# Patient Record
Sex: MALE | Race: WHITE | NOT HISPANIC OR LATINO | Employment: OTHER | URBAN - NONMETROPOLITAN AREA
[De-identification: names, ages, dates, MRNs, and addresses within clinical notes are randomized per-mention and may not be internally consistent; named-entity substitution may affect disease eponyms.]

---

## 2017-03-07 ENCOUNTER — OFFICE VISIT (OUTPATIENT)
Dept: INTERNAL MEDICINE | Facility: CLINIC | Age: 57
End: 2017-03-07

## 2017-03-07 VITALS
HEART RATE: 72 BPM | TEMPERATURE: 98.5 F | BODY MASS INDEX: 36.26 KG/M2 | SYSTOLIC BLOOD PRESSURE: 126 MMHG | OXYGEN SATURATION: 98 % | WEIGHT: 231 LBS | DIASTOLIC BLOOD PRESSURE: 80 MMHG | HEIGHT: 67 IN

## 2017-03-07 DIAGNOSIS — N40.1 BENIGN NON-NODULAR PROSTATIC HYPERPLASIA WITH LOWER URINARY TRACT SYMPTOMS: ICD-10-CM

## 2017-03-07 DIAGNOSIS — I10 ESSENTIAL HYPERTENSION: Primary | ICD-10-CM

## 2017-03-07 DIAGNOSIS — K29.50 CHRONIC GASTRITIS WITHOUT BLEEDING, UNSPECIFIED GASTRITIS TYPE: ICD-10-CM

## 2017-03-07 PROCEDURE — 99214 OFFICE O/P EST MOD 30 MIN: CPT | Performed by: PHYSICIAN ASSISTANT

## 2017-03-07 RX ORDER — AMLODIPINE BESYLATE 2.5 MG/1
2.5 TABLET ORAL DAILY
Qty: 90 TABLET | Refills: 3 | Status: SHIPPED | OUTPATIENT
Start: 2017-03-07 | End: 2017-12-11 | Stop reason: SDUPTHER

## 2017-03-07 RX ORDER — PANTOPRAZOLE SODIUM 40 MG/1
40 TABLET, DELAYED RELEASE ORAL DAILY
Qty: 90 TABLET | Refills: 3 | Status: SHIPPED | OUTPATIENT
Start: 2017-03-07 | End: 2017-12-11 | Stop reason: SDUPTHER

## 2017-03-07 RX ORDER — PRAVASTATIN SODIUM 40 MG
40 TABLET ORAL DAILY
Qty: 90 TABLET | Refills: 3 | Status: SHIPPED | OUTPATIENT
Start: 2017-03-07 | End: 2017-12-11 | Stop reason: SDUPTHER

## 2017-03-07 RX ORDER — CARVEDILOL 12.5 MG/1
12.5 TABLET ORAL 2 TIMES DAILY WITH MEALS
Qty: 180 TABLET | Refills: 3 | Status: SHIPPED | OUTPATIENT
Start: 2017-03-07 | End: 2017-12-11 | Stop reason: SDUPTHER

## 2017-03-07 RX ORDER — HYDROCHLOROTHIAZIDE 12.5 MG/1
12.5 CAPSULE, GELATIN COATED ORAL DAILY
Qty: 90 CAPSULE | Refills: 3 | Status: SHIPPED | OUTPATIENT
Start: 2017-03-07 | End: 2017-12-18 | Stop reason: SDUPTHER

## 2017-03-07 RX ORDER — TAMSULOSIN HYDROCHLORIDE 0.4 MG/1
1 CAPSULE ORAL NIGHTLY
Qty: 90 CAPSULE | Refills: 3 | Status: SHIPPED | OUTPATIENT
Start: 2017-03-07 | End: 2017-12-11 | Stop reason: SDUPTHER

## 2017-03-07 NOTE — PROGRESS NOTES
Chris Mejia is a 57 y.o. male.     Subjective   History of Present Illness   Here today for follow up of hypertension. BP well controlled with amlodipine, coreg and HCTZ.  Denies CP, SOA, edema or palpitations.     Chronic gastritis: well controlled with Protonix daily. Denies epigastric pain.         The following portions of the patient's history were reviewed and updated as appropriate: allergies, current medications, past family history, past medical history, past social history, past surgical history and problem list.    Review of Systems    Constitutional: Negative for appetite change, chills, fatigue, fever and unexpected weight change.   HENT: Negative for congestion, ear pain, hearing loss, nosebleeds, postnasal drip, rhinorrhea, sore throat, tinnitus and trouble swallowing.    Eyes: Negative for photophobia, discharge and visual disturbance.   Respiratory: Negative for cough, chest tightness, shortness of breath and wheezing.    Cardiovascular: Negative for chest pain, palpitations and leg swelling.   Gastrointestinal: Negative for abdominal distention, abdominal pain, blood in stool, constipation, diarrhea, nausea and vomiting.   Endocrine: Negative for cold intolerance, heat intolerance, polydipsia, polyphagia and polyuria.   Musculoskeletal: Negative for arthralgias, back pain, joint swelling, myalgias, neck pain and neck stiffness.   Skin: Negative for color change, pallor, rash and wound.   Allergic/Immunologic: Negative for environmental allergies, food allergies and immunocompromised state.   Neurological: Negative for dizziness, tremors, seizures, weakness, numbness and headaches.   Hematological: Negative for adenopathy. Does not bruise/bleed easily.   Psychiatric/Behavioral: Negative for sleep disturbances, agitation, behavioral problems, confusion, hallucinations, self-injury and suicidal ideas. The patient is not nervous/anxious.      Objective    Physical Exam  Constitutional: Oriented to  "person, place, and time. Appears well-developed and well-nourished.   HENT:   Head: Normocephalic and atraumatic.   Eyes: EOM are normal. Pupils are equal, round, and reactive to light.   Neck: Normal range of motion. Neck supple.   Cardiovascular: Normal rate, regular rhythm and normal heart sounds.    Pulmonary/Chest: Effort normal and breath sounds normal. No respiratory distress.  Has no wheezes or rales. Exhibits no chest wall tenderness.   Abdominal: Soft. Bowel sounds are normal. Exhibits no distension and no mass. There is no tenderness.   Musculoskeletal: Normal range of motion. Exhibits no tenderness.   Neurological: Alert and oriented to person, place, and time.   Skin: Skin is warm and dry.   Psychiatric: Has a normal mood and affect. Behavior is normal. Judgment and thought content normal.       Visit Vitals   • /80   • Pulse 72   • Temp 98.5 °F (36.9 °C)   • Ht 67\" (170.2 cm)   • Wt 231 lb (105 kg)   • SpO2 98%   • BMI 36.18 kg/m2       Nursing note and vitals reviewed.        Assessment/Plan   Chris was seen today for follow-up and hypertension.    Diagnoses and all orders for this visit:    Essential hypertension    Benign non-nodular prostatic hyperplasia with lower urinary tract symptoms  -     tamsulosin (FLOMAX) 0.4 MG capsule 24 hr capsule; Take 1 capsule by mouth Every Night.    Chronic gastritis without bleeding, unspecified gastritis type    Other orders  -     pantoprazole (PROTONIX) 40 MG EC tablet; Take 1 tablet by mouth Daily.  -     amLODIPine (NORVASC) 2.5 MG tablet; Take 1 tablet by mouth Daily.  -     carvedilol (COREG) 12.5 MG tablet; Take 1 tablet by mouth 2 (Two) Times a Day With Meals.  -     hydrochlorothiazide (MICROZIDE) 12.5 MG capsule; Take 1 capsule by mouth Daily.  -     pravastatin (PRAVACHOL) 40 MG tablet; Take 1 tablet by mouth Daily.      HTN stable with current medications: continue.     Gastritis: stable with Protonix. Continue. F/u with Dr. Lopez in 1 year as " scheduled.

## 2017-04-10 ENCOUNTER — OFFICE VISIT (OUTPATIENT)
Dept: INTERNAL MEDICINE | Facility: CLINIC | Age: 57
End: 2017-04-10

## 2017-04-10 VITALS
HEART RATE: 68 BPM | WEIGHT: 230 LBS | SYSTOLIC BLOOD PRESSURE: 126 MMHG | TEMPERATURE: 98 F | HEIGHT: 67 IN | DIASTOLIC BLOOD PRESSURE: 86 MMHG | BODY MASS INDEX: 36.1 KG/M2 | OXYGEN SATURATION: 98 %

## 2017-04-10 DIAGNOSIS — G11.9: ICD-10-CM

## 2017-04-10 DIAGNOSIS — F51.01 PRIMARY INSOMNIA: Primary | ICD-10-CM

## 2017-04-10 DIAGNOSIS — Z00.00 WELCOME TO MEDICARE PREVENTIVE VISIT: ICD-10-CM

## 2017-04-10 DIAGNOSIS — G11.9 CEREBRAL ATAXIA (HCC): ICD-10-CM

## 2017-04-10 PROCEDURE — G0402 INITIAL PREVENTIVE EXAM: HCPCS | Performed by: PHYSICIAN ASSISTANT

## 2017-04-10 PROCEDURE — G0403 EKG FOR INITIAL PREVENT EXAM: HCPCS | Performed by: PHYSICIAN ASSISTANT

## 2017-04-10 PROCEDURE — 99396 PREV VISIT EST AGE 40-64: CPT | Performed by: PHYSICIAN ASSISTANT

## 2017-04-10 PROCEDURE — 96160 PT-FOCUSED HLTH RISK ASSMT: CPT | Performed by: PHYSICIAN ASSISTANT

## 2017-04-10 RX ORDER — AMITRIPTYLINE HYDROCHLORIDE 50 MG/1
TABLET, FILM COATED ORAL
Qty: 90 TABLET | Refills: 3 | Status: SHIPPED | OUTPATIENT
Start: 2017-04-10 | End: 2018-03-02

## 2017-04-10 RX ORDER — CARBAMAZEPINE 200 MG/1
200 TABLET ORAL NIGHTLY PRN
Qty: 90 TABLET | Refills: 3 | Status: SHIPPED | OUTPATIENT
Start: 2017-04-10 | End: 2018-06-04 | Stop reason: SDUPTHER

## 2017-04-10 NOTE — PROGRESS NOTES
QUICK REFERENCE INFORMATION:  The ABCs of the Annual Wellness Visit    [facility]    HEALTH RISK ASSESSMENT    1960    Recent Hospitalizations:  No recent hospitalization(s)..      Current Medical Providers:  Patient Care Team:  Dallas Deleon DO as PCP - General (Family Medicine)      Smoking Status:  History   Smoking Status   • Never Smoker   Smokeless Tobacco   • Never Used       Alcohol Consumption:  History   Alcohol Use No       Depression Screen:   PHQ-9 Depression Screening 4/10/2017   Little interest or pleasure in doing things 1   Feeling down, depressed, or hopeless 0   Trouble falling or staying asleep, or sleeping too much 3   Feeling tired or having little energy 1   Poor appetite or overeating 1   Feeling bad about yourself - or that you are a failure or have let yourself or your family down 1   Trouble concentrating on things, such as reading the newspaper or watching television 0   Moving or speaking so slowly that other people could have noticed. Or the opposite - being so fidgety or restless that you have been moving around a lot more than usual 1   Thoughts that you would be better off dead, or of hurting yourself in some way 0   PHQ-9 Total Score 8   If you checked off any problems, how difficult have these problems made it for you to do your work, take care of things at home, or get along with other people? Not difficult at all       Health Habits and Functional and Cognitive Screening:  Functional & Cognitive Status 4/10/2017   Do you have difficulty preparing food and eating? No   Do you have difficulty bathing yourself? No   Do you have difficulty getting dressed? No   Do you have difficulty using the toilet? No   Do you have difficulty moving around from place to place? No   In the past year have you fallen or experienced a near fall? No   Do you need help using the phone?  No   Are you deaf or do you have serious difficulty hearing?  Yes   Do you need help with  transportation? No   Do you need help shopping? No   Do you need help preparing meals?  No   Do you need help with housework?  No   Do you need help with laundry? No   Do you need help taking your medications? No   Do you need help managing money? No   Do you have difficulty concentrating, remembering or making decisions? Yes       Health Habits  Current Diet: Limited Junk Food  Dental Exam: Not up to date  Eye Exam: Not up to date  Exercise (times per week): 7 times per week for knees, once weekly whole body  Current Exercise Activities Include:  (leg exercise, sit ups, working outside)        Does the patient have evidence of cognitive impairment? 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     Asprin use counseling?yes      Recent Lab Results:  CMP:  Lab Results   Component Value Date    GLU 95 06/27/2016    BUN 18 06/27/2016    CREATININE 0.90 06/27/2016    EGFRIFNONA 87 06/27/2016    BCR 20.0 06/27/2016     06/27/2016    K 4.2 06/27/2016    CO2 31.0 06/27/2016    CALCIUM 9.6 06/27/2016    PROTENTOTREF 7.8 06/27/2016    ALBUMIN 4.20 06/27/2016    LABIL2 1.2 06/27/2016    BILITOT 0.8 06/27/2016    ALKPHOS 50 06/27/2016    AST 32 06/27/2016    ALT 39 06/27/2016     Lipid Panel:  Lab Results   Component Value Date    CHLPL 147 06/27/2016    TRIG 103 06/27/2016    HDL 49 06/27/2016    VLDL 20.6 06/27/2016     LDL:  Lab Results   Component Value Date    LDLCHOLDIREC 75 06/27/2016     HbA1c:     Urine Microalbumin:     Visual Acuity:  No exam data present    Age-appropriate Screening Schedule:  Refer to the list below for future screening recommendations based on patient's age, sex and/or medical conditions. Orders for these recommended tests are listed in the plan section. The patient has been provided with a written plan.    Health Maintenance   Topic Date Due   • INFLUENZA VACCINE  10/02/2017 (Originally 8/1/2016)   • LIPID PANEL  06/27/2017   • COLONOSCOPY  03/12/2018   • TDAP/TD VACCINES (2 -  Td) 01/01/2020        Subjective   History of Present Illness    Chris Mejia is a 57 y.o. male established patient presenting for a Welcome to Medicare Visit.     The following portions of the patient's history were reviewed and updated as appropriate: allergies, current medications, past family history, past medical history, past social history, past surgical history and problem list.    Outpatient Medications Prior to Visit   Medication Sig Dispense Refill   • amLODIPine (NORVASC) 2.5 MG tablet Take 1 tablet by mouth Daily. 90 tablet 3   • aspirin  MG tablet Take  by mouth.     • carvedilol (COREG) 12.5 MG tablet Take 1 tablet by mouth 2 (Two) Times a Day With Meals. 180 tablet 3   • hydrochlorothiazide (MICROZIDE) 12.5 MG capsule Take 1 capsule by mouth Daily. 90 capsule 3   • loratadine (CLARITIN) 10 MG tablet      • pantoprazole (PROTONIX) 40 MG EC tablet Take 1 tablet by mouth Daily. 90 tablet 3   • pravastatin (PRAVACHOL) 40 MG tablet Take 1 tablet by mouth Daily. 90 tablet 3   • tamsulosin (FLOMAX) 0.4 MG capsule 24 hr capsule Take 1 capsule by mouth Every Night. 90 capsule 3   • amitriptyline (ELAVIL) 50 MG tablet Take 1 pill at supper time daily 30 tablet 0   • carBAMazepine (TEGretol) 200 MG tablet Take  by mouth 2 (two) times a day.       No facility-administered medications prior to visit.        Patient Active Problem List   Diagnosis   • Tubular adenoma of colon   • Trigeminal neuralgia   • Spinocerebellar disorder   • RA (rheumatoid arthritis)   • Insomnia   • Hypertension   • Hypercholesteremia   • Acid reflux   • Anxiety   • Vitamin B12 deficiency   • Vitamin D deficiency   • DENZEL (obstructive sleep apnea)   • Right upper quadrant pain   • Abdominal pain   • Acute sinusitis   • Atopic rhinitis   • Ataxia   • Ataxic gait   • Cellulitis   • Chest pain   • Dizziness   • Edema   • Fall   • Gastritis   • Headache   • Closed fracture of left hip   • Acute lymphadenitis   • Shoulder pain   •  Tick bite   • Encounter for screening colonoscopy       Advanced Care Planning:  has NO advanced directive - information provided to the patient today    Identification of Risk Factors:  Risk factors include: cardiovascular risk, cognitive impairment and hearing limitations.    Review of Systems   Constitutional: Negative for appetite change, chills, fatigue, fever and unexpected weight change.   HENT: Negative for congestion, ear pain, hearing loss, nosebleeds, postnasal drip, rhinorrhea, sore throat, tinnitus and trouble swallowing.    Eyes: Negative for photophobia, discharge and visual disturbance.   Respiratory: Negative for cough, chest tightness, shortness of breath and wheezing.    Cardiovascular: Negative for chest pain, palpitations and leg swelling.   Gastrointestinal: Negative for abdominal distention, abdominal pain, blood in stool, constipation, diarrhea, nausea and vomiting.   Endocrine: Negative for cold intolerance, heat intolerance, polydipsia, polyphagia and polyuria.   Musculoskeletal: Positive for arthralgias (knee pain). Negative for back pain, joint swelling, myalgias, neck pain and neck stiffness.   Skin: Negative for color change, pallor, rash and wound.   Allergic/Immunologic: Negative for environmental allergies, food allergies and immunocompromised state.   Neurological: Negative for dizziness, tremors, seizures, weakness, numbness and headaches.   Hematological: Negative for adenopathy. Does not bruise/bleed easily.   Psychiatric/Behavioral: Negative for agitation, behavioral problems, confusion, hallucinations, self-injury and suicidal ideas. The patient is not nervous/anxious.        Compared to one year ago, the patient feels his physical health is the same.  Compared to one year ago, the patient feels his mental health is better.    Objective    Physical Exam   Constitutional: He is oriented to person, place, and time. He appears well-developed and well-nourished. No distress.  "  HENT:   Head: Normocephalic and atraumatic.   Eyes: EOM are normal. Pupils are equal, round, and reactive to light.   Neck: Normal range of motion. Neck supple. No thyromegaly present.   Cardiovascular: Normal rate, regular rhythm and normal heart sounds.  Exam reveals no gallop and no friction rub.    No murmur heard.  Pulmonary/Chest: Effort normal and breath sounds normal. No respiratory distress. He has no wheezes. He has no rales. He exhibits no tenderness.   Abdominal: Soft. Bowel sounds are normal. He exhibits no distension. There is no tenderness.   Musculoskeletal: Normal range of motion.   Lymphadenopathy:     He has no cervical adenopathy.   Neurological: He is alert and oriented to person, place, and time. Coordination abnormal.   Ataxic gait   Skin: Skin is warm and dry. He is not diaphoretic.   Psychiatric: He has a normal mood and affect. His behavior is normal. Judgment and thought content normal.   Nursing note and vitals reviewed.        Vitals:    04/10/17 1028   BP: 126/86   Pulse: 68   Temp: 98 °F (36.7 °C)   SpO2: 98%   Weight: 230 lb (104 kg)   Height: 67\" (170.2 cm)       Body mass index is 36.02 kg/(m^2).  Discussed the patient's BMI with him. The BMI is above average; BMI management plan is completed.    Procedure     ECG 12 Lead  Date/Time: 4/10/2017 11:11 AM  Performed by: BRIANA NGUYEN  Authorized by: BRIANA NGUYEN   Comparison: not compared with previous ECG   Previous ECG: no previous ECG available  Rhythm: sinus bradycardia  Rate: bradycardic  BPM: 58  Conduction comments: Possible 1st degree block  ST Segments: ST segments normal  T Waves: T waves normal  QRS axis: normal  Other: no other findings  Clinical impression: normal ECG               Assessment/Plan   Patient Self-Management and Personalized Health Advice  The patient has been provided with information about: fall prevention and designing advance directives and preventive services including:   · Advanced " directives: has NO advanced directive - information provided to the patient today.    Visit Diagnoses:    ICD-10-CM ICD-9-CM   1. Primary insomnia F51.01 307.42   2. 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 Spinocerebellar disorder G11.8 334.9   4. Welcome to Medicare preventive visit Z00.00 V70.0       Orders Placed This Encounter   Procedures   • ECG 12 Lead     This order was created via procedure documentation       Outpatient Encounter Prescriptions as of 4/10/2017   Medication Sig Dispense Refill   • amitriptyline (ELAVIL) 50 MG tablet Take 1 pill at bedtime daily. 90 tablet 3   • amLODIPine (NORVASC) 2.5 MG tablet Take 1 tablet by mouth Daily. 90 tablet 3   • aspirin  MG tablet Take  by mouth.     • carBAMazepine (TEGretol) 200 MG tablet Take 1 tablet by mouth At Night As Needed (headache). 90 tablet 3   • carvedilol (COREG) 12.5 MG tablet Take 1 tablet by mouth 2 (Two) Times a Day With Meals. 180 tablet 3   • hydrochlorothiazide (MICROZIDE) 12.5 MG capsule Take 1 capsule by mouth Daily. 90 capsule 3   • loratadine (CLARITIN) 10 MG tablet      • pantoprazole (PROTONIX) 40 MG EC tablet Take 1 tablet by mouth Daily. 90 tablet 3   • pravastatin (PRAVACHOL) 40 MG tablet Take 1 tablet by mouth Daily. 90 tablet 3   • tamsulosin (FLOMAX) 0.4 MG capsule 24 hr capsule Take 1 capsule by mouth Every Night. 90 capsule 3   • [DISCONTINUED] amitriptyline (ELAVIL) 50 MG tablet Take 1 pill at supper time daily 30 tablet 0   • [DISCONTINUED] carBAMazepine (TEGretol) 200 MG tablet Take  by mouth 2 (two) times a day.       No facility-administered encounter medications on file as of 4/10/2017.        Reviewed use of high risk medication in the elderly: yes  Reviewed for potential of harmful drug interactions in the elderly: yes    Follow Up:  No Follow-up on file.     An After Visit Summary and PPPS with all of these plans were given to the patient.

## 2017-04-10 NOTE — PATIENT INSTRUCTIONS
Medicare Wellness  Personal Prevention Plan of Service     Date of Office Visit:  04/10/2017  Encounter Provider:  CRISTIAN Ascencio  Place of Service:  [facility]  Patient Name: Chris Mejia  :  1960    As part of the Medicare Wellness portion of your visit today, we are providing you with this personalized preventive plan of services (PPPS). This plan is based upon recommendations of the [place] Preventive Services Task Force (USPSTF) and the Advisory Committee on Immunization Practices (ACIP).    This lists the preventive care services that should be considered, and provides dates of when you are due. Items listed as completed are up-to-date and do not require any further intervention.    Health Maintenance   Topic Date Due   • HEPATITIS C SCREENING  10/02/2017 (Originally 2016)   • INFLUENZA VACCINE  10/02/2017 (Originally 2016)   • LIPID PANEL  2017   • COLONOSCOPY  2018   • MEDICARE ANNUAL WELLNESS  04/10/2018   • TDAP/TD VACCINES (2 - Td) 2020       Orders Placed This Encounter   Procedures   • ECG 12 Lead     This order was created via procedure documentation       No Follow-up on file.

## 2017-04-18 ENCOUNTER — TELEPHONE (OUTPATIENT)
Dept: INTERNAL MEDICINE | Facility: CLINIC | Age: 57
End: 2017-04-18

## 2017-04-18 DIAGNOSIS — R51.9 FREQUENT HEADACHES: ICD-10-CM

## 2017-04-18 DIAGNOSIS — R27.0 ATAXIA: Primary | ICD-10-CM

## 2017-04-18 NOTE — TELEPHONE ENCOUNTER
----- Message from Oksana Sahu sent at 4/18/2017  3:14 PM EDT -----  Contact: Diana Patients Wife  Diana called the office stating that [facility] pharmacy has been trying to get in touch with our office regarding 2 medications that Susana had sent in. They state that there is 2 alternatives that they are wanting to change them to due to the payment for the patient. The patients wife would like an update once you get a chance to contact [facility] mail order.

## 2017-04-18 NOTE — TELEPHONE ENCOUNTER
KE WANTS AMITRIPTYLINE CHANGED TO TRAZODONE, AND CARBAMAZIPINE TO CARBAMAZEPINE CHW? IT WILL BE CHEAPER ON PT, CAN WE CHANGE, LET PT WIFE KNOW WHAT WE DO

## 2017-04-21 NOTE — TELEPHONE ENCOUNTER
Discussed with his wife. After further review of the last neurology note it does not appear that he is supposed to be using Tegretol so I advised her not to have it filled. Request for Trazodone not advised due to potential worsening of headaches. Recommended using Elavil until follow up with neurology when med changes may be made. She agreed to this.

## 2017-09-15 ENCOUNTER — OFFICE VISIT (OUTPATIENT)
Dept: INTERNAL MEDICINE | Facility: CLINIC | Age: 57
End: 2017-09-15

## 2017-09-15 VITALS
OXYGEN SATURATION: 96 % | HEIGHT: 67 IN | BODY MASS INDEX: 36.1 KG/M2 | HEART RATE: 70 BPM | SYSTOLIC BLOOD PRESSURE: 130 MMHG | TEMPERATURE: 98.1 F | WEIGHT: 230 LBS | DIASTOLIC BLOOD PRESSURE: 86 MMHG

## 2017-09-15 DIAGNOSIS — M19.012 BILATERAL SHOULDER REGION ARTHRITIS: Primary | ICD-10-CM

## 2017-09-15 DIAGNOSIS — F41.9 ANXIETY: ICD-10-CM

## 2017-09-15 DIAGNOSIS — Z00.00 PREVENTATIVE HEALTH CARE: ICD-10-CM

## 2017-09-15 DIAGNOSIS — M19.011 BILATERAL SHOULDER REGION ARTHRITIS: Primary | ICD-10-CM

## 2017-09-15 DIAGNOSIS — R97.20 ELEVATED PSA: ICD-10-CM

## 2017-09-15 LAB
ALBUMIN SERPL-MCNC: 4.4 G/DL (ref 3.5–5)
ALBUMIN/GLOB SERPL: 1.4 G/DL (ref 1–2)
ALP SERPL-CCNC: 51 U/L (ref 38–126)
ALT SERPL-CCNC: 36 U/L (ref 13–69)
AST SERPL-CCNC: 30 U/L (ref 15–46)
BILIRUB SERPL-MCNC: 0.7 MG/DL (ref 0.2–1.3)
BUN SERPL-MCNC: 20 MG/DL (ref 7–20)
BUN/CREAT SERPL: 22.2 (ref 6.3–21.9)
CALCIUM SERPL-MCNC: 9.9 MG/DL (ref 8.4–10.2)
CHLORIDE SERPL-SCNC: 104 MMOL/L (ref 98–107)
CHOLEST SERPL-MCNC: 124 MG/DL (ref 0–199)
CO2 SERPL-SCNC: 29 MMOL/L (ref 26–30)
CREAT SERPL-MCNC: 0.9 MG/DL (ref 0.6–1.3)
GLOBULIN SER CALC-MCNC: 3.2 GM/DL
GLUCOSE SERPL-MCNC: 103 MG/DL (ref 74–98)
HDLC SERPL-MCNC: 43 MG/DL (ref 40–60)
LDLC SERPL CALC-MCNC: 66 MG/DL (ref 0–99)
POTASSIUM SERPL-SCNC: 4.8 MMOL/L (ref 3.5–5.1)
PROT SERPL-MCNC: 7.6 G/DL (ref 6.3–8.2)
PSA SERPL-MCNC: 2.34 NG/ML (ref 0.06–4)
SODIUM SERPL-SCNC: 142 MMOL/L (ref 137–145)
TRIGL SERPL-MCNC: 75 MG/DL
TSH SERPL DL<=0.005 MIU/L-ACNC: 2.25 MIU/ML (ref 0.47–4.68)
VLDLC SERPL CALC-MCNC: 15 MG/DL

## 2017-09-15 PROCEDURE — 99214 OFFICE O/P EST MOD 30 MIN: CPT | Performed by: PHYSICIAN ASSISTANT

## 2017-09-15 RX ORDER — CETIRIZINE HYDROCHLORIDE 10 MG/1
10 TABLET ORAL DAILY
COMMUNITY
Start: 2017-08-07 | End: 2021-02-10

## 2017-09-15 RX ORDER — BUSPIRONE HYDROCHLORIDE 10 MG/1
10 TABLET ORAL 2 TIMES DAILY PRN
Qty: 180 TABLET | Refills: 1 | Status: SHIPPED | OUTPATIENT
Start: 2017-09-15 | End: 2018-03-02 | Stop reason: SDUPTHER

## 2017-09-15 NOTE — PROGRESS NOTES
Chris Mejia is a 57 y.o. male.     Subjective   History of Present Illness   Here today with concern of increased anxiety which he feels is due to babysitting grand kids who do not mind him.   Also concerned with bilateral shoulder pain which has been bothersome more than usual. The right shoulder is worse than the left. Previously saw Dr. Gaytan who was giving him steroid injections which were helpful but he has not had one in the last few years.         The following portions of the patient's history were reviewed and updated as appropriate: allergies, current medications, past family history, past medical history, past social history, past surgical history and problem list.    Review of Systems    Constitutional: Negative for appetite change, chills, fatigue, fever and unexpected weight change.   HENT: Negative for congestion, ear pain, hearing loss, nosebleeds, postnasal drip, rhinorrhea, sore throat, tinnitus and trouble swallowing.    Eyes: Negative for photophobia, discharge and visual disturbance.   Respiratory: Negative for cough, chest tightness, shortness of breath and wheezing.    Cardiovascular: Negative for chest pain, palpitations and leg swelling.   Gastrointestinal: Negative for abdominal distention, abdominal pain, blood in stool, constipation, diarrhea, nausea and vomiting.   Endocrine: Negative for cold intolerance, heat intolerance, polydipsia, polyphagia and polyuria.   Musculoskeletal: bilateral shoulder pain. Negative for back pain, joint swelling, myalgias, neck pain and neck stiffness.   Skin: Negative for color change, pallor, rash and wound.   Allergic/Immunologic: Negative for environmental allergies, food allergies and immunocompromised state.   Neurological: Negative for dizziness, tremors, seizures, weakness, numbness and headaches.   Hematological: Negative for adenopathy. Does not bruise/bleed easily.   Psychiatric/Behavioral: anxious. Negative for sleep disturbances, agitation,  "behavioral problems, confusion, hallucinations, self-injury and suicidal ideas.     Objective    Physical Exam  Constitutional: Oriented to person, place, and time. Appears well-developed and well-nourished.   HENT:   Head: Normocephalic and atraumatic.   Eyes: EOM are normal. Pupils are equal, round, and reactive to light.   Neck: Normal range of motion. Neck supple.   Cardiovascular: Normal rate, regular rhythm and normal heart sounds.    Pulmonary/Chest: Effort normal and breath sounds normal. No respiratory distress.  Has no wheezes or rales. Exhibits no chest wall tenderness.   Abdominal: Soft. Bowel sounds are normal. Exhibits no distension and no mass. There is no tenderness.   Musculoskeletal: tenderness bilateral shoulder regions, pain with ROM. Normal range of motion.   Neurological: Alert and oriented to person, place, and time.   Skin: Skin is warm and dry.   Psychiatric: Has a normal mood and affect. Behavior is normal. Judgment and thought content normal.       /86  Pulse 70  Temp 98.1 °F (36.7 °C)  Ht 67\" (170.2 cm)  Wt 230 lb (104 kg)  SpO2 96%  BMI 36.02 kg/m2    Nursing note and vitals reviewed.        Assessment/Plan   Chris was seen today for follow-up, anxiety and shoulder pain.    Diagnoses and all orders for this visit:    Bilateral shoulder region arthritis  -     Ambulatory Referral to Orthopedic Surgery    Anxiety  -     busPIRone (BUSPAR) 10 MG tablet; Take 1 tablet by mouth 2 (Two) Times a Day As Needed (anxiety).  -     TSH    Preventative health care  -     Lipid Panel  -     Comprehensive Metabolic Panel    Elevated PSA  -     PSA               "

## 2017-11-20 ENCOUNTER — OFFICE VISIT (OUTPATIENT)
Dept: INTERNAL MEDICINE | Facility: CLINIC | Age: 57
End: 2017-11-20

## 2017-11-20 VITALS
BODY MASS INDEX: 36.47 KG/M2 | DIASTOLIC BLOOD PRESSURE: 84 MMHG | HEIGHT: 67 IN | SYSTOLIC BLOOD PRESSURE: 124 MMHG | RESPIRATION RATE: 16 BRPM | HEART RATE: 88 BPM | WEIGHT: 232.38 LBS | TEMPERATURE: 98.6 F | OXYGEN SATURATION: 95 %

## 2017-11-20 DIAGNOSIS — J10.1 INFLUENZA A VIRUS PRESENT: Primary | ICD-10-CM

## 2017-11-20 LAB
EXPIRATION DATE: ABNORMAL
FLUAV AG NPH QL: ABNORMAL
FLUBV AG NPH QL: ABNORMAL
INTERNAL CONTROL: ABNORMAL
Lab: ABNORMAL

## 2017-11-20 PROCEDURE — 99213 OFFICE O/P EST LOW 20 MIN: CPT | Performed by: NURSE PRACTITIONER

## 2017-11-20 PROCEDURE — 87804 INFLUENZA ASSAY W/OPTIC: CPT | Performed by: NURSE PRACTITIONER

## 2017-11-20 RX ORDER — OSELTAMIVIR PHOSPHATE 75 MG/1
75 CAPSULE ORAL 2 TIMES DAILY
Qty: 10 CAPSULE | Refills: 0 | Status: SHIPPED | OUTPATIENT
Start: 2017-11-20 | End: 2017-11-25

## 2017-11-20 RX ORDER — DEXTROMETHORPHAN HYDROBROMIDE AND PROMETHAZINE HYDROCHLORIDE 15; 6.25 MG/5ML; MG/5ML
5 SYRUP ORAL 4 TIMES DAILY PRN
Qty: 240 ML | Refills: 0 | Status: SHIPPED | OUTPATIENT
Start: 2017-11-20 | End: 2017-12-07 | Stop reason: SDUPTHER

## 2017-11-20 NOTE — PATIENT INSTRUCTIONS
Viral Respiratory Infection  A respiratory infection is an illness that affects part of the respiratory system, such as the lungs, nose, or throat. Most respiratory infections are caused by either viruses or bacteria. A respiratory infection that is caused by a virus is called a viral respiratory infection.  Common types of viral respiratory infections include:  · A cold.  · The flu (influenza).  · A respiratory syncytial virus (RSV) infection.  HOW DO I KNOW IF I HAVE A VIRAL RESPIRATORY INFECTION?  Most viral respiratory infections cause:  · A stuffy or runny nose.  · Yellow or green nasal discharge.  · A cough.  · Sneezing.  · Fatigue.  · Achy muscles.  · A sore throat.  · Sweating or chills.  · A fever.  · A headache.  HOW ARE VIRAL RESPIRATORY INFECTIONS TREATED?  If influenza is diagnosed early, it may be treated with an antiviral medicine that shortens the length of time a person has symptoms. Symptoms of viral respiratory infections may be treated with over-the-counter and prescription medicines, such as:  · Expectorants. These make it easier to cough up mucus.  · Decongestant nasal sprays.  Health care providers do not prescribe antibiotic medicines for viral infections. This is because antibiotics are designed to kill bacteria. They have no effect on viruses.  HOW DO I KNOW IF I SHOULD STAY HOME FROM WORK OR SCHOOL?  To avoid exposing others to your respiratory infection, stay home if you have:  · A fever.  · A persistent cough.  · A sore throat.  · A runny nose.  · Sneezing.  · Muscles aches.  · Headaches.  · Fatigue.  · Weakness.  · Chills.  · Sweating.  · Nausea.  HOME CARE INSTRUCTIONS  · Rest as much as possible.  · Take over-the-counter and prescription medicines only as told by your health care provider.  · Drink enough fluid to keep your urine clear or pale yellow. This helps prevent dehydration and helps loosen up mucus.  · Gargle with a salt-water mixture 3-4 times per day or as needed. To make a  salt-water mixture, completely dissolve ½-1 tsp of salt in 1 cup of warm water.  · Use nose drops made from salt water to ease congestion and soften raw skin around your nose.  · Do not drink alcohol.  · Do not use tobacco products, including cigarettes, chewing tobacco, and e-cigarettes. If you need help quitting, ask your health care provider.  SEEK MEDICAL CARE IF:  · Your symptoms last for 10 days or longer.  · Your symptoms get worse over time.  · You have a fever.  · You have severe sinus pain in your face or forehead.  · The glands in your jaw or neck become very swollen.  SEEK IMMEDIATE MEDICAL CARE IF:  · You feel pain or pressure in your chest.  · You have shortness of breath.  · You faint or feel like you will faint.  · You have severe and persistent vomiting.  · You feel confused or disoriented.     This information is not intended to replace advice given to you by your health care provider. Make sure you discuss any questions you have with your health care provider.     Document Released: 09/27/2006 Document Revised: 04/10/2017 Document Reviewed: 05/25/2016  [employer] Patient Education ©2017 [employer]

## 2017-11-20 NOTE — PROGRESS NOTES
Chief Complaint / Reason:      Chief Complaint   Patient presents with   • Cough     onset Thursday night, cough is sometimes productive, chest sore from coughing   • Headache     using a nasal spray, unsure of name       Subjective     HPI  She presents today with complaints of cough, headache, and chest tenderness.  He reports that he is congested but denies fever.  He indicates onset was Thursday night and is accompanied by his wife who has similar symptoms.  He has tried using the nasal spray and taking allergy medication with out any relief.  Denies chest pain, shortness of breath or heart palpitations.  Denies any sick contacts.  History taken from: patient    PMH/FH/Social History were reviewed and updated appropriately in the electronic medical record.     Review of Systems:   Review of Systems   Constitutional: Positive for fatigue. Negative for fever.   HENT: Positive for congestion, sinus pain, sinus pressure and sore throat.    Respiratory: Positive for cough.    Cardiovascular: Negative.    Gastrointestinal: Negative.    Skin: Negative.    Neurological: Positive for headaches.   Hematological: Negative for adenopathy.     All other systems were reviewed and are negative.  Exceptions are noted in the subjective or above.      Objective     Vital Signs  Vitals:    11/20/17 1705   BP: 124/84   Pulse: 88   Resp: 16   Temp: 98.6 °F (37 °C)   SpO2: 95%       Body mass index is 36.4 kg/(m^2).    Physical Exam   Constitutional: He is oriented to person, place, and time. He appears well-developed and well-nourished.   HENT:   Head: Normocephalic.   Right Ear: External ear normal.   Left Ear: External ear normal.   Mouth/Throat: Mucous membranes are dry. Posterior oropharyngeal erythema present.   Cardiovascular: Normal rate, regular rhythm, normal heart sounds and intact distal pulses.    Pulmonary/Chest: Effort normal and breath sounds normal.   Abdominal: Soft. Bowel sounds are normal.   Lymphadenopathy:      He has no cervical adenopathy.   Neurological: He is alert and oriented to person, place, and time.   Skin: Skin is warm and dry.   Psychiatric: He has a normal mood and affect. His behavior is normal. Judgment and thought content normal.   Nursing note and vitals reviewed.       Results Review:    I reviewed the patient's new clinical results.   Influenza A positive  Medication Review:     Current Outpatient Prescriptions:   •  Acetaminophen 500 MG coapsule, , Disp: , Rfl:   •  amitriptyline (ELAVIL) 50 MG tablet, Take 1 pill at bedtime daily., Disp: 90 tablet, Rfl: 3  •  amLODIPine (NORVASC) 2.5 MG tablet, Take 1 tablet by mouth Daily., Disp: 90 tablet, Rfl: 3  •  aspirin  MG tablet, Take  by mouth., Disp: , Rfl:   •  busPIRone (BUSPAR) 10 MG tablet, Take 1 tablet by mouth 2 (Two) Times a Day As Needed (anxiety)., Disp: 180 tablet, Rfl: 1  •  carBAMazepine (TEGretol) 200 MG tablet, Take 1 tablet by mouth At Night As Needed (headache)., Disp: 90 tablet, Rfl: 3  •  carvedilol (COREG) 12.5 MG tablet, Take 1 tablet by mouth 2 (Two) Times a Day With Meals., Disp: 180 tablet, Rfl: 3  •  cetirizine (zyrTEC) 10 MG tablet, Daily., Disp: , Rfl:   •  hydrochlorothiazide (MICROZIDE) 12.5 MG capsule, Take 1 capsule by mouth Daily., Disp: 90 capsule, Rfl: 3  •  pantoprazole (PROTONIX) 40 MG EC tablet, Take 1 tablet by mouth Daily., Disp: 90 tablet, Rfl: 3  •  pravastatin (PRAVACHOL) 40 MG tablet, Take 1 tablet by mouth Daily., Disp: 90 tablet, Rfl: 3  •  tamsulosin (FLOMAX) 0.4 MG capsule 24 hr capsule, Take 1 capsule by mouth Every Night., Disp: 90 capsule, Rfl: 3  •  oseltamivir (TAMIFLU) 75 MG capsule, Take 1 capsule by mouth 2 (Two) Times a Day for 5 days., Disp: 10 capsule, Rfl: 0  •  promethazine-dextromethorphan (PROMETHAZINE-DM) 6.25-15 MG/5ML syrup, Take 5 mL by mouth 4 (Four) Times a Day As Needed for Cough., Disp: 240 mL, Rfl: 0    Assessment/Plan   Chris was seen today for cough and headache.    Diagnoses  and all orders for this visit:    Influenza A virus present  -     promethazine-dextromethorphan (PROMETHAZINE-DM) 6.25-15 MG/5ML syrup; Take 5 mL by mouth 4 (Four) Times a Day As Needed for Cough.  -     oseltamivir (TAMIFLU) 75 MG capsule; Take 1 capsule by mouth 2 (Two) Times a Day for 5 days.  Recommend resting at home, increased fluid intake, analgesics and antipyretics.  Good handwashing and oral hygiene. Avoid spreading the flu by covering your mouth.  Recommend getting the flu vaccine every year.    Return if symptoms worsen or fail to improve.    Silvia Resendiz, APRN  11/20/2017

## 2017-12-07 DIAGNOSIS — J10.1 INFLUENZA A VIRUS PRESENT: ICD-10-CM

## 2017-12-07 DIAGNOSIS — N40.1 BENIGN NON-NODULAR PROSTATIC HYPERPLASIA WITH LOWER URINARY TRACT SYMPTOMS: ICD-10-CM

## 2017-12-07 RX ORDER — CARVEDILOL 12.5 MG/1
12.5 TABLET ORAL 2 TIMES DAILY WITH MEALS
Qty: 180 TABLET | Refills: 3 | Status: CANCELLED | OUTPATIENT
Start: 2017-12-07

## 2017-12-07 RX ORDER — PANTOPRAZOLE SODIUM 40 MG/1
40 TABLET, DELAYED RELEASE ORAL DAILY
Qty: 90 TABLET | Refills: 3 | Status: CANCELLED | OUTPATIENT
Start: 2017-12-07

## 2017-12-07 RX ORDER — TAMSULOSIN HYDROCHLORIDE 0.4 MG/1
1 CAPSULE ORAL NIGHTLY
Qty: 90 CAPSULE | Refills: 3 | Status: CANCELLED | OUTPATIENT
Start: 2017-12-07

## 2017-12-07 RX ORDER — AMLODIPINE BESYLATE 2.5 MG/1
2.5 TABLET ORAL DAILY
Qty: 90 TABLET | Refills: 3 | Status: CANCELLED | OUTPATIENT
Start: 2017-12-07

## 2017-12-07 RX ORDER — PRAVASTATIN SODIUM 40 MG
40 TABLET ORAL DAILY
Qty: 90 TABLET | Refills: 3 | Status: CANCELLED | OUTPATIENT
Start: 2017-12-07

## 2017-12-07 NOTE — TELEPHONE ENCOUNTER
Promehazine-dextromethophran needs to go to [facility] pharmacy in [place].    Other meds need to go to [facility] mail delivery      Please let know when complete.

## 2017-12-11 ENCOUNTER — FLU SHOT (OUTPATIENT)
Dept: INTERNAL MEDICINE | Facility: CLINIC | Age: 57
End: 2017-12-11

## 2017-12-11 DIAGNOSIS — N40.1 BENIGN NON-NODULAR PROSTATIC HYPERPLASIA WITH LOWER URINARY TRACT SYMPTOMS: ICD-10-CM

## 2017-12-11 DIAGNOSIS — Z23 NEED FOR INFLUENZA VACCINE: ICD-10-CM

## 2017-12-11 PROCEDURE — G0008 ADMIN INFLUENZA VIRUS VAC: HCPCS | Performed by: PHYSICIAN ASSISTANT

## 2017-12-11 PROCEDURE — 90686 IIV4 VACC NO PRSV 0.5 ML IM: CPT | Performed by: PHYSICIAN ASSISTANT

## 2017-12-11 RX ORDER — PRAVASTATIN SODIUM 40 MG
40 TABLET ORAL DAILY
Qty: 90 TABLET | Refills: 0 | Status: SHIPPED | OUTPATIENT
Start: 2017-12-11 | End: 2018-02-23 | Stop reason: SDUPTHER

## 2017-12-11 RX ORDER — TAMSULOSIN HYDROCHLORIDE 0.4 MG/1
1 CAPSULE ORAL NIGHTLY
Qty: 90 CAPSULE | Refills: 0 | Status: SHIPPED | OUTPATIENT
Start: 2017-12-11 | End: 2018-02-23 | Stop reason: SDUPTHER

## 2017-12-11 RX ORDER — DEXTROMETHORPHAN HYDROBROMIDE AND PROMETHAZINE HYDROCHLORIDE 15; 6.25 MG/5ML; MG/5ML
5 SYRUP ORAL 4 TIMES DAILY PRN
Qty: 240 ML | Refills: 0 | Status: SHIPPED | OUTPATIENT
Start: 2017-12-11 | End: 2017-12-12 | Stop reason: SDUPTHER

## 2017-12-11 RX ORDER — AMLODIPINE BESYLATE 2.5 MG/1
2.5 TABLET ORAL DAILY
Qty: 90 TABLET | Refills: 0 | Status: SHIPPED | OUTPATIENT
Start: 2017-12-11 | End: 2018-02-23 | Stop reason: SDUPTHER

## 2017-12-11 RX ORDER — CARVEDILOL 12.5 MG/1
12.5 TABLET ORAL 2 TIMES DAILY WITH MEALS
Qty: 180 TABLET | Refills: 0 | Status: SHIPPED | OUTPATIENT
Start: 2017-12-11 | End: 2018-02-23 | Stop reason: SDUPTHER

## 2017-12-11 RX ORDER — PANTOPRAZOLE SODIUM 40 MG/1
40 TABLET, DELAYED RELEASE ORAL DAILY
Qty: 90 TABLET | Refills: 0 | Status: SHIPPED | OUTPATIENT
Start: 2017-12-11 | End: 2018-02-23 | Stop reason: SDUPTHER

## 2017-12-12 DIAGNOSIS — J10.1 INFLUENZA A VIRUS PRESENT: ICD-10-CM

## 2017-12-12 RX ORDER — DEXTROMETHORPHAN HYDROBROMIDE AND PROMETHAZINE HYDROCHLORIDE 15; 6.25 MG/5ML; MG/5ML
5 SYRUP ORAL 4 TIMES DAILY PRN
Qty: 240 ML | Refills: 0 | Status: SHIPPED | OUTPATIENT
Start: 2017-12-12 | End: 2018-03-02 | Stop reason: SDUPTHER

## 2017-12-19 RX ORDER — HYDROCHLOROTHIAZIDE 12.5 MG/1
CAPSULE, GELATIN COATED ORAL
Qty: 90 CAPSULE | Refills: 3 | Status: SHIPPED | OUTPATIENT
Start: 2017-12-19 | End: 2018-03-02 | Stop reason: SDUPTHER

## 2018-02-23 DIAGNOSIS — N40.1 BENIGN NON-NODULAR PROSTATIC HYPERPLASIA WITH LOWER URINARY TRACT SYMPTOMS: ICD-10-CM

## 2018-02-23 RX ORDER — AMLODIPINE BESYLATE 2.5 MG/1
TABLET ORAL
Qty: 90 TABLET | Refills: 1 | Status: SHIPPED | OUTPATIENT
Start: 2018-02-23 | End: 2018-03-02 | Stop reason: SDUPTHER

## 2018-02-23 RX ORDER — PRAVASTATIN SODIUM 40 MG
TABLET ORAL
Qty: 90 TABLET | Refills: 1 | Status: SHIPPED | OUTPATIENT
Start: 2018-02-23 | End: 2018-03-02 | Stop reason: SDUPTHER

## 2018-02-23 RX ORDER — PANTOPRAZOLE SODIUM 40 MG/1
TABLET, DELAYED RELEASE ORAL
Qty: 90 TABLET | Refills: 1 | Status: SHIPPED | OUTPATIENT
Start: 2018-02-23 | End: 2018-03-02 | Stop reason: SDUPTHER

## 2018-02-23 RX ORDER — CARVEDILOL 12.5 MG/1
TABLET ORAL
Qty: 180 TABLET | Refills: 1 | Status: SHIPPED | OUTPATIENT
Start: 2018-02-23 | End: 2018-03-02 | Stop reason: SDUPTHER

## 2018-02-23 RX ORDER — TAMSULOSIN HYDROCHLORIDE 0.4 MG/1
CAPSULE ORAL
Qty: 90 CAPSULE | Refills: 1 | Status: SHIPPED | OUTPATIENT
Start: 2018-02-23 | End: 2018-03-02 | Stop reason: SDUPTHER

## 2018-03-02 ENCOUNTER — OFFICE VISIT (OUTPATIENT)
Dept: INTERNAL MEDICINE | Facility: CLINIC | Age: 58
End: 2018-03-02

## 2018-03-02 VITALS
SYSTOLIC BLOOD PRESSURE: 120 MMHG | HEIGHT: 67 IN | TEMPERATURE: 98.9 F | WEIGHT: 235 LBS | HEART RATE: 84 BPM | RESPIRATION RATE: 12 BRPM | OXYGEN SATURATION: 97 % | DIASTOLIC BLOOD PRESSURE: 80 MMHG | BODY MASS INDEX: 36.88 KG/M2

## 2018-03-02 DIAGNOSIS — E78.00 HYPERCHOLESTEREMIA: ICD-10-CM

## 2018-03-02 DIAGNOSIS — K21.9 GASTROESOPHAGEAL REFLUX DISEASE WITHOUT ESOPHAGITIS: ICD-10-CM

## 2018-03-02 DIAGNOSIS — R07.2 PRECORDIAL PAIN: Primary | ICD-10-CM

## 2018-03-02 DIAGNOSIS — R05.9 COUGH: ICD-10-CM

## 2018-03-02 DIAGNOSIS — I10 ESSENTIAL HYPERTENSION: Chronic | ICD-10-CM

## 2018-03-02 DIAGNOSIS — Z23 NEED FOR ZOSTER VACCINE: ICD-10-CM

## 2018-03-02 DIAGNOSIS — N40.1 BENIGN NON-NODULAR PROSTATIC HYPERPLASIA WITH LOWER URINARY TRACT SYMPTOMS: ICD-10-CM

## 2018-03-02 DIAGNOSIS — G11.8 SPINOCEREBELLAR ATAXIA (HCC): Chronic | ICD-10-CM

## 2018-03-02 DIAGNOSIS — F41.9 ANXIETY: ICD-10-CM

## 2018-03-02 PROBLEM — H49.9 OPHTHALMOPLEGIA: Chronic | Status: ACTIVE | Noted: 2018-03-02

## 2018-03-02 PROCEDURE — 99214 OFFICE O/P EST MOD 30 MIN: CPT | Performed by: FAMILY MEDICINE

## 2018-03-02 RX ORDER — HYDROCHLOROTHIAZIDE 12.5 MG/1
12.5 CAPSULE, GELATIN COATED ORAL EVERY MORNING
Qty: 90 CAPSULE | Refills: 3 | Status: SHIPPED | OUTPATIENT
Start: 2018-03-02 | End: 2018-06-04 | Stop reason: SDUPTHER

## 2018-03-02 RX ORDER — ERGOCALCIFEROL 1.25 MG/1
5000 CAPSULE ORAL WEEKLY
COMMUNITY
End: 2018-03-02

## 2018-03-02 RX ORDER — CARVEDILOL 12.5 MG/1
12.5 TABLET ORAL 2 TIMES DAILY WITH MEALS
Qty: 180 TABLET | Refills: 3 | Status: SHIPPED | OUTPATIENT
Start: 2018-03-02 | End: 2018-06-04 | Stop reason: SDUPTHER

## 2018-03-02 RX ORDER — TAMSULOSIN HYDROCHLORIDE 0.4 MG/1
1 CAPSULE ORAL DAILY
Qty: 90 CAPSULE | Refills: 3 | Status: SHIPPED | OUTPATIENT
Start: 2018-03-02 | End: 2018-06-04 | Stop reason: SDUPTHER

## 2018-03-02 RX ORDER — DEXTROMETHORPHAN HYDROBROMIDE AND PROMETHAZINE HYDROCHLORIDE 15; 6.25 MG/5ML; MG/5ML
5 SYRUP ORAL 4 TIMES DAILY PRN
Qty: 240 ML | Refills: 0 | Status: SHIPPED | OUTPATIENT
Start: 2018-03-02 | End: 2018-06-04

## 2018-03-02 RX ORDER — AMLODIPINE BESYLATE 2.5 MG/1
2.5 TABLET ORAL DAILY
Qty: 90 TABLET | Refills: 3 | Status: SHIPPED | OUTPATIENT
Start: 2018-03-02 | End: 2018-04-18 | Stop reason: DRUGHIGH

## 2018-03-02 RX ORDER — PANTOPRAZOLE SODIUM 40 MG/1
40 TABLET, DELAYED RELEASE ORAL DAILY
Qty: 90 TABLET | Refills: 3 | Status: SHIPPED | OUTPATIENT
Start: 2018-03-02 | End: 2018-06-04 | Stop reason: SDUPTHER

## 2018-03-02 RX ORDER — PRAVASTATIN SODIUM 40 MG
40 TABLET ORAL NIGHTLY
Qty: 90 TABLET | Refills: 3 | Status: SHIPPED | OUTPATIENT
Start: 2018-03-02 | End: 2018-06-04

## 2018-03-02 RX ORDER — BUSPIRONE HYDROCHLORIDE 15 MG/1
15 TABLET ORAL 2 TIMES DAILY PRN
Qty: 180 TABLET | Refills: 3 | Status: SHIPPED | OUTPATIENT
Start: 2018-03-02 | End: 2018-06-04 | Stop reason: DRUGHIGH

## 2018-03-02 NOTE — PROGRESS NOTES
Subjective    Chris Mejia is a 58 y.o. male here for:  Chief Complaint   Patient presents with   • Establish Care     Former patient of Dr. Deleon   • Hypertension   • Anxiety     Discuss change in medication     History of Present Illness     Patient says he's having more gas pain, usually in center of chest but now more on left. He's able to drink mylanta and it sometimes gets better. He's had this almost every night recently. If he exerts himself he gets short of breath. Not able to do as much as before. No arm or neck pain. Symptoms last minute or so. Symptoms happen sitting up. Occurs at rest. Does not seem to be correlated with particular foods, though he says peanut butter recently ay set it off. Does not smoke but there's a family history of heart disease. He has hypertension and hyperlipidemia. He had a stress test 4-5 years ago. Has issues with cerebellum so treadmill test is not an option. Dr. Barlow prescribes tegretol.    Hypertension is well controlled on current medicines. He takes amlodipine, coreg and HCTZ.    Anxiety, though, is not well controlled. Currently using Buspar 10 mg twice a day.    He'd like a refill on the cough syrup he used when he had the flu. He still has some but he found it especially helpful with cough at night.    The following portions of the patient's history were reviewed and updated as appropriate: allergies, current medications, past family history, past medical history, past social history, past surgical history and problem list.    Review of Systems   HENT: Positive for trouble swallowing.    Cardiovascular:        Chest discomfort   Gastrointestinal: Negative for blood in stool.   Musculoskeletal: Positive for arthralgias.   Neurological: Positive for dizziness.   Psychiatric/Behavioral: The patient is nervous/anxious.        Vitals:    03/02/18 0921   BP: 120/80   Pulse: 84   Resp: 12   Temp: 98.9 °F (37.2 °C)   SpO2: 97%         Objective   Physical Exam    Constitutional: He is oriented to person, place, and time. Vital signs are normal. He appears well-developed and well-nourished. He is active. He does not have a sickly appearance. He does not appear ill.   Appears stated age. Well groomed. Obese    HENT:   Head: Normocephalic and atraumatic. 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   Right Ear: Hearing normal.   Left Ear: Hearing normal.   Nose: Nose normal.   Mouth/Throat: Mucous membranes are not dry.   No suspicious skin lesions to scalp   Eyes: EOM and lids are normal. Pupils are equal, round, and reactive to light. No scleral icterus.   Cardiovascular: Normal rate, regular rhythm and normal heart sounds.  Exam reveals no gallop and no friction rub.    No murmur heard.  Pulmonary/Chest: Effort normal and breath sounds normal.   Musculoskeletal: He exhibits no deformity.   Neurological: He is alert and oriented to person, place, and time. No cranial nerve deficit. Coordination and gait abnormal.   Skin: Skin is warm. He is not diaphoretic. No cyanosis. Nails show no clubbing.   Psychiatric: He has a normal mood and affect. His speech is normal and behavior is normal. Judgment and thought content normal. Cognition and memory are normal.   Nursing note and vitals reviewed.          Assessment/Plan       Chris was seen today for establish care, hypertension and anxiety.    Diagnoses and all orders for this visit:    Precordial pain  -     Stress Test With Myocardial Perfusion - One Day    Anxiety  -     busPIRone (BUSPAR) 15 MG tablet; Take 1 tablet by mouth 2 (Two) Times a Day As Needed (anxiety).    Essential hypertension  Comments:  Controlled. Continue amlodipine, coreg, HCTZ.  Orders:  -     amLODIPine (NORVASC) 2.5 MG tablet; Take 1 tablet by mouth Daily.  -     carvedilol (COREG) 12.5 MG tablet; Take 1 tablet by mouth 2 (Two) Times a Day With Meals.  -     hydrochlorothiazide (MICROZIDE) 12.5 MG capsule; Take 1 capsule by mouth Every  Morning.    Cough  Comments:  use medicine sparingly. If use becomes regular needs further evaluation  Orders:  -     promethazine-dextromethorphan (PROMETHAZINE-DM) 6.25-15 MG/5ML syrup; Take 5 mL by mouth 4 (Four) Times a Day As Needed for Cough.    Benign non-nodular prostatic hyperplasia with lower urinary tract symptoms  -     tamsulosin (FLOMAX) 0.4 MG capsule 24 hr capsule; Take 1 capsule by mouth Daily.    Spinocerebellar ataxia  Comments:  Follow up with neurology.    Need for zoster vaccine  -     Zoster Vac Recomb Adjuvanted 50 MCG reconstituted suspension; Inject 1 each into the shoulder, thigh, or buttocks 1 (One) Time for 1 dose.    Hypercholesteremia  -     pravastatin (PRAVACHOL) 40 MG tablet; Take 1 tablet by mouth Every Night.    Gastroesophageal reflux disease without esophagitis  -     pantoprazole (PROTONIX) 40 MG EC tablet; Take 1 tablet by mouth Daily.        · Prescription sent for Shingrix. Vaccination discussed in detail including efficacy compared to Zostavax and need for two injections two months apart. Discussed likely myalgias after vaccination as greater than 40% of patients complained of this in clinical trials. If not covered by insurance, recommend waiting until it is covered (if not affordable). Even if Zostavax was previously received, Shingrix is recommended.  ·     Return in about 3 months (around 5/18/2018) for Medicare Wellness.    Naty Lebron MD    Please note that portions of this note may have been completed with a voice recognition program. Efforts were made to edit dictation, but occasionally words are mistranscribed.

## 2018-03-04 ENCOUNTER — APPOINTMENT (OUTPATIENT)
Dept: GENERAL RADIOLOGY | Facility: HOSPITAL | Age: 58
End: 2018-03-04

## 2018-03-04 ENCOUNTER — HOSPITAL ENCOUNTER (EMERGENCY)
Facility: HOSPITAL | Age: 58
Discharge: HOME | End: 2018-03-04
Attending: EMERGENCY MEDICINE | Admitting: EMERGENCY MEDICINE

## 2018-03-04 VITALS
HEIGHT: 67 IN | DIASTOLIC BLOOD PRESSURE: 84 MMHG | BODY MASS INDEX: 36.88 KG/M2 | OXYGEN SATURATION: 92 % | SYSTOLIC BLOOD PRESSURE: 125 MMHG | RESPIRATION RATE: 18 BRPM | TEMPERATURE: 98.3 F | HEART RATE: 61 BPM | WEIGHT: 235 LBS

## 2018-03-04 DIAGNOSIS — R07.9 CHEST PAIN, UNSPECIFIED TYPE: Primary | ICD-10-CM

## 2018-03-04 LAB
ALBUMIN SERPL-MCNC: 4.4 G/DL (ref 3.5–5)
ALBUMIN/GLOB SERPL: 1.3 G/DL (ref 1–2)
ALP SERPL-CCNC: 45 U/L (ref 38–126)
ALT SERPL W P-5'-P-CCNC: 46 U/L (ref 13–69)
ANION GAP SERPL CALCULATED.3IONS-SCNC: 18 MMOL/L
AST SERPL-CCNC: 30 U/L (ref 15–46)
BASOPHILS # BLD AUTO: 0.06 10*3/MM3 (ref 0–0.2)
BASOPHILS NFR BLD AUTO: 0.7 % (ref 0–2.5)
BILIRUB SERPL-MCNC: 0.4 MG/DL (ref 0.2–1.3)
BUN BLD-MCNC: 14 MG/DL (ref 7–20)
BUN/CREAT SERPL: 14 (ref 6.3–21.9)
CALCIUM SPEC-SCNC: 9.7 MG/DL (ref 8.4–10.2)
CARBAMAZEPINE SERPL-MCNC: <3 MCG/ML (ref 4–12)
CHLORIDE SERPL-SCNC: 100 MMOL/L (ref 98–107)
CO2 SERPL-SCNC: 30 MMOL/L (ref 26–30)
CREAT BLD-MCNC: 1 MG/DL (ref 0.6–1.3)
DEPRECATED RDW RBC AUTO: 41.5 FL (ref 37–54)
EOSINOPHIL # BLD AUTO: 0.58 10*3/MM3 (ref 0–0.7)
EOSINOPHIL NFR BLD AUTO: 7 % (ref 0–7)
ERYTHROCYTE [DISTWIDTH] IN BLOOD BY AUTOMATED COUNT: 11.9 % (ref 11.5–14.5)
GFR SERPL CREATININE-BSD FRML MDRD: 76 ML/MIN/1.73
GLOBULIN UR ELPH-MCNC: 3.4 GM/DL
GLUCOSE BLD-MCNC: 87 MG/DL (ref 74–98)
HCT VFR BLD AUTO: 48.8 % (ref 42–52)
HGB BLD-MCNC: 17.4 G/DL (ref 14–18)
HOLD SPECIMEN: NORMAL
HOLD SPECIMEN: NORMAL
IMM GRANULOCYTES # BLD: 0.02 10*3/MM3 (ref 0–0.06)
IMM GRANULOCYTES NFR BLD: 0.2 % (ref 0–0.6)
LYMPHOCYTES # BLD AUTO: 2.7 10*3/MM3 (ref 0.6–3.4)
LYMPHOCYTES NFR BLD AUTO: 32.8 % (ref 10–50)
MCH RBC QN AUTO: 33.5 PG (ref 27–31)
MCHC RBC AUTO-ENTMCNC: 35.7 G/DL (ref 30–37)
MCV RBC AUTO: 94 FL (ref 80–94)
MONOCYTES # BLD AUTO: 1.2 10*3/MM3 (ref 0–0.9)
MONOCYTES NFR BLD AUTO: 14.6 % (ref 0–12)
NEUTROPHILS # BLD AUTO: 3.68 10*3/MM3 (ref 2–6.9)
NEUTROPHILS NFR BLD AUTO: 44.7 % (ref 37–80)
NRBC BLD MANUAL-RTO: 0 /100 WBC (ref 0–0)
PLATELET # BLD AUTO: 167 10*3/MM3 (ref 130–400)
PMV BLD AUTO: 9.5 FL (ref 6–12)
POTASSIUM BLD-SCNC: 4 MMOL/L (ref 3.5–5.1)
PROT SERPL-MCNC: 7.8 G/DL (ref 6.3–8.2)
RBC # BLD AUTO: 5.19 10*6/MM3 (ref 4.7–6.1)
SODIUM BLD-SCNC: 144 MMOL/L (ref 137–145)
TROPONIN I SERPL-MCNC: 0.01 NG/ML (ref 0–0.05)
TROPONIN I SERPL-MCNC: <0.012 NG/ML (ref 0–0.03)
TROPONIN I SERPL-MCNC: <0.012 NG/ML (ref 0–0.03)
WBC NRBC COR # BLD: 8.24 10*3/MM3 (ref 4.8–10.8)
WHOLE BLOOD HOLD SPECIMEN: NORMAL
WHOLE BLOOD HOLD SPECIMEN: NORMAL

## 2018-03-04 PROCEDURE — 93005 ELECTROCARDIOGRAM TRACING: CPT | Performed by: EMERGENCY MEDICINE

## 2018-03-04 PROCEDURE — 84484 ASSAY OF TROPONIN QUANT: CPT | Performed by: NURSE PRACTITIONER

## 2018-03-04 PROCEDURE — 85025 COMPLETE CBC W/AUTO DIFF WBC: CPT | Performed by: EMERGENCY MEDICINE

## 2018-03-04 PROCEDURE — 99284 EMERGENCY DEPT VISIT MOD MDM: CPT

## 2018-03-04 PROCEDURE — 84484 ASSAY OF TROPONIN QUANT: CPT | Performed by: EMERGENCY MEDICINE

## 2018-03-04 PROCEDURE — 80156 ASSAY CARBAMAZEPINE TOTAL: CPT | Performed by: NURSE PRACTITIONER

## 2018-03-04 PROCEDURE — 71045 X-RAY EXAM CHEST 1 VIEW: CPT

## 2018-03-04 PROCEDURE — 80053 COMPREHEN METABOLIC PANEL: CPT | Performed by: EMERGENCY MEDICINE

## 2018-03-04 RX ORDER — ASPIRIN 325 MG
325 TABLET ORAL ONCE
Status: DISCONTINUED | OUTPATIENT
Start: 2018-03-04 | End: 2018-03-04

## 2018-03-04 RX ORDER — SODIUM CHLORIDE 0.9 % (FLUSH) 0.9 %
10 SYRINGE (ML) INJECTION AS NEEDED
Status: DISCONTINUED | OUTPATIENT
Start: 2018-03-04 | End: 2018-03-05 | Stop reason: HOSPADM

## 2018-03-04 NOTE — ED PROVIDER NOTES
Subjective   History of Present Illness  This is a 58 year old male who comes in today complain of chest pain x 1 week. He reports that the pain usually starts when he sits in his chair watching television and relieved when he lays down. He denies any nausea, vomiting, diarrhea or fever. He went to his primary care provider last Monday and she referred him to his Dr. Beverly next week for evaluation. He reports he did have a heart cath about 8 years ago and was normal.   Review of Systems   Constitutional: Negative.    HENT: Negative.    Eyes: Negative.    Respiratory: Positive for shortness of breath.    Cardiovascular: Positive for chest pain.   Gastrointestinal: Negative.    Genitourinary: Negative.    Skin: Negative.    Neurological: Negative.    Psychiatric/Behavioral: Negative.    All other systems reviewed and are negative.      Past Medical History:   Diagnosis Date   • Allergic rhinitis    • Anxiety    • Arthritis    • Dysphagia    • GERD (gastroesophageal reflux disease)    • Gout    • Hip fracture, left    • Hyperlipidemia    • Hypertension    • Lymphadenitis    • Myocardial infarction 2000   • Obesity    • Peptic ulcer    • Peptic ulceration    • Rheumatoid arthritis    • Right shoulder pain    • Spinocerebellar ataxia    • Tension headache    • Vitamin B 12 deficiency    • Vitamin D deficiency        Allergies   Allergen Reactions   • Oxycodone Hallucinations       Past Surgical History:   Procedure Laterality Date   • ELBOW ARTHROPLASTY     • HERNIA REPAIR      x3   • NEUROPLASTY      decompression median nerve at carpal tunnel   • TOTAL KNEE ARTHROPLASTY      2005, 2012       Family History   Problem Relation Age of Onset   • Deep vein thrombosis Mother    • Diabetes Mother    • Hyperlipidemia Mother    • Hypertension Mother    • Heart attack Mother    • Cancer Father    • Kidney disease Father    • Deep vein thrombosis Daughter    • Diabetes Other    • Hyperlipidemia Other    • Hypertension Other    •  Heart attack Other    • Cancer Other    • Kidney disease Other    • Osteoporosis Other    • Thyroid disease Other    • Stroke Other    • Arthritis Sister    • Diabetes Sister    • Hyperlipidemia Sister    • Hypertension Sister    • Osteoporosis Sister    • Thyroid disease Sister    • Arthritis Brother    • Diabetes Brother    • Hyperlipidemia Brother    • Hypertension Brother    • Stroke Brother        Social History     Social History   • Marital status:      Social History Main Topics   • Smoking status: Never Smoker   • Smokeless tobacco: Never Used   • Alcohol use No   • Drug use: No           Objective   Physical Exam   Constitutional: He appears well-developed and well-nourished.   Nursing note and vitals reviewed.  GEN: No acute distress  Head: Normocephalic, atraumatic  Eyes: Pupils equal round reactive to light  ENT: Posterior pharynx normal in appearance, oral mucosa is moist  Chest: tender left chest wall at midline at 5th intercostal space. No bruising or edema noted.  Cardiovascular: Regular rate  Lungs: Clear to auscultation bilaterally  Abdomen: Soft, nontender, nondistended, no peritoneal signs  Extremities: No edema, normal appearance  Neuro: GCS 15  Psych: Mood and affect are appropriate      ECG 12 Lead    Date/Time: 3/4/2018 6:58 PM  Performed by: KHADIJAH JOHNSTON  Authorized by: IVY EDWARDS   Interpreted by physician  Comparison: compared with previous ECG   Similar to previous ECG  Rhythm: sinus rhythm  Clinical impression: normal ECG      ECG 12 Lead    Date/Time: 3/4/2018 9:18 PM  Performed by: KHADIJAH JOHNSTON  Authorized by: EMI AGUDELO   Interpreted by physician  Comparison: compared with previous ECG   Similar to previous ECG  Rhythm: sinus rhythm  Clinical impression: normal ECG               ED Course  ED Course   Comment By Time   Tegretol level and low because patient reports he only takes his medicine as needed whenever he has a headache. Khadijah MCCOLLUM  MARJORIE Awad 03/04 1954                HEART Score (for prediction of 6-week risk of major adverse cardiac event) reviewed and/or performed as part of the patient evaluation and treatment planning process.  The result associated with this review/performance is: 2       MDM  Number of Diagnoses or Management Options     Amount and/or Complexity of Data Reviewed  Clinical lab tests: ordered and reviewed  Tests in the radiology section of CPT®: ordered and reviewed  Review and summarize past medical records: yes  Discuss the patient with other providers: yes  Independent visualization of images, tracings, or specimens: yes    Risk of Complications, Morbidity, and/or Mortality  Presenting problems: moderate  Diagnostic procedures: moderate  Management options: moderate        Final diagnoses:   Chest pain, unspecified type            MARJORIE Correa  03/04/18 [id number]

## 2018-03-05 ENCOUNTER — TELEPHONE (OUTPATIENT)
Dept: INTERNAL MEDICINE | Facility: CLINIC | Age: 58
End: 2018-03-05

## 2018-03-05 NOTE — TELEPHONE ENCOUNTER
Patient wife called and states patient went to the emergency room last night for chest pain but states they told him it was due to hypertension. She states she is wanting to get him set up with dr barrientos for acid reflux. She states this could be part of the problem but not sure.

## 2018-03-05 NOTE — TELEPHONE ENCOUNTER
We discussed symptoms like this at his visit. Needs the stress test first, and then can refer to Dr. Lopez. If he's seen her in last year they can call office to follow up.

## 2018-03-05 NOTE — DISCHARGE INSTRUCTIONS
Chest Wall Pain  Chest wall pain is pain in or around the bones and muscles of your chest. Sometimes, an injury causes this pain. Sometimes, the cause may not be known. This pain may take several weeks or longer to get better.  Follow these instructions at home:  Pay attention to any changes in your symptoms. Take these actions to help with your pain:  · Rest as told by your doctor.  · Avoid activities that cause pain. Try not to use your chest, belly (abdominal), or side muscles to lift heavy things.  · If directed, apply ice to the painful area:  ¨ Put ice in a plastic bag.  ¨ Place a towel between your skin and the bag.  ¨ Leave the ice on for 20 minutes, 2-3 times per day.  · Take over-the-counter and prescription medicines only as told by your doctor.  · Do not use tobacco products, including cigarettes, chewing tobacco, and e-cigarettes. If you need help quitting, ask your doctor.  · Keep all follow-up visits as told by your doctor. This is important.  Contact a doctor if:  · You have a fever.  · Your chest pain gets worse.  · You have new symptoms.  Get help right away if:  · You feel sick to your stomach (nauseous) or you throw up (vomit).  · You feel sweaty or light-headed.  · You have a cough with phlegm (sputum) or you cough up blood.  · You are short of breath.  This information is not intended to replace advice given to you by your health care provider. Make sure you discuss any questions you have with your health care provider.  Document Released: 06/05/2009 Document Revised: 05/25/2017 Document Reviewed: 03/14/2016  [employer] Interactive Patient Education © 2017 [employer]      Hypertension  Hypertension, commonly called high blood pressure, is when the force of blood pumping through the arteries is too strong. The arteries are the blood vessels that carry blood from the heart throughout the body. Hypertension forces the heart to work harder to pump blood and may cause arteries to become narrow  "or stiff. Having untreated or uncontrolled hypertension can cause heart attacks, strokes, kidney disease, and other problems.  A blood pressure reading consists of a higher number over a lower number. Ideally, your blood pressure should be below 120/80. The first (\"top\") number is called the systolic pressure. It is a measure of the pressure in your arteries as your heart beats. The second (\"bottom\") number is called the diastolic pressure. It is a measure of the pressure in your arteries as the heart relaxes.  What are the causes?  The cause of this condition is not known.  What increases the risk?  Some risk factors for high blood pressure are under your control. Others are not.  Factors you can change   · Smoking.  · Having type 2 diabetes mellitus, high cholesterol, or both.  · Not getting enough exercise or physical activity.  · Being overweight.  · Having too much fat, sugar, calories, or salt (sodium) in your diet.  · Drinking too much alcohol.  Factors that are difficult or impossible to change   · Having chronic kidney disease.  · Having a family history of high blood pressure.  · Age. Risk increases with age.  · Race. You may be at higher risk if you are -American.  · Gender. Men are at higher risk than women before age 45. After age 65, women are at higher risk than men.  · Having obstructive sleep apnea.  · Stress.  What are the signs or symptoms?  Extremely high blood pressure (hypertensive crisis) may cause:  · Headache.  · Anxiety.  · Shortness of breath.  · Nosebleed.  · Nausea and vomiting.  · Severe chest pain.  · Jerky movements you cannot control (seizures).  How is this diagnosed?  This condition is diagnosed by measuring your blood pressure while you are seated, with your arm resting on a surface. The cuff of the blood pressure monitor will be placed directly against the skin of your upper arm at the level of your heart. It should be measured at least twice using the same arm. Certain " conditions can cause a difference in blood pressure between your right and left arms.  Certain factors can cause blood pressure readings to be lower or higher than normal (elevated) for a short period of time:  · When your blood pressure is higher when you are in a health care provider's office than when you are at home, this is called white coat hypertension. Most people with this condition do not need medicines.  · When your blood pressure is higher at home than when you are in a health care provider's office, this is called masked hypertension. Most people with this condition may need medicines to control blood pressure.  If you have a high blood pressure reading during one visit or you have normal blood pressure with other risk factors:  · You may be asked to return on a different day to have your blood pressure checked again.  · You may be asked to monitor your blood pressure at home for 1 week or longer.  If you are diagnosed with hypertension, you may have other blood or imaging tests to help your health care provider understand your overall risk for other conditions.  How is this treated?  This condition is treated by making healthy lifestyle changes, such as eating healthy foods, exercising more, and reducing your alcohol intake. Your health care provider may prescribe medicine if lifestyle changes are not enough to get your blood pressure under control, and if:  · Your systolic blood pressure is above 130.  · Your diastolic blood pressure is above 80.  Your personal target blood pressure may vary depending on your medical conditions, your age, and other factors.  Follow these instructions at home:  Eating and drinking   · Eat a diet that is high in fiber and potassium, and low in sodium, added sugar, and fat. An example eating plan is called the DASH (Dietary Approaches to Stop Hypertension) diet. To eat this way:  ¨ Eat plenty of fresh fruits and vegetables. Try to fill half of your plate at each meal  with fruits and vegetables.  ¨ Eat whole grains, such as whole wheat pasta, brown rice, or whole grain bread. Fill about one quarter of your plate with whole grains.  ¨ Eat or drink low-fat dairy products, such as skim milk or low-fat yogurt.  ¨ Avoid fatty cuts of meat, processed or cured meats, and poultry with skin. Fill about one quarter of your plate with lean proteins, such as fish, chicken without skin, beans, eggs, and tofu.  ¨ Avoid premade and processed foods. These tend to be higher in sodium, added sugar, and fat.  · Reduce your daily sodium intake. Most people with hypertension should eat less than 1,500 mg of sodium a day.  · Limit alcohol intake to no more than 1 drink a day for nonpregnant women and 2 drinks a day for men. One drink equals 12 oz of beer, 5 oz of wine, or 1½ oz of hard liquor.  Lifestyle   · Work with your health care provider to maintain a healthy body weight or to lose weight. Ask what an ideal weight is for you.  · Get at least 30 minutes of exercise that causes your heart to beat faster (aerobic exercise) most days of the week. Activities may include walking, swimming, or biking.  · Include exercise to strengthen your muscles (resistance exercise), such as pilates or lifting weights, as part of your weekly exercise routine. Try to do these types of exercises for 30 minutes at least 3 days a week.  · Do not use any products that contain nicotine or tobacco, such as cigarettes and e-cigarettes. If you need help quitting, ask your health care provider.  · Monitor your blood pressure at home as told by your health care provider.  · Keep all follow-up visits as told by your health care provider. This is important.  Medicines   · Take over-the-counter and prescription medicines only as told by your health care provider. Follow directions carefully. Blood pressure medicines must be taken as prescribed.  · Do not skip doses of blood pressure medicine. Doing this puts you at risk for  problems and can make the medicine less effective.  · Ask your health care provider about side effects or reactions to medicines that you should watch for.  Contact a health care provider if:  · You think you are having a reaction to a medicine you are taking.  · You have headaches that keep coming back (recurring).  · You feel dizzy.  · You have swelling in your ankles.  · You have trouble with your vision.  Get help right away if:  · You develop a severe headache or confusion.  · You have unusual weakness or numbness.  · You feel faint.  · You have severe pain in your chest or abdomen.  · You vomit repeatedly.  · You have trouble breathing.  Summary  · Hypertension is when the force of blood pumping through your arteries is too strong. If this condition is not controlled, it may put you at risk for serious complications.  · Your personal target blood pressure may vary depending on your medical conditions, your age, and other factors. For most people, a normal blood pressure is less than 120/80.  · Hypertension is treated with lifestyle changes, medicines, or a combination of both. Lifestyle changes include weight loss, eating a healthy, low-sodium diet, exercising more, and limiting alcohol.  This information is not intended to replace advice given to you by your health care provider. Make sure you discuss any questions you have with your health care provider.  Document Released: 12/18/2006 Document Revised: 11/15/2017 Document Reviewed: 11/15/2017  [employer] Interactive Patient Education © 2017 [employer]

## 2018-03-05 NOTE — TELEPHONE ENCOUNTER
Spoke with wife, he is agreeable to Stress test, states he is still having the same pain and antacids are not helping.

## 2018-03-09 ENCOUNTER — HOSPITAL ENCOUNTER (OUTPATIENT)
Dept: CARDIOLOGY | Facility: HOSPITAL | Age: 58
Discharge: HOME | End: 2018-03-09
Attending: FAMILY MEDICINE

## 2018-03-09 ENCOUNTER — TELEPHONE (OUTPATIENT)
Dept: INTERNAL MEDICINE | Facility: CLINIC | Age: 58
End: 2018-03-09

## 2018-03-09 VITALS
HEART RATE: 69 BPM | HEIGHT: 67 IN | BODY MASS INDEX: 36.88 KG/M2 | WEIGHT: 235 LBS | SYSTOLIC BLOOD PRESSURE: 132 MMHG | DIASTOLIC BLOOD PRESSURE: 100 MMHG

## 2018-03-09 DIAGNOSIS — R07.2 PRECORDIAL PAIN: Primary | ICD-10-CM

## 2018-03-09 DIAGNOSIS — R94.39 ABNORMAL CARDIOVASCULAR STRESS TEST: ICD-10-CM

## 2018-03-09 LAB
BH CV STRESS BP STAGE 2: NORMAL
BH CV STRESS BP STAGE 3: NORMAL
BH CV STRESS COMMENTS STAGE 1: NORMAL
BH CV STRESS DOSE REGADENOSON STAGE 1: 0.4
BH CV STRESS DURATION MIN STAGE 1: 1
BH CV STRESS DURATION MIN STAGE 2: 1
BH CV STRESS DURATION MIN STAGE 3: 1
BH CV STRESS DURATION MIN STAGE 4: 1
BH CV STRESS DURATION SEC STAGE 2: 0
BH CV STRESS HR STAGE 1: 73
BH CV STRESS HR STAGE 2: 97
BH CV STRESS HR STAGE 3: 91
BH CV STRESS HR STAGE 4: 87
BH CV STRESS PROTOCOL 1: NORMAL
BH CV STRESS RECOVERY BP: NORMAL MMHG
BH CV STRESS RECOVERY HR: 86 BPM
BH CV STRESS STAGE 1: 1
BH CV STRESS STAGE 2: 2
BH CV STRESS STAGE 3: 3
BH CV STRESS STAGE 4: 4
LV EF NUC BP: 53 %
MAXIMAL PREDICTED HEART RATE: 160 BPM
PERCENT MAX PREDICTED HR: 63.13 %
STRESS BASELINE BP: NORMAL MMHG
STRESS BASELINE HR: 64 BPM
STRESS PERCENT HR: 74 %
STRESS POST PEAK BP: NORMAL MMHG
STRESS POST PEAK HR: 101 BPM
STRESS TARGET HR: 136 BPM

## 2018-03-09 PROCEDURE — 78452 HT MUSCLE IMAGE SPECT MULT: CPT | Performed by: INTERNAL MEDICINE

## 2018-03-09 PROCEDURE — 25010000002 REGADENOSON 0.4 MG/5ML SOLUTION: Performed by: FAMILY MEDICINE

## 2018-03-09 PROCEDURE — A9500 TC99M SESTAMIBI: HCPCS | Performed by: FAMILY MEDICINE

## 2018-03-09 PROCEDURE — 93018 CV STRESS TEST I&R ONLY: CPT | Performed by: INTERNAL MEDICINE

## 2018-03-09 PROCEDURE — 93017 CV STRESS TEST TRACING ONLY: CPT

## 2018-03-09 PROCEDURE — 78452 HT MUSCLE IMAGE SPECT MULT: CPT

## 2018-03-09 PROCEDURE — 0 TECHNETIUM SESTAMIBI: Performed by: FAMILY MEDICINE

## 2018-03-09 RX ORDER — ASPIRIN 81 MG/1
81 TABLET ORAL DAILY
Qty: 30 TABLET | Refills: 1 | Status: SHIPPED | OUTPATIENT
Start: 2018-03-09 | End: 2018-04-18 | Stop reason: CLARIF

## 2018-03-09 RX ORDER — NITROGLYCERIN 0.4 MG/1
0.4 TABLET SUBLINGUAL
Qty: 30 TABLET | Refills: 1 | Status: SHIPPED | OUTPATIENT
Start: 2018-03-09 | End: 2019-06-24 | Stop reason: SDUPTHER

## 2018-03-09 RX ADMIN — TECHNETIUM TC 99M SESTAMIBI 1 DOSE: 1 INJECTION INTRAVENOUS at 08:00

## 2018-03-09 RX ADMIN — TECHNETIUM TC 99M SESTAMIBI 1 DOSE: 1 INJECTION INTRAVENOUS at 09:50

## 2018-03-09 RX ADMIN — REGADENOSON 0.4 MG: 0.08 INJECTION, SOLUTION INTRAVENOUS at 09:51

## 2018-03-09 NOTE — TELEPHONE ENCOUNTER
Pt wife informed, explained the nitro, told them if he has chest pain for long period of time go to ER

## 2018-03-09 NOTE — TELEPHONE ENCOUNTER
I received a message from Dr. Perkins, cardiology, who suggested getting this patient set up for a heart cath based on the stress test findings. I'm going to order that, but he wanted the patient to start aspirin 81 mg daily (if he's not already taking it) and to have nitroglycerin on hand in case he has chest pain. Will you let him know I sent that to his pharmacy? He'll get a call about further steps on his heart.

## 2018-03-13 ENCOUNTER — PREP FOR SURGERY (OUTPATIENT)
Dept: OTHER | Facility: HOSPITAL | Age: 58
End: 2018-03-13

## 2018-03-13 DIAGNOSIS — E78.2 MIXED HYPERLIPIDEMIA: ICD-10-CM

## 2018-03-13 DIAGNOSIS — E11.9 TYPE 2 DIABETES MELLITUS WITHOUT COMPLICATION, UNSPECIFIED LONG TERM INSULIN USE STATUS: ICD-10-CM

## 2018-03-13 DIAGNOSIS — R94.39 ABNORMAL MYOCARDIAL PERFUSION STUDY: Primary | ICD-10-CM

## 2018-03-13 PROBLEM — R07.2 PRECORDIAL PAIN: Status: ACTIVE | Noted: 2018-03-13

## 2018-03-13 RX ORDER — SODIUM CHLORIDE 0.9 % (FLUSH) 0.9 %
1-10 SYRINGE (ML) INJECTION AS NEEDED
Status: CANCELLED | OUTPATIENT
Start: 2018-03-13

## 2018-03-14 ENCOUNTER — HOSPITAL ENCOUNTER (OUTPATIENT)
Facility: HOSPITAL | Age: 58
Discharge: HOME | End: 2018-03-14
Attending: INTERNAL MEDICINE | Admitting: INTERNAL MEDICINE

## 2018-03-14 VITALS
DIASTOLIC BLOOD PRESSURE: 83 MMHG | OXYGEN SATURATION: 92 % | BODY MASS INDEX: 36.89 KG/M2 | RESPIRATION RATE: 18 BRPM | SYSTOLIC BLOOD PRESSURE: 128 MMHG | WEIGHT: 235.01 LBS | TEMPERATURE: 98 F | HEIGHT: 67 IN | HEART RATE: 69 BPM

## 2018-03-14 DIAGNOSIS — R07.2 PRECORDIAL PAIN: ICD-10-CM

## 2018-03-14 DIAGNOSIS — G47.33 OSA (OBSTRUCTIVE SLEEP APNEA): ICD-10-CM

## 2018-03-14 DIAGNOSIS — E78.2 MIXED HYPERLIPIDEMIA: ICD-10-CM

## 2018-03-14 DIAGNOSIS — R94.39 ABNORMAL CARDIOVASCULAR STRESS TEST: ICD-10-CM

## 2018-03-14 DIAGNOSIS — R06.09 DYSPNEA ON EXERTION: Primary | ICD-10-CM

## 2018-03-14 DIAGNOSIS — R94.39 ABNORMAL MYOCARDIAL PERFUSION STUDY: ICD-10-CM

## 2018-03-14 DIAGNOSIS — E11.9 TYPE 2 DIABETES MELLITUS WITHOUT COMPLICATION, UNSPECIFIED LONG TERM INSULIN USE STATUS: ICD-10-CM

## 2018-03-14 LAB
ALBUMIN SERPL-MCNC: 4.1 G/DL (ref 3.2–4.8)
ALBUMIN/GLOB SERPL: 1.2 G/DL (ref 1.5–2.5)
ALP SERPL-CCNC: 45 U/L (ref 25–100)
ALT SERPL W P-5'-P-CCNC: 25 U/L (ref 7–40)
ANION GAP SERPL CALCULATED.3IONS-SCNC: 9 MMOL/L (ref 3–11)
ARTICHOKE IGE QN: 65 MG/DL (ref 0–130)
AST SERPL-CCNC: 23 U/L (ref 0–33)
BILIRUB SERPL-MCNC: 0.6 MG/DL (ref 0.3–1.2)
BUN BLD-MCNC: 17 MG/DL (ref 9–23)
BUN/CREAT SERPL: 17 (ref 7–25)
CALCIUM SPEC-SCNC: 9.2 MG/DL (ref 8.7–10.4)
CHLORIDE SERPL-SCNC: 103 MMOL/L (ref 99–109)
CHOLEST SERPL-MCNC: 114 MG/DL (ref 0–200)
CO2 SERPL-SCNC: 27 MMOL/L (ref 20–31)
CREAT BLD-MCNC: 1 MG/DL (ref 0.6–1.3)
DEPRECATED RDW RBC AUTO: 43.5 FL (ref 37–54)
ERYTHROCYTE [DISTWIDTH] IN BLOOD BY AUTOMATED COUNT: 12.5 % (ref 11.3–14.5)
GFR SERPL CREATININE-BSD FRML MDRD: 76 ML/MIN/1.73
GLOBULIN UR ELPH-MCNC: 3.3 GM/DL
GLUCOSE BLD-MCNC: 106 MG/DL (ref 70–100)
HBA1C MFR BLD: 5.5 % (ref 4.8–5.6)
HCT VFR BLD AUTO: 48.8 % (ref 38.9–50.9)
HDLC SERPL-MCNC: 41 MG/DL (ref 40–60)
HGB BLD-MCNC: 17.1 G/DL (ref 13.1–17.5)
MCH RBC QN AUTO: 33.3 PG (ref 27–31)
MCHC RBC AUTO-ENTMCNC: 35 G/DL (ref 32–36)
MCV RBC AUTO: 94.9 FL (ref 80–99)
PLATELET # BLD AUTO: 158 10*3/MM3 (ref 150–450)
PMV BLD AUTO: 10.1 FL (ref 6–12)
POTASSIUM BLD-SCNC: 3.5 MMOL/L (ref 3.5–5.5)
PROT SERPL-MCNC: 7.4 G/DL (ref 5.7–8.2)
RBC # BLD AUTO: 5.14 10*6/MM3 (ref 4.2–5.76)
SODIUM BLD-SCNC: 139 MMOL/L (ref 132–146)
TRIGL SERPL-MCNC: 90 MG/DL (ref 0–150)
WBC NRBC COR # BLD: 8.13 10*3/MM3 (ref 3.5–10.8)

## 2018-03-14 PROCEDURE — 25010000002 MIDAZOLAM PER 1 MG: Performed by: INTERNAL MEDICINE

## 2018-03-14 PROCEDURE — C1894 INTRO/SHEATH, NON-LASER: HCPCS | Performed by: INTERNAL MEDICINE

## 2018-03-14 PROCEDURE — 83036 HEMOGLOBIN GLYCOSYLATED A1C: CPT | Performed by: PHYSICIAN ASSISTANT

## 2018-03-14 PROCEDURE — C1769 GUIDE WIRE: HCPCS | Performed by: INTERNAL MEDICINE

## 2018-03-14 PROCEDURE — 93571 IV DOP VEL&/PRESS C FLO 1ST: CPT | Performed by: INTERNAL MEDICINE

## 2018-03-14 PROCEDURE — 93005 ELECTROCARDIOGRAM TRACING: CPT | Performed by: PHYSICIAN ASSISTANT

## 2018-03-14 PROCEDURE — 25010000002 FENTANYL CITRATE (PF) 100 MCG/2ML SOLUTION: Performed by: INTERNAL MEDICINE

## 2018-03-14 PROCEDURE — 93458 L HRT ARTERY/VENTRICLE ANGIO: CPT | Performed by: INTERNAL MEDICINE

## 2018-03-14 PROCEDURE — 80061 LIPID PANEL: CPT | Performed by: PHYSICIAN ASSISTANT

## 2018-03-14 PROCEDURE — 80053 COMPREHEN METABOLIC PANEL: CPT | Performed by: PHYSICIAN ASSISTANT

## 2018-03-14 PROCEDURE — 63710000001 ASPIRIN EC 325 MG TABLET DELAYED-RELEASE: Performed by: NURSE PRACTITIONER

## 2018-03-14 PROCEDURE — A9270 NON-COVERED ITEM OR SERVICE: HCPCS | Performed by: NURSE PRACTITIONER

## 2018-03-14 PROCEDURE — 0 IOPAMIDOL PER 1 ML: Performed by: INTERNAL MEDICINE

## 2018-03-14 PROCEDURE — 25010000002 HEPARIN (PORCINE) PER 1000 UNITS: Performed by: INTERNAL MEDICINE

## 2018-03-14 PROCEDURE — 85027 COMPLETE CBC AUTOMATED: CPT | Performed by: PHYSICIAN ASSISTANT

## 2018-03-14 PROCEDURE — 36415 COLL VENOUS BLD VENIPUNCTURE: CPT

## 2018-03-14 RX ORDER — ASPIRIN 325 MG
325 TABLET, DELAYED RELEASE (ENTERIC COATED) ORAL ONCE
Status: COMPLETED | OUTPATIENT
Start: 2018-03-14 | End: 2018-03-14

## 2018-03-14 RX ORDER — LIDOCAINE HYDROCHLORIDE 10 MG/ML
INJECTION, SOLUTION EPIDURAL; INFILTRATION; INTRACAUDAL; PERINEURAL AS NEEDED
Status: DISCONTINUED | OUTPATIENT
Start: 2018-03-14 | End: 2018-03-14 | Stop reason: HOSPADM

## 2018-03-14 RX ORDER — FENTANYL CITRATE 50 UG/ML
INJECTION, SOLUTION INTRAMUSCULAR; INTRAVENOUS AS NEEDED
Status: DISCONTINUED | OUTPATIENT
Start: 2018-03-14 | End: 2018-03-14 | Stop reason: HOSPADM

## 2018-03-14 RX ORDER — HEPARIN SODIUM 1000 [USP'U]/ML
INJECTION, SOLUTION INTRAVENOUS; SUBCUTANEOUS AS NEEDED
Status: DISCONTINUED | OUTPATIENT
Start: 2018-03-14 | End: 2018-03-14 | Stop reason: HOSPADM

## 2018-03-14 RX ORDER — MIDAZOLAM HYDROCHLORIDE 1 MG/ML
INJECTION INTRAMUSCULAR; INTRAVENOUS AS NEEDED
Status: DISCONTINUED | OUTPATIENT
Start: 2018-03-14 | End: 2018-03-14 | Stop reason: HOSPADM

## 2018-03-14 RX ORDER — SODIUM CHLORIDE 0.9 % (FLUSH) 0.9 %
1-10 SYRINGE (ML) INJECTION AS NEEDED
Status: DISCONTINUED | OUTPATIENT
Start: 2018-03-14 | End: 2018-03-14 | Stop reason: HOSPADM

## 2018-03-14 RX ADMIN — ASPIRIN 325 MG: 325 TABLET, DELAYED RELEASE ORAL at 10:56

## 2018-03-14 NOTE — CONSULTS
[facility]   [address], [address]  [place], [place], [id number] [phone]  [facility]           HISTORY AND PHYSICAL    Referring Physician: MD Adam Hudson Md  [address] [place] [address] [address]  [place] [address]    Patient Care Team:  Patient Care Team:  Naty Lebron MD as PCP - General (Family Medicine)  Kirill Barlow MD, FAAN as Consulting Physician (Sleep Medicine)       HPI:    Chris Mejia is a 58 y.o. male.  History of Present Illness    The patient presents today with a history of chest pain times 2 weeks.  This pain occurs more often in the evenings and does not require exertion.  The chest pain has been resolved with SL NTG tablets. He went to the ED at [facility] last week for the same chest pain where he had negative troponins and normal EKG's.  His family physician ordered a stress test which showed inferior and lateral wall ischemia.  He has had multiple previous left heart catheterizations, but denies any previous interventions.  He states that 10-15 years ago he was hospitalized at [facility] for elevated troponins, but was never told he had an MI. He does complain of dyspnea with exertion at distances even as short as walking to the bathroom in his home.  He also has intermittent lower extremity edema and dizziness with quick movements, such as sitting to standing and lying to sitting.  He denies and palpitations or syncopal episodes.  He has a strong family history of heart disease with many members of his family being affected, including two brothers who had MI's.    PFSH:  Patient Active Problem List   Diagnosis   • Tubular adenoma of colon   • Trigeminal neuralgia   • Spinocerebellar ataxia   • RA (rheumatoid arthritis)   • Insomnia   • Essential hypertension   • Hypercholesteremia   • Acid reflux   • Anxiety   • Vitamin B12 deficiency   • Vitamin D deficiency   • DENZEL (obstructive sleep  apnea)   • Atopic rhinitis   • Ataxia   • Ataxic gait   • Chest pain   • Dizziness   • Headache   • Shoulder pain   • Ophthalmoplegia   • Precordial pain   • Abnormal cardiovascular stress test       No current facility-administered medications on file prior to encounter.      Current Outpatient Prescriptions on File Prior to Encounter   Medication Sig Dispense Refill   • Acetaminophen 500 MG coapsule Take 500 mg by mouth Daily.     • amLODIPine (NORVASC) 2.5 MG tablet Take 1 tablet by mouth Daily. 90 tablet 3   • aspirin 81 MG EC tablet Take 1 tablet by mouth Daily. 30 tablet 1   • busPIRone (BUSPAR) 15 MG tablet Take 1 tablet by mouth 2 (Two) Times a Day As Needed (anxiety). 180 tablet 3   • carBAMazepine (TEGretol) 200 MG tablet Take 1 tablet by mouth At Night As Needed (headache). 90 tablet 3   • carvedilol (COREG) 12.5 MG tablet Take 1 tablet by mouth 2 (Two) Times a Day With Meals. 180 tablet 3   • cetirizine (zyrTEC) 10 MG tablet Take 10 mg by mouth Daily.     • hydrochlorothiazide (MICROZIDE) 12.5 MG capsule Take 1 capsule by mouth Every Morning. 90 capsule 3   • pravastatin (PRAVACHOL) 40 MG tablet Take 1 tablet by mouth Every Night. 90 tablet 3   • promethazine-dextromethorphan (PROMETHAZINE-DM) 6.25-15 MG/5ML syrup Take 5 mL by mouth 4 (Four) Times a Day As Needed for Cough. 240 mL 0   • tamsulosin (FLOMAX) 0.4 MG capsule 24 hr capsule Take 1 capsule by mouth Daily. 90 capsule 3   • nitroglycerin (NITROSTAT) 0.4 MG SL tablet Place 1 tablet under the tongue Every 5 (Five) Minutes As Needed for Chest Pain. Take no more than 3 doses in 15 minutes. 30 tablet 1   • pantoprazole (PROTONIX) 40 MG EC tablet Take 1 tablet by mouth Daily. 90 tablet 3     Allergies   Allergen Reactions   • Oxycodone Hallucinations       Social History     Social History   • Marital status:      Social History Main Topics   • Smoking status: Never Smoker   • Smokeless tobacco: Never Used   • Alcohol use No   • Drug use: No      Other Topics Concern   • Not on file     Family History   Problem Relation Age of Onset   • Deep vein thrombosis Mother    • Diabetes Mother    • Hyperlipidemia Mother    • Hypertension Mother    • Heart attack Mother    • Cancer Father    • Kidney disease Father    • Deep vein thrombosis Daughter    • Diabetes Other    • Hyperlipidemia Other    • Hypertension Other    • Heart attack Other    • Cancer Other    • Kidney disease Other    • Osteoporosis Other    • Thyroid disease Other    • Stroke Other    • Arthritis Sister    • Diabetes Sister    • Hyperlipidemia Sister    • Hypertension Sister    • Osteoporosis Sister    • Thyroid disease Sister    • Arthritis Brother    • Diabetes Brother    • Hyperlipidemia Brother    • Hypertension Brother    • Stroke Brother        Review of Systems:  Positive for chest pain, dyspnea with exertion, lower extremity edema, dizziness.  Negative for palpitations, syncopal episodes.  All other systems reviewed are negative.         Objective:     Vital Sign Min/Max for last 24 hours  Temp  Min: 98 °F (36.7 °C)  Max: 98 °F (36.7 °C)   BP  Min: 137/97  Max: 137/97   Pulse  Min: 73  Max: 73   Resp  Min: 18  Max: 18   SpO2  Min: 93 %  Max: 93 %   No Data Recorded    No intake or output data in the 24 hours ending 03/14/18 1021        Vitals:    03/14/18 0930   BP: 137/97   Pulse: 73   Resp: 18   Temp: 98 °F (36.7 °C)   SpO2: 93%       CONSTITUTIONAL: Well-nourished. In no acute distress.   SKIN: Warm and dry. No rashes noted  HEENT: Head is normocephalic and atraumatic. Mucous membranes are pink and moist.   NECK: Supple without masses or thyromegaly.  LUNGS: Normal effort. Clear to auscultation bilaterally without wheezing, rhonchi, or rales noted.   CARDIOVASCULAR: The heart has a regular rate with a normal S1 and S2. There is no murmur, gallop, rub, or click appreciated.   PERIPHERAL VASCULAR: Carotid upstroke is 2+ bilaterally and without bruits. Radial pulses are 2+ bilaterally.  Posterior tibial pulses are 2+ and symmetrical. There is no lower extremity edema. Right radial sophia's acceptable  ABDOMEN: Soft with no tenderness with palpitation. No hepatosplenomegaly.  MUSCULOSKELETAL:  No digital cyanosis  NEUROLOGICAL: Nonfocal.  PSYCHIATRIC: Alert, orientated x 3, appropriate affect     Labs:  Basic Metabolic Panel    Sodium Sodium   Date Value Ref Range Status   03/14/2018 139 132 - 146 mmol/L Final      Potassium Potassium   Date Value Ref Range Status   03/14/2018 3.5 3.5 - 5.5 mmol/L Final      Chloride Chloride   Date Value Ref Range Status   03/14/2018 103 99 - 109 mmol/L Final      Bicarbonate No results found for: PLASMABICARB   BUN BUN   Date Value Ref Range Status   03/14/2018 17 9 - 23 mg/dL Final      Creatinine Creatinine   Date Value Ref Range Status   03/14/2018 1.00 0.60 - 1.30 mg/dL Final      Calcium Calcium   Date Value Ref Range Status   03/14/2018 9.2 8.7 - 10.4 mg/dL Final      Glucose      No components found for: GLUCOSE.*     Lab Results   Component Value Date    CHOL 114 03/14/2018    TRIG 90 03/14/2018    HDL 41 03/14/2018     WBC   Date Value Ref Range Status   03/14/2018 8.13 3.50 - 10.80 10*3/mm3 Final     RBC   Date Value Ref Range Status   03/14/2018 5.14 4.20 - 5.76 10*6/mm3 Final     Hemoglobin   Date Value Ref Range Status   03/14/2018 17.1 13.1 - 17.5 g/dL Final     Hematocrit   Date Value Ref Range Status   03/14/2018 48.8 38.9 - 50.9 % Final     MCV   Date Value Ref Range Status   03/14/2018 94.9 80.0 - 99.0 fL Final     MCH   Date Value Ref Range Status   03/14/2018 33.3 (H) 27.0 - 31.0 pg Final     MCHC   Date Value Ref Range Status   03/14/2018 35.0 32.0 - 36.0 g/dL Final     RDW   Date Value Ref Range Status   03/14/2018 12.5 11.3 - 14.5 % Final     RDW-SD   Date Value Ref Range Status   03/14/2018 43.5 37.0 - 54.0 fl Final     MPV   Date Value Ref Range Status   03/14/2018 10.1 6.0 - 12.0 fL Final     Platelets   Date Value Ref Range Status    03/14/2018 158 150 - 450 10*3/mm3 Final     Diagnostic Data:    EKG:  NSR, Sinus Arrythmia         Assessment and Plan:   Assessment  -Chest Pain, positive stress test with inferior and lateral ischemia, atypical in nature, relived by SL NTG tablets.  -HTN, stable today on current home medications.  -Hyperlipidemia, controlled on current home medications.    Plan  -LHC +/- CBI today as scheduled, procedure discussed with patient and family at bedside, including risks and benefits, verbalized understanding and wishes to proceed.      Nell Sams, APRN

## 2018-03-14 NOTE — PLAN OF CARE
Problem: Patient Care Overview  Goal: Plan of Care Review  Outcome: Outcome(s) achieved Date Met: 03/14/18 03/14/18 0160   Coping/Psychosocial   Plan of Care Reviewed With patient   Plan of Care Review   Progress no change   OTHER   Outcome Summary Patient's site stable at time of discharge. The patient is being discharged home with family.     Goal: Individualization and Mutuality  Outcome: Outcome(s) achieved Date Met: 03/14/18    Goal: Discharge Needs Assessment  Outcome: Outcome(s) achieved Date Met: 03/14/18    Goal: Interprofessional Rounds/Family Conf  Outcome: Outcome(s) achieved Date Met: 03/14/18

## 2018-04-05 ENCOUNTER — HOSPITAL ENCOUNTER (OUTPATIENT)
Dept: CARDIOLOGY | Facility: HOSPITAL | Age: 58
Discharge: HOME | End: 2018-04-05
Admitting: NURSE PRACTITIONER

## 2018-04-05 ENCOUNTER — HOSPITAL ENCOUNTER (OUTPATIENT)
Dept: PULMONOLOGY | Facility: HOSPITAL | Age: 58
Discharge: HOME | End: 2018-04-05

## 2018-04-05 VITALS — BODY MASS INDEX: 36.88 KG/M2 | WEIGHT: 235 LBS | HEIGHT: 67 IN

## 2018-04-05 DIAGNOSIS — G47.33 OSA (OBSTRUCTIVE SLEEP APNEA): ICD-10-CM

## 2018-04-05 DIAGNOSIS — R06.09 DYSPNEA ON EXERTION: ICD-10-CM

## 2018-04-05 LAB
ARTERIAL PATENCY WRIST A: ABNORMAL
ATMOSPHERIC PRESS: ABNORMAL MMHG
BASE EXCESS BLDA CALC-SCNC: 3.2 MMOL/L (ref 0–2)
BDY SITE: ABNORMAL
CO2 BLDA-SCNC: 27.4 MMOL/L (ref 22–33)
COHGB MFR BLD: 0.1 % (ref 0–2)
HCO3 BLDA-SCNC: 26.3 MMOL/L (ref 20–26)
HCT VFR BLD CALC: 53.9 %
HGB BLDA-MCNC: 17.6 G/DL (ref 13.5–17.5)
HOROWITZ INDEX BLD+IHG-RTO: 21 %
METHGB BLD QL: 0.6 % (ref 0–1.5)
MODALITY: ABNORMAL
OXYHGB MFR BLDV: 93 % (ref 94–99)
PCO2 BLDA: 35.4 MM HG (ref 35–48)
PH BLDA: 7.48 PH UNITS (ref 7.35–7.45)
PO2 BLDA: 72.2 MM HG (ref 83–108)

## 2018-04-05 PROCEDURE — 94727 GAS DIL/WSHOT DETER LNG VOL: CPT | Performed by: INTERNAL MEDICINE

## 2018-04-05 PROCEDURE — 94727 GAS DIL/WSHOT DETER LNG VOL: CPT

## 2018-04-05 PROCEDURE — 94060 EVALUATION OF WHEEZING: CPT

## 2018-04-05 PROCEDURE — 36600 WITHDRAWAL OF ARTERIAL BLOOD: CPT | Performed by: INTERNAL MEDICINE

## 2018-04-05 PROCEDURE — 94729 DIFFUSING CAPACITY: CPT

## 2018-04-05 PROCEDURE — 93306 TTE W/DOPPLER COMPLETE: CPT | Performed by: INTERNAL MEDICINE

## 2018-04-05 PROCEDURE — 82805 BLOOD GASES W/O2 SATURATION: CPT | Performed by: INTERNAL MEDICINE

## 2018-04-05 PROCEDURE — 94060 EVALUATION OF WHEEZING: CPT | Performed by: INTERNAL MEDICINE

## 2018-04-05 PROCEDURE — 94729 DIFFUSING CAPACITY: CPT | Performed by: INTERNAL MEDICINE

## 2018-04-05 PROCEDURE — 93306 TTE W/DOPPLER COMPLETE: CPT

## 2018-04-06 LAB
BH CV ECHO MEAS - AI DEC SLOPE: 264.4 CM/SEC^2
BH CV ECHO MEAS - AI MAX PG: 88.6 MMHG
BH CV ECHO MEAS - AI MAX VEL: 470.6 CM/SEC
BH CV ECHO MEAS - AI P1/2T: 521.2 MSEC
BH CV ECHO MEAS - AO MAX PG (FULL): 1.9 MMHG
BH CV ECHO MEAS - AO MAX PG: 5.1 MMHG
BH CV ECHO MEAS - AO ROOT AREA (BSA CORRECTED): 1.6
BH CV ECHO MEAS - AO ROOT AREA: 8.9 CM^2
BH CV ECHO MEAS - AO ROOT DIAM: 3.4 CM
BH CV ECHO MEAS - AO V2 MAX: 112.5 CM/SEC
BH CV ECHO MEAS - AVA(V,A): 2.6 CM^2
BH CV ECHO MEAS - AVA(V,D): 2.6 CM^2
BH CV ECHO MEAS - BSA(HAYCOCK): 2.3 M^2
BH CV ECHO MEAS - BSA: 2.2 M^2
BH CV ECHO MEAS - BZI_BMI: 36.8 KILOGRAMS/M^2
BH CV ECHO MEAS - BZI_METRIC_HEIGHT: 170.2 CM
BH CV ECHO MEAS - BZI_METRIC_WEIGHT: 106.6 KG
BH CV ECHO MEAS - CONTRAST EF (2CH): 53.8 ML/M^2
BH CV ECHO MEAS - CONTRAST EF 4CH: 56.3 ML/M^2
BH CV ECHO MEAS - EDV(CUBED): 165.1 ML
BH CV ECHO MEAS - EDV(MOD-SP2): 65 ML
BH CV ECHO MEAS - EDV(MOD-SP4): 96 ML
BH CV ECHO MEAS - EDV(TEICH): 146.5 ML
BH CV ECHO MEAS - EF(CUBED): 68.1 %
BH CV ECHO MEAS - EF(MOD-SP2): 53.8 %
BH CV ECHO MEAS - EF(MOD-SP4): 54 %
BH CV ECHO MEAS - EF(TEICH): 59.1 %
BH CV ECHO MEAS - ESV(CUBED): 52.7 ML
BH CV ECHO MEAS - ESV(MOD-SP2): 30 ML
BH CV ECHO MEAS - ESV(MOD-SP4): 42 ML
BH CV ECHO MEAS - ESV(TEICH): 60 ML
BH CV ECHO MEAS - FS: 31.7 %
BH CV ECHO MEAS - IVS/LVPW: 0.84
BH CV ECHO MEAS - IVSD: 1 CM
BH CV ECHO MEAS - LA DIMENSION: 3.4 CM
BH CV ECHO MEAS - LA/AO: 1
BH CV ECHO MEAS - LAT PEAK E' VEL: 8.6 CM/SEC
BH CV ECHO MEAS - LV DIASTOLIC VOL/BSA (35-75): 44.3 ML/M^2
BH CV ECHO MEAS - LV MASS(C)D: 252.3 GRAMS
BH CV ECHO MEAS - LV MASS(C)DI: 116.5 GRAMS/M^2
BH CV ECHO MEAS - LV MAX PG: 3.1 MMHG
BH CV ECHO MEAS - LV MEAN PG: 1.5 MMHG
BH CV ECHO MEAS - LV SYSTOLIC VOL/BSA (12-30): 19.4 ML/M^2
BH CV ECHO MEAS - LV V1 MAX: 88.7 CM/SEC
BH CV ECHO MEAS - LV V1 MEAN: 57.2 CM/SEC
BH CV ECHO MEAS - LV V1 VTI: 16.7 CM
BH CV ECHO MEAS - LVIDD: 5.5 CM
BH CV ECHO MEAS - LVIDS: 3.7 CM
BH CV ECHO MEAS - LVLD AP2: 7.5 CM
BH CV ECHO MEAS - LVLD AP4: 7.6 CM
BH CV ECHO MEAS - LVLS AP2: 7.1 CM
BH CV ECHO MEAS - LVLS AP4: 6.6 CM
BH CV ECHO MEAS - LVOT AREA (M): 3.1 CM^2
BH CV ECHO MEAS - LVOT AREA: 3.3 CM^2
BH CV ECHO MEAS - LVOT DIAM: 2 CM
BH CV ECHO MEAS - LVPWD: 1.2 CM
BH CV ECHO MEAS - MED PEAK E' VEL: 6.4 CM/SEC
BH CV ECHO MEAS - MV A MAX VEL: 52.4 CM/SEC
BH CV ECHO MEAS - MV DEC TIME: 0.18 SEC
BH CV ECHO MEAS - MV E MAX VEL: 87.1 CM/SEC
BH CV ECHO MEAS - MV E/A: 1.7
BH CV ECHO MEAS - PA ACC SLOPE: 405.3 CM/SEC^2
BH CV ECHO MEAS - PA ACC TIME: 0.15 SEC
BH CV ECHO MEAS - PA PR(ACCEL): 10.9 MMHG
BH CV ECHO MEAS - SI(CUBED): 51.9 ML/M^2
BH CV ECHO MEAS - SI(LVOT): 25.1 ML/M^2
BH CV ECHO MEAS - SI(MOD-SP2): 16.2 ML/M^2
BH CV ECHO MEAS - SI(MOD-SP4): 24.9 ML/M^2
BH CV ECHO MEAS - SI(TEICH): 40 ML/M^2
BH CV ECHO MEAS - SV(CUBED): 112.4 ML
BH CV ECHO MEAS - SV(LVOT): 54.4 ML
BH CV ECHO MEAS - SV(MOD-SP2): 35 ML
BH CV ECHO MEAS - SV(MOD-SP4): 54 ML
BH CV ECHO MEAS - SV(TEICH): 86.6 ML
BH CV ECHO MEAS - TAPSE (>1.6): 2.7 CM2
BH CV VAS BP LEFT ARM: NORMAL MMHG
BH CV XLRA - RV BASE: 3.5 CM
BH CV XLRA - RV LENGTH: 8.2 CM
BH CV XLRA - RV MID: 2.9 CM
BH CV XLRA - TDI S': 12.6 CM/SEC
E/E' RATIO: 11.9
LEFT ATRIUM VOLUME INDEX: 21 ML/M2
LEFT ATRIUM VOLUME: 46 CM3
LV EF 2D ECHO EST: 55 %
MAXIMAL PREDICTED HEART RATE: 160 BPM
STRESS TARGET HR: 136 BPM

## 2018-04-11 ENCOUNTER — HOSPITAL ENCOUNTER (OUTPATIENT)
Dept: GENERAL RADIOLOGY | Facility: HOSPITAL | Age: 58
Discharge: HOME | End: 2018-04-11
Admitting: PHYSICIAN ASSISTANT

## 2018-04-11 ENCOUNTER — OFFICE VISIT (OUTPATIENT)
Dept: INTERNAL MEDICINE | Facility: CLINIC | Age: 58
End: 2018-04-11

## 2018-04-11 VITALS
BODY MASS INDEX: 37.98 KG/M2 | WEIGHT: 242 LBS | OXYGEN SATURATION: 96 % | DIASTOLIC BLOOD PRESSURE: 90 MMHG | HEART RATE: 70 BPM | HEIGHT: 67 IN | SYSTOLIC BLOOD PRESSURE: 132 MMHG | TEMPERATURE: 97.7 F
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
## 2018-04-11 DIAGNOSIS — M25.552 POSTERIOR PAIN OF HIP, LEFT: ICD-10-CM

## 2018-04-11 PROCEDURE — 73502 X-RAY EXAM HIP UNI 2-3 VIEWS: CPT

## 2018-04-11 PROCEDURE — 99213 OFFICE O/P EST LOW 20 MIN: CPT | Performed by: PHYSICIAN ASSISTANT

## 2018-04-11 NOTE — PROGRESS NOTES
"Subjective     Chief Complaint: hip pain from fall    History of Present Illness     Chris Mejia is a 58 y.o. male presenting following a fall onto hip 2 days ago. Patient states he was trying to pull on his deep freeze, handle came off, and he fell backwards on to left hip. Denies hitting his head. Patient reports significant bruising and swelling of posterior hip. Had surgery and hardware put in this hip 2014 by Dr. Gaytan, has had no issues until now. Wife is concerned for fracture or hardware shifting. Would like imaging. Weightbearing is okay, no falls since then. Wife says patient is always somewhat unstable but due cerebellar ataxia, not fall.    The following portions of the patient's history were reviewed and updated as appropriate: current medications, allergies, PMH.    Review of Systems   Musculoskeletal: Positive for arthralgias and joint swelling. Negative for back pain, neck pain and neck stiffness.     Objective     Vitals:    04/11/18 0834   BP: 132/90   Pulse: 70   Temp: 97.7 °F (36.5 °C)   SpO2: 96%   Weight: 110 kg (242 lb)   Height: 170 cm (66.93\")     Physical Exam   Constitutional: He appears well-developed and well-nourished. No distress.   Cardiovascular: Normal rate and regular rhythm.    Pulmonary/Chest: Effort normal and breath sounds normal.   Musculoskeletal:        Left hip: He exhibits tenderness, swelling and deformity.        Lumbar back: He exhibits no tenderness and no swelling.        Legs:      Assessment/Plan     Diagnoses and all orders for this visit:    Fall, initial encounter  -     XR Hip With or Without Pelvis 2 - 3 View Left    Posterior pain of hip, left  -     XR Hip With or Without Pelvis 2 - 3 View Left    Imaging today, patient may use ice, has not needed anything for pain but states he will take tylenol if necessary. Encouraged rest, non weightbearing while waiting for imaging result. Prior x-rays were completed by Dr. Gaytan following surgery- will try to get " copies of these for comparison if necessary.    Mya Ortiz PA-C  04/11/2018         Please note that portions of this note were completed with a voice recognition program. Efforts were made to edit dictation, but occasionally words are mistranscribed.

## 2018-04-12 NOTE — PROGRESS NOTES
[facility] AT [address], [address]  [place], [place], [address] [phone]  [email or web address]           OUTPATIENT CLINIC FOLLOW UP NOTE    Patient Care Team:  Patient Care Team:  Naty Lebron MD as PCP - General (Family Medicine)  Kirill Barlow MD, FAAN as Consulting Physician (Sleep Medicine)    Subjective:      Chief Complaint   Patient presents with   • Shortness of Breath   • Precordial pain       HPI:    Chris Mejia is a 58 y.o. male.  History of Present Illness    History of mild to moderate CAD, hypertension, hyperlipidemia, DENZEL, RA, and trigeminal neuralgia. Presents today for follow-up.    The patient has had persistent dyspnea with exertion that is mild to moderate in severity, worse with exertion, relieves with rest, not associated with chest pain.  Occasionally has mild lower extremity swelling.  Overall his chest syndrome has improved since his heart catheterization note is unclear as to why    Review of Systems:  Positive for dyspnea, fatigue, lower extremity edema  Negative for exertional chest pain, orthopnea, PND, palpitations, lightheadedness, syncope.    PFSH:  Patient Active Problem List   Diagnosis   • Tubular adenoma of colon   • Trigeminal neuralgia   • Spinocerebellar ataxia   • RA (rheumatoid arthritis)   • Insomnia   • Essential hypertension   • Hypercholesteremia   • Acid reflux   • Anxiety   • Vitamin B12 deficiency   • Vitamin D deficiency   • DENZEL (obstructive sleep apnea)   • Atopic rhinitis   • Ataxia   • Ataxic gait   • Chest pain   • Dizziness   • Headache   • Shoulder pain   • Ophthalmoplegia   • Precordial pain   • Abnormal cardiovascular stress test         Current Outpatient Prescriptions:   •  Acetaminophen 500 MG coapsule, Take 500 mg by mouth Daily., Disp: , Rfl:   •  amLODIPine (NORVASC) 2.5 MG tablet, Take 1 tablet by mouth Daily., Disp: 90 tablet, Rfl: 3  •  aspirin 81 MG EC tablet, Take 1 tablet by mouth  Daily., Disp: 30 tablet, Rfl: 1  •  busPIRone (BUSPAR) 15 MG tablet, Take 1 tablet by mouth 2 (Two) Times a Day As Needed (anxiety)., Disp: 180 tablet, Rfl: 3  •  carBAMazepine (TEGretol) 200 MG tablet, Take 1 tablet by mouth At Night As Needed (headache)., Disp: 90 tablet, Rfl: 3  •  carvedilol (COREG) 12.5 MG tablet, Take 1 tablet by mouth 2 (Two) Times a Day With Meals., Disp: 180 tablet, Rfl: 3  •  cetirizine (zyrTEC) 10 MG tablet, Take 10 mg by mouth Daily., Disp: , Rfl:   •  hydrochlorothiazide (MICROZIDE) 12.5 MG capsule, Take 1 capsule by mouth Every Morning., Disp: 90 capsule, Rfl: 3  •  nitroglycerin (NITROSTAT) 0.4 MG SL tablet, Place 1 tablet under the tongue Every 5 (Five) Minutes As Needed for Chest Pain. Take no more than 3 doses in 15 minutes., Disp: 30 tablet, Rfl: 1  •  pantoprazole (PROTONIX) 40 MG EC tablet, Take 1 tablet by mouth Daily., Disp: 90 tablet, Rfl: 3  •  pravastatin (PRAVACHOL) 40 MG tablet, Take 1 tablet by mouth Every Night., Disp: 90 tablet, Rfl: 3  •  promethazine-dextromethorphan (PROMETHAZINE-DM) 6.25-15 MG/5ML syrup, Take 5 mL by mouth 4 (Four) Times a Day As Needed for Cough., Disp: 240 mL, Rfl: 0  •  tamsulosin (FLOMAX) 0.4 MG capsule 24 hr capsule, Take 1 capsule by mouth Daily., Disp: 90 capsule, Rfl: 3    Allergies   Allergen Reactions   • Oxycodone Hallucinations        reports that he has never smoked. He has never used smokeless tobacco.    Family History   Problem Relation Age of Onset   • Deep vein thrombosis Mother    • Diabetes Mother    • Hyperlipidemia Mother    • Hypertension Mother    • Heart attack Mother    • Cancer Father    • Kidney disease Father    • Deep vein thrombosis Daughter    • Diabetes Other    • Hyperlipidemia Other    • Hypertension Other    • Heart attack Other    • Cancer Other    • Kidney disease Other    • Osteoporosis Other    • Thyroid disease Other    • Stroke Other    • Arthritis Sister    • Diabetes Sister    • Hyperlipidemia Sister    •  "Hypertension Sister    • Osteoporosis Sister    • Thyroid disease Sister    • Arthritis Brother    • Diabetes Brother    • Hyperlipidemia Brother    • Hypertension Brother    • Stroke Brother          Objective:   Blood pressure 118/88, pulse 74, height 171.5 cm (67.5\"), weight 109 kg (240 lb 9.6 oz), SpO2 92 %.  CONSTITUTIONAL: No acute distress, normal affect  RESPIRATORY: Normal effort. Clear to auscultation bilaterally without wheezing or rales  CARDIOVASCULAR: Carotids with normal upstrokes without bruits.  Regular rate and rhythm with normal S1 and S2. Without murmur, gallop or rub.  PERIPHERAL VASCULAR: Normal radial pulses bilaterally. There is no lower extremity edema bilaterally.    Labs:  Lab Results   Component Value Date    ALT 25 03/14/2018    AST 23 03/14/2018     Lab Results   Component Value Date    CHOL 114 03/14/2018    TRIG 90 03/14/2018    HDL 41 03/14/2018    CREATININE 1.00 03/14/2018       No components found for: LDLDIRECTC  Lab Results   Component Value Date    LDL 65 03/14/2018    LDL 66 09/15/2017    LDL 75 06/27/2016     Lab Results   Component Value Date    LDLHDL 1.58 06/27/2016       Diagnostic Data:    Procedures    PFT 4/2018 - results reviewed; borderline FEV1/FVC    TTE 4/2018  · Left ventricular systolic function is normal. Estimated EF = 55%.  · Left ventricular diastolic dysfunction (grade II) consistent with pseudonormalization.    [facility] 3/2018  · There was mild to moderate CAD.  · There is a 40% eccentric, discrete stenosis in the second obtuse marginal branch that was not found to be functionally significant by iFR with a ratio of 1.0.  · There is mild coronary artery disease involving the proximal LAD.  · Normal left ventricular ejection fraction 65% with no regional wall motion abnormalities    Assessment and Plan:   Chris was seen today for shortness of breath and precordial pain.    Diagnoses and all orders for this visit:    Coronary artery disease involving native " coronary artery of native heart without angina pectoris, Hypercholesteremia  -CCS 1, Without flow limiting stenoses on heart catheterization.  Of note the patient does have borderline PFTs, history of hiatal hernia, prior ulcerative disease on EGD  -Continue current related medications other than decreasing aspirin to 81 mg daily    Dyspnea  Borderline PFTs?   Smoke exposure  Sleep disturbance, poor sleep, snoring  - Referral to primary clinic for a sleep study as well as to evaluate borderline PFTs and prior smoke exposure    Essential hypertension  -At goal, continue current medications    - Dietary and exercise counseling was provided during this visit  - Return in about 1 year (around 4/18/2019).     Adam Perkins MD, MSc, [facility]  Interventional Cardiology  [facility]

## 2018-04-18 ENCOUNTER — OFFICE VISIT (OUTPATIENT)
Dept: CARDIOLOGY | Facility: CLINIC | Age: 58
End: 2018-04-18

## 2018-04-18 VITALS
OXYGEN SATURATION: 92 % | WEIGHT: 240.6 LBS | HEART RATE: 74 BPM | HEIGHT: 68 IN | DIASTOLIC BLOOD PRESSURE: 88 MMHG | BODY MASS INDEX: 36.46 KG/M2 | SYSTOLIC BLOOD PRESSURE: 118 MMHG

## 2018-04-18 DIAGNOSIS — E78.00 HYPERCHOLESTEREMIA: ICD-10-CM

## 2018-04-18 DIAGNOSIS — G47.9 SLEEP DISTURBANCE: Primary | ICD-10-CM

## 2018-04-18 DIAGNOSIS — I25.10 CORONARY ARTERY DISEASE INVOLVING NATIVE CORONARY ARTERY OF NATIVE HEART WITHOUT ANGINA PECTORIS: ICD-10-CM

## 2018-04-18 DIAGNOSIS — I10 ESSENTIAL HYPERTENSION: Chronic | ICD-10-CM

## 2018-04-18 PROBLEM — R94.39 ABNORMAL CARDIOVASCULAR STRESS TEST: Status: RESOLVED | Noted: 2018-03-13 | Resolved: 2018-04-18

## 2018-04-18 PROCEDURE — 99213 OFFICE O/P EST LOW 20 MIN: CPT | Performed by: INTERNAL MEDICINE

## 2018-04-18 RX ORDER — MELATONIN
5000 WEEKLY
COMMUNITY
End: 2019-06-24

## 2018-04-18 RX ORDER — ASPIRIN 325 MG
325 TABLET ORAL DAILY
COMMUNITY
End: 2018-04-18 | Stop reason: DRUGHIGH

## 2018-06-04 ENCOUNTER — OFFICE VISIT (OUTPATIENT)
Dept: INTERNAL MEDICINE | Facility: CLINIC | Age: 58
End: 2018-06-04

## 2018-06-04 VITALS
SYSTOLIC BLOOD PRESSURE: 112 MMHG | OXYGEN SATURATION: 95 % | HEIGHT: 68 IN | WEIGHT: 231 LBS | TEMPERATURE: 98.3 F | DIASTOLIC BLOOD PRESSURE: 70 MMHG | RESPIRATION RATE: 14 BRPM | BODY MASS INDEX: 35.01 KG/M2 | HEART RATE: 66 BPM

## 2018-06-04 DIAGNOSIS — G50.0 TRIGEMINAL NEURALGIA: ICD-10-CM

## 2018-06-04 DIAGNOSIS — Z51.81 ENCOUNTER FOR MONITORING LONG-TERM PROTON PUMP INHIBITOR THERAPY: ICD-10-CM

## 2018-06-04 DIAGNOSIS — H49.9 OPHTHALMOPLEGIA: Chronic | ICD-10-CM

## 2018-06-04 DIAGNOSIS — F41.9 ANXIETY: ICD-10-CM

## 2018-06-04 DIAGNOSIS — R26.0 ATAXIC GAIT: ICD-10-CM

## 2018-06-04 DIAGNOSIS — Z00.00 MEDICARE ANNUAL WELLNESS VISIT, SUBSEQUENT: Primary | ICD-10-CM

## 2018-06-04 DIAGNOSIS — E53.8 VITAMIN B12 DEFICIENCY: ICD-10-CM

## 2018-06-04 DIAGNOSIS — Z79.899 ENCOUNTER FOR MONITORING LONG-TERM PROTON PUMP INHIBITOR THERAPY: ICD-10-CM

## 2018-06-04 DIAGNOSIS — I25.10 CORONARY ARTERY DISEASE INVOLVING NATIVE CORONARY ARTERY OF NATIVE HEART WITHOUT ANGINA PECTORIS: ICD-10-CM

## 2018-06-04 DIAGNOSIS — E66.09 CLASS 2 OBESITY DUE TO EXCESS CALORIES WITHOUT SERIOUS COMORBIDITY WITH BODY MASS INDEX (BMI) OF 35.0 TO 35.9 IN ADULT: ICD-10-CM

## 2018-06-04 DIAGNOSIS — Z11.59 NEED FOR HEPATITIS C SCREENING TEST: ICD-10-CM

## 2018-06-04 DIAGNOSIS — I10 ESSENTIAL HYPERTENSION: Chronic | ICD-10-CM

## 2018-06-04 DIAGNOSIS — E78.00 HYPERCHOLESTEREMIA: ICD-10-CM

## 2018-06-04 DIAGNOSIS — M06.9 RHEUMATOID ARTHRITIS INVOLVING MULTIPLE SITES, UNSPECIFIED RHEUMATOID FACTOR PRESENCE: ICD-10-CM

## 2018-06-04 DIAGNOSIS — G11.8 SPINOCEREBELLAR ATAXIA (HCC): Chronic | ICD-10-CM

## 2018-06-04 DIAGNOSIS — R05.9 COUGH: ICD-10-CM

## 2018-06-04 DIAGNOSIS — N40.1 BENIGN NON-NODULAR PROSTATIC HYPERPLASIA WITH LOWER URINARY TRACT SYMPTOMS: ICD-10-CM

## 2018-06-04 DIAGNOSIS — Z23 NEED FOR PNEUMOCOCCAL VACCINE: ICD-10-CM

## 2018-06-04 DIAGNOSIS — R06.02 SOB (SHORTNESS OF BREATH) ON EXERTION: ICD-10-CM

## 2018-06-04 DIAGNOSIS — K21.9 GASTROESOPHAGEAL REFLUX DISEASE WITHOUT ESOPHAGITIS: ICD-10-CM

## 2018-06-04 DIAGNOSIS — F51.01 PRIMARY INSOMNIA: ICD-10-CM

## 2018-06-04 DIAGNOSIS — G47.33 OSA (OBSTRUCTIVE SLEEP APNEA): ICD-10-CM

## 2018-06-04 PROBLEM — R07.2 PRECORDIAL PAIN: Status: RESOLVED | Noted: 2018-03-13 | Resolved: 2018-06-04

## 2018-06-04 PROCEDURE — G0009 ADMIN PNEUMOCOCCAL VACCINE: HCPCS | Performed by: FAMILY MEDICINE

## 2018-06-04 PROCEDURE — 96160 PT-FOCUSED HLTH RISK ASSMT: CPT | Performed by: FAMILY MEDICINE

## 2018-06-04 PROCEDURE — G0439 PPPS, SUBSEQ VISIT: HCPCS | Performed by: FAMILY MEDICINE

## 2018-06-04 PROCEDURE — 90732 PPSV23 VACC 2 YRS+ SUBQ/IM: CPT | Performed by: FAMILY MEDICINE

## 2018-06-04 PROCEDURE — 99396 PREV VISIT EST AGE 40-64: CPT | Performed by: FAMILY MEDICINE

## 2018-06-04 RX ORDER — AMLODIPINE BESYLATE 2.5 MG/1
2.5 TABLET ORAL DAILY
Qty: 90 TABLET | Refills: 3 | Status: SHIPPED | OUTPATIENT
Start: 2018-06-04 | End: 2019-05-28 | Stop reason: SDUPTHER

## 2018-06-04 RX ORDER — HYDROCHLOROTHIAZIDE 12.5 MG/1
12.5 CAPSULE, GELATIN COATED ORAL EVERY MORNING
Qty: 90 CAPSULE | Refills: 3 | Status: SHIPPED | OUTPATIENT
Start: 2018-06-04 | End: 2019-05-28 | Stop reason: SDUPTHER

## 2018-06-04 RX ORDER — DEXTROMETHORPHAN HYDROBROMIDE AND PROMETHAZINE HYDROCHLORIDE 15; 6.25 MG/5ML; MG/5ML
5 SYRUP ORAL 4 TIMES DAILY PRN
Qty: 240 ML | Refills: 0
Start: 2018-06-04 | End: 2019-06-24

## 2018-06-04 RX ORDER — CARVEDILOL 12.5 MG/1
12.5 TABLET ORAL 2 TIMES DAILY WITH MEALS
Qty: 180 TABLET | Refills: 3 | Status: SHIPPED | OUTPATIENT
Start: 2018-06-04 | End: 2019-05-28 | Stop reason: SDUPTHER

## 2018-06-04 RX ORDER — PRAVASTATIN SODIUM 40 MG
40 TABLET ORAL DAILY
Qty: 90 TABLET | Refills: 3 | Status: SHIPPED | OUTPATIENT
Start: 2018-06-04 | End: 2019-05-28 | Stop reason: SDUPTHER

## 2018-06-04 RX ORDER — CARBAMAZEPINE 200 MG/1
200 TABLET ORAL NIGHTLY PRN
Qty: 90 TABLET | Refills: 3 | Status: SHIPPED | OUTPATIENT
Start: 2018-06-04 | End: 2019-06-24 | Stop reason: SDUPTHER

## 2018-06-04 RX ORDER — TAMSULOSIN HYDROCHLORIDE 0.4 MG/1
1 CAPSULE ORAL DAILY
Qty: 90 CAPSULE | Refills: 3 | Status: SHIPPED | OUTPATIENT
Start: 2018-06-04 | End: 2019-05-28 | Stop reason: SDUPTHER

## 2018-06-04 RX ORDER — AMLODIPINE BESYLATE 2.5 MG/1
TABLET ORAL
COMMUNITY
Start: 2018-04-24 | End: 2018-06-04 | Stop reason: SDUPTHER

## 2018-06-04 RX ORDER — PANTOPRAZOLE SODIUM 40 MG/1
40 TABLET, DELAYED RELEASE ORAL DAILY
Qty: 90 TABLET | Refills: 3 | Status: SHIPPED | OUTPATIENT
Start: 2018-06-04 | End: 2019-05-28 | Stop reason: SDUPTHER

## 2018-06-04 RX ORDER — BUSPIRONE HYDROCHLORIDE 10 MG/1
TABLET ORAL
Qty: 180 TABLET | Refills: 3 | Status: SHIPPED | OUTPATIENT
Start: 2018-06-04 | End: 2019-05-28

## 2018-06-04 NOTE — PATIENT INSTRUCTIONS
MyPlate from [employer]  The general, healthful diet is based on the 2010 Dietary Guidelines for Americans. The amount of food you need to eat from each food group depends on your age, sex, and level of physical activity and can be individualized by a dietitian. Go to ChooseMyPlate.gov for more information.  What do I need to know about the MyPlate plan?  · Enjoy your food, but eat less.  · Avoid oversized portions.  ¨ ½ of your plate should include fruits and vegetables.  ¨ ¼ of your plate should be grains.  ¨ ¼ of your plate should be protein.  Grains   · Make at least half of your grains whole grains.  · For a 2,000 calorie daily food plan, eat 6 oz every day.  · 1 oz is about 1 slice bread, 1 cup cereal, or ½ cup cooked rice, cereal, or pasta.  Vegetables   · Make half your plate fruits and vegetables.  · For a 2,000 calorie daily food plan, eat 2½ cups every day.  · 1 cup is about 1 cup raw or cooked vegetables or vegetable juice or 2 cups raw leafy greens.  Fruits   · Make half your plate fruits and vegetables.  · For a 2,000 calorie daily food plan, eat 2 cups every day.  · 1 cup is about 1 cup fruit or 100% fruit juice or ½ cup dried fruit.  Protein   · For a 2,000 calorie daily food plan, eat 5½ oz every day.  · 1 oz is about 1 oz meat, poultry, or fish, ¼ cup cooked beans, 1 egg, 1 Tbsp peanut butter, or ½ oz nuts or seeds.  Dairy   · Switch to fat-free or low-fat (1%) milk.  · For a 2,000 calorie daily food plan, eat 3 cups every day.  · 1 cup is about 1 cup milk or yogurt or soy milk (soy beverage), 1½ oz natural cheese, or 2 oz processed cheese.  Fats, Oils, and Empty Calories   · Only small amounts of oils are recommended.  · Empty calories are calories from solid fats or added sugars.  · Compare sodium in foods like soup, bread, and frozen meals. Choose the foods with lower numbers.  · Drink water instead of sugary drinks.  What foods can I eat?  Grains   Whole grains such as whole wheat, quinoa,  millet, and bulgur. Bread, rolls, and pasta made from whole grains. Brown or wild rice. Hot or cold cereals made from whole grains and without added sugar.  Vegetables   All fresh vegetables, especially fresh red, dark green, or orange vegetables. Peas and beans. Low-sodium frozen or canned vegetables prepared without added salt. Low-sodium vegetable juices.  Fruits   All fresh, frozen, and dried fruits. Canned fruit packed in water or fruit juice without added sugar. Fruit juices without added sugar.  Meats and Other Protein Sources   Boiled, baked, or grilled lean meat trimmed of fat. Skinless poultry. Fresh seafood and shellfish. Canned seafood packed in water. Unsalted nuts and unsalted nut butters. Tofu. Dried beans and pea. Eggs.  Dairy   Low-fat or fat-free milk, yogurt, and cheeses.  Sweets and Desserts   Frozen desserts made from low-fat milk.  Fats and Oils   Olive, peanut, and canola oils and margarine. Salad dressing and mayonnaise made from these oils.  Other   Soups and casseroles made from allowed ingredients and without added fat or salt.  The items listed above may not be a complete list of recommended foods or beverages. Contact your dietitian for more options.   What foods are not recommended?  Grains   Sweetened, low-fiber cereals. Packaged baked goods. Snack crackers and chips. Cheese crackers, butter crackers, and biscuits. Frozen waffles, sweet breads, doughnuts, pastries, packaged baking mixes, pancakes, cakes, and cookies.  Vegetables   Regular canned or frozen vegetables or vegetables prepared with salt. Canned tomatoes. Canned tomato sauce. Fried vegetables. Vegetables in cream sauce or cheese sauce.  Fruits   Fruits packed in syrup or made with added sugar.  Meats and Other Protein Sources   Marbled or fatty meats such as ribs. Poultry with skin. Fried meats, poultry, eggs, or fish. Sausages, hot dogs, and deli meats such as pastrami, bologna, or salami.  Dairy   Whole milk, cream,  cheeses made from whole milk, sour cream. Ice cream or yogurt made from whole milk or with added sugar.  Beverages   For adults, no more than one alcoholic drink per day. Regular soft drinks or other sugary beverages. Juice drinks.  Sweets and Desserts   Sugary or fatty desserts, candy, and other sweets.  Fats and Oils   Solid shortening or partially hydrogenated oils. Solid margarine. Margarine that contains trans fats. Butter.  The items listed above may not be a complete list of foods and beverages to avoid. Contact your dietitian for more information.   This information is not intended to replace advice given to you by your health care provider. Make sure you discuss any questions you have with your health care provider.  Document Released: 01/06/2009 Document Revised: 05/25/2017 Document Reviewed: 11/26/2014  [employer] Interactive Patient Education © 2017 [employer]    Serving Sizes  A serving size is a measured amount of food or drink, such as one slice of bread, that has an associated nutrient content. Knowing the serving size of a food or drink can help you determine how much of that food you should consume.  What is the size of one serving?  The size of one healthy serving depends on the food or drink. To determine a serving size, read the food label. If the food or drink does not have a food label, try to find serving size information online. Or, use the following to estimate the size of one adult serving:  Grain   1 slice bread. ½ bagel. ½ cup pasta.  Vegetable   ½ cup cooked or canned vegetables. 1 cup raw, leafy greens.  Fruit   ½ cup canned fruit. 1 medium fruit. ¼ cup dried fruit.  Meat and Other Protein Sources   1 oz meat, poultry, or fish. ¼ cup cooked beans. 1 egg. ¼ cup nuts or seeds. 1 Tbsp nut butter. ¼ cup tofu or tempeh. 2 Tbsp hummus.  Dairy   An individual container of yogurt (6-8 oz). 1 piece of cheese the size of your thumb (1 oz). 1 cup (8 oz) milk or milk alternative.  Fat   A piece  the size of one dice. 1 tsp soft margarine. 1 Tbsp mayonnaise. 1 tsp vegetable oil. 1 Tbsp regular salad dressing. 2 Tbsp low-fat salad dressing.  How many servings should I eat from each food group each day?  The following are the suggested number of servings to try and have every day from each food group. You can also look at your eating throughout the week and aim for meeting these requirements on most days for overall healthy eating.  Grain   6-8 servings. Try to have half of your grains from whole grains, such as whole wheat bread, corn tortillas, oatmeal, brown rice, whole wheat pasta, and bulgur.  Vegetable   At least 2½-3 servings.  Fruit   2 servings.  Meat and Other Protein Foods   5-6 servings. Aim to have lean proteins, such as chicken, turkey, fish, beans, or tofu.  Dairy   3 servings. Choose low-fat or nonfat if you are trying to control your weight.  Fat   2-3 servings.  Is a serving the same thing as a portion?  No. A portion is the actual amount you eat, which may be more than one serving. Knowing the specific serving size of a food and the nutritional information that goes with it can help you make a healthy decision on what size portion to eat.  What are some tips to help me learn healthy serving sizes?  · Check food labels for serving sizes. Many foods that come as a single portion actually contain multiple servings.  · Determine the serving size of foods you commonly eat and figure out how large a portion you usually eat.  · Measure the number of servings that can be held by the bowls, glasses, cups, and plates you typically use. For example, pour your breakfast cereal into your regular bowl and then pour it into a measuring cup.  · For 1-2 days, measure the serving sizes of all the foods you eat.  · Practice estimating serving sizes and determining how big your portions should be.  This information is not intended to replace advice given to you by your health care provider. Make sure you  discuss any questions you have with your health care provider.  Document Released: 09/15/2004 Document Revised: 2017 Document Reviewed: 2015  [employer] Interactive Patient Education ©  [employer]    Medicare Wellness  Personal Prevention Plan of Service     Date of Office Visit:  2018  Encounter Provider:  Naty Lebron MD  Place of Service:  [facility]  Patient Name: Chris Mejia  :  1960    As part of the Medicare Wellness portion of your visit today, we are providing you with this personalized preventive plan of services (PPPS). This plan is based upon recommendations of the [place] Preventive Services Task Force (USPSTF) and the Advisory Committee on Immunization Practices (ACIP).    This lists the preventive care services that should be considered, and provides dates of when you are due. Items listed as completed are up-to-date and do not require any further intervention.    Health Maintenance   Topic Date Due   • HEPATITIS C SCREENING  2018 (Originally 2016)   • COLONOSCOPY  2018 (Originally 3/12/2018)   • ZOSTER VACCINE (1 of 2) 2018 (Originally 2008)   • INFLUENZA VACCINE  2018   • LIPID PANEL  2019   • MEDICARE ANNUAL WELLNESS  2019   • TDAP/TD VACCINES (2 - Td) 2020   • PNEUMOCOCCAL VACCINE (19-64 MEDIUM RISK)  Completed       Orders Placed This Encounter   Procedures   • Extra Heavy Duty Wheelchair     Order Specific Question:   Length of Need (99 Months = Lifetime)     Answer:   99 Months = Lifetime   • Pneumococcal polysaccharide vaccine 23-valent >= 3yo subcutaneous/IM (PPSV23)   • Vitamin B12 & Folate   • Magnesium   • Comprehensive Metabolic Panel   • Hepatitis C Antibody   • Carbamazepine level, total   • Ambulatory Referral to Pulmonology     Referral Priority:   Routine     Referral Type:   Consultation     Referral Reason:   Specialty Services Required     Referred  to Provider:   Megan Murillo MD     Requested Specialty:   Pulmonary Disease     Number of Visits Requested:   1   • CBC & Differential     Order Specific Question:   Manual Differential     Answer:   No       Return in about 6 months (around 12/4/2018) for Blood Pressure follow up.

## 2018-06-04 NOTE — PROGRESS NOTES
QUICK REFERENCE INFORMATION:  The ABCs of the Annual Wellness Visit    Subsequent Medicare Wellness Visit    HEALTH RISK ASSESSMENT    1960    Recent Hospitalizations:  No hospitalization(s) within the last year..        Current Medical Providers:  Patient Care Team:  Naty Lebron MD as PCP - General (Family Medicine)  Kirill Barlow MD, FAAN as Consulting Physician (Sleep Medicine)  Adam Perkins MD as Consulting Physician (Cardiology)        Smoking Status:  History   Smoking Status   • Never Smoker   Smokeless Tobacco   • Never Used       Alcohol Consumption:  History   Alcohol Use No       Depression Screen:   PHQ-2/PHQ-9 Depression Screening 6/4/2018   Little interest or pleasure in doing things 0   Feeling down, depressed, or hopeless 0   Trouble falling or staying asleep, or sleeping too much -   Feeling tired or having little energy -   Poor appetite or overeating -   Feeling bad about yourself - or that you are a failure or have let yourself or your family down -   Trouble concentrating on things, such as reading the newspaper or watching television -   Moving or speaking so slowly that other people could have noticed. Or the opposite - being so fidgety or restless that you have been moving around a lot more than usual -   Thoughts that you would be better off dead, or of hurting yourself in some way -   Total Score 0   If you checked off any problems, how difficult have these problems made it for you to do your work, take care of things at home, or get along with other people? -       Health Habits and Functional and Cognitive Screening:  Functional & Cognitive Status 6/4/2018   Do you have difficulty preparing food and eating? No   Do you have difficulty bathing yourself, getting dressed or grooming yourself? No   Do you have difficulty using the toilet? No   Do you have difficulty moving around from place to place? No   Do you have trouble with steps or getting out of a bed or a chair? No    In the past year have you fallen or experienced a near fall? Yes   Current Diet Well Balanced Diet   Dental Exam Not up to date   Eye Exam Not up to date   Exercise (times per week) 6 times per week   Current Exercise Activities Include (No Data)   Do you need help using the phone?  No   Are you deaf or do you have serious difficulty hearing?  No   Do you need help with transportation? No   Do you need help shopping? No   Do you need help preparing meals?  No   Do you need help with housework?  No   Do you need help with laundry? No   Do you need help taking your medications? No   Do you need help managing money? No   Do you ever drive or ride in a car without wearing a seat belt? No   Have you felt unusual stress, anger or loneliness in the last month? No   Who do you live with? Spouse   If you need help, do you have trouble finding someone available to you? No   Have you been bothered in the last four weeks by sexual problems? No   Do you have difficulty concentrating, remembering or making decisions? No           Does the patient have evidence of cognitive impairment? Yes Expected with his ataxia.    Aspirin use counseling: Taking ASA appropriately as indicated      Recent Lab Results:  CMP:  Lab Results   Component Value Date     (H) 09/15/2017    BUN 17 03/14/2018    CREATININE 1.00 03/14/2018    EGFRIFNONA 76 03/14/2018    EGFRIFAFRI 105 09/15/2017    BCR 17.0 03/14/2018     03/14/2018    K 3.5 03/14/2018    CO2 27.0 03/14/2018    CALCIUM 9.2 03/14/2018    PROTENTOTREF 7.6 09/15/2017    ALBUMIN 4.10 03/14/2018    LABGLOBREF 3.2 09/15/2017    LABIL2 1.2 (L) 03/14/2018    BILITOT 0.6 03/14/2018    ALKPHOS 45 03/14/2018    AST 23 03/14/2018    ALT 25 03/14/2018     Lipid Panel:  Lab Results   Component Value Date    CHOL 114 03/14/2018    TRIG 90 03/14/2018    HDL 41 03/14/2018    VLDL 15 09/15/2017    LDLHDL 1.58 06/27/2016     HbA1c:  Lab Results   Component Value Date    HGBA1C 5.50 03/14/2018        Visual Acuity:  No exam data present    Age-appropriate Screening Schedule:  Refer to the list below for future screening recommendations based on patient's age, sex and/or medical conditions. Orders for these recommended tests are listed in the plan section. The patient has been provided with a written plan.    Health Maintenance   Topic Date Due   • PNEUMOCOCCAL VACCINE (19-64 MEDIUM RISK) (1 of 1 - PPSV23) 01/25/1977   • COLONOSCOPY  11/01/2018 (Originally 3/12/2018)   • ZOSTER VACCINE (1 of 2) 11/01/2018 (Originally 1/25/2008)   • INFLUENZA VACCINE  08/01/2018   • HEMOGLOBIN A1C  09/14/2018   • LIPID PANEL  03/14/2019   • TDAP/TD VACCINES (2 - Td) 01/01/2020   • DIABETIC FOOT EXAM  Excluded   • DIABETIC EYE EXAM  Excluded   • URINE MICROALBUMIN  Excluded        Subjective   History of Present Illness    Chris Mejia is a 58 y.o. male who presents for an Subsequent Wellness Visit.    He has a complicated medical history.  He has been treated by neurology for spinocerebellar ataxia.  Balance is abnormal due to this and he would benefit from a wheelchair.  He has tried physical therapy in the past and does not feel that would be helpful at this time.  He takes Tegretol to help also with trigeminal neuralgia.  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  He is followed by Dr. Barlow.    This year he has undergone cardiac catheterization which found need for medical management of coronary artery disease.  He has been followed by cardiology.  Evaluation performed due to chest discomfort and shortness of breath with exertion, the latter of which continues.  Patient is unsure when he had a pneumonia shot previously.    He has essential hypertension which is controlled.  He is on pravastatin for hypercholesterolemia.  He takes a proton pump inhibitor chronically for GERD.  Anxiety is doing well with BuSpar.  Sleep apnea is controlled with CPAP.  He takes Tegretol to help with his ataxia and this also helps with his  insomnia.  Patient is trying to be more active due to his heart condition to see if this would help with his breathing.  Cardiology recommended pulmonology evaluation.  He has a history of B12 deficiency and is due for labs.  He's also been treated for rheumatoid arthritis in the past.  He takes Flomax to help with an enlarged prostate.  On occasion he takes a cough syrup to help with occasional deep cough.    Due for colonoscopy, patient wishes to hold off another month or so and he will contact Dr. Lopez for this.  She has done his colonoscopies previously.  He was unable to get the shingles vaccination due to cost.    The following portions of the patient's history were reviewed and updated as appropriate: allergies, current medications, past family history, past medical history, past social history, past surgical history and problem list.    Outpatient Medications Prior to Visit   Medication Sig Dispense Refill   • Acetaminophen 500 MG coapsule Take 500 mg by mouth Daily.     • aspirin 81 MG tablet Take 1 tablet by mouth Daily. 30 tablet 11   • cetirizine (zyrTEC) 10 MG tablet Take 10 mg by mouth Daily.     • cholecalciferol (VITAMIN D3) 1000 units tablet Take 5,000 Units by mouth 1 (One) Time Per Week.     • nitroglycerin (NITROSTAT) 0.4 MG SL tablet Place 1 tablet under the tongue Every 5 (Five) Minutes As Needed for Chest Pain. Take no more than 3 doses in 15 minutes. 30 tablet 1   • busPIRone (BUSPAR) 15 MG tablet Take 1 tablet by mouth 2 (Two) Times a Day As Needed (anxiety). (Patient taking differently: Take 10 mg by mouth 2 (Two) Times a Day As Needed (anxiety).) 180 tablet 3   • carBAMazepine (TEGretol) 200 MG tablet Take 1 tablet by mouth At Night As Needed (headache). 90 tablet 3   • carvedilol (COREG) 12.5 MG tablet Take 1 tablet by mouth 2 (Two) Times a Day With Meals. 180 tablet 3   • hydrochlorothiazide (MICROZIDE) 12.5 MG capsule Take 1 capsule by mouth Every Morning. 90 capsule 3   • pantoprazole  (PROTONIX) 40 MG EC tablet Take 1 tablet by mouth Daily. 90 tablet 3   • pravastatin (PRAVACHOL) 40 MG tablet Take 1 tablet by mouth Every Night. 90 tablet 3   • tamsulosin (FLOMAX) 0.4 MG capsule 24 hr capsule Take 1 capsule by mouth Daily. 90 capsule 3   • promethazine-dextromethorphan (PROMETHAZINE-DM) 6.25-15 MG/5ML syrup Take 5 mL by mouth 4 (Four) Times a Day As Needed for Cough. 240 mL 0     No facility-administered medications prior to visit.        Patient Active Problem List   Diagnosis   • Tubular adenoma of colon   • Trigeminal neuralgia   • Spinocerebellar ataxia   • RA (rheumatoid arthritis)   • Insomnia   • Essential hypertension   • Hypercholesteremia   • Acid reflux   • Anxiety   • Vitamin B12 deficiency   • Vitamin D deficiency   • DENZEL (obstructive sleep apnea)   • Atopic rhinitis   • Ataxic gait   • Shoulder pain   • Ophthalmoplegia       Advance Care Planning:  has NO advance directive - not interested in additional information    Identification of Risk Factors:  Risk factors include: weight , unhealthy diet, cardiovascular risk, inactivity, increased fall risk and polypharmacy.    Review of Systems   Constitutional: Negative for activity change.   Respiratory: Positive for shortness of breath.    Musculoskeletal: Positive for arthralgias.   Psychiatric/Behavioral: Positive for confusion.   All other systems reviewed and are negative.      Compared to one year ago, the patient feels his physical health is the same.  Compared to one year ago, the patient feels his mental health is better.    Objective     Physical Exam   Constitutional: He is oriented to person, place, and time. Vital signs are normal. He appears well-developed and well-nourished. He is active. He does not have a sickly appearance. He does not appear ill.   Appears stated age. Well groomed.   HENT:   Head: Normocephalic and atraumatic. Hair is abnormal.   Right Ear: Hearing, tympanic membrane, external ear and ear canal normal.  "  Left Ear: Hearing, tympanic membrane, external ear and ear canal normal.   Nose: Nose normal.   Mouth/Throat: Mucous membranes are not dry. Abnormal dentition.   Eyes: EOM and lids are normal. Pupils are equal, round, and reactive to light. No scleral icterus.   Cardiovascular: Normal rate, regular rhythm and normal heart sounds.  Exam reveals no gallop and no friction rub.    No murmur heard.  Pulmonary/Chest: Effort normal and breath sounds normal.   Abdominal: Soft. Bowel sounds are normal. He exhibits no distension. There is no tenderness.   Musculoskeletal: He exhibits no deformity.   Neurological: He is alert and oriented to person, place, and time. He displays no tremor. No cranial nerve deficit. Gait abnormal.   Skin: Skin is warm. He is not diaphoretic. No cyanosis. Nails show no clubbing.        Psychiatric: He has a normal mood and affect. His speech is normal and behavior is normal. Judgment and thought content normal. He is attentive.   Nursing note and vitals reviewed.      Vitals:    06/04/18 0818   BP: 112/70   Pulse: 66   Resp: 14   Temp: 98.3 °F (36.8 °C)   SpO2: 95%   Weight: 105 kg (231 lb)   Height: 171.5 cm (67.5\")       Patient's Body mass index is 35.65 kg/m². BMI is above normal parameters. Recommendations include: educational material, exercise counseling and nutrition counseling.      Assessment/Plan   Patient Self-Management and Personalized Health Advice  The patient has been provided with information about: diet, exercise, weight management, prevention of cardiac or vascular disease and fall prevention and preventive services including:   · Nutrition counseling provided, Pneumococcal vaccine , Shingrix.    Visit Diagnoses:    ICD-10-CM ICD-9-CM   1. Medicare annual wellness visit, subsequent Z00.00 V70.0   2. SOB (shortness of breath) on exertion R06.02 786.05   3. Spinocerebellar ataxia G11.8 334.8   4. Ataxic gait R26.0 781.2   5. Ophthalmoplegia H49.9 378.9   6. Trigeminal " neuralgia G50.0 350.1   7. Rheumatoid arthritis involving multiple sites, unspecified rheumatoid factor presence M06.9 714.0   8. Essential hypertension I10 401.9   9. Hypercholesteremia E78.00 272.0   10. Gastroesophageal reflux disease without esophagitis K21.9 530.81   11. Anxiety F41.9 300.00   12. DENZEL (obstructive sleep apnea) G47.33 327.23   13. Primary insomnia F51.01 307.42   14. Class 2 obesity due to excess calories without serious comorbidity with body mass index (BMI) of 35.0 to 35.9 in adult E66.09 278.00    Z68.35 V85.35   15. Vitamin B12 deficiency E53.8 266.2   16. Coronary artery disease involving native coronary artery of native heart without angina pectoris I25.10 414.01   17. Benign non-nodular prostatic hyperplasia with lower urinary tract symptoms N40.1 600.91   18. Cough R05 786.2   19. Need for hepatitis C screening test Z11.59 V73.89   20. Encounter for monitoring long-term proton pump inhibitor therapy Z51.81 V58.83    Z79.899 V58.69   21. Need for pneumococcal vaccine Z23 V03.82       Orders Placed This Encounter   Procedures   • Extra Heavy Duty Wheelchair     Order Specific Question:   Length of Need (99 Months = Lifetime)     Answer:   99 Months = Lifetime   • Pneumococcal polysaccharide vaccine 23-valent >= 1yo subcutaneous/IM (PPSV23)   • Vitamin B12 & Folate   • Magnesium   • Comprehensive Metabolic Panel   • Hepatitis C Antibody   • Carbamazepine level, total   • Ambulatory Referral to Pulmonology     Referral Priority:   Routine     Referral Type:   Consultation     Referral Reason:   Specialty Services Required     Referred to Provider:   Megan Murillo MD     Requested Specialty:   Pulmonary Disease     Number of Visits Requested:   1   • CBC & Differential     Order Specific Question:   Manual Differential     Answer:   No       Outpatient Encounter Prescriptions as of 6/4/2018   Medication Sig Dispense Refill   • Acetaminophen 500 MG coapsule Take 500 mg by mouth Daily.      • amLODIPine (NORVASC) 2.5 MG tablet Take 1 tablet by mouth Daily. 90 tablet 3   • aspirin 81 MG tablet Take 1 tablet by mouth Daily. 30 tablet 11   • carBAMazepine (TEGretol) 200 MG tablet Take 1 tablet by mouth At Night As Needed (headache). 90 tablet 3   • carvedilol (COREG) 12.5 MG tablet Take 1 tablet by mouth 2 (Two) Times a Day With Meals. 180 tablet 3   • cetirizine (zyrTEC) 10 MG tablet Take 10 mg by mouth Daily.     • cholecalciferol (VITAMIN D3) 1000 units tablet Take 5,000 Units by mouth 1 (One) Time Per Week.     • hydrochlorothiazide (MICROZIDE) 12.5 MG capsule Take 1 capsule by mouth Every Morning. 90 capsule 3   • nitroglycerin (NITROSTAT) 0.4 MG SL tablet Place 1 tablet under the tongue Every 5 (Five) Minutes As Needed for Chest Pain. Take no more than 3 doses in 15 minutes. 30 tablet 1   • pantoprazole (PROTONIX) 40 MG EC tablet Take 1 tablet by mouth Daily. 90 tablet 3   • Simethicone (GAS-X PO) Take  by mouth. One at night     • tamsulosin (FLOMAX) 0.4 MG capsule 24 hr capsule Take 1 capsule by mouth Daily. 90 capsule 3   • [DISCONTINUED] amLODIPine (NORVASC) 2.5 MG tablet      • [DISCONTINUED] busPIRone (BUSPAR) 15 MG tablet Take 1 tablet by mouth 2 (Two) Times a Day As Needed (anxiety). (Patient taking differently: Take 10 mg by mouth 2 (Two) Times a Day As Needed (anxiety).) 180 tablet 3   • [DISCONTINUED] carBAMazepine (TEGretol) 200 MG tablet Take 1 tablet by mouth At Night As Needed (headache). 90 tablet 3   • [DISCONTINUED] carvedilol (COREG) 12.5 MG tablet Take 1 tablet by mouth 2 (Two) Times a Day With Meals. 180 tablet 3   • [DISCONTINUED] hydrochlorothiazide (MICROZIDE) 12.5 MG capsule Take 1 capsule by mouth Every Morning. 90 capsule 3   • [DISCONTINUED] pantoprazole (PROTONIX) 40 MG EC tablet Take 1 tablet by mouth Daily. 90 tablet 3   • [DISCONTINUED] pravastatin (PRAVACHOL) 40 MG tablet Take 1 tablet by mouth Every Night. 90 tablet 3   • [DISCONTINUED] tamsulosin (FLOMAX) 0.4 MG  capsule 24 hr capsule Take 1 capsule by mouth Daily. 90 capsule 3   • busPIRone (BUSPAR) 10 MG tablet TAKE 1-2 TAB PO DAILY AS NEEDED 180 tablet 3   • pravastatin (PRAVACHOL) 40 MG tablet Take 1 tablet by mouth Daily. 90 tablet 3   • promethazine-dextromethorphan (PROMETHAZINE-DM) 6.25-15 MG/5ML syrup Take 5 mL by mouth 4 (Four) Times a Day As Needed for Cough. 240 mL 0   • [DISCONTINUED] promethazine-dextromethorphan (PROMETHAZINE-DM) 6.25-15 MG/5ML syrup Take 5 mL by mouth 4 (Four) Times a Day As Needed for Cough. 240 mL 0     No facility-administered encounter medications on file as of 6/4/2018.        Reviewed use of high risk medication in the elderly: yes  Reviewed for potential of harmful drug interactions in the elderly: yes    Follow Up:  Return in about 6 months (around 12/4/2018) for Blood Pressure follow up.     An After Visit Summary and PPPS with all of these plans were given to the patient.      Problem List Items Addressed This Visit        Cardiovascular and Mediastinum    Essential hypertension (Chronic)    Relevant Medications    carvedilol (COREG) 12.5 MG tablet    amLODIPine (NORVASC) 2.5 MG tablet    hydrochlorothiazide (MICROZIDE) 12.5 MG capsule    Other Relevant Orders    CBC & Differential (Completed)    Hypercholesteremia    Relevant Medications    pravastatin (PRAVACHOL) 40 MG tablet    Other Relevant Orders    Comprehensive Metabolic Panel (Completed)       Respiratory    DENZEL (obstructive sleep apnea)    Relevant Orders    Ambulatory Referral to Pulmonology       Digestive    Acid reflux    Relevant Medications    pantoprazole (PROTONIX) 40 MG EC tablet    Other Relevant Orders    Vitamin B12 & Folate (Completed)    Magnesium (Completed)    Vitamin B12 deficiency    Relevant Orders    Vitamin B12 & Folate (Completed)       Nervous and Auditory    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Chronic)    Relevant Medications    carBAMazepine (TEGretol) 200 MG tablet    Other Relevant Orders    Carbamazepine  level, total (Completed)    Standard Wheelchair    Ophthalmoplegia (Chronic)    Trigeminal neuralgia    Relevant Medications    carBAMazepine (TEGretol) 200 MG tablet       Musculoskeletal and Integument    RA (rheumatoid arthritis)       Other    Insomnia    Relevant Medications    carBAMazepine (TEGretol) 200 MG tablet    Anxiety    Relevant Medications    busPIRone (BUSPAR) 10 MG tablet    Ataxic gait    Relevant Orders    Standard Wheelchair      Other Visit Diagnoses     Medicare annual wellness visit, subsequent    -  Primary    SOB (shortness of breath) on exertion        Relevant Orders    Ambulatory Referral to Pulmonology    Standard Wheelchair    Class 2 obesity due to excess calories without serious comorbidity with body mass index (BMI) of 35.0 to 35.9 in adult        Coronary artery disease involving native coronary artery of native heart without angina pectoris        Relevant Medications    carvedilol (COREG) 12.5 MG tablet    amLODIPine (NORVASC) 2.5 MG tablet    Other Relevant Orders    Pneumococcal polysaccharide vaccine 23-valent >= 1yo subcutaneous/IM (PPSV23) (Completed)    Benign non-nodular prostatic hyperplasia with lower urinary tract symptoms        Relevant Medications    tamsulosin (FLOMAX) 0.4 MG capsule 24 hr capsule    Cough        use medicine sparingly. If use becomes regular needs further evaluation    Relevant Medications    promethazine-dextromethorphan (PROMETHAZINE-DM) 6.25-15 MG/5ML syrup    Other Relevant Orders    Ambulatory Referral to Pulmonology    Need for hepatitis C screening test        Relevant Orders    Hepatitis C Antibody (Completed)    Encounter for monitoring long-term proton pump inhibitor therapy        Relevant Orders    Vitamin B12 & Folate (Completed)    Magnesium (Completed)    Need for pneumococcal vaccine        Relevant Orders    Pneumococcal polysaccharide vaccine 23-valent >= 1yo subcutaneous/IM (PPSV23) (Completed)        Due to fall risk from  spinocerebellar ataxia, ataxic gait and dyspnea upon exertion, patient would benefit from wheelchair. Mobility is limited and affects his ADLs. He cannot safely use a cane or walker due to ataxia. A manual wheelchair would significantly improve his ability to participate in MRADLs and he will use it on a regular basis in home. He has the mental capacity to safely propel the wheelchair in home. His wife is willing and able to provide assistance with the wheelchair.

## 2018-06-05 LAB
ALBUMIN SERPL-MCNC: 4.1 G/DL (ref 3.5–5)
ALBUMIN/GLOB SERPL: 1.2 G/DL (ref 1–2)
ALP SERPL-CCNC: 45 U/L (ref 38–126)
ALT SERPL-CCNC: 34 U/L (ref 13–69)
AST SERPL-CCNC: 31 U/L (ref 15–46)
BASOPHILS # BLD AUTO: 0.07 10*3/MM3 (ref 0–0.2)
BASOPHILS NFR BLD AUTO: 1.1 % (ref 0–2.5)
BILIRUB SERPL-MCNC: 0.7 MG/DL (ref 0.2–1.3)
BUN SERPL-MCNC: 13 MG/DL (ref 7–20)
BUN/CREAT SERPL: 14.4 (ref 6.3–21.9)
CALCIUM SERPL-MCNC: 9.5 MG/DL (ref 8.4–10.2)
CARBAMAZEPINE SERPL-MCNC: <3 MCG/ML (ref 4–12)
CHLORIDE SERPL-SCNC: 101 MMOL/L (ref 98–107)
CO2 SERPL-SCNC: 28 MMOL/L (ref 26–30)
CREAT SERPL-MCNC: 0.9 MG/DL (ref 0.6–1.3)
EOSINOPHIL # BLD AUTO: 0.24 10*3/MM3 (ref 0–0.7)
EOSINOPHIL NFR BLD AUTO: 3.6 % (ref 0–7)
ERYTHROCYTE [DISTWIDTH] IN BLOOD BY AUTOMATED COUNT: 12.9 % (ref 11.5–14.5)
FOLATE SERPL-MCNC: 9.28 NG/ML
GFR SERPLBLD CREATININE-BSD FMLA CKD-EPI: 104 ML/MIN/1.73
GFR SERPLBLD CREATININE-BSD FMLA CKD-EPI: 86 ML/MIN/1.73
GLOBULIN SER CALC-MCNC: 3.5 GM/DL
GLUCOSE SERPL-MCNC: 99 MG/DL (ref 74–98)
HCT VFR BLD AUTO: 48.1 % (ref 42–52)
HCV AB S/CO SERPL IA: <0.1 S/CO RATIO (ref 0–0.9)
HGB BLD-MCNC: 16.7 G/DL (ref 14–18)
IMM GRANULOCYTES # BLD: 0.01 10*3/MM3 (ref 0–0.06)
IMM GRANULOCYTES NFR BLD: 0.2 % (ref 0–0.6)
LYMPHOCYTES # BLD AUTO: 1.58 10*3/MM3 (ref 0.6–3.4)
LYMPHOCYTES NFR BLD AUTO: 23.8 % (ref 10–50)
MAGNESIUM SERPL-MCNC: 2 MG/DL (ref 1.6–2.3)
MCH RBC QN AUTO: 33.1 PG (ref 27–31)
MCHC RBC AUTO-ENTMCNC: 34.7 G/DL (ref 30–37)
MCV RBC AUTO: 95.2 FL (ref 80–94)
MONOCYTES # BLD AUTO: 0.63 10*3/MM3 (ref 0–0.9)
MONOCYTES NFR BLD AUTO: 9.5 % (ref 0–12)
NEUTROPHILS # BLD AUTO: 4.11 10*3/MM3 (ref 2–6.9)
NEUTROPHILS NFR BLD AUTO: 61.8 % (ref 37–80)
NRBC BLD AUTO-RTO: 0 /100 WBC (ref 0–0)
PLATELET # BLD AUTO: 165 10*3/MM3 (ref 130–400)
POTASSIUM SERPL-SCNC: 4.1 MMOL/L (ref 3.5–5.1)
PROT SERPL-MCNC: 7.6 G/DL (ref 6.3–8.2)
RBC # BLD AUTO: 5.05 10*6/MM3 (ref 4.7–6.1)
SODIUM SERPL-SCNC: 142 MMOL/L (ref 137–145)
VIT B12 SERPL-MCNC: 245 PG/ML (ref 239–931)
WBC # BLD AUTO: 6.64 10*3/MM3 (ref 4.8–10.8)

## 2018-06-06 ENCOUNTER — TELEPHONE (OUTPATIENT)
Dept: INTERNAL MEDICINE | Facility: CLINIC | Age: 58
End: 2018-06-06

## 2018-06-06 NOTE — TELEPHONE ENCOUNTER
This has been faxed however per Alexa Hamilton's there is documentation that is required to be added to note. This has been given to Dr. Lebron to addend.

## 2018-06-06 NOTE — TELEPHONE ENCOUNTER
Patient's wife called and is requesting that the precription for the patient's wheelchair be sent to  [facility] in [place] along with the office notes.

## 2018-09-28 ENCOUNTER — FLU SHOT (OUTPATIENT)
Dept: INTERNAL MEDICINE | Facility: CLINIC | Age: 58
End: 2018-09-28

## 2018-09-28 DIAGNOSIS — Z23 NEED FOR INFLUENZA VACCINATION: Primary | ICD-10-CM

## 2018-09-28 PROCEDURE — 90674 CCIIV4 VAC NO PRSV 0.5 ML IM: CPT | Performed by: FAMILY MEDICINE

## 2018-09-28 PROCEDURE — G0008 ADMIN INFLUENZA VIRUS VAC: HCPCS | Performed by: FAMILY MEDICINE

## 2018-11-30 RX ORDER — BUSPIRONE HYDROCHLORIDE 15 MG/1
TABLET ORAL
Qty: 180 TABLET | Refills: 3 | Status: SHIPPED | OUTPATIENT
Start: 2018-11-30 | End: 2019-05-28 | Stop reason: SDUPTHER

## 2018-12-26 ENCOUNTER — APPOINTMENT (OUTPATIENT)
Dept: GENERAL RADIOLOGY | Facility: HOSPITAL | Age: 58
End: 2018-12-26

## 2018-12-26 ENCOUNTER — HOSPITAL ENCOUNTER (EMERGENCY)
Facility: HOSPITAL | Age: 58
Discharge: HOME | End: 2018-12-26
Attending: EMERGENCY MEDICINE | Admitting: NURSE PRACTITIONER

## 2018-12-26 VITALS
TEMPERATURE: 99.8 F | HEART RATE: 80 BPM | DIASTOLIC BLOOD PRESSURE: 93 MMHG | RESPIRATION RATE: 18 BRPM | BODY MASS INDEX: 43.95 KG/M2 | OXYGEN SATURATION: 93 % | SYSTOLIC BLOOD PRESSURE: 143 MMHG | HEIGHT: 67 IN | WEIGHT: 280 LBS

## 2018-12-26 DIAGNOSIS — J10.1 INFLUENZA A: Primary | ICD-10-CM

## 2018-12-26 LAB
FLUAV AG NPH QL: POSITIVE
FLUBV AG NPH QL IA: NEGATIVE

## 2018-12-26 PROCEDURE — 87804 INFLUENZA ASSAY W/OPTIC: CPT | Performed by: NURSE PRACTITIONER

## 2018-12-26 PROCEDURE — 71045 X-RAY EXAM CHEST 1 VIEW: CPT

## 2018-12-26 PROCEDURE — 99283 EMERGENCY DEPT VISIT LOW MDM: CPT

## 2018-12-26 RX ORDER — ONDANSETRON 8 MG/1
8 TABLET, ORALLY DISINTEGRATING ORAL EVERY 8 HOURS PRN
Qty: 20 TABLET | Refills: 0 | Status: SHIPPED | OUTPATIENT
Start: 2018-12-26 | End: 2019-05-28

## 2018-12-26 RX ORDER — OSELTAMIVIR PHOSPHATE 75 MG/1
75 CAPSULE ORAL 2 TIMES DAILY
Qty: 10 CAPSULE | Refills: 0 | Status: SHIPPED | OUTPATIENT
Start: 2018-12-26 | End: 2019-05-28

## 2018-12-26 RX ORDER — IBUPROFEN 800 MG/1
800 TABLET ORAL EVERY 8 HOURS PRN
Qty: 30 TABLET | Refills: 0 | Status: SHIPPED | OUTPATIENT
Start: 2018-12-26 | End: 2019-05-28

## 2018-12-26 RX ORDER — PROMETHAZINE HYDROCHLORIDE AND CODEINE PHOSPHATE 6.25; 1 MG/5ML; MG/5ML
5 SYRUP ORAL EVERY 4 HOURS PRN
Qty: 118 ML | Refills: 0 | Status: SHIPPED | OUTPATIENT
Start: 2018-12-26 | End: 2019-05-28

## 2019-01-08 ENCOUNTER — APPOINTMENT (OUTPATIENT)
Dept: CT IMAGING | Facility: HOSPITAL | Age: 59
End: 2019-01-08
Attending: EMERGENCY MEDICINE

## 2019-01-08 ENCOUNTER — HOSPITAL ENCOUNTER (EMERGENCY)
Facility: HOSPITAL | Age: 59
Discharge: HOME | End: 2019-01-08
Attending: EMERGENCY MEDICINE | Admitting: EMERGENCY MEDICINE

## 2019-01-08 ENCOUNTER — APPOINTMENT (OUTPATIENT)
Dept: GENERAL RADIOLOGY | Facility: HOSPITAL | Age: 59
End: 2019-01-08
Attending: EMERGENCY MEDICINE

## 2019-01-08 VITALS
WEIGHT: 260 LBS | OXYGEN SATURATION: 93 % | SYSTOLIC BLOOD PRESSURE: 116 MMHG | TEMPERATURE: 98.5 F | RESPIRATION RATE: 18 BRPM | BODY MASS INDEX: 39.4 KG/M2 | HEIGHT: 68 IN | DIASTOLIC BLOOD PRESSURE: 74 MMHG | HEART RATE: 73 BPM

## 2019-01-08 DIAGNOSIS — M54.6 ACUTE LEFT-SIDED THORACIC BACK PAIN: Primary | ICD-10-CM

## 2019-01-08 LAB
ANION GAP SERPL CALCULATED.3IONS-SCNC: 11.7 MMOL/L (ref 10–20)
BASOPHILS # BLD AUTO: 0.03 10*3/MM3 (ref 0–0.2)
BASOPHILS NFR BLD AUTO: 0.4 % (ref 0–2.5)
BUN BLD-MCNC: 13 MG/DL (ref 7–20)
BUN/CREAT SERPL: 16.3 (ref 6.3–21.9)
CALCIUM SPEC-SCNC: 8.9 MG/DL (ref 8.4–10.2)
CARBAMAZEPINE SERPL-MCNC: <3 MCG/ML (ref 4–12)
CHLORIDE SERPL-SCNC: 102 MMOL/L (ref 98–107)
CO2 SERPL-SCNC: 29 MMOL/L (ref 26–30)
CREAT BLD-MCNC: 0.8 MG/DL (ref 0.6–1.3)
D-DIMER, QUANTITATIVE (MAD,POW, STR): 649 NG/ML (FEU) (ref 0–500)
DEPRECATED RDW RBC AUTO: 43.1 FL (ref 37–54)
EOSINOPHIL # BLD AUTO: 0.24 10*3/MM3 (ref 0–0.7)
EOSINOPHIL NFR BLD AUTO: 3 % (ref 0–7)
ERYTHROCYTE [DISTWIDTH] IN BLOOD BY AUTOMATED COUNT: 12.5 % (ref 11.5–14.5)
GFR SERPL CREATININE-BSD FRML MDRD: 99 ML/MIN/1.73
GLUCOSE BLD-MCNC: 122 MG/DL (ref 74–98)
HCT VFR BLD AUTO: 44.8 % (ref 42–52)
HGB BLD-MCNC: 15.9 G/DL (ref 14–18)
IMM GRANULOCYTES # BLD AUTO: 0.03 10*3/MM3 (ref 0–0.06)
IMM GRANULOCYTES NFR BLD AUTO: 0.4 % (ref 0–0.6)
LYMPHOCYTES # BLD AUTO: 1.64 10*3/MM3 (ref 0.6–3.4)
LYMPHOCYTES NFR BLD AUTO: 20.7 % (ref 10–50)
MCH RBC QN AUTO: 33.2 PG (ref 27–31)
MCHC RBC AUTO-ENTMCNC: 35.5 G/DL (ref 30–37)
MCV RBC AUTO: 93.5 FL (ref 80–94)
MONOCYTES # BLD AUTO: 0.83 10*3/MM3 (ref 0–0.9)
MONOCYTES NFR BLD AUTO: 10.5 % (ref 0–12)
NEUTROPHILS # BLD AUTO: 5.16 10*3/MM3 (ref 2–6.9)
NEUTROPHILS NFR BLD AUTO: 65 % (ref 37–80)
NRBC BLD AUTO-RTO: 0 /100 WBC (ref 0–0)
PLATELET # BLD AUTO: 208 10*3/MM3 (ref 130–400)
PMV BLD AUTO: 9.2 FL (ref 6–12)
POTASSIUM BLD-SCNC: 3.7 MMOL/L (ref 3.5–5.1)
RBC # BLD AUTO: 4.79 10*6/MM3 (ref 4.7–6.1)
SODIUM BLD-SCNC: 139 MMOL/L (ref 137–145)
TROPONIN I SERPL-MCNC: <0.012 NG/ML (ref 0–0.03)
WBC NRBC COR # BLD: 7.93 10*3/MM3 (ref 4.8–10.8)

## 2019-01-08 PROCEDURE — 80048 BASIC METABOLIC PNL TOTAL CA: CPT | Performed by: EMERGENCY MEDICINE

## 2019-01-08 PROCEDURE — 71046 X-RAY EXAM CHEST 2 VIEWS: CPT

## 2019-01-08 PROCEDURE — 84484 ASSAY OF TROPONIN QUANT: CPT | Performed by: EMERGENCY MEDICINE

## 2019-01-08 PROCEDURE — 80156 ASSAY CARBAMAZEPINE TOTAL: CPT | Performed by: EMERGENCY MEDICINE

## 2019-01-08 PROCEDURE — 93005 ELECTROCARDIOGRAM TRACING: CPT | Performed by: EMERGENCY MEDICINE

## 2019-01-08 PROCEDURE — 99284 EMERGENCY DEPT VISIT MOD MDM: CPT

## 2019-01-08 PROCEDURE — 25010000002 IOPAMIDOL 61 % SOLUTION: Performed by: EMERGENCY MEDICINE

## 2019-01-08 PROCEDURE — 85379 FIBRIN DEGRADATION QUANT: CPT | Performed by: EMERGENCY MEDICINE

## 2019-01-08 PROCEDURE — 71275 CT ANGIOGRAPHY CHEST: CPT

## 2019-01-08 PROCEDURE — 85025 COMPLETE CBC W/AUTO DIFF WBC: CPT | Performed by: EMERGENCY MEDICINE

## 2019-01-08 RX ORDER — BENZONATATE 100 MG/1
100 CAPSULE ORAL 3 TIMES DAILY PRN
Qty: 15 CAPSULE | Refills: 0 | Status: SHIPPED | OUTPATIENT
Start: 2019-01-08 | End: 2019-05-28

## 2019-01-08 RX ORDER — BENZONATATE 100 MG/1
100 CAPSULE ORAL ONCE
Status: COMPLETED | OUTPATIENT
Start: 2019-01-08 | End: 2019-01-08

## 2019-01-08 RX ADMIN — BENZONATATE 100 MG: 100 CAPSULE, LIQUID FILLED ORAL at 07:50

## 2019-01-08 RX ADMIN — IOPAMIDOL 100 ML: 612 INJECTION, SOLUTION INTRAVENOUS at 09:16

## 2019-01-08 NOTE — ED PROVIDER NOTES
Subjective   History of Present Illness   58M w/ hx of CAD, spinocerebellar ataxia w/ frequent falls p/w L-sided mid back pain.  Morning and he had pain under his left scapula.  This pain has some radiation towards his chest.  He has an associated cough that has been present for several weeks since he was diagnosed with influenza.  This does not seem to change.  He does not feel any more short of breath than usual and denies any active chest pain, fevers, chills, leg swelling, history of blood clots in his legs or his lungs, recent travel or surgery.  He did mention that he fell 3 days ago and this caused some lower back pain but that has since resolved.    Review of Systems   Musculoskeletal: Positive for back pain.   All other systems reviewed and are negative.      Past Medical History:   Diagnosis Date   • Allergic rhinitis    • Anxiety    • Arthritis    • Dysphagia    • GERD (gastroesophageal reflux disease)    • Gout    • Hip fracture, left (CMS/HCC)    • Hyperlipidemia    • Hypertension    • Lymphadenitis    • Myocardial infarction (CMS/HCC) 2000   • Obesity    • Peptic ulcer    • Peptic ulceration    • Rheumatoid arthritis (CMS/HCC)    • Right shoulder pain    • 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CMS/HCC)    • Tension headache    • Vitamin B 12 deficiency    • Vitamin D deficiency        Allergies   Allergen Reactions   • Oxycodone Hallucinations       Past Surgical History:   Procedure Laterality Date   • CARDIAC CATHETERIZATION     • CARDIAC CATHETERIZATION Left 3/14/2018    Procedure: Cardiac Catheterization/Vascular Study;  Surgeon: Adam Perkins MD;  Location: [facility] INVASIVE LOCATION;  Service: Cardiovascular   • ELBOW ARTHROPLASTY     • HERNIA REPAIR      x3   • NEUROPLASTY      decompression median nerve at carpal tunnel   • ORTHOPEDIC SURGERY Left 2014    [facility]   • TOTAL KNEE ARTHROPLASTY      2005, 2012       Family History   Problem Relation Age of Onset   • Deep vein thrombosis Mother    • Diabetes  Mother    • Hyperlipidemia Mother    • Hypertension Mother    • Heart attack Mother    • Cancer Father    • Kidney disease Father    • Deep vein thrombosis Daughter    • Diabetes Other    • Hyperlipidemia Other    • Hypertension Other    • Heart attack Other    • Cancer Other    • Kidney disease Other    • Osteoporosis Other    • Thyroid disease Other    • Stroke Other    • Arthritis Sister    • Diabetes Sister    • Hyperlipidemia Sister    • Hypertension Sister    • Osteoporosis Sister    • Thyroid disease Sister    • Arthritis Brother    • Diabetes Brother    • Hyperlipidemia Brother    • Hypertension Brother    • Stroke Brother        Social History     Socioeconomic History   • Marital status:      Spouse name: Not on file   • Number of children: Not on file   • Years of education: Not on file   • Highest education level: Not on file   Tobacco Use   • Smoking status: Never Smoker   • Smokeless tobacco: Never Used   Substance and Sexual Activity   • Alcohol use: No   • Drug use: No   • Sexual activity: Defer           Objective   Physical Exam   Constitutional: He is oriented to person, place, and time. He appears well-developed and well-nourished. No distress.   HENT:   Head: Normocephalic.   Mouth/Throat: Oropharynx is clear and moist.   Eyes: Right eye exhibits no discharge. Left eye exhibits no discharge. No scleral icterus.   Neck: Normal range of motion. Neck supple. No tracheal deviation present.   Cardiovascular: Normal rate, regular rhythm, normal heart sounds and intact distal pulses. Exam reveals no gallop and no friction rub.   No murmur heard.  Pulmonary/Chest: Effort normal and breath sounds normal. No stridor. No respiratory distress. He has no wheezes. He has no rales. He exhibits no tenderness.   Abdominal: Soft. He exhibits no distension and no mass. There is no tenderness. There is no rebound and no guarding.   Musculoskeletal: Normal range of motion. He exhibits tenderness (L thoracic  paraspinal musculature) and deformity (contracture L hand, hypothenar wasting bilaterally L>R). He exhibits no edema.   Neurological: He is alert and oriented to person, place, and time.   Skin: Skin is warm and dry. No rash noted. He is not diaphoretic. No erythema. No pallor.   Psychiatric: He has a normal mood and affect. His behavior is normal.   Nursing note and vitals reviewed.      Procedures           ED Course  ED Course as of Jan 08 0944   Tue Jan 08, 2019   0753 EKG: Interpreted by me. NSR w/ nl intervals, no nichol/std. Overall, normal EKG  [AI]      ED Course User Index  [AI] Fermin Mcdaniel MD                  MDM   58-year-old male here with left mid back pain and cough.  Afebrile, vital signs stable.  Lungs sound clear to my exam.  I suspect he likely has costochondritis versus pleurisy versus muscle skeletal pain.  However, given his age, cough, cannot rule out pneumonia, pneumothorax, pulmonary embolism, ACS.  We'll get EKG, chest x-ray, labs, and reassess.    9:44 AM Labs remarkable for mildly elevated d-dimer. Otherwise reassuring. CT scan unremarkable. More likely msk vs costochondritis vs pleurisy.  Patient states that he does feel much improved after the Tessalon Perles.  We'll write a prescription for this and discharge home with strict return care precautions.      Final diagnoses:   Acute left-sided thoracic back pain            Fermin Mcdaniel MD  01/08/19 0939

## 2019-01-09 DIAGNOSIS — N40.1 BENIGN NON-NODULAR PROSTATIC HYPERPLASIA WITH LOWER URINARY TRACT SYMPTOMS: ICD-10-CM

## 2019-01-10 RX ORDER — TAMSULOSIN HYDROCHLORIDE 0.4 MG/1
CAPSULE ORAL
Qty: 90 CAPSULE | Refills: 1 | OUTPATIENT
Start: 2019-01-10

## 2019-05-28 ENCOUNTER — OFFICE VISIT (OUTPATIENT)
Dept: INTERNAL MEDICINE | Facility: CLINIC | Age: 59
End: 2019-05-28

## 2019-05-28 DIAGNOSIS — I10 ESSENTIAL HYPERTENSION: Chronic | ICD-10-CM

## 2019-05-28 DIAGNOSIS — E78.00 HYPERCHOLESTEREMIA: ICD-10-CM

## 2019-05-28 DIAGNOSIS — J30.2 SEASONAL ALLERGIES: Primary | ICD-10-CM

## 2019-05-28 DIAGNOSIS — N40.1 BENIGN NON-NODULAR PROSTATIC HYPERPLASIA WITH LOWER URINARY TRACT SYMPTOMS: ICD-10-CM

## 2019-05-28 DIAGNOSIS — F41.9 ANXIETY: ICD-10-CM

## 2019-05-28 DIAGNOSIS — K21.9 GASTROESOPHAGEAL REFLUX DISEASE WITHOUT ESOPHAGITIS: ICD-10-CM

## 2019-05-28 DIAGNOSIS — I25.10 CORONARY ARTERY DISEASE INVOLVING NATIVE CORONARY ARTERY OF NATIVE HEART WITHOUT ANGINA PECTORIS: ICD-10-CM

## 2019-05-28 PROCEDURE — 99214 OFFICE O/P EST MOD 30 MIN: CPT | Performed by: PHYSICIAN ASSISTANT

## 2019-05-28 RX ORDER — BUSPIRONE HYDROCHLORIDE 15 MG/1
15 TABLET ORAL 2 TIMES DAILY PRN
Qty: 180 TABLET | Refills: 0 | Status: SHIPPED | OUTPATIENT
Start: 2019-05-28 | End: 2019-06-24 | Stop reason: SDUPTHER

## 2019-05-28 RX ORDER — HYDROCHLOROTHIAZIDE 12.5 MG/1
12.5 CAPSULE, GELATIN COATED ORAL EVERY MORNING
Qty: 90 CAPSULE | Refills: 0 | Status: SHIPPED | OUTPATIENT
Start: 2019-05-28 | End: 2019-06-24 | Stop reason: SDUPTHER

## 2019-05-28 RX ORDER — CARVEDILOL 12.5 MG/1
12.5 TABLET ORAL 2 TIMES DAILY WITH MEALS
Qty: 180 TABLET | Refills: 0 | Status: SHIPPED | OUTPATIENT
Start: 2019-05-28 | End: 2019-06-24 | Stop reason: SDUPTHER

## 2019-05-28 RX ORDER — TAMSULOSIN HYDROCHLORIDE 0.4 MG/1
1 CAPSULE ORAL DAILY
Qty: 90 CAPSULE | Refills: 0 | Status: SHIPPED | OUTPATIENT
Start: 2019-05-28 | End: 2019-06-24 | Stop reason: SDUPTHER

## 2019-05-28 RX ORDER — AMLODIPINE BESYLATE 2.5 MG/1
2.5 TABLET ORAL DAILY
Qty: 90 TABLET | Refills: 0 | Status: SHIPPED | OUTPATIENT
Start: 2019-05-28 | End: 2019-06-24 | Stop reason: SDUPTHER

## 2019-05-28 RX ORDER — PANTOPRAZOLE SODIUM 40 MG/1
40 TABLET, DELAYED RELEASE ORAL DAILY
Qty: 90 TABLET | Refills: 0 | Status: SHIPPED | OUTPATIENT
Start: 2019-05-28 | End: 2019-06-24 | Stop reason: SDUPTHER

## 2019-05-28 RX ORDER — AZELASTINE 1 MG/ML
1 SPRAY, METERED NASAL 2 TIMES DAILY
Qty: 30 ML | Refills: 5 | Status: SHIPPED | OUTPATIENT
Start: 2019-05-28 | End: 2019-06-24 | Stop reason: SDUPTHER

## 2019-05-28 RX ORDER — PRAVASTATIN SODIUM 40 MG
40 TABLET ORAL DAILY
Qty: 90 TABLET | Refills: 0 | Status: SHIPPED | OUTPATIENT
Start: 2019-05-28 | End: 2019-06-24 | Stop reason: SDUPTHER

## 2019-05-28 NOTE — PROGRESS NOTES
Subjective     Chief Complaint   Patient presents with   • Allergies       History of Present Illness     Chris Mejia is a 59 y.o. male presenting with complaints of several weeks of itchy watery eyes, rhinorrhea, some sinus pressure, postnasal drip, dry cough.  He has not tried any medication over-the-counter but he does use Zyrtec and Nasonex regularly.  He denies any fevers, chills, headache, sore throat, shortness of breath, wheezing, chest pain or palpitations, any GI symptoms.  No sick contacts that he is aware of.    He is also requesting refills of his chronic medication.    The following portions of the patient's history were reviewed and updated as appropriate: current medications, allergies, PMH.    Review of Systems   Constitutional: Negative for appetite change, chills, fatigue, fever and unexpected weight change.   HENT: Positive for postnasal drip, rhinorrhea, sinus pressure and sneezing. Negative for congestion, ear pain, hearing loss, nosebleeds, sore throat, tinnitus and trouble swallowing.    Eyes: Positive for discharge and itching. Negative for photophobia and visual disturbance.   Respiratory: Positive for cough. Negative for chest tightness, shortness of breath and wheezing.    Cardiovascular: Negative for chest pain, palpitations and leg swelling.   Gastrointestinal: Negative for abdominal distention, abdominal pain, blood in stool, constipation, diarrhea, nausea and vomiting.   Endocrine: Negative for cold intolerance, heat intolerance, polydipsia, polyphagia and polyuria.   Genitourinary: Negative for difficulty urinating, dysuria, flank pain, frequency, hematuria and urgency.   Musculoskeletal: Negative.  Negative for arthralgias, back pain, joint swelling, myalgias, neck pain and neck stiffness.   Skin: Negative for color change, pallor, rash and wound.   Allergic/Immunologic: Negative for environmental allergies, food allergies and immunocompromised state.   Neurological: Negative  for dizziness, weakness, numbness and headaches.   Hematological: Negative for adenopathy. Does not bruise/bleed easily.   Psychiatric/Behavioral: Negative for dysphoric mood, hallucinations, sleep disturbance and suicidal ideas. The patient is not nervous/anxious.        Objective     Vitals:    05/28/19 1612 05/28/19 1623   BP: (!) 158/101 138/86   Pulse: 78    Resp: 18    Temp: 98.6 °F (37 °C)    TempSrc: Temporal    SpO2: 94%    Weight: 106 kg (234 lb)        Physical Exam   Constitutional: Appears well-developed and well-nourished.   Ears: Mild effusions bilaterally  Nose: Nose normal.   Mouth/Throat: Slight erythema, cobblestoning noted  Eyes: EOM are normal. Pupils are equal, round, and reactive to light.   Neck: Normal range of motion. Neck supple.   Cardiovascular: Normal rate, regular rhythm.  Pulmonary/Chest: Effort normal and breath sounds normal.   Musculoskeletal: Normal range of motion.   Lymphadenopathy: No cervical adenopathy noted.   Neurological: Alert and oriented to person, place, and time.   Skin: Skin is warm and dry.   Psychiatric: Exhibits a normal mood and affect.     Assessment/Plan     Diagnoses and all orders for this visit:    Seasonal allergies  -     azelastine (ASTELIN) 0.1 % nasal spray; 1 spray into the nostril(s) as directed by provider 2 (Two) Times a Day. Use in each nostril as directed    Add on Astelin.  Encourage compliance with his antihistamine and Nasonex as well.  RTC if symptoms worsen or persist.    Hypercholesteremia  -     pravastatin (PRAVACHOL) 40 MG tablet; Take 1 tablet by mouth Daily.    Essential hypertension  -     carvedilol (COREG) 12.5 MG tablet; Take 1 tablet by mouth 2 (Two) Times a Day With Meals.  -     hydrochlorothiazide (MICROZIDE) 12.5 MG capsule; Take 1 capsule by mouth Every Morning.  -     amLODIPine (NORVASC) 2.5 MG tablet; Take 1 tablet by mouth Daily.    Coronary artery disease involving native coronary artery of native heart without angina  pectoris  -     carvedilol (COREG) 12.5 MG tablet; Take 1 tablet by mouth 2 (Two) Times a Day With Meals.    Benign non-nodular prostatic hyperplasia with lower urinary tract symptoms  -     tamsulosin (FLOMAX) 0.4 MG capsule 24 hr capsule; Take 1 capsule by mouth Daily.    Gastroesophageal reflux disease without esophagitis  -     pantoprazole (PROTONIX) 40 MG EC tablet; Take 1 tablet by mouth Daily.    Anxiety  -     busPIRone (BUSPAR) 15 MG tablet; Take 1 tablet by mouth 2 (Two) Times a Day As Needed (anxiety).    Refilled chronic medications x1 month.  He is due for annual exam in June and encouraged patient to schedule.    Mya Ortiz PA-C  05/28/2019         Please note that portions of this note were completed with a voice recognition program. Efforts were made to edit dictation, but occasionally words are mistranscribed.

## 2019-05-31 VITALS
WEIGHT: 234 LBS | TEMPERATURE: 98.6 F | HEART RATE: 78 BPM | DIASTOLIC BLOOD PRESSURE: 86 MMHG | BODY MASS INDEX: 36.11 KG/M2 | RESPIRATION RATE: 18 BRPM | OXYGEN SATURATION: 94 % | SYSTOLIC BLOOD PRESSURE: 138 MMHG

## 2019-06-24 ENCOUNTER — TELEPHONE (OUTPATIENT)
Dept: SURGERY | Facility: CLINIC | Age: 59
End: 2019-06-24

## 2019-06-24 ENCOUNTER — OFFICE VISIT (OUTPATIENT)
Dept: INTERNAL MEDICINE | Facility: CLINIC | Age: 59
End: 2019-06-24

## 2019-06-24 VITALS
OXYGEN SATURATION: 97 % | HEART RATE: 63 BPM | HEIGHT: 68 IN | RESPIRATION RATE: 16 BRPM | WEIGHT: 237.13 LBS | DIASTOLIC BLOOD PRESSURE: 88 MMHG | TEMPERATURE: 98.3 F | BODY MASS INDEX: 35.94 KG/M2 | SYSTOLIC BLOOD PRESSURE: 130 MMHG

## 2019-06-24 DIAGNOSIS — F51.01 PRIMARY INSOMNIA: ICD-10-CM

## 2019-06-24 DIAGNOSIS — I25.10 CORONARY ARTERY DISEASE INVOLVING NATIVE CORONARY ARTERY OF NATIVE HEART WITHOUT ANGINA PECTORIS: ICD-10-CM

## 2019-06-24 DIAGNOSIS — K21.9 GASTROESOPHAGEAL REFLUX DISEASE WITHOUT ESOPHAGITIS: ICD-10-CM

## 2019-06-24 DIAGNOSIS — N40.1 BENIGN NON-NODULAR PROSTATIC HYPERPLASIA WITH LOWER URINARY TRACT SYMPTOMS: ICD-10-CM

## 2019-06-24 DIAGNOSIS — I10 ESSENTIAL HYPERTENSION: Chronic | ICD-10-CM

## 2019-06-24 DIAGNOSIS — J30.2 SEASONAL ALLERGIES: ICD-10-CM

## 2019-06-24 DIAGNOSIS — F41.9 ANXIETY: ICD-10-CM

## 2019-06-24 DIAGNOSIS — R79.9 ABNORMAL FINDING OF BLOOD CHEMISTRY: ICD-10-CM

## 2019-06-24 DIAGNOSIS — G11.8 SPINOCEREBELLAR ATAXIA (HCC): Chronic | ICD-10-CM

## 2019-06-24 DIAGNOSIS — Z00.00 MEDICARE ANNUAL WELLNESS VISIT, SUBSEQUENT: Primary | ICD-10-CM

## 2019-06-24 DIAGNOSIS — E55.9 VITAMIN D DEFICIENCY: ICD-10-CM

## 2019-06-24 DIAGNOSIS — G47.33 OSA (OBSTRUCTIVE SLEEP APNEA): ICD-10-CM

## 2019-06-24 DIAGNOSIS — G50.0 TRIGEMINAL NEURALGIA: ICD-10-CM

## 2019-06-24 DIAGNOSIS — E78.00 HYPERCHOLESTEREMIA: ICD-10-CM

## 2019-06-24 DIAGNOSIS — E66.01 CLASS 2 SEVERE OBESITY DUE TO EXCESS CALORIES WITH SERIOUS COMORBIDITY AND BODY MASS INDEX (BMI) OF 36.0 TO 36.9 IN ADULT (HCC): ICD-10-CM

## 2019-06-24 DIAGNOSIS — E53.8 VITAMIN B12 DEFICIENCY: ICD-10-CM

## 2019-06-24 DIAGNOSIS — Z51.81 ENCOUNTER FOR MEDICATION MONITORING: ICD-10-CM

## 2019-06-24 DIAGNOSIS — Z12.11 COLON CANCER SCREENING: ICD-10-CM

## 2019-06-24 PROCEDURE — 96372 THER/PROPH/DIAG INJ SC/IM: CPT | Performed by: FAMILY MEDICINE

## 2019-06-24 PROCEDURE — 96160 PT-FOCUSED HLTH RISK ASSMT: CPT | Performed by: FAMILY MEDICINE

## 2019-06-24 PROCEDURE — 99396 PREV VISIT EST AGE 40-64: CPT | Performed by: FAMILY MEDICINE

## 2019-06-24 PROCEDURE — G0439 PPPS, SUBSEQ VISIT: HCPCS | Performed by: FAMILY MEDICINE

## 2019-06-24 RX ORDER — AMLODIPINE BESYLATE 2.5 MG/1
2.5 TABLET ORAL DAILY
Qty: 90 TABLET | Refills: 3 | Status: SHIPPED | OUTPATIENT
Start: 2019-06-24 | End: 2020-06-05

## 2019-06-24 RX ORDER — PRAVASTATIN SODIUM 40 MG
40 TABLET ORAL DAILY
Qty: 90 TABLET | Refills: 3 | Status: SHIPPED | OUTPATIENT
Start: 2019-06-24 | End: 2020-06-05

## 2019-06-24 RX ORDER — HYDROCHLOROTHIAZIDE 12.5 MG/1
12.5 CAPSULE, GELATIN COATED ORAL EVERY MORNING
Qty: 90 CAPSULE | Refills: 3 | Status: SHIPPED | OUTPATIENT
Start: 2019-06-24 | End: 2020-06-05

## 2019-06-24 RX ORDER — CARVEDILOL 12.5 MG/1
12.5 TABLET ORAL 2 TIMES DAILY WITH MEALS
Qty: 180 TABLET | Refills: 3 | Status: SHIPPED | OUTPATIENT
Start: 2019-06-24 | End: 2020-06-05

## 2019-06-24 RX ORDER — TAMSULOSIN HYDROCHLORIDE 0.4 MG/1
1 CAPSULE ORAL DAILY
Qty: 90 CAPSULE | Refills: 3 | Status: SHIPPED | OUTPATIENT
Start: 2019-06-24 | End: 2020-06-05

## 2019-06-24 RX ORDER — BUSPIRONE HYDROCHLORIDE 15 MG/1
15 TABLET ORAL 2 TIMES DAILY PRN
Qty: 180 TABLET | Refills: 3 | Status: SHIPPED | OUTPATIENT
Start: 2019-06-24 | End: 2020-06-05

## 2019-06-24 RX ORDER — NITROGLYCERIN 0.4 MG/1
0.4 TABLET SUBLINGUAL
Qty: 30 TABLET | Refills: 11 | Status: SHIPPED | OUTPATIENT
Start: 2019-06-24 | End: 2019-07-22 | Stop reason: SDUPTHER

## 2019-06-24 RX ORDER — DEXTROMETHORPHAN HYDROBROMIDE AND PROMETHAZINE HYDROCHLORIDE 15; 6.25 MG/5ML; MG/5ML
5 SYRUP ORAL 4 TIMES DAILY PRN
Qty: 240 ML | Refills: 0 | Status: CANCELLED
Start: 2019-06-24

## 2019-06-24 RX ORDER — PANTOPRAZOLE SODIUM 40 MG/1
40 TABLET, DELAYED RELEASE ORAL DAILY
Qty: 90 TABLET | Refills: 3 | Status: SHIPPED | OUTPATIENT
Start: 2019-06-24 | End: 2020-06-05

## 2019-06-24 RX ORDER — CARBAMAZEPINE 200 MG/1
200 TABLET ORAL NIGHTLY PRN
Qty: 90 TABLET | Refills: 3 | Status: SHIPPED | OUTPATIENT
Start: 2019-06-24 | End: 2019-07-22 | Stop reason: SDUPTHER

## 2019-06-24 RX ORDER — AZELASTINE 1 MG/ML
1 SPRAY, METERED NASAL 2 TIMES DAILY
Qty: 30 ML | Refills: 5 | Status: SHIPPED | OUTPATIENT
Start: 2019-06-24 | End: 2021-02-10 | Stop reason: SDUPTHER

## 2019-06-24 RX ORDER — CYANOCOBALAMIN 1000 UG/ML
1000 INJECTION, SOLUTION INTRAMUSCULAR; SUBCUTANEOUS
Status: SHIPPED | OUTPATIENT
Start: 2019-06-24 | End: 2020-06-18

## 2019-06-24 RX ADMIN — CYANOCOBALAMIN 1000 MCG: 1000 INJECTION, SOLUTION INTRAMUSCULAR; SUBCUTANEOUS at 10:13

## 2019-06-24 NOTE — PROGRESS NOTES
Subsequent Medicare Wellness Visit   The ABC's of the Annual Wellness Visit    Chief Complaint   Patient presents with   • Medicare Wellness-subsequent     pt states no problems or concerns        HPI:  Chris Mejia, -1960, is a 59 y.o. male who presents for a Subsequent Medicare Wellness Visit.    Followed by neuro for spinocerebellar ataxia, gradually worsening. Some problems with memory as well. Has trouble walking and has cane but does not use wife states. He also has DENZEL.     Coronary artery disease stable and followed by cardiology. Patient has some dyspnea upon exertion but it's not felt to be cardiac he reports, patient blames weight. Blood pressure stable on current medicines. On statin for hyperlipidemia.     Anxiety stable on Buspar.     GERD stable on PPI.     BPH with LOTS stable on Flomax.    Vitamin B12 shots recommended last year but he's not yet started those.    Recent Hospitalizations:  No hospitalization(s) within the last year..    Current Medical Providers:  Patient Care Team:  Naty Lebron MD as PCP - General (Family Medicine)  Kirill Barlow MD, FAAN as Consulting Physician (Sleep Medicine)  Adam Perkins MD as Consulting Physician (Cardiology)    Health Habits and Functional and Cognitive Screening and Depression Screening:  Functional & Cognitive Status 2019   Do you have difficulty preparing food and eating? No   Do you have difficulty bathing yourself, getting dressed or grooming yourself? No   Do you have difficulty using the toilet? No   Do you have difficulty moving around from place to place? No   Do you have trouble with steps or getting out of a bed or a chair? No   In the past year have you fallen or experienced a near fall? Yes   Current Diet Unhealthy Diet   Dental Exam Not up to date   Eye Exam Not up to date   Exercise (times per week) 0 times per week   Current Exercise Activities Include None   Do you need help using the phone?  No   Are you deaf  or do you have serious difficulty hearing?  Yes   Do you need help with transportation? Yes   Do you need help shopping? No   Do you need help preparing meals?  No   Do you need help with housework?  No   Do you need help with laundry? No   Do you need help taking your medications? Yes   Do you need help managing money? No   Do you ever drive or ride in a car without wearing a seat belt? No   Have you felt unusual stress, anger or loneliness in the last month? -   Who do you live with? -   If you need help, do you have trouble finding someone available to you? -   Have you been bothered in the last four weeks by sexual problems? -   Do you have difficulty concentrating, remembering or making decisions? -       Compared to one year ago, the patient feels his physical health is the same and his mental health is the same.    Depression Screen:  PHQ-2/PHQ-9 Depression Screening 6/24/2019   Little interest or pleasure in doing things 0   Feeling down, depressed, or hopeless 0   Trouble falling or staying asleep, or sleeping too much -   Feeling tired or having little energy -   Poor appetite or overeating -   Feeling bad about yourself - or that you are a failure or have let yourself or your family down -   Trouble concentrating on things, such as reading the newspaper or watching television -   Moving or speaking so slowly that other people could have noticed. Or the opposite - being so fidgety or restless that you have been moving around a lot more than usual -   Thoughts that you would be better off dead, or of hurting yourself in some way -   Total Score 0   If you checked off any problems, how difficult have these problems made it for you to do your work, take care of things at home, or get along with other people? -         Past Medical/Family/Social History:  The following portions of the patient's history were reviewed and updated as appropriate: allergies, current medications, past family history, past medical  history, past social history, past surgical history and problem list.    Allergies   Allergen Reactions   • Oxycodone Hallucinations         Current Outpatient Medications:   •  Acetaminophen 500 MG coapsule, Take 500 mg by mouth Daily., Disp: , Rfl:   •  amLODIPine (NORVASC) 2.5 MG tablet, Take 1 tablet by mouth Daily., Disp: 90 tablet, Rfl: 3  •  aspirin 81 MG tablet, Take 1 tablet by mouth Daily., Disp: 30 tablet, Rfl: 11  •  azelastine (ASTELIN) 0.1 % nasal spray, 1 spray into the nostril(s) as directed by provider 2 (Two) Times a Day. Use in each nostril as directed, Disp: 30 mL, Rfl: 5  •  busPIRone (BUSPAR) 15 MG tablet, Take 1 tablet by mouth 2 (Two) Times a Day As Needed (anxiety)., Disp: 180 tablet, Rfl: 3  •  carBAMazepine (TEGretol) 200 MG tablet, Take 1 tablet by mouth At Night As Needed (headache)., Disp: 90 tablet, Rfl: 3  •  carvedilol (COREG) 12.5 MG tablet, Take 1 tablet by mouth 2 (Two) Times a Day With Meals., Disp: 180 tablet, Rfl: 3  •  cetirizine (zyrTEC) 10 MG tablet, Take 10 mg by mouth Daily., Disp: , Rfl:   •  hydrochlorothiazide (MICROZIDE) 12.5 MG capsule, Take 1 capsule by mouth Every Morning., Disp: 90 capsule, Rfl: 3  •  nitroglycerin (NITROSTAT) 0.4 MG SL tablet, Place 1 tablet under the tongue Every 5 (Five) Minutes As Needed for Chest Pain. Take no more than 3 doses in 15 minutes., Disp: 30 tablet, Rfl: 11  •  pantoprazole (PROTONIX) 40 MG EC tablet, Take 1 tablet by mouth Daily., Disp: 90 tablet, Rfl: 3  •  pravastatin (PRAVACHOL) 40 MG tablet, Take 1 tablet by mouth Daily., Disp: 90 tablet, Rfl: 3  •  tamsulosin (FLOMAX) 0.4 MG capsule 24 hr capsule, Take 1 capsule by mouth Daily., Disp: 90 capsule, Rfl: 3    Current Facility-Administered Medications:   •  cyanocobalamin injection 1,000 mcg, 1,000 mcg, Intramuscular, Q30 Days, Naty Lebron MD, 1,000 mcg at 06/24/19 1013    Aspirin use counseling: Taking ASA appropriately as indicated    Current medication list contains  no high risk medications.  No harmful drug interactions have been identified.     Family History   Problem Relation Age of Onset   • Deep vein thrombosis Mother    • Diabetes Mother    • Hyperlipidemia Mother    • Hypertension Mother    • Heart attack Mother    • Cancer Father    • Kidney disease Father    • Deep vein thrombosis Daughter    • Arthritis Sister    • Diabetes Sister    • Hyperlipidemia Sister    • Hypertension Sister    • Osteoporosis Sister    • Thyroid disease Sister    • Arthritis Brother    • Diabetes Brother    • Hyperlipidemia Brother    • Hypertension Brother    • Stroke Brother        Social History     Tobacco Use   • Smoking status: Never Smoker   • Smokeless tobacco: Never Used   Substance Use Topics   • Alcohol use: No       Past Surgical History:   Procedure Laterality Date   • CARDIAC CATHETERIZATION     • CARDIAC CATHETERIZATION Left 3/14/2018    Procedure: Cardiac Catheterization/Vascular Study;  Surgeon: Adam Perkins MD;  Location: [facility] INVASIVE LOC[facility];  Service: Cardiovascular   • ELBOW ARTHROPLASTY     • HERNIA REPAIR      x3   • NEUROPLASTY      decompression median nerve at carpal tunnel   • ORTHOPEDIC SURGERY Left 2014    [facility]   • TOTAL KNEE ARTHROPLASTY      2005, 2012       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CMS/HCC)   • RA (rheumatoid arthritis) (CMS/HCC)   • Insomnia   • Essential hypertension   • Hypercholesteremia   • Acid reflux   • Anxiety   • Vitamin B12 deficiency   • Vitamin D deficiency   • DENZEL (obstructive sleep apnea)   • Atopic rhinitis   • Ataxic gait   • Shoulder pain   • Ophthalmoplegia   • Coronary artery disease involving native coronary artery of native heart without angina pectoris   • Seasonal allergies   • Benign non-nodular prostatic hyperplasia with lower urinary tract symptoms       Review of Systems   Respiratory: Positive for shortness of breath.   "  Cardiovascular: Negative for chest pain.   Gastrointestinal: Negative for abdominal pain.   Musculoskeletal: Positive for arthralgias.   Neurological: Positive for tremors and weakness.   Psychiatric/Behavioral: Positive for confusion.   All other systems reviewed and are negative.      Objective     Vitals:    06/24/19 0836   BP: 130/88   Pulse: 63   Resp: 16   Temp: 98.3 °F (36.8 °C)   TempSrc: Temporal   SpO2: 97%   Weight: 108 kg (237 lb 2 oz)   Height: 171.5 cm (67.52\")   PainSc:   1   PainLoc: Hand       Patient's Body mass index is 36.57 kg/m². BMI is above normal parameters. Recommendations include: educational material.      No exam data present    followed by neurology. abnormal clock drawng, see scanned media.    Physical Exam   Constitutional: He is oriented to person, place, and time. Vital signs are normal. He appears well-developed and well-nourished. He is active.  Non-toxic appearance. He does not have a sickly appearance. He does not appear ill. No distress. He is obese.  HENT:   Head: Normocephalic and atraumatic. Hair is abnormal (androgenic alopcia, no obvious worrisome skin lesions to scalp).   Right Ear: Hearing, tympanic membrane, external ear and ear canal normal.   Left Ear: Hearing, tympanic membrane, external ear and ear canal normal.   Nose: Nose normal.   Mouth/Throat: Oropharynx is clear and moist. Mucous membranes are not dry. Abnormal dentition.   Eyes: Conjunctivae, EOM and lids are normal. No scleral icterus.   Neck: Phonation normal. Neck supple. No tracheal deviation present.   Cardiovascular: Normal rate, regular rhythm and normal heart sounds. Exam reveals no gallop and no friction rub.   No murmur heard.  Pulmonary/Chest: Effort normal and breath sounds normal.   Abdominal: Soft. Bowel sounds are normal. He exhibits no distension. There is no tenderness.   Musculoskeletal: He exhibits no deformity.        Legs:  Neurological: He is alert and oriented to person, place, and " time. No cranial nerve deficit. Gait abnormal.   Skin: Skin is warm. No rash noted. He is not diaphoretic. No cyanosis. No pallor. Nails show no clubbing.   Psychiatric: He has a normal mood and affect. His speech is normal and behavior is normal. Judgment and thought content normal. Cognition and memory are normal. He is attentive.   Nursing note and vitals reviewed.      Recent Lab Results:  Lab Results   Component Value Date    GLU 99 (H) 06/04/2018     Lab Results   Component Value Date    CHOL 114 03/14/2018    TRIG 90 03/14/2018    HDL 41 03/14/2018    VLDL 15 09/15/2017    LDLHDL 1.58 06/27/2016       Assessment/Plan   Age-appropriate Screening Schedule:  Refer to the list below for future screening recommendations based on patient's age, sex and/or medical conditions.      Health Maintenance   Topic Date Due   • HEMOGLOBIN A1C  09/14/2018   • LIPID PANEL  06/25/2019 (Originally 3/14/2019)   • COLONOSCOPY  08/01/2019 (Originally 3/12/2018)   • ZOSTER VACCINE (1 of 2) 07/02/2020 (Originally 1/25/2008)   • INFLUENZA VACCINE  08/01/2019   • TDAP/TD VACCINES (2 - Td) 01/01/2020   • PNEUMOCOCCAL VACCINE (19-64 MEDIUM RISK)  Completed   • DIABETIC FOOT EXAM  Discontinued   • DIABETIC EYE EXAM  Discontinued   • URINE MICROALBUMIN  Discontinued       Medicare Risks and Personalized Health Plan:  weight , cardiovascular risk and increased fall risk      CMS-Preventive Services Quick Reference  Medicare Preventive Services Addressed:  Advance directive, Colorectal cancer screening, colonoscopy referral placed, Diabetes screening, see lab orders, shingrix    Advance Care Planning:  Patient does not have an advance directive - information provided to the patient today    Diagnoses and all orders for this visit:    1. Medicare annual wellness visit, subsequent (Primary)    2. 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CMS/HCC)  -     carBAMazepine (TEGretol) 200 MG tablet; Take 1 tablet by mouth At Night As Needed (headache).  Dispense: 90  tablet; Refill: 3    3. Class 2 severe obesity due to excess calories with serious comorbidity and body mass index (BMI) of 36.0 to 36.9 in adult (CMS/[facility])  -     Lipid Panel  -     Hemoglobin A1c  -     TSH+Free T4    4. Coronary artery disease involving native coronary artery of native heart without angina pectoris  -     nitroglycerin (NITROSTAT) 0.4 MG SL tablet; Place 1 tablet under the tongue Every 5 (Five) Minutes As Needed for Chest Pain. Take no more than 3 doses in 15 minutes.  Dispense: 30 tablet; Refill: 11  -     carvedilol (COREG) 12.5 MG tablet; Take 1 tablet by mouth 2 (Two) Times a Day With Meals.  Dispense: 180 tablet; Refill: 3    5. Essential hypertension  -     amLODIPine (NORVASC) 2.5 MG tablet; Take 1 tablet by mouth Daily.  Dispense: 90 tablet; Refill: 3  -     carvedilol (COREG) 12.5 MG tablet; Take 1 tablet by mouth 2 (Two) Times a Day With Meals.  Dispense: 180 tablet; Refill: 3  -     hydrochlorothiazide (MICROZIDE) 12.5 MG capsule; Take 1 capsule by mouth Every Morning.  Dispense: 90 capsule; Refill: 3  -     CBC & Differential  -     Comprehensive Metabolic Panel  -     TSH+Free T4    6. DENZEL (obstructive sleep apnea)    7. Seasonal allergies  -     azelastine (ASTELIN) 0.1 % nasal spray; 1 spray into the nostril(s) as directed by provider 2 (Two) Times a Day. Use in each nostril as directed  Dispense: 30 mL; Refill: 5    8. Anxiety  -     busPIRone (BUSPAR) 15 MG tablet; Take 1 tablet by mouth 2 (Two) Times a Day As Needed (anxiety).  Dispense: 180 tablet; Refill: 3    9. Primary insomnia  -     carBAMazepine (TEGretol) 200 MG tablet; Take 1 tablet by mouth At Night As Needed (headache).  Dispense: 90 tablet; Refill: 3    10. Trigeminal neuralgia  -     carBAMazepine (TEGretol) 200 MG tablet; Take 1 tablet by mouth At Night As Needed (headache).  Dispense: 90 tablet; Refill: 3    11. Gastroesophageal reflux disease without esophagitis  -     pantoprazole (PROTONIX) 40 MG EC tablet; Take  1 tablet by mouth Daily.  Dispense: 90 tablet; Refill: 3    12. Hypercholesteremia  -     pravastatin (PRAVACHOL) 40 MG tablet; Take 1 tablet by mouth Daily.  Dispense: 90 tablet; Refill: 3  -     Comprehensive Metabolic Panel  -     Lipid Panel    13. Benign non-nodular prostatic hyperplasia with lower urinary tract symptoms  -     tamsulosin (FLOMAX) 0.4 MG capsule 24 hr capsule; Take 1 capsule by mouth Daily.  Dispense: 90 capsule; Refill: 3    14. Vitamin B12 deficiency  -     CBC & Differential  -     cyanocobalamin injection 1,000 mcg    15. Vitamin D deficiency  -     Vitamin D 25 Hydroxy    16. Abnormal finding of blood chemistry   -     Hemoglobin A1c  -     TSH+Free T4    17. Encounter for medication monitoring  -     Carbamazepine level, free  -     Carbamazepine level, total    18. Colon cancer screening  -     Ambulatory Referral For Screening Colonoscopy    Other orders  -     Cancel: promethazine-dextromethorphan (PROMETHAZINE-DM) 6.25-15 MG/5ML syrup; Take 5 mL by mouth 4 (Four) Times a Day As Needed for Cough.  Dispense: 240 mL; Refill: 0      Follow up with neurology for DENZEL and ataxia, with Dr. Barlow's impending departure I may need to put in a referral for another provider.    Encouraged use of cane.    An After Visit Summary and PPPS with all of these plans were given to the patient.      Follow Up:  Return in about 6 months (around 12/24/2019) for Follow up on current issues.

## 2019-06-24 NOTE — PATIENT INSTRUCTIONS
Health Maintenance, Male  A healthy lifestyle and preventive care is important for your health and wellness. Ask your health care provider about what schedule of regular examinations is right for you.  What should I know about weight and diet?    Eat a Healthy Diet  · Eat plenty of vegetables, fruits, whole grains, low-fat dairy products, and lean protein.  · Do not eat a lot of foods high in solid fats, added sugars, or salt.     Maintain a Healthy Weight  Regular exercise can help you achieve or maintain a healthy weight. You should:  · Do at least 150 minutes of exercise each week. The exercise should increase your heart rate and make you sweat (moderate-intensity exercise).  · Do strength-training exercises at least twice a week.     Watch Your Levels of Cholesterol and Blood Lipids  · Have your blood tested for lipids and cholesterol every 5 years starting at 35 years of age. If you are at high risk for heart disease, you should start having your blood tested when you are 20 years old. You may need to have your cholesterol levels checked more often if:  ? Your lipid or cholesterol levels are high.  ? You are older than 50 years of age.  ? You are at high risk for heart disease.     What should I know about cancer screening?  Many types of cancers can be detected early and may often be prevented.  Lung Cancer  · You should be screened every year for lung cancer if:  ? You are a current smoker who has smoked for at least 30 years.  ? You are a former smoker who has quit within the past 15 years.  · Talk to your health care provider about your screening options, when you should start screening, and how often you should be screened.     Colorectal Cancer  · Routine colorectal cancer screening usually begins at 50 years of age and should be repeated every 5-10 years until you are 75 years old. You may need to be screened more often if early forms of precancerous polyps or small growths are found. Your health care  provider may recommend screening at an earlier age if you have risk factors for colon cancer.  · Your health care provider may recommend using home test kits to check for hidden blood in the stool.  · A small camera at the end of a tube can be used to examine your colon (sigmoidoscopy or colonoscopy). This checks for the earliest forms of colorectal cancer.     Prostate and Testicular Cancer  · Depending on your age and overall health, your health care provider may do certain tests to screen for prostate and testicular cancer.  · Talk to your health care provider about any symptoms or concerns you have about testicular or prostate cancer.     Skin Cancer  · Check your skin from head to toe regularly.  · Tell your health care provider about any new moles or changes in moles, especially if:  ? There is a change in a mole’s size, shape, or color.  ? You have a mole that is larger than a pencil eraser.  · Always use sunscreen. Apply sunscreen liberally and repeat throughout the day.  · Protect yourself by wearing long sleeves, pants, a wide-brimmed hat, and sunglasses when outside.     What should I know about heart disease, diabetes, and high blood pressure?  · If you are 18-39 years of age, have your blood pressure checked every 3-5 years. If you are 40 years of age or older, have your blood pressure checked every year. You should have your blood pressure measured twice--once when you are at a hospital or clinic, and once when you are not at a hospital or clinic. Record the average of the two measurements. To check your blood pressure when you are not at a hospital or clinic, you can use:  ? An automated blood pressure machine at a pharmacy.  ? A home blood pressure monitor.  · Talk to your health care provider about your target blood pressure.  · If you are between 45-79 years old, ask your health care provider if you should take aspirin to prevent heart disease.  · Have regular diabetes screenings by checking your  fasting blood sugar level.  ? If you are at a normal weight and have a low risk for diabetes, have this test once every three years after the age of 45.  ? If you are overweight and have a high risk for diabetes, consider being tested at a younger age or more often.  · A one-time screening for abdominal aortic aneurysm (AAA) by ultrasound is recommended for men aged 65-75 years who are current or former smokers.  What should I know about preventing infection?  Hepatitis B  If you have a higher risk for hepatitis B, you should be screened for this virus. Talk with your health care provider to find out if you are at risk for hepatitis B infection.  Hepatitis C  Blood testing is recommended for:  · Everyone born from 1945 through 1965.  · Anyone with known risk factors for hepatitis C.     Sexually Transmitted Diseases (STDs)  · You should be screened each year for STDs including gonorrhea and chlamydia if:  ? You are sexually active and are younger than 24 years of age.  ? You are older than 24 years of age and your health care provider tells you that you are at risk for this type of infection.  ? Your sexual activity has changed since you were last screened and you are at an increased risk for chlamydia or gonorrhea. Ask your health care provider if you are at risk.  · Talk with your health care provider about whether you are at high risk of being infected with HIV. Your health care provider may recommend a prescription medicine to help prevent HIV infection.     What else can I do?  ·   · Schedule regular health, dental, and eye exams.  · Stay current with your vaccines (immunizations).  · Do not use any tobacco products, such as cigarettes, chewing tobacco, and e-cigarettes. If you need help quitting, ask your health care provider.  · Limit alcohol intake to no more than 2 drinks per day. One drink equals 12 ounces of beer, 5 ounces of wine, or 1½ ounces of hard liquor.  · Do not use street drugs.  · Do not share  needles.  · Ask your health care provider for help if you need support or information about quitting drugs.  · Tell your health care provider if you often feel depressed.  · Tell your health care provider if you have ever been abused or do not feel safe at home.      This information is not intended to replace advice given to you by your health care provider. Make sure you discuss any questions you have with your health care provider.  Document Released: 06/15/2009 Document Revised: 08/16/2017 Document Reviewed: 09/20/2016  [employer] Interactive Patient Education © 2018 [employer]       Serving Sizes  A serving size is a measured amount of food or drink, such as one slice of bread, that has an associated nutrient content. Knowing the serving size of a food or drink can help you determine how much of that food you should consume.  What is the size of one serving?  The size of one healthy serving depends on the food or drink. To determine a serving size, read the food label. If the food or drink does not have a food label, try to find serving size information online. Or, use the following to estimate the size of one adult serving:  Grain  1 slice bread. ½ bagel. ½ cup pasta.  Vegetable  ½ cup cooked or canned vegetables. 1 cup raw, leafy greens.  Fruit  ½ cup canned fruit. 1 medium fruit. ¼ cup dried fruit.  Meat and Other Protein Sources  1 oz meat, poultry, or fish. ¼ cup cooked beans. 1 egg. ¼ cup nuts or seeds. 1 Tbsp nut butter. ¼ cup tofu or tempeh. 2 Tbsp hummus.  Dairy  An individual container of yogurt (6-8 oz). 1 piece of cheese the size of your thumb (1 oz). 1 cup (8 oz) milk or milk alternative.  Fat  A piece the size of one dice. 1 tsp soft margarine. 1 Tbsp mayonnaise. 1 tsp vegetable oil. 1 Tbsp regular salad dressing. 2 Tbsp low-fat salad dressing.  How many servings should I eat from each food group each day?  The following are the suggested number of servings to try and have every day from each  food group. You can also look at your eating throughout the week and aim for meeting these requirements on most days for overall healthy eating.  Grain  6-8 servings. Try to have half of your grains from whole grains, such as whole wheat bread, corn tortillas, oatmeal, brown rice, whole wheat pasta, and bulgur.  Vegetable  At least 2½-3 servings.  Fruit  2 servings.  Meat and Other Protein Foods  5-6 servings. Aim to have lean proteins, such as chicken, turkey, fish, beans, or tofu.  Dairy  3 servings. Choose low-fat or nonfat if you are trying to control your weight.  Fat  2-3 servings.  Is a serving the same thing as a portion?  No. A portion is the actual amount you eat, which may be more than one serving. Knowing the specific serving size of a food and the nutritional information that goes with it can help you make a healthy decision on what size portion to eat.  What are some tips to help me learn healthy serving sizes?  · Check food labels for serving sizes. Many foods that come as a single portion actually contain multiple servings.  · Determine the serving size of foods you commonly eat and figure out how large a portion you usually eat.  · Measure the number of servings that can be held by the bowls, glasses, cups, and plates you typically use. For example, pour your breakfast cereal into your regular bowl and then pour it into a measuring cup.  · For 1-2 days, measure the serving sizes of all the foods you eat.  · Practice estimating serving sizes and determining how big your portions should be.      This information is not intended to replace advice given to you by your health care provider. Make sure you discuss any questions you have with your health care provider.  Document Released: 09/15/2004 Document Revised: 08/12/2017 Document Reviewed: 03/17/2015  [employer] Interactive Patient Education © 2018 [employer]    MyPlate from [employer]    The general, healthful diet is based on the 2010 Dietary Guidelines  for Americans. The amount of food you need to eat from each food group depends on your age, sex, and level of physical activity and can be individualized by a dietitian. Go to ChooseMyPlate.gov for more information.  What do I need to know about the MyPlate plan?  · Enjoy your food, but eat less.  · Avoid oversized portions.  ? ½ of your plate should include fruits and vegetables.  ? ¼ of your plate should be grains.  ? ¼ of your plate should be protein.  Grains  · Make at least half of your grains whole grains.  · For a 2,000 calorie daily food plan, eat 6 oz every day.  · 1 oz is about 1 slice bread, 1 cup cereal, or ½ cup cooked rice, cereal, or pasta.  Vegetables  · Make half your plate fruits and vegetables.  · For a 2,000 calorie daily food plan, eat 2½ cups every day.  · 1 cup is about 1 cup raw or cooked vegetables or vegetable juice or 2 cups raw leafy greens.  Fruits  · Make half your plate fruits and vegetables.  · For a 2,000 calorie daily food plan, eat 2 cups every day.  · 1 cup is about 1 cup fruit or 100% fruit juice or ½ cup dried fruit.  Protein  · For a 2,000 calorie daily food plan, eat 5½ oz every day.  · 1 oz is about 1 oz meat, poultry, or fish, ¼ cup cooked beans, 1 egg, 1 Tbsp peanut butter, or ½ oz nuts or seeds.  Dairy  · Switch to fat-free or low-fat (1%) milk.  · For a 2,000 calorie daily food plan, eat 3 cups every day.  · 1 cup is about 1 cup milk or yogurt or soy milk (soy beverage), 1½ oz natural cheese, or 2 oz processed cheese.  Fats, Oils, and Empty Calories  · Only small amounts of oils are recommended.  · Empty calories are calories from solid fats or added sugars.  · Compare sodium in foods like soup, bread, and frozen meals. Choose the foods with lower numbers.  · Drink water instead of sugary drinks.  What foods can I eat?  Grains  Whole grains such as whole wheat, quinoa, millet, and bulgur. Bread, rolls, and pasta made from whole grains. Brown or wild rice. Hot or cold  cereals made from whole grains and without added sugar.  Vegetables  All fresh vegetables, especially fresh red, dark green, or orange vegetables. Peas and beans. Low-sodium frozen or canned vegetables prepared without added salt. Low-sodium vegetable juices.  Fruits  All fresh, frozen, and dried fruits. Canned fruit packed in water or fruit juice without added sugar. Fruit juices without added sugar.  Meats and Other Protein Sources  Boiled, baked, or grilled lean meat trimmed of fat. Skinless poultry. Fresh seafood and shellfish. Canned seafood packed in water. Unsalted nuts and unsalted nut butters. Tofu. Dried beans and pea. Eggs.  Dairy  Low-fat or fat-free milk, yogurt, and cheeses.  Sweets and Desserts  Frozen desserts made from low-fat milk.  Fats and Oils  Olive, peanut, and canola oils and margarine. Salad dressing and mayonnaise made from these oils.  Other  Soups and casseroles made from allowed ingredients and without added fat or salt.  The items listed above may not be a complete list of recommended foods or beverages. Contact your dietitian for more options.  What foods are not recommended?  Grains  Sweetened, low-fiber cereals. Packaged baked goods. Snack crackers and chips. Cheese crackers, butter crackers, and biscuits. Frozen waffles, sweet breads, doughnuts, pastries, packaged baking mixes, pancakes, cakes, and cookies.  Vegetables  Regular canned or frozen vegetables or vegetables prepared with salt. Canned tomatoes. Canned tomato sauce. Fried vegetables. Vegetables in cream sauce or cheese sauce.  Fruits  Fruits packed in syrup or made with added sugar.  Meats and Other Protein Sources  Marbled or fatty meats such as ribs. Poultry with skin. Fried meats, poultry, eggs, or fish. Sausages, hot dogs, and deli meats such as pastrami, bologna, or salami.  Dairy  Whole milk, cream, cheeses made from whole milk, sour cream. Ice cream or yogurt made from whole milk or with added sugar.  Beverages  For  adults, no more than one alcoholic drink per day. Regular soft drinks or other sugary beverages. Juice drinks.  Sweets and Desserts  Sugary or fatty desserts, candy, and other sweets.  Fats and Oils  Solid shortening or partially hydrogenated oils. Solid margarine. Margarine that contains trans fats. Butter.    The items listed above may not be a complete list of foods and beverages to avoid. Contact your dietitian for more information.    This information is not intended to replace advice given to you by your health care provider. Make sure you discuss any questions you have with your health care provider.  Document Released: 01/06/2009 Document Revised: 05/25/2017 Document Reviewed: 11/26/2014  [employer] Interactive Patient Education © 2018 [employer]        Calorie Counting for Weight Loss  Calories are units of energy. Your body needs a certain amount of calories from food to keep you going throughout the day. When you eat more calories than your body needs, your body stores the extra calories as fat. When you eat fewer calories than your body needs, your body burns fat to get the energy it needs.  Calorie counting means keeping track of how many calories you eat and drink each day. Calorie counting can be helpful if you need to lose weight. If you make sure to eat fewer calories than your body needs, you should lose weight. Ask your health care provider what a healthy weight is for you.  For calorie counting to work, you will need to eat the right number of calories in a day in order to lose a healthy amount of weight per week. A dietitian can help you determine how many calories you need in a day and will give you suggestions on how to reach your calorie goal.  · A healthy amount of weight to lose per week is usually 1-2 lb (0.5-0.9 kg). This usually means that your daily calorie intake should be reduced by 500-750 calories.  · Eating 1,200 - 1,500 calories per day can help most women lose weight.  · Eating  1,500 - 1,800 calories per day can help most men lose weight.     What do I need to know about calorie counting?  In order to meet your daily calorie goal, you will need to:  · Find out how many calories are in each food you would like to eat. Try to do this before you eat.  · Decide how much of the food you plan to eat.  · Write down what you ate and how many calories it had. Doing this is called keeping a food log.     To successfully lose weight, it is important to balance calorie counting with a healthy lifestyle that includes regular activity. Aim for 150 minutes of moderate exercise (such as walking) or 75 minutes of vigorous exercise (such as running) each week.  Where do I find calorie information?       The number of calories in a food can be found on a Nutrition Facts label. If a food does not have a Nutrition Facts label, try to look up the calories online or ask your dietitian for help.  Remember that calories are listed per serving. If you choose to have more than one serving of a food, you will have to multiply the calories per serving by the amount of servings you plan to eat. For example, the label on a package of bread might say that a serving size is 1 slice and that there are 90 calories in a serving. If you eat 1 slice, you will have eaten 90 calories. If you eat 2 slices, you will have eaten 180 calories.  How do I keep a food log?  Immediately after each meal, record the following information in your food log:  · What you ate. Don't forget to include toppings, sauces, and other extras on the food.  · How much you ate. This can be measured in cups, ounces, or number of items.  · How many calories each food and drink had.  · The total number of calories in the meal.     Keep your food log near you, such as in a small notebook in your pocket, or use a mobile alanis or website. Some programs will calculate calories for you and show you how many calories you have left for the day to meet your  "goal.  What are some calorie counting tips?  · Use your calories on foods and drinks that will fill you up and not leave you hungry:  ? Some examples of foods that fill you up are nuts and nut butters, vegetables, lean proteins, and high-fiber foods like whole grains. High-fiber foods are foods with more than 5 g fiber per serving.  ? Drinks such as sodas, specialty coffee drinks, alcohol, and juices have a lot of calories, yet do not fill you up.  · Eat nutritious foods and avoid empty calories. Empty calories are calories you get from foods or beverages that do not have many vitamins or protein, such as candy, sweets, and soda. It is better to have a nutritious high-calorie food (such as an avocado) than a food with few nutrients (such as a bag of chips).  · Know how many calories are in the foods you eat most often. This will help you calculate calorie counts faster.  · Pay attention to calories in drinks. Low-calorie drinks include water and unsweetened drinks.  · Pay attention to nutrition labels for \"low fat\" or \"fat free\" foods. These foods sometimes have the same amount of calories or more calories than the full fat versions. They also often have added sugar, starch, or salt, to make up for flavor that was removed with the fat.  ·   · Find a way of tracking calories that works for you. Get creative. Try different apps or programs if writing down calories does not work for you.  What are some portion control tips?  · Know how many calories are in a serving. This will help you know how many servings of a certain food you can have.  · Use a measuring cup to measure serving sizes. You could also try weighing out portions on a kitchen scale. With time, you will be able to estimate serving sizes for some foods.  · Take some time to put servings of different foods on your favorite plates, bowls, and cups so you know what a serving looks like.  · Try not to eat straight from a bag or box. Doing this can lead to " overeating. Put the amount you would like to eat in a cup or on a plate to make sure you are eating the right portion.  · Use smaller plates, glasses, and bowls to prevent overeating.  · Try not to multitask (for example, watch TV or use your computer) while eating. If it is time to eat, sit down at a table and enjoy your food. This will help you to know when you are full. It will also help you to be aware of what you are eating and how much you are eating.  What are tips for following this plan?  Reading food labels  · Check the calorie count compared to the serving size. The serving size may be smaller than what you are used to eating.  · Check the source of the calories. Make sure the food you are eating is high in vitamins and protein and low in saturated and trans fats.  Shopping  · Read nutrition labels while you shop. This will help you make healthy decisions before you decide to purchase your food.  · Make a grocery list and stick to it.  Cooking  · Try to cook your favorite foods in a healthier way. For example, try baking instead of frying.  · Use low-fat dairy products.  Meal planning  · Use more fruits and vegetables. Half of your plate should be fruits and vegetables.  · Include lean proteins like poultry and fish.  How do I count calories when eating out?  · Ask for smaller portion sizes.  · Consider sharing an entree and sides instead of getting your own entree.  · If you get your own entree, eat only half. Ask for a box at the beginning of your meal and put the rest of your entree in it so you are not tempted to eat it.  · If calories are listed on the menu, choose the lower calorie options.  · Choose dishes that include vegetables, fruits, whole grains, low-fat dairy products, and lean protein.  · Choose items that are boiled, broiled, grilled, or steamed. Stay away from items that are buttered, battered, fried, or served with cream sauce. Items labeled “crispy” are usually fried, unless stated  otherwise.  · Choose water, low-fat milk, unsweetened iced tea, or other drinks without added sugar. If you want an alcoholic beverage, choose a lower calorie option such as a glass of wine or light beer.  · Ask for dressings, sauces, and syrups on the side. These are usually high in calories, so you should limit the amount you eat.  · If you want a salad, choose a garden salad and ask for grilled meats. Avoid extra toppings like wilson, cheese, or fried items. Ask for the dressing on the side, or ask for olive oil and vinegar or lemon to use as dressing.  · Estimate how many servings of a food you are given. For example, a serving of cooked rice is ½ cup or about the size of half a baseball. Knowing serving sizes will help you be aware of how much food you are eating at restaurants. The list below tells you how big or small some common portion sizes are based on everyday objects:  ? 1 oz--4 stacked dice.  ? 3 oz--1 deck of cards.  ? 1 tsp--1 die.  ? 1 Tbsp--½ a ping-pong ball.  ? 2 Tbsp--1 ping-pong ball.  ? ½ cup--½ baseball.  ? 1 cup--1 baseball.  Summary  · Calorie counting means keeping track of how many calories you eat and drink each day. If you eat fewer calories than your body needs, you should lose weight.  · A healthy amount of weight to lose per week is usually 1-2 lb (0.5-0.9 kg). This usually means reducing your daily calorie intake by 500-750 calories.  · The number of calories in a food can be found on a Nutrition Facts label. If a food does not have a Nutrition Facts label, try to look up the calories online or ask your dietitian for help.  · Use your calories on foods and drinks that will fill you up, and not on foods and drinks that will leave you hungry.  · Use smaller plates, glasses, and bowls to prevent overeating.      This information is not intended to replace advice given to you by your health care provider. Make sure you discuss any questions you have with your health care  provider.  Document Released: 2006 Document Revised: 2017 Document Reviewed: 2017  [employer] Interactive Patient Education © 2018 [employer]           Medicare Wellness  Personal Prevention Plan of Service     Date of Office Visit:  2019  Encounter Provider:  Naty Lebron MD  Place of Service:  [facility]  Patient Name: Chris Mejia  :  1960    As part of the Medicare Wellness portion of your visit today, we are providing you with this personalized preventive plan of services (PPPS). This plan is based upon recommendations of the [place] Preventive Services Task Force (USPSTF) and the Advisory Committee on Immunization Practices (ACIP).    This lists the preventive care services that should be considered, and provides dates of when you are due. Items listed as completed are up-to-date and do not require any further intervention.    Health Maintenance   Topic Date Due   • HEMOGLOBIN A1C  2018   • LIPID PANEL  2019 (Originally 3/14/2019)   • COLONOSCOPY  2019 (Originally 3/12/2018)   • ZOSTER VACCINE (1 of 2) 2020 (Originally 2008)   • INFLUENZA VACCINE  2019   • TDAP/TD VACCINES (2 - Td) 2020   • MEDICARE ANNUAL WELLNESS  2020   • PNEUMOCOCCAL VACCINE (19-64 MEDIUM RISK)  Completed   • HEPATITIS C SCREENING  Completed   • DIABETIC FOOT EXAM  Discontinued   • DIABETIC EYE EXAM  Discontinued   • URINE MICROALBUMIN  Discontinued       Orders Placed This Encounter   Procedures   • Comprehensive Metabolic Panel   • Lipid Panel     Order Specific Question:   [facility] Has the patient fasted?     Answer:   Yes   • Hemoglobin A1c   • TSH+Free T4   • Carbamazepine level, free   • Carbamazepine level, total   • Vitamin D 25 Hydroxy   • Ambulatory Referral For Screening Colonoscopy     Referral Priority:   Routine     Referral Type:   Diagnostic Medical     Referral Reason:   Specialty Services  Required     Referred to Provider:   Melba Lopez MD     Number of Visits Requested:   1   • CBC & Differential     Order Specific Question:   Manual Differential     Answer:   No       Return in about 6 months (around 12/24/2019) for Follow up on current issues.

## 2019-06-25 LAB — Lab: NORMAL

## 2019-06-26 LAB
25(OH)D3+25(OH)D2 SERPL-MCNC: 38 NG/ML
ALBUMIN SERPL-MCNC: 4.2 G/DL (ref 3.5–5)
ALBUMIN/GLOB SERPL: 1.2 G/DL (ref 1–2)
ALP SERPL-CCNC: 41 U/L (ref 38–126)
ALT SERPL-CCNC: 41 U/L (ref 13–69)
AST SERPL-CCNC: 32 U/L (ref 15–46)
BASOPHILS # BLD AUTO: 0.06 10*3/MM3 (ref 0–0.2)
BASOPHILS NFR BLD AUTO: 0.9 % (ref 0–1.5)
BILIRUB SERPL-MCNC: 0.6 MG/DL (ref 0.2–1.3)
BUN SERPL-MCNC: 13 MG/DL (ref 7–20)
BUN/CREAT SERPL: 14.4 (ref 6.3–21.9)
CALCIUM SERPL-MCNC: 9.3 MG/DL (ref 8.4–10.2)
CARBAMAZEPINE FREE SERPL-MCNC: ABNORMAL UG/ML (ref 0.6–4.2)
CARBAMAZEPINE SERPL-MCNC: 0.7 UG/ML (ref 4–12)
CARBAMAZEPINE SERPL-MCNC: <3 MCG/ML (ref 4–12)
CHLORIDE SERPL-SCNC: 100 MMOL/L (ref 98–107)
CHOLEST SERPL-MCNC: 116 MG/DL (ref 0–199)
CO2 SERPL-SCNC: 31 MMOL/L (ref 26–30)
CREAT SERPL-MCNC: 0.9 MG/DL (ref 0.6–1.3)
EOSINOPHIL # BLD AUTO: 0.31 10*3/MM3 (ref 0–0.4)
EOSINOPHIL NFR BLD AUTO: 4.8 % (ref 0.3–6.2)
ERYTHROCYTE [DISTWIDTH] IN BLOOD BY AUTOMATED COUNT: 13.2 % (ref 12.3–15.4)
GLOBULIN SER CALC-MCNC: 3.4 GM/DL
GLUCOSE SERPL-MCNC: 99 MG/DL (ref 74–98)
HBA1C MFR BLD: 5.5 % (ref 4.8–5.6)
HCT VFR BLD AUTO: 50 % (ref 37.5–51)
HDLC SERPL-MCNC: 43 MG/DL (ref 40–60)
HGB BLD-MCNC: 16.9 G/DL (ref 13–17.7)
IMM GRANULOCYTES # BLD AUTO: 0.04 10*3/MM3 (ref 0–0.05)
IMM GRANULOCYTES NFR BLD AUTO: 0.6 % (ref 0–0.5)
LDLC SERPL CALC-MCNC: 59 MG/DL (ref 0–99)
LYMPHOCYTES # BLD AUTO: 1.78 10*3/MM3 (ref 0.7–3.1)
LYMPHOCYTES NFR BLD AUTO: 27.6 % (ref 19.6–45.3)
MCH RBC QN AUTO: 32.4 PG (ref 26.6–33)
MCHC RBC AUTO-ENTMCNC: 33.8 G/DL (ref 31.5–35.7)
MCV RBC AUTO: 96 FL (ref 79–97)
MONOCYTES # BLD AUTO: 0.71 10*3/MM3 (ref 0.1–0.9)
MONOCYTES NFR BLD AUTO: 11 % (ref 5–12)
NEUTROPHILS # BLD AUTO: 3.55 10*3/MM3 (ref 1.7–7)
NEUTROPHILS NFR BLD AUTO: 55.1 % (ref 42.7–76)
NRBC BLD AUTO-RTO: 0 /100 WBC (ref 0–0.2)
PLATELET # BLD AUTO: 189 10*3/MM3 (ref 140–450)
POTASSIUM SERPL-SCNC: 4 MMOL/L (ref 3.5–5.1)
PROT SERPL-MCNC: 7.6 G/DL (ref 6.3–8.2)
RBC # BLD AUTO: 5.21 10*6/MM3 (ref 4.14–5.8)
SODIUM SERPL-SCNC: 141 MMOL/L (ref 137–145)
T4 FREE SERPL-MCNC: 1.15 NG/DL (ref 0.78–2.19)
TRIGL SERPL-MCNC: 72 MG/DL
TSH SERPL DL<=0.005 MIU/L-ACNC: 2.66 MIU/ML (ref 0.47–4.68)
VLDLC SERPL CALC-MCNC: 14.4 MG/DL
WBC # BLD AUTO: 6.45 10*3/MM3 (ref 3.4–10.8)

## 2019-07-10 NOTE — TELEPHONE ENCOUNTER
PRESCREENING FOR OPEN ACCESS SCHEDULING    Chris Mejia, 1960  [id number]    07/10/19    If, the patient answers yes to any of the following questions the provider will be informed prior to scheduling open access for approval and documented in the chart.    [x]  Yes  [] No    1. Have you ever had a colonoscopy in the past?      When:  2015      Where: [facility]      Polyps or other:     [x]  Yes  [] No    2. Family history of colon cancer?      Relation: Brothers x6       Age of onset:       Do you currently have any of the following?    []  Yes  [x] No  Rectal bleeding, if so, how long?     []  Yes  [x] No  Abdominal pain, if so, how long?    []  Yes  [x] No  Constipation, if so, how long?    []  Yes  [x] No  Diarrhea, if so, how long?    []  Yes  [x] No  Weight loss, is so, how much?    [] Yes  [x] No  Small caliber stool, if so, how long?      Have you ever had any of the following conditions?    [] Yes  [x] No  Heart attack?      When?       Last cardiac workup?     Blood thinners?    [] Yes  [x] No   Lung problems, asthma or COPD?  [] Yes  [x] No  Oxygen required?       [] Yes  [x] No  Stroke?     [] Yes  [x] No  Have you ever had a reaction to anesthesia?      Patient is scheduled for a open access colonoscopy on 09/13/19

## 2019-07-22 DIAGNOSIS — I25.10 CORONARY ARTERY DISEASE INVOLVING NATIVE CORONARY ARTERY OF NATIVE HEART WITHOUT ANGINA PECTORIS: ICD-10-CM

## 2019-07-22 DIAGNOSIS — G50.0 TRIGEMINAL NEURALGIA: ICD-10-CM

## 2019-07-22 DIAGNOSIS — F51.01 PRIMARY INSOMNIA: ICD-10-CM

## 2019-07-22 DIAGNOSIS — G11.8 SPINOCEREBELLAR ATAXIA (HCC): Chronic | ICD-10-CM

## 2019-07-22 NOTE — TELEPHONE ENCOUNTER
Patient received a letter that these two medicines he can get through the [facility] pharmacy for free.  Phone number verified.  Thank you.

## 2019-07-23 RX ORDER — CARBAMAZEPINE 200 MG/1
200 TABLET ORAL NIGHTLY PRN
Qty: 90 TABLET | Refills: 3 | Status: SHIPPED | OUTPATIENT
Start: 2019-07-23 | End: 2021-02-10 | Stop reason: SDUPTHER

## 2019-07-23 RX ORDER — NITROGLYCERIN 0.4 MG/1
0.4 TABLET SUBLINGUAL
Qty: 30 TABLET | Refills: 11 | Status: SHIPPED | OUTPATIENT
Start: 2019-07-23 | End: 2021-08-16 | Stop reason: SDUPTHER

## 2019-08-02 ENCOUNTER — CLINICAL SUPPORT (OUTPATIENT)
Dept: INTERNAL MEDICINE | Facility: CLINIC | Age: 59
End: 2019-08-02

## 2019-08-02 DIAGNOSIS — E53.8 VITAMIN B12 DEFICIENCY: ICD-10-CM

## 2019-08-02 PROCEDURE — 96372 THER/PROPH/DIAG INJ SC/IM: CPT | Performed by: FAMILY MEDICINE

## 2019-08-02 RX ADMIN — CYANOCOBALAMIN 1000 MCG: 1000 INJECTION, SOLUTION INTRAMUSCULAR; SUBCUTANEOUS at 09:23

## 2019-09-03 ENCOUNTER — CLINICAL SUPPORT (OUTPATIENT)
Dept: INTERNAL MEDICINE | Facility: CLINIC | Age: 59
End: 2019-09-03

## 2019-09-03 DIAGNOSIS — E53.8 VITAMIN B12 DEFICIENCY: ICD-10-CM

## 2019-09-03 PROCEDURE — 96372 THER/PROPH/DIAG INJ SC/IM: CPT | Performed by: FAMILY MEDICINE

## 2019-09-03 RX ADMIN — CYANOCOBALAMIN 1000 MCG: 1000 INJECTION, SOLUTION INTRAMUSCULAR; SUBCUTANEOUS at 11:36

## 2019-09-04 RX ORDER — POLYETHYLENE GLYCOL 3350 17 G/17G
POWDER, FOR SOLUTION ORAL
Qty: 238 G | Refills: 0 | Status: SHIPPED | OUTPATIENT
Start: 2019-09-04 | End: 2019-09-13 | Stop reason: HOSPADM

## 2019-09-04 RX ORDER — BISACODYL 5 MG/1
TABLET, DELAYED RELEASE ORAL
Qty: 4 TABLET | Refills: 0 | Status: SHIPPED | OUTPATIENT
Start: 2019-09-04 | End: 2019-09-13 | Stop reason: HOSPADM

## 2019-09-12 ENCOUNTER — PREP FOR SURGERY (OUTPATIENT)
Dept: OTHER | Facility: HOSPITAL | Age: 59
End: 2019-09-12

## 2019-09-12 DIAGNOSIS — Z12.11 COLON CANCER SCREENING: Primary | ICD-10-CM

## 2019-09-12 RX ORDER — SODIUM CHLORIDE 0.9 % (FLUSH) 0.9 %
10 SYRINGE (ML) INJECTION AS NEEDED
Status: CANCELLED | OUTPATIENT
Start: 2019-09-12

## 2019-09-12 RX ORDER — SODIUM CHLORIDE 0.9 % (FLUSH) 0.9 %
3 SYRINGE (ML) INJECTION EVERY 12 HOURS SCHEDULED
Status: CANCELLED | OUTPATIENT
Start: 2019-09-12

## 2019-09-12 RX ORDER — SODIUM CHLORIDE, SODIUM LACTATE, POTASSIUM CHLORIDE, CALCIUM CHLORIDE 600; 310; 30; 20 MG/100ML; MG/100ML; MG/100ML; MG/100ML
50 INJECTION, SOLUTION INTRAVENOUS CONTINUOUS
Status: CANCELLED | OUTPATIENT
Start: 2019-09-12

## 2019-09-13 ENCOUNTER — HOSPITAL ENCOUNTER (OUTPATIENT)
Facility: HOSPITAL | Age: 59
Setting detail: HOSPITAL OUTPATIENT SURGERY
Discharge: HOME | End: 2019-09-13
Attending: SURGERY | Admitting: SURGERY

## 2019-09-13 ENCOUNTER — ANESTHESIA EVENT (OUTPATIENT)
Dept: GASTROENTEROLOGY | Facility: HOSPITAL | Age: 59
End: 2019-09-13

## 2019-09-13 ENCOUNTER — ANESTHESIA (OUTPATIENT)
Dept: GASTROENTEROLOGY | Facility: HOSPITAL | Age: 59
End: 2019-09-13

## 2019-09-13 VITALS
RESPIRATION RATE: 16 BRPM | HEART RATE: 61 BPM | WEIGHT: 240 LBS | DIASTOLIC BLOOD PRESSURE: 76 MMHG | OXYGEN SATURATION: 97 % | HEIGHT: 67 IN | SYSTOLIC BLOOD PRESSURE: 131 MMHG | BODY MASS INDEX: 37.67 KG/M2 | TEMPERATURE: 97.2 F

## 2019-09-13 DIAGNOSIS — Z12.11 COLON CANCER SCREENING: ICD-10-CM

## 2019-09-13 LAB — GLUCOSE BLDC GLUCOMTR-MCNC: 90 MG/DL (ref 70–130)

## 2019-09-13 PROCEDURE — 25010000002 PROPOFOL 200 MG/20ML EMULSION: Performed by: NURSE ANESTHETIST, CERTIFIED REGISTERED

## 2019-09-13 PROCEDURE — S0260 H&P FOR SURGERY: HCPCS | Performed by: SURGERY

## 2019-09-13 PROCEDURE — 45385 COLONOSCOPY W/LESION REMOVAL: CPT | Performed by: SURGERY

## 2019-09-13 PROCEDURE — 82962 GLUCOSE BLOOD TEST: CPT

## 2019-09-13 RX ORDER — PROPOFOL 10 MG/ML
INJECTION, EMULSION INTRAVENOUS AS NEEDED
Status: DISCONTINUED | OUTPATIENT
Start: 2019-09-13 | End: 2019-09-13 | Stop reason: SURG

## 2019-09-13 RX ORDER — SODIUM CHLORIDE 0.9 % (FLUSH) 0.9 %
3 SYRINGE (ML) INJECTION EVERY 12 HOURS SCHEDULED
Status: DISCONTINUED | OUTPATIENT
Start: 2019-09-13 | End: 2019-09-13 | Stop reason: HOSPADM

## 2019-09-13 RX ORDER — LIDOCAINE HYDROCHLORIDE 20 MG/ML
INJECTION, SOLUTION INTRAVENOUS AS NEEDED
Status: DISCONTINUED | OUTPATIENT
Start: 2019-09-13 | End: 2019-09-13 | Stop reason: SURG

## 2019-09-13 RX ORDER — SODIUM CHLORIDE, SODIUM LACTATE, POTASSIUM CHLORIDE, CALCIUM CHLORIDE 600; 310; 30; 20 MG/100ML; MG/100ML; MG/100ML; MG/100ML
50 INJECTION, SOLUTION INTRAVENOUS CONTINUOUS
Status: DISCONTINUED | OUTPATIENT
Start: 2019-09-13 | End: 2019-09-13 | Stop reason: HOSPADM

## 2019-09-13 RX ORDER — SODIUM CHLORIDE 0.9 % (FLUSH) 0.9 %
10 SYRINGE (ML) INJECTION AS NEEDED
Status: DISCONTINUED | OUTPATIENT
Start: 2019-09-13 | End: 2019-09-13 | Stop reason: HOSPADM

## 2019-09-13 RX ORDER — KETAMINE HYDROCHLORIDE 50 MG/ML
INJECTION, SOLUTION, CONCENTRATE INTRAMUSCULAR; INTRAVENOUS AS NEEDED
Status: DISCONTINUED | OUTPATIENT
Start: 2019-09-13 | End: 2019-09-13 | Stop reason: SURG

## 2019-09-13 RX ADMIN — PROPOFOL 50 MG: 10 INJECTION, EMULSION INTRAVENOUS at 07:49

## 2019-09-13 RX ADMIN — PROPOFOL 50 MG: 10 INJECTION, EMULSION INTRAVENOUS at 08:04

## 2019-09-13 RX ADMIN — SODIUM CHLORIDE, POTASSIUM CHLORIDE, SODIUM LACTATE AND CALCIUM CHLORIDE 50 ML/HR: 600; 310; 30; 20 INJECTION, SOLUTION INTRAVENOUS at 06:31

## 2019-09-13 RX ADMIN — PROPOFOL 100 MG: 10 INJECTION, EMULSION INTRAVENOUS at 07:42

## 2019-09-13 RX ADMIN — PROPOFOL 50 MG: 10 INJECTION, EMULSION INTRAVENOUS at 08:00

## 2019-09-13 RX ADMIN — PROPOFOL 50 MG: 10 INJECTION, EMULSION INTRAVENOUS at 07:54

## 2019-09-13 RX ADMIN — LIDOCAINE HYDROCHLORIDE 40 MG: 20 INJECTION, SOLUTION INTRAVENOUS at 07:39

## 2019-09-13 RX ADMIN — LIDOCAINE HYDROCHLORIDE 60 MG: 20 INJECTION, SOLUTION INTRAVENOUS at 07:45

## 2019-09-13 RX ADMIN — KETAMINE HYDROCHLORIDE 25 MG: 50 INJECTION, SOLUTION INTRAMUSCULAR; INTRAVENOUS at 07:39

## 2019-09-13 NOTE — ANESTHESIA POSTPROCEDURE EVALUATION
Patient: Chris Mejia    Procedure Summary     Date:  09/13/19 Room / Location:  [facility] ENDOSCOPY 3 / [facility] ENDOSCOPY    Anesthesia Start:  0739 Anesthesia Stop:  0806    Procedure:  COLONOSCOPY W/ COLD SNARE POLYPECTOMY (N/A ) Diagnosis:       Colon cancer screening      (Colon cancer screening [Z12.11])    Surgeon:  Melba Lopez MD Provider:  Ezequiel Currie CRNA    Anesthesia Type:  MAC ASA Status:  3          Anesthesia Type: MAC  Last vitals  BP   131/76 (09/13/19 0841)   Temp   97.2 °F (36.2 °C) (09/13/19 0815)   Pulse   61 (09/13/19 0841)   Resp   16 (09/13/19 0841)     SpO2   97 % (09/13/19 0841)     Post Anesthesia Care and Evaluation    Patient location during evaluation: bedside  Patient participation: complete - patient participated  Level of consciousness: awake and alert  Pain management: satisfactory to patient  Airway patency: patent  Anesthetic complications: No anesthetic complications  PONV Status: none  Cardiovascular status: acceptable and hemodynamically stable  Respiratory status: acceptable  Hydration status: acceptable

## 2019-09-13 NOTE — ANESTHESIA PREPROCEDURE EVALUATION
Anesthesia Evaluation     Patient summary reviewed and Nursing notes reviewed   no history of anesthetic complications:  NPO Solid Status: > 8 hours  NPO Liquid Status: > 8 hours           Airway   Mallampati: I  TM distance: >3 FB  Neck ROM: full  no difficulty expected  Dental - normal exam   (+) poor dentition    Pulmonary - normal exam   (+) shortness of breath, sleep apnea,   Cardiovascular - normal exam  Exercise tolerance: good (4-7 METS)    (+) hypertension, past MI , CAD, hyperlipidemia,       Neuro/Psych  (+) headaches, numbness, psychiatric history Anxiety,     GI/Hepatic/Renal/Endo    (+) obesity,  GERD, PUD,  diabetes mellitus,     Musculoskeletal     Abdominal    Substance History - negative use     OB/GYN negative ob/gyn ROS         Other   (+) arthritis                   Anesthesia Plan    ASA 3     MAC     intravenous induction   Anesthetic plan, all risks, benefits, and alternatives have been provided, discussed and informed consent has been obtained with: patient.

## 2019-09-17 LAB
LAB AP CASE REPORT: NORMAL
PATH REPORT.FINAL DX SPEC: NORMAL

## 2019-10-04 ENCOUNTER — CLINICAL SUPPORT (OUTPATIENT)
Dept: INTERNAL MEDICINE | Facility: CLINIC | Age: 59
End: 2019-10-04

## 2019-10-04 DIAGNOSIS — Z23 NEED FOR INFLUENZA VACCINATION: Primary | ICD-10-CM

## 2019-10-04 PROCEDURE — 96372 THER/PROPH/DIAG INJ SC/IM: CPT | Performed by: FAMILY MEDICINE

## 2019-10-04 PROCEDURE — 90674 CCIIV4 VAC NO PRSV 0.5 ML IM: CPT | Performed by: FAMILY MEDICINE

## 2019-10-04 PROCEDURE — G0008 ADMIN INFLUENZA VIRUS VAC: HCPCS | Performed by: FAMILY MEDICINE

## 2019-10-04 RX ADMIN — CYANOCOBALAMIN 1000 MCG: 1000 INJECTION, SOLUTION INTRAMUSCULAR; SUBCUTANEOUS at 10:19

## 2019-11-04 ENCOUNTER — CLINICAL SUPPORT (OUTPATIENT)
Dept: INTERNAL MEDICINE | Facility: CLINIC | Age: 59
End: 2019-11-04

## 2019-11-04 DIAGNOSIS — E53.8 VITAMIN B12 DEFICIENCY: ICD-10-CM

## 2019-11-04 PROCEDURE — 96372 THER/PROPH/DIAG INJ SC/IM: CPT | Performed by: FAMILY MEDICINE

## 2019-11-04 RX ADMIN — CYANOCOBALAMIN 1000 MCG: 1000 INJECTION, SOLUTION INTRAMUSCULAR; SUBCUTANEOUS at 08:47

## 2019-12-05 ENCOUNTER — CLINICAL SUPPORT (OUTPATIENT)
Dept: INTERNAL MEDICINE | Facility: CLINIC | Age: 59
End: 2019-12-05

## 2019-12-05 DIAGNOSIS — E53.8 VITAMIN B12 DEFICIENCY: ICD-10-CM

## 2019-12-05 PROCEDURE — 96372 THER/PROPH/DIAG INJ SC/IM: CPT | Performed by: FAMILY MEDICINE

## 2019-12-05 RX ADMIN — CYANOCOBALAMIN 1000 MCG: 1000 INJECTION, SOLUTION INTRAMUSCULAR; SUBCUTANEOUS at 08:48

## 2020-01-03 ENCOUNTER — CLINICAL SUPPORT (OUTPATIENT)
Dept: INTERNAL MEDICINE | Facility: CLINIC | Age: 60
End: 2020-01-03

## 2020-01-03 DIAGNOSIS — E53.8 VITAMIN B12 DEFICIENCY: ICD-10-CM

## 2020-01-03 PROCEDURE — 96372 THER/PROPH/DIAG INJ SC/IM: CPT | Performed by: FAMILY MEDICINE

## 2020-01-03 RX ADMIN — CYANOCOBALAMIN 1000 MCG: 1000 INJECTION, SOLUTION INTRAMUSCULAR; SUBCUTANEOUS at 11:08

## 2020-02-05 ENCOUNTER — CLINICAL SUPPORT (OUTPATIENT)
Dept: INTERNAL MEDICINE | Facility: CLINIC | Age: 60
End: 2020-02-05

## 2020-02-05 DIAGNOSIS — E53.8 VITAMIN B12 DEFICIENCY: ICD-10-CM

## 2020-02-05 PROCEDURE — 96372 THER/PROPH/DIAG INJ SC/IM: CPT | Performed by: FAMILY MEDICINE

## 2020-02-05 RX ADMIN — CYANOCOBALAMIN 1000 MCG: 1000 INJECTION, SOLUTION INTRAMUSCULAR; SUBCUTANEOUS at 11:09

## 2020-03-03 ENCOUNTER — CLINICAL SUPPORT (OUTPATIENT)
Dept: INTERNAL MEDICINE | Facility: CLINIC | Age: 60
End: 2020-03-03

## 2020-03-03 DIAGNOSIS — E53.8 VITAMIN B12 DEFICIENCY: ICD-10-CM

## 2020-03-03 PROCEDURE — 96372 THER/PROPH/DIAG INJ SC/IM: CPT | Performed by: FAMILY MEDICINE

## 2020-03-03 RX ADMIN — CYANOCOBALAMIN 1000 MCG: 1000 INJECTION, SOLUTION INTRAMUSCULAR; SUBCUTANEOUS at 11:53

## 2020-04-03 ENCOUNTER — CLINICAL SUPPORT (OUTPATIENT)
Dept: INTERNAL MEDICINE | Facility: CLINIC | Age: 60
End: 2020-04-03

## 2020-04-03 DIAGNOSIS — E53.8 VITAMIN B12 DEFICIENCY: ICD-10-CM

## 2020-04-03 PROCEDURE — 96372 THER/PROPH/DIAG INJ SC/IM: CPT | Performed by: FAMILY MEDICINE

## 2020-04-03 RX ADMIN — CYANOCOBALAMIN 1000 MCG: 1000 INJECTION, SOLUTION INTRAMUSCULAR; SUBCUTANEOUS at 10:46

## 2020-04-15 DIAGNOSIS — E53.8 VITAMIN B12 DEFICIENCY: Primary | ICD-10-CM

## 2020-06-05 DIAGNOSIS — I25.10 CORONARY ARTERY DISEASE INVOLVING NATIVE CORONARY ARTERY OF NATIVE HEART WITHOUT ANGINA PECTORIS: ICD-10-CM

## 2020-06-05 DIAGNOSIS — K21.9 GASTROESOPHAGEAL REFLUX DISEASE WITHOUT ESOPHAGITIS: ICD-10-CM

## 2020-06-05 DIAGNOSIS — I10 ESSENTIAL HYPERTENSION: Chronic | ICD-10-CM

## 2020-06-05 DIAGNOSIS — F41.9 ANXIETY: ICD-10-CM

## 2020-06-05 DIAGNOSIS — N40.1 BENIGN NON-NODULAR PROSTATIC HYPERPLASIA WITH LOWER URINARY TRACT SYMPTOMS: ICD-10-CM

## 2020-06-05 DIAGNOSIS — E78.00 HYPERCHOLESTEREMIA: ICD-10-CM

## 2020-06-05 RX ORDER — PRAVASTATIN SODIUM 40 MG
40 TABLET ORAL DAILY
Qty: 90 TABLET | Refills: 0 | Status: SHIPPED | OUTPATIENT
Start: 2020-06-05 | End: 2020-08-10 | Stop reason: SDUPTHER

## 2020-06-05 RX ORDER — CARVEDILOL 12.5 MG/1
12.5 TABLET ORAL 2 TIMES DAILY WITH MEALS
Qty: 180 TABLET | Refills: 0 | Status: SHIPPED | OUTPATIENT
Start: 2020-06-05 | End: 2020-08-10 | Stop reason: SDUPTHER

## 2020-06-05 RX ORDER — BUSPIRONE HYDROCHLORIDE 15 MG/1
15 TABLET ORAL 2 TIMES DAILY PRN
Qty: 180 TABLET | Refills: 0 | Status: SHIPPED | OUTPATIENT
Start: 2020-06-05 | End: 2020-08-10 | Stop reason: SDUPTHER

## 2020-06-05 RX ORDER — AMLODIPINE BESYLATE 2.5 MG/1
2.5 TABLET ORAL DAILY
Qty: 90 TABLET | Refills: 0 | Status: SHIPPED | OUTPATIENT
Start: 2020-06-05 | End: 2020-08-10 | Stop reason: SDUPTHER

## 2020-06-05 RX ORDER — TAMSULOSIN HYDROCHLORIDE 0.4 MG/1
1 CAPSULE ORAL DAILY
Qty: 90 CAPSULE | Refills: 0 | Status: SHIPPED | OUTPATIENT
Start: 2020-06-05 | End: 2020-08-10 | Stop reason: SDUPTHER

## 2020-06-05 RX ORDER — PANTOPRAZOLE SODIUM 40 MG/1
40 TABLET, DELAYED RELEASE ORAL DAILY
Qty: 90 TABLET | Refills: 0 | Status: SHIPPED | OUTPATIENT
Start: 2020-06-05 | End: 2020-06-15 | Stop reason: SDUPTHER

## 2020-06-05 RX ORDER — TAMSULOSIN HYDROCHLORIDE 0.4 MG/1
1 CAPSULE ORAL DAILY
Qty: 90 CAPSULE | Refills: 0 | Status: SHIPPED | OUTPATIENT
Start: 2020-06-05 | End: 2020-06-05

## 2020-06-05 RX ORDER — HYDROCHLOROTHIAZIDE 12.5 MG/1
12.5 CAPSULE, GELATIN COATED ORAL EVERY MORNING
Qty: 90 CAPSULE | Refills: 0 | Status: SHIPPED | OUTPATIENT
Start: 2020-06-05 | End: 2020-08-10 | Stop reason: SDUPTHER

## 2020-06-05 RX ORDER — PRAVASTATIN SODIUM 40 MG
40 TABLET ORAL DAILY
Qty: 90 TABLET | Refills: 0 | Status: SHIPPED | OUTPATIENT
Start: 2020-06-05 | End: 2020-06-05

## 2020-06-15 DIAGNOSIS — K21.9 GASTROESOPHAGEAL REFLUX DISEASE WITHOUT ESOPHAGITIS: ICD-10-CM

## 2020-06-15 RX ORDER — PANTOPRAZOLE SODIUM 40 MG/1
40 TABLET, DELAYED RELEASE ORAL DAILY
Qty: 30 TABLET | Refills: 0 | Status: SHIPPED | OUTPATIENT
Start: 2020-06-15 | End: 2020-08-10 | Stop reason: SDUPTHER

## 2020-08-10 ENCOUNTER — OFFICE VISIT (OUTPATIENT)
Dept: INTERNAL MEDICINE | Facility: CLINIC | Age: 60
End: 2020-08-10

## 2020-08-10 VITALS
HEART RATE: 62 BPM | DIASTOLIC BLOOD PRESSURE: 80 MMHG | BODY MASS INDEX: 37.59 KG/M2 | HEIGHT: 67 IN | RESPIRATION RATE: 18 BRPM | OXYGEN SATURATION: 97 % | WEIGHT: 239.5 LBS | TEMPERATURE: 98.2 F | SYSTOLIC BLOOD PRESSURE: 135 MMHG

## 2020-08-10 DIAGNOSIS — N40.1 BENIGN NON-NODULAR PROSTATIC HYPERPLASIA WITH LOWER URINARY TRACT SYMPTOMS: ICD-10-CM

## 2020-08-10 DIAGNOSIS — E78.00 HYPERCHOLESTEREMIA: ICD-10-CM

## 2020-08-10 DIAGNOSIS — G50.0 TRIGEMINAL NEURALGIA: ICD-10-CM

## 2020-08-10 DIAGNOSIS — G11.8 SPINOCEREBELLAR ATAXIA (HCC): ICD-10-CM

## 2020-08-10 DIAGNOSIS — E53.8 VITAMIN B12 DEFICIENCY: ICD-10-CM

## 2020-08-10 DIAGNOSIS — E66.01 CLASS 2 SEVERE OBESITY DUE TO EXCESS CALORIES WITH SERIOUS COMORBIDITY AND BODY MASS INDEX (BMI) OF 37.0 TO 37.9 IN ADULT (HCC): ICD-10-CM

## 2020-08-10 DIAGNOSIS — I25.10 CORONARY ARTERY DISEASE INVOLVING NATIVE CORONARY ARTERY OF NATIVE HEART WITHOUT ANGINA PECTORIS: ICD-10-CM

## 2020-08-10 DIAGNOSIS — I10 ESSENTIAL HYPERTENSION: Chronic | ICD-10-CM

## 2020-08-10 DIAGNOSIS — Z51.81 MEDICATION MONITORING ENCOUNTER: ICD-10-CM

## 2020-08-10 DIAGNOSIS — E55.9 VITAMIN D DEFICIENCY: ICD-10-CM

## 2020-08-10 DIAGNOSIS — Z12.5 PROSTATE CANCER SCREENING: ICD-10-CM

## 2020-08-10 DIAGNOSIS — M06.9 RHEUMATOID ARTHRITIS INVOLVING MULTIPLE SITES, UNSPECIFIED RHEUMATOID FACTOR PRESENCE: ICD-10-CM

## 2020-08-10 DIAGNOSIS — K21.9 GASTROESOPHAGEAL REFLUX DISEASE WITHOUT ESOPHAGITIS: ICD-10-CM

## 2020-08-10 DIAGNOSIS — Z00.00 MEDICARE ANNUAL WELLNESS VISIT, SUBSEQUENT: Primary | ICD-10-CM

## 2020-08-10 DIAGNOSIS — R41.3 MEMORY CHANGE: ICD-10-CM

## 2020-08-10 DIAGNOSIS — F41.9 ANXIETY: ICD-10-CM

## 2020-08-10 DIAGNOSIS — R21 RASH OF GROIN: ICD-10-CM

## 2020-08-10 PROBLEM — Z12.11 COLON CANCER SCREENING: Status: RESOLVED | Noted: 2019-09-12 | Resolved: 2020-08-10

## 2020-08-10 PROBLEM — E66.812 CLASS 2 SEVERE OBESITY DUE TO EXCESS CALORIES WITH SERIOUS COMORBIDITY IN ADULT: Status: ACTIVE | Noted: 2020-08-10

## 2020-08-10 PROCEDURE — 96160 PT-FOCUSED HLTH RISK ASSMT: CPT | Performed by: FAMILY MEDICINE

## 2020-08-10 PROCEDURE — 99396 PREV VISIT EST AGE 40-64: CPT | Performed by: FAMILY MEDICINE

## 2020-08-10 PROCEDURE — 96372 THER/PROPH/DIAG INJ SC/IM: CPT | Performed by: FAMILY MEDICINE

## 2020-08-10 PROCEDURE — G0439 PPPS, SUBSEQ VISIT: HCPCS | Performed by: FAMILY MEDICINE

## 2020-08-10 RX ORDER — TAMSULOSIN HYDROCHLORIDE 0.4 MG/1
1 CAPSULE ORAL NIGHTLY
Qty: 90 CAPSULE | Refills: 3 | Status: SHIPPED | OUTPATIENT
Start: 2020-08-10 | End: 2021-08-16 | Stop reason: SDUPTHER

## 2020-08-10 RX ORDER — BUSPIRONE HYDROCHLORIDE 15 MG/1
15 TABLET ORAL 2 TIMES DAILY PRN
Qty: 180 TABLET | Refills: 3 | Status: SHIPPED | OUTPATIENT
Start: 2020-08-10 | End: 2021-08-16 | Stop reason: SDUPTHER

## 2020-08-10 RX ORDER — PRAVASTATIN SODIUM 40 MG
40 TABLET ORAL NIGHTLY
Qty: 90 TABLET | Refills: 3 | Status: SHIPPED | OUTPATIENT
Start: 2020-08-10 | End: 2021-07-13

## 2020-08-10 RX ORDER — CARVEDILOL 12.5 MG/1
12.5 TABLET ORAL 2 TIMES DAILY WITH MEALS
Qty: 180 TABLET | Refills: 3 | Status: SHIPPED | OUTPATIENT
Start: 2020-08-10 | End: 2021-08-16 | Stop reason: SDUPTHER

## 2020-08-10 RX ORDER — HYDROCHLOROTHIAZIDE 12.5 MG/1
12.5 CAPSULE, GELATIN COATED ORAL EVERY MORNING
Qty: 90 CAPSULE | Refills: 3 | Status: SHIPPED | OUTPATIENT
Start: 2020-08-10 | End: 2021-08-16 | Stop reason: SDUPTHER

## 2020-08-10 RX ORDER — AMLODIPINE BESYLATE 2.5 MG/1
2.5 TABLET ORAL DAILY
Qty: 90 TABLET | Refills: 3 | Status: SHIPPED | OUTPATIENT
Start: 2020-08-10 | End: 2021-08-16 | Stop reason: SDUPTHER

## 2020-08-10 RX ORDER — CLOTRIMAZOLE 1 %
CREAM (GRAM) TOPICAL 2 TIMES DAILY
Qty: 180 G | Refills: 3 | Status: SHIPPED | OUTPATIENT
Start: 2020-08-10 | End: 2022-02-11

## 2020-08-10 RX ORDER — CYANOCOBALAMIN 1000 UG/ML
1000 INJECTION, SOLUTION INTRAMUSCULAR; SUBCUTANEOUS
Status: SHIPPED | OUTPATIENT
Start: 2020-08-10

## 2020-08-10 RX ORDER — PANTOPRAZOLE SODIUM 40 MG/1
40 TABLET, DELAYED RELEASE ORAL DAILY
Qty: 90 TABLET | Refills: 3 | Status: SHIPPED | OUTPATIENT
Start: 2020-08-10 | End: 2021-08-16 | Stop reason: SDUPTHER

## 2020-08-10 RX ADMIN — CYANOCOBALAMIN 1000 MCG: 1000 INJECTION, SOLUTION INTRAMUSCULAR; SUBCUTANEOUS at 14:04

## 2020-08-10 NOTE — PROGRESS NOTES
The ABCs of the Annual Wellness Visit  Subsequent Medicare Wellness Visit    Chief Complaint   Patient presents with   • Medicare Wellness-subsequent   • Recurrent Skin Infections     He is using cetaphil cream mixed with another cream right now but it is too greasy.        Subjective   History of Present Illness:  Chris Mejia is a 60 y.o. male who presents for a Subsequent Medicare Wellness Visit.    Itchy rash in groin. History per ma reviewed.    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 Needs new provider. Some problems with memory as well. Has trouble walking and has cane but does not use wife states. He also has DENZEL.      Coronary artery disease stable and followed by cardiology. Blood pressure stable on current medicines. On statin for hyperlipidemia.      Anxiety stable on Buspar.      GERD stable on PPI.      BPH with LOTS stable on Flomax.     Vitamin B12 deficiency, was on shots but changed to pills during pandemic.    HEALTH RISK ASSESSMENT    Recent Hospitalizations:  No hospitalization(s) within the last year.    Current Medical Providers:  Patient Care Team:  Naty Lebron MD as PCP - General (Family Medicine)  Adam Perkins MD as Consulting Physician (Cardiology)    Smoking Status:  Social History     Tobacco Use   Smoking Status Never Smoker   Smokeless Tobacco Never Used       Alcohol Consumption:  Social History     Substance and Sexual Activity   Alcohol Use No       Depression Screen:   PHQ-2/PHQ-9 Depression Screening 8/10/2020   Little interest or pleasure in doing things 0   Feeling down, depressed, or hopeless 0   Trouble falling or staying asleep, or sleeping too much -   Feeling tired or having little energy -   Poor appetite or overeating -   Feeling bad about yourself - or that you are a failure or have let yourself or your family down -   Trouble concentrating on things, such as reading the newspaper or watching television -   Moving or  speaking so slowly that other people could have noticed. Or the opposite - being so fidgety or restless that you have been moving around a lot more than usual -   Thoughts that you would be better off dead, or of hurting yourself in some way -   Total Score 0   If you checked off any problems, how difficult have these problems made it for you to do your work, take care of things at home, or get along with other people? -       Fall Risk Screen:  DEENA Fall Risk Assessment has not been completed.    Health Habits and Functional and Cognitive Screening:  Functional & Cognitive Status 8/10/2020   Do you have difficulty preparing food and eating? No   Do you have difficulty bathing yourself, getting dressed or grooming yourself? Yes   Do you have difficulty using the toilet? No   Do you have difficulty moving around from place to place? No   Do you have trouble with steps or getting out of a bed or a chair? No   Current Diet Well Balanced Diet   Dental Exam Not up to date   Eye Exam Not up to date   Exercise (times per week) 2 times per week   Current Exercise Activities Include Walking   Do you need help using the phone?  No   Are you deaf or do you have serious difficulty hearing?  Yes   Do you need help with transportation? Yes   Do you need help shopping? No   Do you need help preparing meals?  No   Do you need help with housework?  No   Do you need help with laundry? No   Do you need help taking your medications? No   Do you need help managing money? No   Do you ever drive or ride in a car without wearing a seat belt? No   Have you felt unusual stress, anger or loneliness in the last month? No   Who do you live with? Spouse   If you need help, do you have trouble finding someone available to you? No   Have you been bothered in the last four weeks by sexual problems? -   Do you have difficulty concentrating, remembering or making decisions? Yes         Does the patient have evidence of cognitive impairment?  Yes    Asprin use counseling:Taking ASA appropriately as indicated    Age-appropriate Screening Schedule:  Refer to the list below for future screening recommendations based on patient's age, sex and/or medical conditions. Orders for these recommended tests are listed in the plan section. The patient has been provided with a written plan.    Health Maintenance   Topic Date Due   • LIPID PANEL  06/24/2020   • INFLUENZA VACCINE  08/01/2020   • ZOSTER VACCINE (1 of 2) 08/19/2021 (Originally 1/25/2008)   • COLONOSCOPY  09/13/2022   • TDAP/TD VACCINES (3 - Td) 11/15/2022          The following portions of the patient's history were reviewed and updated as appropriate: allergies, current medications, past family history, past medical history, past social history, past surgical history and problem list.    Outpatient Medications Prior to Visit   Medication Sig Dispense Refill   • aspirin 81 MG tablet Take 1 tablet by mouth Daily. 30 tablet 11   • carBAMazepine (TEGretol) 200 MG tablet Take 1 tablet by mouth At Night As Needed (headache). 90 tablet 3   • cetirizine (zyrTEC) 10 MG tablet Take 10 mg by mouth Daily.     • nitroglycerin (NITROSTAT) 0.4 MG SL tablet Place 1 tablet under the tongue Every 5 (Five) Minutes As Needed for Chest Pain. Take no more than 3 doses in 15 minutes. 30 tablet 11   • amLODIPine (NORVASC) 2.5 MG tablet Take 1 tablet by mouth Daily. Pt needs an appt for more refills 90 tablet 0   • busPIRone (BUSPAR) 15 MG tablet Take 1 tablet by mouth 2 (Two) Times a Day As Needed (anxiety). Pt needs an appt for more refills 180 tablet 0   • carvedilol (COREG) 12.5 MG tablet Take 1 tablet by mouth 2 (Two) Times a Day With Meals. Pt needs an appt for more refills 180 tablet 0   • hydroCHLOROthiazide (MICROZIDE) 12.5 MG capsule Take 1 capsule by mouth Every Morning. Appointment needed for additional refills 90 capsule 0   • pantoprazole (PROTONIX) 40 MG EC tablet Take 1 tablet by mouth Daily. Pt needs an appt for  more refills 30 tablet 0   • pravastatin (PRAVACHOL) 40 MG tablet Take 1 tablet by mouth Daily. Appointment needed for additional refills 90 tablet 0   • tamsulosin (FLOMAX) 0.4 MG capsule 24 hr capsule Take 1 capsule by mouth Daily. Appointment needed for additional refills 90 capsule 0   • azelastine (ASTELIN) 0.1 % nasal spray 1 spray into the nostril(s) as directed by provider 2 (Two) Times a Day. Use in each nostril as directed 30 mL 5   • Cyanocobalamin 1000 MCG sublingual tablet Place 1 tablet under the tongue Daily. 90 each 4     No facility-administered medications prior to visit.        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CMS/HCC)   • RA (rheumatoid arthritis) (CMS/[facility])   • Insomnia   • Essential hypertension   • Hypercholesteremia   • Acid reflux   • Anxiety   • Vitamin B12 deficiency   • Vitamin D deficiency   • DENZEL (obstructive sleep apnea)   • Atopic rhinitis   • Ataxic gait   • Shoulder pain   • Ophthalmoplegia   • Coronary artery disease involving native coronary artery of native heart without angina pectoris   • Seasonal allergies   • Benign non-nodular prostatic hyperplasia with lower urinary tract symptoms   • Class 2 severe obesity due to excess calories with serious comorbidity in adult (CMS/[facility])       Advanced Care Planning:  ACP discussion was held with the patient during this visit. Patient does not have an advance directive, declines further assistance.    Review of Systems   Constitutional: Negative for fever.   Gastrointestinal: Negative for abdominal pain.   Musculoskeletal: Positive for arthralgias and gait problem.   Skin: Positive for rash.   All other systems reviewed and are negative.      Compared to one year ago, the patient feels his physical health is worse.  Compared to one year ago, the patient feels his mental health is the same.    Reviewed chart for potential of high risk medication in the  "elderly: yes  Reviewed chart for potential of harmful drug interactions in the elderly:yes    Objective         Vitals:    08/10/20 1324   BP: 135/80   Pulse: 62   Resp: 18   Temp: 98.2 °F (36.8 °C)   TempSrc: Temporal   SpO2: 97%   Weight: 109 kg (239 lb 8 oz)   Height: 170.2 cm (67.01\")   PainSc: 0-No pain       Body mass index is 37.5 kg/m².  Discussed the patient's BMI with him. The BMI is above average; BMI management plan is completed.    Physical Exam   Constitutional: He is oriented to person, place, and time. Vital signs are normal. He appears well-developed and well-nourished. He is active.  Non-toxic appearance. He does not have a sickly appearance. He does not appear ill. No distress. Face mask in place.   HENT:   Head: Normocephalic and atraumatic. 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   Right Ear: Hearing, tympanic membrane, external ear and ear canal normal.   Left Ear: Hearing, tympanic membrane, external ear and ear canal normal.   Eyes: Pupils are equal, round, and reactive to light. Conjunctivae, EOM and lids are normal. Right eye exhibits no discharge. Left eye exhibits no discharge. No scleral icterus.   Neck: Phonation normal. Neck supple.   Cardiovascular: Normal rate, regular rhythm and normal heart sounds.   Pulmonary/Chest: Effort normal and breath sounds normal. No stridor.   Abdominal: Soft. Bowel sounds are normal. He exhibits no distension. There is no tenderness.   Musculoskeletal: He exhibits no edema or deformity.   Neurological: He is alert and oriented to person, place, and time. He displays no tremor. He exhibits normal muscle tone. He displays no seizure activity. Gait abnormal.   Skin: Skin is warm. Capillary refill takes less than 2 seconds. No rash noted. He is not diaphoretic. No cyanosis. Nails show no clubbing.   Psychiatric: He has a normal mood and affect. His speech is normal and behavior is normal. Judgment and thought content normal.   Nursing note and vitals " reviewed.            Assessment/Plan   Medicare Risks and Personalized Health Plan  CMS Preventative Services Quick Reference  Advance Directive Discussion  Dementia/Memory   Immunizations Discussed/Encouraged (specific immunizations; Influenza and Shingrix )  Obesity/Overweight     The above risks/problems have been discussed with the patient.  Pertinent information has been shared with the patient in the After Visit Summary.  Follow up plans and orders are seen below in the Assessment/Plan Section.    Diagnoses and all orders for this visit:    1. Medicare annual wellness visit, subsequent (Primary)    2. 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 Rheumatoid arthritis involving multiple sites, unspecified rheumatoid factor presence (CMS/[facility])  Comments:  per chart review but not with rheumatology at this time, will reassess on labs  Orders:  -     Cyclic Citrul Peptide Antibody, IgG / IgA  -     Rheumatoid Factor, Quant    4. Class 2 severe obesity due to excess calories with serious comorbidity and body mass index (BMI) of 37.0 to 37.9 in adult (CMS/[facility])  Comments:  encouraged walking and using cane for safety    5. Hypercholesteremia  -     pravastatin (PRAVACHOL) 40 MG tablet; Take 1 tablet by mouth Every Night.  Dispense: 90 tablet; Refill: 3  -     Lipid Panel  -     Comprehensive Metabolic Panel    6. Essential hypertension  -     carvedilol (COREG) 12.5 MG tablet; Take 1 tablet by mouth 2 (Two) Times a Day With Meals.  Dispense: 180 tablet; Refill: 3  -     hydroCHLOROthiazide (MICROZIDE) 12.5 MG capsule; Take 1 capsule by mouth Every Morning.  Dispense: 90 capsule; Refill: 3  -     amLODIPine (NORVASC) 2.5 MG tablet; Take 1 tablet by mouth Daily.  Dispense: 90 tablet; Refill: 3  -     TSH+Free T4    7. Coronary artery disease involving native  coronary artery of native heart without angina pectoris  -     carvedilol (COREG) 12.5 MG tablet; Take 1 tablet by mouth 2 (Two) Times a Day With Meals.  Dispense: 180 tablet; Refill: 3  -     Lipid Panel  -     TSH+Free T4    8. Trigeminal neuralgia  -     Carbamazepine level, total    9. Benign non-nodular prostatic hyperplasia with lower urinary tract symptoms  -     tamsulosin (FLOMAX) 0.4 MG capsule 24 hr capsule; Take 1 capsule by mouth Every Night.  Dispense: 90 capsule; Refill: 3    10. Gastroesophageal reflux disease without esophagitis  -     pantoprazole (PROTONIX) 40 MG EC tablet; Take 1 tablet by mouth Daily.  Dispense: 90 tablet; Refill: 3  -     Vitamin B12    11. Anxiety  -     busPIRone (BUSPAR) 15 MG tablet; Take 1 tablet by mouth 2 (Two) Times a Day As Needed (anxiety).  Dispense: 180 tablet; Refill: 3    12. Vitamin B12 deficiency  -     cyanocobalamin injection 1,000 mcg  -     Vitamin B12  -     Folate  -     CBC (No Diff)    13. Vitamin D deficiency  -     Vitamin D 25 Hydroxy    14. Memory change  -     Ambulatory Referral to Neurology  -     Vitamin B12  -     TSH+Free T4    15. Rash of groin  -     clotrimazole (LOTRIMIN) 1 % cream; Apply  topically to the appropriate area as directed 2 (Two) Times a Day.  Dispense: 180 g; Refill: 3    16. Prostate cancer screening  -     PSA Screen    17. Medication monitoring encounter  -     Carbamazepine level, total    he'll return to clinic fasting for labs.    Follow Up:  Return in about 6 months (around 2/10/2021) for follow up.     An After Visit Summary and PPPS were given to the patient.

## 2020-08-10 NOTE — PATIENT INSTRUCTIONS
Medicare Wellness  Personal Prevention Plan of Service     Date of Office Visit:  08/10/2020  Encounter Provider:  Naty Lebron MD  Place of Service:  [facility]IMARY [facility]ARE  Patient Name: Chris Mejia  :  1960    As part of the Medicare Wellness portion of your visit today, we are providing you with this personalized preventive plan of services (PPPS). This plan is based upon recommendations of the [place] Preventive Services Task Force (USPSTF) and the Advisory Committee on Immunization Practices (ACIP).    This lists the preventive care services that should be considered, and provides dates of when you are due. Items listed as completed are up-to-date and do not require any further intervention.    Health Maintenance   Topic Date Due   • MEDICARE ANNUAL WELLNESS  2020   • LIPID PANEL  2020   • INFLUENZA VACCINE  2020   • ZOSTER VACCINE (1 of 2) 2021 (Originally 2008)   • COLONOSCOPY  2022   • TDAP/TD VACCINES (3 - Td) 11/15/2022   • HEPATITIS C SCREENING  Completed       Orders Placed This Encounter   Procedures   • PSA Screen   • Lipid Panel     Order Specific Question:   [facility] Has the patient fasted?     Answer:   Yes   • Vitamin B12   • Folate   • CBC (No Diff)   • Comprehensive Metabolic Panel   • TSH+Free T4   • Carbamazepine level, total   • Ambulatory Referral to Neurology     Referral Priority:   Routine     Referral Type:   Consultation     Referral Reason:   Specialty Services Required     Requested Specialty:   Neurology     Number of Visits Requested:   1       Return in about 6 months (around 2/10/2021) for follow up.

## 2020-08-21 DIAGNOSIS — N40.1 BENIGN NON-NODULAR PROSTATIC HYPERPLASIA WITH LOWER URINARY TRACT SYMPTOMS: ICD-10-CM

## 2020-08-21 DIAGNOSIS — E78.00 HYPERCHOLESTEREMIA: ICD-10-CM

## 2020-08-21 DIAGNOSIS — I10 ESSENTIAL HYPERTENSION: Chronic | ICD-10-CM

## 2020-08-21 RX ORDER — HYDROCHLOROTHIAZIDE 12.5 MG/1
CAPSULE, GELATIN COATED ORAL
Qty: 90 CAPSULE | Refills: 3 | OUTPATIENT
Start: 2020-08-21

## 2020-08-21 RX ORDER — TAMSULOSIN HYDROCHLORIDE 0.4 MG/1
CAPSULE ORAL
Qty: 90 CAPSULE | Refills: 3 | OUTPATIENT
Start: 2020-08-21

## 2020-08-21 RX ORDER — AMLODIPINE BESYLATE 2.5 MG/1
TABLET ORAL
Qty: 90 TABLET | Refills: 3 | OUTPATIENT
Start: 2020-08-21

## 2020-08-21 RX ORDER — PRAVASTATIN SODIUM 40 MG
TABLET ORAL
Qty: 90 TABLET | Refills: 3 | OUTPATIENT
Start: 2020-08-21

## 2020-09-03 LAB
25(OH)D3+25(OH)D2 SERPL-MCNC: 29.8 NG/ML (ref 30–100)
ALBUMIN SERPL-MCNC: 4.5 G/DL (ref 3.5–5.2)
ALBUMIN/GLOB SERPL: 1.9 G/DL
ALP SERPL-CCNC: 33 U/L (ref 39–117)
ALT SERPL-CCNC: 31 U/L (ref 1–41)
AST SERPL-CCNC: 24 U/L (ref 1–40)
BILIRUB SERPL-MCNC: 0.6 MG/DL (ref 0–1.2)
BUN SERPL-MCNC: 22 MG/DL (ref 8–23)
BUN/CREAT SERPL: 22.9 (ref 7–25)
CALCIUM SERPL-MCNC: 9.2 MG/DL (ref 8.6–10.5)
CARBAMAZEPINE SERPL-MCNC: <2 MCG/ML (ref 4–12)
CHLORIDE SERPL-SCNC: 100 MMOL/L (ref 98–107)
CHOLEST SERPL-MCNC: 111 MG/DL (ref 0–200)
CO2 SERPL-SCNC: 24.7 MMOL/L (ref 22–29)
CREAT SERPL-MCNC: 0.96 MG/DL (ref 0.76–1.27)
ERYTHROCYTE [DISTWIDTH] IN BLOOD BY AUTOMATED COUNT: 12.9 % (ref 12.3–15.4)
FOLATE SERPL-MCNC: 10.9 NG/ML (ref 4.78–24.2)
GLOBULIN SER CALC-MCNC: 2.4 GM/DL
GLUCOSE SERPL-MCNC: 99 MG/DL (ref 65–99)
HCT VFR BLD AUTO: 48.5 % (ref 37.5–51)
HDLC SERPL-MCNC: 38 MG/DL (ref 40–60)
HGB BLD-MCNC: 17.2 G/DL (ref 13–17.7)
LDLC SERPL CALC-MCNC: 56 MG/DL (ref 0–100)
MCH RBC QN AUTO: 33.5 PG (ref 26.6–33)
MCHC RBC AUTO-ENTMCNC: 35.5 G/DL (ref 31.5–35.7)
MCV RBC AUTO: 94.5 FL (ref 79–97)
PLATELET # BLD AUTO: 176 10*3/MM3 (ref 140–450)
POTASSIUM SERPL-SCNC: 3.4 MMOL/L (ref 3.5–5.2)
PROT SERPL-MCNC: 6.9 G/DL (ref 6–8.5)
PSA SERPL-MCNC: 2.91 NG/ML (ref 0–4)
RBC # BLD AUTO: 5.13 10*6/MM3 (ref 4.14–5.8)
SODIUM SERPL-SCNC: 138 MMOL/L (ref 136–145)
T4 FREE SERPL-MCNC: 1.39 NG/DL (ref 0.93–1.7)
TRIGL SERPL-MCNC: 85 MG/DL (ref 0–150)
TSH SERPL DL<=0.005 MIU/L-ACNC: 2.71 UIU/ML (ref 0.27–4.2)
VIT B12 SERPL-MCNC: 488 PG/ML (ref 211–946)
VLDLC SERPL CALC-MCNC: 17 MG/DL
WBC # BLD AUTO: 6.27 10*3/MM3 (ref 3.4–10.8)

## 2020-09-04 LAB
CCP IGA+IGG SERPL IA-ACNC: 19 UNITS (ref 0–19)
RHEUMATOID FACT SERPL-ACNC: <10 IU/ML (ref 0–13.9)

## 2020-09-09 DIAGNOSIS — E55.9 VITAMIN D DEFICIENCY: Primary | ICD-10-CM

## 2020-09-09 RX ORDER — ACETAMINOPHEN 160 MG
2000 TABLET,DISINTEGRATING ORAL DAILY
Qty: 90 CAPSULE | Refills: 4 | Status: SHIPPED | OUTPATIENT
Start: 2020-09-09 | End: 2021-08-16 | Stop reason: SDUPTHER

## 2020-09-17 ENCOUNTER — CLINICAL SUPPORT (OUTPATIENT)
Dept: INTERNAL MEDICINE | Facility: CLINIC | Age: 60
End: 2020-09-17

## 2020-09-17 ENCOUNTER — FLU SHOT (OUTPATIENT)
Dept: INTERNAL MEDICINE | Facility: CLINIC | Age: 60
End: 2020-09-17

## 2020-09-17 DIAGNOSIS — E53.8 VITAMIN B12 DEFICIENCY: ICD-10-CM

## 2020-09-17 DIAGNOSIS — Z23 NEED FOR INFLUENZA VACCINATION: ICD-10-CM

## 2020-09-17 PROCEDURE — 90686 IIV4 VACC NO PRSV 0.5 ML IM: CPT | Performed by: FAMILY MEDICINE

## 2020-09-17 PROCEDURE — 96372 THER/PROPH/DIAG INJ SC/IM: CPT | Performed by: FAMILY MEDICINE

## 2020-09-17 PROCEDURE — G0008 ADMIN INFLUENZA VIRUS VAC: HCPCS | Performed by: FAMILY MEDICINE

## 2020-09-17 RX ADMIN — CYANOCOBALAMIN 1000 MCG: 1000 INJECTION, SOLUTION INTRAMUSCULAR; SUBCUTANEOUS at 08:43

## 2020-10-28 ENCOUNTER — LAB (OUTPATIENT)
Dept: LAB | Facility: HOSPITAL | Age: 60
End: 2020-10-28

## 2020-10-28 ENCOUNTER — OFFICE VISIT (OUTPATIENT)
Dept: NEUROLOGY | Age: 60
End: 2020-10-28

## 2020-10-28 VITALS
BODY MASS INDEX: 37.95 KG/M2 | WEIGHT: 241.8 LBS | DIASTOLIC BLOOD PRESSURE: 74 MMHG | OXYGEN SATURATION: 97 % | HEART RATE: 67 BPM | TEMPERATURE: 97.3 F | SYSTOLIC BLOOD PRESSURE: 122 MMHG | HEIGHT: 67 IN

## 2020-10-28 DIAGNOSIS — R53.1 WEAKNESS: ICD-10-CM

## 2020-10-28 DIAGNOSIS — G44.209 TENSION HEADACHE: ICD-10-CM

## 2020-10-28 DIAGNOSIS — M54.2 NECK PAIN: ICD-10-CM

## 2020-10-28 DIAGNOSIS — E53.8 VITAMIN B12 DEFICIENCY: ICD-10-CM

## 2020-10-28 DIAGNOSIS — R26.0 ATAXIC GAIT: ICD-10-CM

## 2020-10-28 DIAGNOSIS — M06.9 RHEUMATOID ARTHRITIS INVOLVING MULTIPLE SITES, UNSPECIFIED WHETHER RHEUMATOID FACTOR PRESENT (HCC): ICD-10-CM

## 2020-10-28 DIAGNOSIS — G11.8 SPINOCEREBELLAR ATAXIA (HCC): Primary | Chronic | ICD-10-CM

## 2020-10-28 LAB
ALBUMIN SERPL-MCNC: 4.5 G/DL (ref 3.5–5.2)
ALBUMIN/GLOB SERPL: 1.3 G/DL
ALP SERPL-CCNC: 45 U/L (ref 39–117)
ALT SERPL W P-5'-P-CCNC: 35 U/L (ref 1–41)
ANION GAP SERPL CALCULATED.3IONS-SCNC: 9.3 MMOL/L (ref 5–15)
AST SERPL-CCNC: 29 U/L (ref 1–40)
BASOPHILS # BLD AUTO: 0.09 10*3/MM3 (ref 0–0.2)
BASOPHILS NFR BLD AUTO: 0.9 % (ref 0–1.5)
BILIRUB SERPL-MCNC: 0.6 MG/DL (ref 0–1.2)
BUN SERPL-MCNC: 12 MG/DL (ref 8–23)
BUN/CREAT SERPL: 13.2 (ref 7–25)
CALCIUM SPEC-SCNC: 9.6 MG/DL (ref 8.6–10.5)
CARBAMAZEPINE SERPL-MCNC: <2 MCG/ML (ref 4–12)
CHLORIDE SERPL-SCNC: 99 MMOL/L (ref 98–107)
CO2 SERPL-SCNC: 29.7 MMOL/L (ref 22–29)
CREAT SERPL-MCNC: 0.91 MG/DL (ref 0.76–1.27)
CRP SERPL-MCNC: 0.06 MG/DL (ref 0–0.5)
DEPRECATED RDW RBC AUTO: 43.5 FL (ref 37–54)
EOSINOPHIL # BLD AUTO: 0.75 10*3/MM3 (ref 0–0.4)
EOSINOPHIL NFR BLD AUTO: 7.6 % (ref 0.3–6.2)
ERYTHROCYTE [DISTWIDTH] IN BLOOD BY AUTOMATED COUNT: 12.6 % (ref 12.3–15.4)
FOLATE SERPL-MCNC: 10.8 NG/ML (ref 4.78–24.2)
GFR SERPL CREATININE-BSD FRML MDRD: 84 ML/MIN/1.73
GLOBULIN UR ELPH-MCNC: 3.5 GM/DL
GLUCOSE SERPL-MCNC: 102 MG/DL (ref 65–99)
HCT VFR BLD AUTO: 50.4 % (ref 37.5–51)
HGB BLD-MCNC: 18 G/DL (ref 13–17.7)
IMM GRANULOCYTES # BLD AUTO: 0.03 10*3/MM3 (ref 0–0.05)
IMM GRANULOCYTES NFR BLD AUTO: 0.3 % (ref 0–0.5)
LYMPHOCYTES # BLD AUTO: 2.12 10*3/MM3 (ref 0.7–3.1)
LYMPHOCYTES NFR BLD AUTO: 21.4 % (ref 19.6–45.3)
MCH RBC QN AUTO: 33.6 PG (ref 26.6–33)
MCHC RBC AUTO-ENTMCNC: 35.7 G/DL (ref 31.5–35.7)
MCV RBC AUTO: 94.2 FL (ref 79–97)
MONOCYTES # BLD AUTO: 0.86 10*3/MM3 (ref 0.1–0.9)
MONOCYTES NFR BLD AUTO: 8.7 % (ref 5–12)
NEUTROPHILS NFR BLD AUTO: 6.04 10*3/MM3 (ref 1.7–7)
NEUTROPHILS NFR BLD AUTO: 61.1 % (ref 42.7–76)
NRBC BLD AUTO-RTO: 0 /100 WBC (ref 0–0.2)
PLATELET # BLD AUTO: 192 10*3/MM3 (ref 140–450)
PMV BLD AUTO: 10.3 FL (ref 6–12)
POTASSIUM SERPL-SCNC: 4.4 MMOL/L (ref 3.5–5.2)
PROT SERPL-MCNC: 8 G/DL (ref 6–8.5)
RBC # BLD AUTO: 5.35 10*6/MM3 (ref 4.14–5.8)
SODIUM SERPL-SCNC: 138 MMOL/L (ref 136–145)
T4 FREE SERPL-MCNC: 1.23 NG/DL (ref 0.93–1.7)
TSH SERPL DL<=0.05 MIU/L-ACNC: 3.24 UIU/ML (ref 0.27–4.2)
VIT B12 BLD-MCNC: 542 PG/ML (ref 211–946)
WBC # BLD AUTO: 9.89 10*3/MM3 (ref 3.4–10.8)

## 2020-10-28 PROCEDURE — 85025 COMPLETE CBC W/AUTO DIFF WBC: CPT | Performed by: PSYCHIATRY & NEUROLOGY

## 2020-10-28 PROCEDURE — 84439 ASSAY OF FREE THYROXINE: CPT | Performed by: PSYCHIATRY & NEUROLOGY

## 2020-10-28 PROCEDURE — 86140 C-REACTIVE PROTEIN: CPT | Performed by: PSYCHIATRY & NEUROLOGY

## 2020-10-28 PROCEDURE — 82746 ASSAY OF FOLIC ACID SERUM: CPT | Performed by: PSYCHIATRY & NEUROLOGY

## 2020-10-28 PROCEDURE — 83921 ORGANIC ACID SINGLE QUANT: CPT | Performed by: PSYCHIATRY & NEUROLOGY

## 2020-10-28 PROCEDURE — 82607 VITAMIN B-12: CPT | Performed by: PSYCHIATRY & NEUROLOGY

## 2020-10-28 PROCEDURE — 80156 ASSAY CARBAMAZEPINE TOTAL: CPT | Performed by: PSYCHIATRY & NEUROLOGY

## 2020-10-28 PROCEDURE — 99204 OFFICE O/P NEW MOD 45 MIN: CPT | Performed by: PSYCHIATRY & NEUROLOGY

## 2020-10-28 PROCEDURE — 80053 COMPREHEN METABOLIC PANEL: CPT | Performed by: PSYCHIATRY & NEUROLOGY

## 2020-10-28 PROCEDURE — 84443 ASSAY THYROID STIM HORMONE: CPT | Performed by: PSYCHIATRY & NEUROLOGY

## 2020-10-28 PROCEDURE — 36415 COLL VENOUS BLD VENIPUNCTURE: CPT | Performed by: PSYCHIATRY & NEUROLOGY

## 2020-10-28 RX ORDER — MELOXICAM 15 MG/1
15 TABLET ORAL DAILY
Qty: 90 TABLET | Refills: 3 | Status: SHIPPED | OUTPATIENT
Start: 2020-10-28 | End: 2021-08-16 | Stop reason: SDUPTHER

## 2020-10-28 RX ORDER — TIZANIDINE 4 MG/1
4 TABLET ORAL NIGHTLY
Qty: 90 TABLET | Refills: 3 | Status: SHIPPED | OUTPATIENT
Start: 2020-10-28 | End: 2021-08-16 | Stop reason: SDUPTHER

## 2020-11-01 LAB
Lab: NORMAL
METHYLMALONATE SERPL-SCNC: 153 NMOL/L (ref 0–378)

## 2020-11-03 ENCOUNTER — CLINICAL SUPPORT (OUTPATIENT)
Dept: INTERNAL MEDICINE | Facility: CLINIC | Age: 60
End: 2020-11-03

## 2020-11-03 DIAGNOSIS — E53.8 VITAMIN B12 DEFICIENCY: Primary | ICD-10-CM

## 2020-11-03 PROCEDURE — 96372 THER/PROPH/DIAG INJ SC/IM: CPT | Performed by: FAMILY MEDICINE

## 2020-11-03 RX ADMIN — CYANOCOBALAMIN 1000 MCG: 1000 INJECTION, SOLUTION INTRAMUSCULAR; SUBCUTANEOUS at 10:26

## 2020-12-03 ENCOUNTER — CLINICAL SUPPORT (OUTPATIENT)
Dept: INTERNAL MEDICINE | Facility: CLINIC | Age: 60
End: 2020-12-03

## 2020-12-03 DIAGNOSIS — E53.8 VITAMIN B12 DEFICIENCY: Primary | ICD-10-CM

## 2020-12-03 PROCEDURE — 96372 THER/PROPH/DIAG INJ SC/IM: CPT | Performed by: FAMILY MEDICINE

## 2020-12-03 RX ADMIN — CYANOCOBALAMIN 1000 MCG: 1000 INJECTION, SOLUTION INTRAMUSCULAR; SUBCUTANEOUS at 13:50

## 2021-01-06 ENCOUNTER — CLINICAL SUPPORT (OUTPATIENT)
Dept: INTERNAL MEDICINE | Facility: CLINIC | Age: 61
End: 2021-01-06

## 2021-01-06 DIAGNOSIS — E53.8 VITAMIN B12 DEFICIENCY: ICD-10-CM

## 2021-01-06 PROCEDURE — 96372 THER/PROPH/DIAG INJ SC/IM: CPT | Performed by: FAMILY MEDICINE

## 2021-01-06 RX ADMIN — CYANOCOBALAMIN 1000 MCG: 1000 INJECTION, SOLUTION INTRAMUSCULAR; SUBCUTANEOUS at 09:04

## 2021-02-02 ENCOUNTER — CLINICAL SUPPORT (OUTPATIENT)
Dept: INTERNAL MEDICINE | Facility: CLINIC | Age: 61
End: 2021-02-02

## 2021-02-02 DIAGNOSIS — E53.8 VITAMIN B12 DEFICIENCY: ICD-10-CM

## 2021-02-02 PROCEDURE — 96372 THER/PROPH/DIAG INJ SC/IM: CPT | Performed by: FAMILY MEDICINE

## 2021-02-02 RX ADMIN — CYANOCOBALAMIN 1000 MCG: 1000 INJECTION, SOLUTION INTRAMUSCULAR; SUBCUTANEOUS at 09:02

## 2021-02-10 ENCOUNTER — OFFICE VISIT (OUTPATIENT)
Dept: INTERNAL MEDICINE | Facility: CLINIC | Age: 61
End: 2021-02-10

## 2021-02-10 VITALS
BODY MASS INDEX: 37.83 KG/M2 | HEART RATE: 80 BPM | SYSTOLIC BLOOD PRESSURE: 125 MMHG | DIASTOLIC BLOOD PRESSURE: 80 MMHG | HEIGHT: 67 IN | OXYGEN SATURATION: 94 % | RESPIRATION RATE: 18 BRPM | TEMPERATURE: 97.8 F | WEIGHT: 241 LBS

## 2021-02-10 DIAGNOSIS — M21.962 DEFORMITY OF BOTH FEET: ICD-10-CM

## 2021-02-10 DIAGNOSIS — F41.9 ANXIETY: ICD-10-CM

## 2021-02-10 DIAGNOSIS — E53.8 VITAMIN B12 DEFICIENCY: ICD-10-CM

## 2021-02-10 DIAGNOSIS — R73.9 HYPERGLYCEMIA: ICD-10-CM

## 2021-02-10 DIAGNOSIS — E78.00 HYPERCHOLESTEREMIA: ICD-10-CM

## 2021-02-10 DIAGNOSIS — M21.961 DEFORMITY OF BOTH FEET: ICD-10-CM

## 2021-02-10 DIAGNOSIS — B35.6 TINEA CRURIS: ICD-10-CM

## 2021-02-10 DIAGNOSIS — I10 ESSENTIAL HYPERTENSION: Chronic | ICD-10-CM

## 2021-02-10 DIAGNOSIS — J30.2 SEASONAL ALLERGIES: ICD-10-CM

## 2021-02-10 DIAGNOSIS — E55.9 VITAMIN D DEFICIENCY: ICD-10-CM

## 2021-02-10 DIAGNOSIS — F51.01 PRIMARY INSOMNIA: ICD-10-CM

## 2021-02-10 DIAGNOSIS — G11.8 SPINOCEREBELLAR ATAXIA (HCC): Primary | Chronic | ICD-10-CM

## 2021-02-10 DIAGNOSIS — G50.0 TRIGEMINAL NEURALGIA: ICD-10-CM

## 2021-02-10 PROCEDURE — 99214 OFFICE O/P EST MOD 30 MIN: CPT | Performed by: FAMILY MEDICINE

## 2021-02-10 RX ORDER — CICLOPIROX OLAMINE 7.7 MG/100ML
SUSPENSION TOPICAL EVERY 12 HOURS SCHEDULED
Qty: 60 ML | Refills: 3 | Status: SHIPPED | OUTPATIENT
Start: 2021-02-10 | End: 2022-02-11

## 2021-02-10 RX ORDER — AZELASTINE 1 MG/ML
1 SPRAY, METERED NASAL 2 TIMES DAILY
Qty: 90 ML | Refills: 3 | Status: SHIPPED | OUTPATIENT
Start: 2021-02-10 | End: 2022-02-11

## 2021-02-10 RX ORDER — LEVOCETIRIZINE DIHYDROCHLORIDE 5 MG/1
5 TABLET, FILM COATED ORAL EVERY EVENING
Qty: 90 TABLET | Refills: 3 | Status: SHIPPED | OUTPATIENT
Start: 2021-02-10 | End: 2021-06-04 | Stop reason: SDUPTHER

## 2021-02-10 RX ORDER — CARBAMAZEPINE 200 MG/1
200 TABLET ORAL NIGHTLY PRN
Qty: 90 TABLET | Refills: 3 | Status: SHIPPED | OUTPATIENT
Start: 2021-02-10 | End: 2022-03-10 | Stop reason: SDUPTHER

## 2021-02-10 NOTE — PATIENT INSTRUCTIONS
Jock Itch  Jock itch (tinea cruris) is an infection of the skin in the groin area. It is caused by a fungus, which is a type of germ that lives in dark, damp places. Jock itch causes an itchy rash in the groin and upper thigh area. It usually goes away in 2-3 weeks with treatment.  What are the causes?  The fungus that causes jock itch may be spread by:  · Touching a fungus infection elsewhere on your body, such as athlete's foot, and then touching your groin area.  · Sharing towels or clothing, such as socks or shoes, with someone who has a fungal infection.  What increases the risk?  Jock itch is most common in men and adolescent boys. You are also more likely to develop the condition if you:  · Are in a hot, humid climate.  · Wear tight-fitting clothing or wet bathing suits for long periods of time.  · Play sports.  · Are overweight.  · Have diabetes.  · Have a weakened immune system.  · Sweat a lot.  What are the signs or symptoms?  Symptoms of jock itch may include:  · A red, pink, or brown rash in the groin area. Blisters may be present. The rash may spread to the thighs, anus, and buttocks.  · Dry and scaly skin on or around the rash.  · Itchiness.  How is this diagnosed?  In most cases, your health care provider can make the diagnosis by looking at your rash. In some cases, a sample of infected skin may be scraped off. This sample may be examined under a microscope (biopsy) or by trying to grow the fungus from the sample (culture).  How is this treated?  Treatment for this condition may include:  · Antifungal medicine to kill the fungus. This may be a skin cream, ointment, or powder, or it may be a medicine that you take by mouth.  · Skin cream or ointment to reduce itching.  · Lifestyle changes, such as wearing looser clothing and caring for your skin.  Follow these instructions at home:  Skin care  · Apply skin creams, ointments, or powders exactly as told by your health care provider.  · Wear  loose-fitting clothing that does not rub against your groin area. Men should wear boxer shorts or loose-fitting underwear.  · Keep your groin area clean and dry.  ? Change your underwear every day.  ? Change out of wet bathing suits as soon as possible.  ? After bathing, use a separate towel to dry your groin area thoroughly and gently. Using a separate towel will help prevent spreading the infection to other areas of your body.  · Avoid hot baths and showers. Hot water can make itching worse.  · Do not scratch the affected area.  General instructions  · Take and apply over-the-counter and prescription medicines only as told by your health care provider.  · Do not share towels, clothing, or personal items with other people.  · Wash your hands often with soap and water, especially after touching your groin area. If soap and water are not available, use alcohol-based hand .  Contact a health care provider if:  · Your rash:  ? Gets worse or does not get better after 2 weeks of treatment.  ? Spreads.  ? Returns after treatment is finished.  · You have any of the following:  ? A fever.  ? New or worsening redness, swelling, or pain around your rash.  ? Fluid, blood, or pus coming from your rash.  Summary  · Jock itch (tinea cruris) is a fungal infection of the skin in the groin area.  · The fungus can be spread by sharing clothing or by touching a fungus infection elsewhere on your body and then touching your groin area.  · Treatment may include antifungal medicine and lifestyle changes, such as keeping the area clean and dry.  This information is not intended to replace advice given to you by your health care provider. Make sure you discuss any questions you have with your health care provider.  Document Revised: 11/30/2018 Document Reviewed: 11/28/2018  [employer] Patient Education © 2020 [employer]

## 2021-02-10 NOTE — PROGRESS NOTES
"Subjective    Chris Mejia is a 61 y.o. male here for:  Chief Complaint   Patient presents with   • Hypertension     He admits he does not check his BP. Wife states he has been dizzy.    • Anxiety     Continues Buspar everyday. States his anxiety is ok.    • Rash     Clotrimzaole cream is not working. If he uses the cream as directed, makes his scrotum raw.    • Allergies     Zrytec is not working now. Sometimes he feels like he can not breathe. Has a lot of sinus drainage, especailly at night, which causes a cough--only at night.        History per MA reviewed.    Coughing up a lot of phlegm  Has to get up some nights to gag it up then he's okay  Comes up clear to yellow (\"butterscotch color\") x 6-8 months per pt, \"long time\" per wife  Worse in winter compared to summer  Was not a smoker but has had secondhand smoke exposure heavily    Rash to scrotum very itchy    Dizziness possibly related to cerebellum, followed by neurology  Previously put on tegretol for trigeminal neuralgia by Dr. Barlow in 2014 per notes. At some point it was changed by Dr. Barlow (per wife) to daily as needed for headaches. Patient says this works well for him.          The following portions of the patient's history were reviewed and updated as appropriate: allergies, current medications, past family history, past medical history, past social history, past surgical history and problem list.    Review of Systems   HENT: Positive for congestion.    Respiratory: Positive for cough and shortness of breath.    Skin: Positive for rash.   Neurological: Positive for dizziness and light-headedness.       Visit Vitals  /80   Pulse 80   Temp 97.8 °F (36.6 °C) (Temporal)   Resp 18   Ht 170.4 cm (67.09\")   Wt 109 kg (241 lb)   SpO2 94%   BMI 37.65 kg/m²         Objective   Physical Exam  Vitals signs and nursing note reviewed.   Constitutional:       General: He is not in acute distress.     Appearance: Normal appearance. He is well-developed and " well-groomed. He is obese. He is not ill-appearing, toxic-appearing or diaphoretic.      Interventions: Face mask in place.   HENT:      Head: Normocephalic and atraumatic.      Right Ear: Hearing normal.      Left Ear: Hearing normal.   Eyes:      General: Lids are normal. No scleral icterus.     Extraocular Movements: Extraocular movements intact.   Pulmonary:      Effort: Pulmonary effort is normal.   Skin:     Coloration: Skin is not jaundiced.   Neurological:      General: No focal deficit present.      Mental Status: He is alert and oriented to person, place, and time.      Gait: Gait abnormal.   Psychiatric:         Attention and Perception: Attention and perception normal.         Mood and Affect: Mood and affect normal.         Speech: Speech normal.         Behavior: Behavior normal. Behavior is cooperative.         Thought Content: Thought content normal.         Cognition and Memory: Cognition and memory normal.         Judgment: Judgment normal.         For medical decision making review of the following was required:  Progress Notes by Kirill Barlow MD (02/27/2014 08:30) 1st visit regarding cerebellar atrophy on mri  Progress Notes by Kirill Barlow MD (04/08/2014 14:15) tegretol started for TN      Assessment/Plan     Problem List Items Addressed This Visit        Allergies and Adverse Reactions    Seasonal allergies    Relevant Medications    azelastine (ASTELIN) 0.1 % nasal spray    levocetirizine (XYZAL) 5 MG tablet       Cardiac and Vasculature    Essential hypertension (Chronic)    Relevant Orders    CBC (No Diff)    Comprehensive Metabolic Panel    TSH+Free T4    Hypercholesteremia    Relevant Orders    Comprehensive Metabolic Panel    Lipid Panel       Endocrine and Metabolic    Vitamin B12 deficiency    Relevant Orders    CBC (No Diff)    Vitamin B12    Folate    Vitamin D deficiency    Relevant Orders    Vitamin D 25 Hydroxy       Mental Health    Anxiety    Relevant Orders    Genetic  Testing - Blood,       Neuro    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CMS/HCC) - Primary (Chronic)    Relevant Medications    carBAMazepine (TEGretol) 200 MG tablet    Trigeminal neuralgia    Relevant Medications    carBAMazepine (TEGretol) 200 MG tablet       Sleep    Insomnia    Relevant Medications    carBAMazepine (TEGretol) 200 MG tablet      Other Visit Diagnoses     Deformity of both feet        Relevant Orders    Ambulatory Referral to Podiatry    Tinea cruris        Relevant Medications    ciclopirox (LOPROX) 0.77 % suspension    Hyperglycemia        Relevant Orders    Hemoglobin A1c          Return in about 26 weeks (around 8/11/2021) for Medicare Wellness (366 days from last AWV).     Naty Lebron MD

## 2021-02-11 LAB
25(OH)D3+25(OH)D2 SERPL-MCNC: 35.5 NG/ML (ref 30–100)
ALBUMIN SERPL-MCNC: 4.2 G/DL (ref 3.5–5.2)
ALBUMIN/GLOB SERPL: 1.2 G/DL
ALP SERPL-CCNC: 39 U/L (ref 39–117)
ALT SERPL-CCNC: 40 U/L (ref 1–41)
AST SERPL-CCNC: 34 U/L (ref 1–40)
BILIRUB SERPL-MCNC: 0.6 MG/DL (ref 0–1.2)
BUN SERPL-MCNC: 13 MG/DL (ref 8–23)
BUN/CREAT SERPL: 11.7 (ref 7–25)
CALCIUM SERPL-MCNC: 9.7 MG/DL (ref 8.6–10.5)
CHLORIDE SERPL-SCNC: 100 MMOL/L (ref 98–107)
CHOLEST SERPL-MCNC: 123 MG/DL (ref 0–200)
CO2 SERPL-SCNC: 29.3 MMOL/L (ref 22–29)
CREAT SERPL-MCNC: 1.11 MG/DL (ref 0.76–1.27)
ERYTHROCYTE [DISTWIDTH] IN BLOOD BY AUTOMATED COUNT: 12.6 % (ref 12.3–15.4)
FOLATE SERPL-MCNC: 10.3 NG/ML (ref 4.78–24.2)
GLOBULIN SER CALC-MCNC: 3.5 GM/DL
GLUCOSE SERPL-MCNC: 103 MG/DL (ref 65–99)
HBA1C MFR BLD: 5.6 % (ref 4.8–5.6)
HCT VFR BLD AUTO: 52.7 % (ref 37.5–51)
HDLC SERPL-MCNC: 38 MG/DL (ref 40–60)
HGB BLD-MCNC: 18 G/DL (ref 13–17.7)
LDLC SERPL CALC-MCNC: 65 MG/DL (ref 0–100)
MCH RBC QN AUTO: 32.9 PG (ref 26.6–33)
MCHC RBC AUTO-ENTMCNC: 34.2 G/DL (ref 31.5–35.7)
MCV RBC AUTO: 96.3 FL (ref 79–97)
PLATELET # BLD AUTO: 173 10*3/MM3 (ref 140–450)
POTASSIUM SERPL-SCNC: 4.6 MMOL/L (ref 3.5–5.2)
PROT SERPL-MCNC: 7.7 G/DL (ref 6–8.5)
RBC # BLD AUTO: 5.47 10*6/MM3 (ref 4.14–5.8)
SODIUM SERPL-SCNC: 138 MMOL/L (ref 136–145)
T4 FREE SERPL-MCNC: 1.45 NG/DL (ref 0.93–1.7)
TRIGL SERPL-MCNC: 105 MG/DL (ref 0–150)
TSH SERPL DL<=0.005 MIU/L-ACNC: 2.91 UIU/ML (ref 0.27–4.2)
VIT B12 SERPL-MCNC: 964 PG/ML (ref 211–946)
VLDLC SERPL CALC-MCNC: 20 MG/DL (ref 5–40)
WBC # BLD AUTO: 7.51 10*3/MM3 (ref 3.4–10.8)

## 2021-02-11 NOTE — PROGRESS NOTES
Labs show his blood count remains mildly elevated. Same level as 3 months ago, two years ago was similar. Suggest we watch over time. Other labs okay.

## 2021-02-19 PROBLEM — Z15.89: Chronic | Status: ACTIVE | Noted: 2021-02-19

## 2021-02-19 PROBLEM — Z15.89 HTR2A GG GENOTYPE: Chronic | Status: ACTIVE | Noted: 2021-02-19

## 2021-02-19 PROBLEM — E88.89: Chronic | Status: ACTIVE | Noted: 2021-02-19

## 2021-02-19 PROBLEM — E88.89 CYP2B6 INTERMEDIATE METABOLIZER: Chronic | Status: ACTIVE | Noted: 2021-02-19

## 2021-03-04 ENCOUNTER — CLINICAL SUPPORT (OUTPATIENT)
Dept: INTERNAL MEDICINE | Facility: CLINIC | Age: 61
End: 2021-03-04

## 2021-03-04 DIAGNOSIS — E53.8 VITAMIN B12 DEFICIENCY: ICD-10-CM

## 2021-03-04 PROCEDURE — 96372 THER/PROPH/DIAG INJ SC/IM: CPT | Performed by: FAMILY MEDICINE

## 2021-03-04 RX ADMIN — CYANOCOBALAMIN 1000 MCG: 1000 INJECTION, SOLUTION INTRAMUSCULAR; SUBCUTANEOUS at 09:40

## 2021-04-02 ENCOUNTER — CLINICAL SUPPORT (OUTPATIENT)
Dept: INTERNAL MEDICINE | Facility: CLINIC | Age: 61
End: 2021-04-02

## 2021-04-02 DIAGNOSIS — E53.8 VITAMIN B12 DEFICIENCY: ICD-10-CM

## 2021-04-02 PROCEDURE — 96372 THER/PROPH/DIAG INJ SC/IM: CPT | Performed by: FAMILY MEDICINE

## 2021-04-02 RX ADMIN — CYANOCOBALAMIN 1000 MCG: 1000 INJECTION, SOLUTION INTRAMUSCULAR; SUBCUTANEOUS at 11:52

## 2021-05-03 ENCOUNTER — CLINICAL SUPPORT (OUTPATIENT)
Dept: INTERNAL MEDICINE | Facility: CLINIC | Age: 61
End: 2021-05-03

## 2021-05-03 DIAGNOSIS — E53.8 VITAMIN B12 DEFICIENCY: ICD-10-CM

## 2021-05-03 PROCEDURE — 96372 THER/PROPH/DIAG INJ SC/IM: CPT | Performed by: FAMILY MEDICINE

## 2021-05-03 RX ADMIN — CYANOCOBALAMIN 1000 MCG: 1000 INJECTION, SOLUTION INTRAMUSCULAR; SUBCUTANEOUS at 11:51

## 2021-06-04 ENCOUNTER — CLINICAL SUPPORT (OUTPATIENT)
Dept: INTERNAL MEDICINE | Facility: CLINIC | Age: 61
End: 2021-06-04

## 2021-06-04 DIAGNOSIS — J30.2 SEASONAL ALLERGIES: ICD-10-CM

## 2021-06-04 PROCEDURE — 96372 THER/PROPH/DIAG INJ SC/IM: CPT | Performed by: FAMILY MEDICINE

## 2021-06-04 RX ORDER — LEVOCETIRIZINE DIHYDROCHLORIDE 5 MG/1
5 TABLET, FILM COATED ORAL EVERY EVENING
Qty: 90 TABLET | Refills: 3 | Status: SHIPPED | OUTPATIENT
Start: 2021-06-04 | End: 2022-03-10 | Stop reason: SDUPTHER

## 2021-06-04 RX ADMIN — CYANOCOBALAMIN 1000 MCG: 1000 INJECTION, SOLUTION INTRAMUSCULAR; SUBCUTANEOUS at 08:55

## 2021-07-02 ENCOUNTER — CLINICAL SUPPORT (OUTPATIENT)
Dept: INTERNAL MEDICINE | Facility: CLINIC | Age: 61
End: 2021-07-02

## 2021-07-02 DIAGNOSIS — E53.8 VITAMIN B12 DEFICIENCY: ICD-10-CM

## 2021-07-02 PROCEDURE — 96372 THER/PROPH/DIAG INJ SC/IM: CPT | Performed by: FAMILY MEDICINE

## 2021-07-02 RX ADMIN — CYANOCOBALAMIN 1000 MCG: 1000 INJECTION, SOLUTION INTRAMUSCULAR; SUBCUTANEOUS at 12:24

## 2021-07-12 DIAGNOSIS — E78.00 HYPERCHOLESTEREMIA: ICD-10-CM

## 2021-07-13 RX ORDER — PRAVASTATIN SODIUM 40 MG
40 TABLET ORAL NIGHTLY
Qty: 90 TABLET | Refills: 3 | Status: SHIPPED | OUTPATIENT
Start: 2021-07-13 | End: 2022-03-10 | Stop reason: SDUPTHER

## 2021-08-03 ENCOUNTER — CLINICAL SUPPORT (OUTPATIENT)
Dept: INTERNAL MEDICINE | Facility: CLINIC | Age: 61
End: 2021-08-03

## 2021-08-03 DIAGNOSIS — E53.8 VITAMIN B12 DEFICIENCY: ICD-10-CM

## 2021-08-03 PROCEDURE — 96372 THER/PROPH/DIAG INJ SC/IM: CPT | Performed by: FAMILY MEDICINE

## 2021-08-03 RX ADMIN — CYANOCOBALAMIN 1000 MCG: 1000 INJECTION, SOLUTION INTRAMUSCULAR; SUBCUTANEOUS at 11:49

## 2021-08-16 ENCOUNTER — OFFICE VISIT (OUTPATIENT)
Dept: INTERNAL MEDICINE | Facility: CLINIC | Age: 61
End: 2021-08-16

## 2021-08-16 VITALS
TEMPERATURE: 97.3 F | SYSTOLIC BLOOD PRESSURE: 125 MMHG | OXYGEN SATURATION: 95 % | BODY MASS INDEX: 37.2 KG/M2 | RESPIRATION RATE: 18 BRPM | DIASTOLIC BLOOD PRESSURE: 75 MMHG | HEART RATE: 74 BPM | WEIGHT: 237 LBS | HEIGHT: 67 IN

## 2021-08-16 DIAGNOSIS — M06.9 RHEUMATOID ARTHRITIS INVOLVING MULTIPLE SITES, UNSPECIFIED WHETHER RHEUMATOID FACTOR PRESENT (HCC): ICD-10-CM

## 2021-08-16 DIAGNOSIS — N40.1 BENIGN NON-NODULAR PROSTATIC HYPERPLASIA WITH LOWER URINARY TRACT SYMPTOMS: ICD-10-CM

## 2021-08-16 DIAGNOSIS — G11.8 SPINOCEREBELLAR ATAXIA (HCC): Chronic | ICD-10-CM

## 2021-08-16 DIAGNOSIS — I25.10 CORONARY ARTERY DISEASE INVOLVING NATIVE CORONARY ARTERY OF NATIVE HEART WITHOUT ANGINA PECTORIS: ICD-10-CM

## 2021-08-16 DIAGNOSIS — E88.89 CYP2B6 INTERMEDIATE METABOLIZER (HCC): ICD-10-CM

## 2021-08-16 DIAGNOSIS — E66.01 CLASS 2 SEVERE OBESITY DUE TO EXCESS CALORIES WITH SERIOUS COMORBIDITY AND BODY MASS INDEX (BMI) OF 37.0 TO 37.9 IN ADULT (HCC): ICD-10-CM

## 2021-08-16 DIAGNOSIS — E55.9 VITAMIN D DEFICIENCY: ICD-10-CM

## 2021-08-16 DIAGNOSIS — E88.89 LIMITED RESPONSE TO SEROTONIN REUPTAKE INHIBITORS ASSOCIATED WITH SS GENOTYPE OF 5-HTTLPR REGION OF SLC6A4 GENE (HCC): Chronic | ICD-10-CM

## 2021-08-16 DIAGNOSIS — I10 ESSENTIAL HYPERTENSION: Chronic | ICD-10-CM

## 2021-08-16 DIAGNOSIS — Z00.00 MEDICARE ANNUAL WELLNESS VISIT, SUBSEQUENT: Primary | ICD-10-CM

## 2021-08-16 DIAGNOSIS — K21.9 GASTROESOPHAGEAL REFLUX DISEASE WITHOUT ESOPHAGITIS: ICD-10-CM

## 2021-08-16 DIAGNOSIS — Z23 NEED FOR SHINGLES VACCINE: ICD-10-CM

## 2021-08-16 DIAGNOSIS — F41.9 ANXIETY: ICD-10-CM

## 2021-08-16 DIAGNOSIS — G25.81 RLS (RESTLESS LEGS SYNDROME): ICD-10-CM

## 2021-08-16 PROCEDURE — 1159F MED LIST DOCD IN RCRD: CPT | Performed by: FAMILY MEDICINE

## 2021-08-16 PROCEDURE — G0439 PPPS, SUBSEQ VISIT: HCPCS | Performed by: FAMILY MEDICINE

## 2021-08-16 PROCEDURE — 1170F FXNL STATUS ASSESSED: CPT | Performed by: FAMILY MEDICINE

## 2021-08-16 PROCEDURE — 1126F AMNT PAIN NOTED NONE PRSNT: CPT | Performed by: FAMILY MEDICINE

## 2021-08-16 PROCEDURE — 99396 PREV VISIT EST AGE 40-64: CPT | Performed by: FAMILY MEDICINE

## 2021-08-16 PROCEDURE — 96160 PT-FOCUSED HLTH RISK ASSMT: CPT | Performed by: FAMILY MEDICINE

## 2021-08-16 RX ORDER — ZOSTER VACCINE RECOMBINANT, ADJUVANTED 50 MCG/0.5
0.5 KIT INTRAMUSCULAR ONCE
Qty: 1 EACH | Refills: 1 | Status: SHIPPED | OUTPATIENT
Start: 2021-08-16 | End: 2021-08-16

## 2021-08-16 RX ORDER — ACETAMINOPHEN 160 MG
2000 TABLET,DISINTEGRATING ORAL DAILY
Qty: 90 CAPSULE | Refills: 4 | Status: SHIPPED | OUTPATIENT
Start: 2021-08-16 | End: 2022-02-11

## 2021-08-16 RX ORDER — CARVEDILOL 12.5 MG/1
12.5 TABLET ORAL 2 TIMES DAILY WITH MEALS
Qty: 180 TABLET | Refills: 3 | Status: SHIPPED | OUTPATIENT
Start: 2021-08-16 | End: 2022-03-10 | Stop reason: SDUPTHER

## 2021-08-16 RX ORDER — NITROGLYCERIN 0.4 MG/1
0.4 TABLET SUBLINGUAL
Qty: 30 TABLET | Refills: 1 | Status: SHIPPED | OUTPATIENT
Start: 2021-08-16 | End: 2022-03-10 | Stop reason: SDUPTHER

## 2021-08-16 RX ORDER — PANTOPRAZOLE SODIUM 40 MG/1
40 TABLET, DELAYED RELEASE ORAL DAILY
Qty: 90 TABLET | Refills: 3 | Status: SHIPPED | OUTPATIENT
Start: 2021-08-16 | End: 2021-09-07

## 2021-08-16 RX ORDER — BUSPIRONE HYDROCHLORIDE 15 MG/1
15 TABLET ORAL 2 TIMES DAILY PRN
Qty: 180 TABLET | Refills: 3 | Status: SHIPPED | OUTPATIENT
Start: 2021-08-16 | End: 2022-03-10 | Stop reason: SDUPTHER

## 2021-08-16 RX ORDER — AMLODIPINE BESYLATE 2.5 MG/1
2.5 TABLET ORAL DAILY
Qty: 90 TABLET | Refills: 3 | Status: SHIPPED | OUTPATIENT
Start: 2021-08-16 | End: 2022-03-10 | Stop reason: SDUPTHER

## 2021-08-16 RX ORDER — MELOXICAM 15 MG/1
15 TABLET ORAL DAILY
Qty: 90 TABLET | Refills: 3 | Status: SHIPPED | OUTPATIENT
Start: 2021-08-16 | End: 2022-03-10 | Stop reason: SDUPTHER

## 2021-08-16 RX ORDER — TAMSULOSIN HYDROCHLORIDE 0.4 MG/1
1 CAPSULE ORAL NIGHTLY
Qty: 90 CAPSULE | Refills: 3 | Status: SHIPPED | OUTPATIENT
Start: 2021-08-16 | End: 2022-02-11 | Stop reason: SDUPTHER

## 2021-08-16 RX ORDER — TIZANIDINE 4 MG/1
4 TABLET ORAL NIGHTLY
Qty: 90 TABLET | Refills: 3 | Status: SHIPPED | OUTPATIENT
Start: 2021-08-16 | End: 2021-10-04

## 2021-08-16 RX ORDER — HYDROCHLOROTHIAZIDE 12.5 MG/1
12.5 CAPSULE, GELATIN COATED ORAL EVERY MORNING
Qty: 90 CAPSULE | Refills: 3 | Status: SHIPPED | OUTPATIENT
Start: 2021-08-16 | End: 2022-03-10 | Stop reason: SDUPTHER

## 2021-08-16 RX ORDER — ROPINIROLE 0.25 MG/1
TABLET, FILM COATED ORAL
Qty: 180 TABLET | Refills: 3 | Status: SHIPPED | OUTPATIENT
Start: 2021-08-16 | End: 2021-10-04

## 2021-08-16 NOTE — PATIENT INSTRUCTIONS
Medicare Wellness  Personal Prevention Plan of Service     Date of Office Visit:  2021  Encounter Provider:  Naty Lebron MD  Place of Service:  [facility]  Patient Name: Chris Mejia  :  1960    As part of the Medicare Wellness portion of your visit today, we are providing you with this personalized preventive plan of services (PPPS). This plan is based upon recommendations of the [place] Preventive Services Task Force (USPSTF) and the Advisory Committee on Immunization Practices (ACIP).    This lists the preventive care services that should be considered, and provides dates of when you are due. Items listed as completed are up-to-date and do not require any further intervention.    Health Maintenance   Topic Date Due   • ZOSTER VACCINE (1 of 2) 2022 (Originally 2008)   • INFLUENZA VACCINE  10/01/2021   • LIPID PANEL  02/10/2022   • ANNUAL WELLNESS VISIT  2022   • COLORECTAL CANCER SCREENING  2022   • TDAP/TD VACCINES (3 - Td or Tdap) 11/15/2022   • Pneumococcal Vaccine 0-64 (2 of 2 - PPSV23) 2023   • HEPATITIS C SCREENING  Completed   • COVID-19 Vaccine  Completed   • DIABETIC FOOT EXAM  Discontinued   • HEMOGLOBIN A1C  Discontinued   • DIABETIC EYE EXAM  Discontinued   • URINE MICROALBUMIN  Discontinued       No orders of the defined types were placed in this encounter.      Return in about 3 months (around 2021) for recheck rls.

## 2021-08-16 NOTE — PROGRESS NOTES
The ABCs of the Annual Wellness Visit  Subsequent Medicare Wellness Visit    Chief Complaint   Patient presents with   • Medicare Wellness-subsequent   • Restless Legs Syndrome     Worse over the last 6 months. Feels like his legs will not relax when he is sitting in the recliner or in bed. Cramps in the plantar feet.        Subjective   History of Present Illness:  Chris Mejia is a 61 y.o. male who presents for a Subsequent Medicare Wellness Visit.    Has been using Tudorza from wife and it helps breathing. Has not been officially diagnosed with COPD.     Dr. Mendoza diagnosed rheumatoid arthritis in the past. Tried methotrexate, embrel, etc, without luck. Does not want to go back to rheumatology at this time. He wears compression gloves through [employer] and they help.     Ataxia--has tried PT, cannot help any further. Has cane but not using.     HEALTH RISK ASSESSMENT    Recent Hospitalizations:  No hospitalization(s) within the last year.    Current Medical Providers:  Patient Care Team:  Naty Lebron MD as PCP - General (Family Medicine)  Adam Perkins MD as Consulting Physician (Cardiology)    Smoking Status:  Social History     Tobacco Use   Smoking Status Never Smoker   Smokeless Tobacco Never Used       Alcohol Consumption:  Social History     Substance and Sexual Activity   Alcohol Use No       Depression Screen:   PHQ-2/PHQ-9 Depression Screening 8/16/2021   Little interest or pleasure in doing things 0   Feeling down, depressed, or hopeless 0   Trouble falling or staying asleep, or sleeping too much -   Feeling tired or having little energy -   Poor appetite or overeating -   Feeling bad about yourself - or that you are a failure or have let yourself or your family down -   Trouble concentrating on things, such as reading the newspaper or watching television -   Moving or speaking so slowly that other people could have noticed. Or the opposite - being so fidgety or restless that you have been  moving around a lot more than usual -   Thoughts that you would be better off dead, or of hurting yourself in some way -   Total Score 0   If you checked off any problems, how difficult have these problems made it for you to do your work, take care of things at home, or get along with other people? -       Fall Risk Screen:  DEENA Fall Risk Assessment has not been completed.    Health Habits and Functional and Cognitive Screening:  Functional & Cognitive Status 8/16/2021   Do you have difficulty preparing food and eating? No   Do you have difficulty bathing yourself, getting dressed or grooming yourself? No   Do you have difficulty using the toilet? No   Do you have difficulty moving around from place to place? No   Do you have trouble with steps or getting out of a bed or a chair? No   Current Diet Well Balanced Diet   Dental Exam Not up to date   Eye Exam Not up to date   Exercise (times per week) 7 times per week   Current Exercises Include Walking   Current Exercise Activities Include -   Do you need help using the phone?  No   Are you deaf or do you have serious difficulty hearing?  Yes   Do you need help with transportation? No   Do you need help shopping? Yes   Do you need help preparing meals?  No   Do you need help with housework?  No   Do you need help with laundry? No   Do you need help taking your medications? Yes   Do you need help managing money? No   Do you ever drive or ride in a car without wearing a seat belt? No   Have you felt unusual stress, anger or loneliness in the last month? No   Who do you live with? Spouse   If you need help, do you have trouble finding someone available to you? No   Have you been bothered in the last four weeks by sexual problems? No   Do you have difficulty concentrating, remembering or making decisions? Yes         Does the patient have evidence of cognitive impairment? Yes; patient says he's good but wife disagrees.     Asprin use counseling:Taking ASA appropriately  as indicated    Age-appropriate Screening Schedule:  Refer to the list below for future screening recommendations based on patient's age, sex and/or medical conditions. Orders for these recommended tests are listed in the plan section. The patient has been provided with a written plan.    Health Maintenance   Topic Date Due   • ZOSTER VACCINE (1 of 2) 08/25/2022 (Originally 1/25/2008)   • INFLUENZA VACCINE  10/01/2021   • LIPID PANEL  02/10/2022   • TDAP/TD VACCINES (3 - Td or Tdap) 11/15/2022   • DIABETIC FOOT EXAM  Discontinued   • HEMOGLOBIN A1C  Discontinued   • DIABETIC EYE EXAM  Discontinued   • URINE MICROALBUMIN  Discontinued          The following portions of the patient's history were reviewed and updated as appropriate: allergies, current medications, past family history, past medical history, past social history, past surgical history and problem list.    Outpatient Medications Prior to Visit   Medication Sig Dispense Refill   • aspirin 81 MG tablet Take 1 tablet by mouth Daily. 30 tablet 11   • azelastine (ASTELIN) 0.1 % nasal spray 1 spray into the nostril(s) as directed by provider 2 (Two) Times a Day. Use in each nostril as directed 90 mL 3   • carBAMazepine (TEGretol) 200 MG tablet Take 1 tablet by mouth At Night As Needed (headache). 90 tablet 3   • ciclopirox (LOPROX) 0.77 % suspension Apply  topically to the appropriate area as directed Every 12 (Twelve) Hours. 60 mL 3   • clotrimazole (LOTRIMIN) 1 % cream Apply  topically to the appropriate area as directed 2 (Two) Times a Day. 180 g 3   • levocetirizine (XYZAL) 5 MG tablet Take 1 tablet by mouth Every Evening. 90 tablet 3   • pravastatin (PRAVACHOL) 40 MG tablet Take 1 tablet by mouth Every Night. For high cholesterol, stroke risk reduction. 90 tablet 3   • amLODIPine (NORVASC) 2.5 MG tablet Take 1 tablet by mouth Daily. 90 tablet 3   • busPIRone (BUSPAR) 15 MG tablet Take 1 tablet by mouth 2 (Two) Times a Day As Needed (anxiety). 180 tablet 3    • carvedilol (COREG) 12.5 MG tablet Take 1 tablet by mouth 2 (Two) Times a Day With Meals. 180 tablet 3   • Cholecalciferol (VITAMIN D3) 50 MCG (2000 UT) capsule Take 1 capsule by mouth Daily. 90 capsule 4   • hydroCHLOROthiazide (MICROZIDE) 12.5 MG capsule Take 1 capsule by mouth Every Morning. 90 capsule 3   • meloxicam (MOBIC) 15 MG tablet Take 1 tablet by mouth Daily. 90 tablet 3   • nitroglycerin (NITROSTAT) 0.4 MG SL tablet Place 1 tablet under the tongue Every 5 (Five) Minutes As Needed for Chest Pain. Take no more than 3 doses in 15 minutes. 30 tablet 11   • pantoprazole (PROTONIX) 40 MG EC tablet Take 1 tablet by mouth Daily. 90 tablet 3   • tamsulosin (FLOMAX) 0.4 MG capsule 24 hr capsule Take 1 capsule by mouth Every Night. 90 capsule 3   • tiZANidine (Zanaflex) 4 MG tablet Take 1 tablet by mouth Every Night. 90 tablet 3     Facility-Administered Medications Prior to Visit   Medication Dose Route Frequency Provider Last Rate Last Admin   • cyanocobalamin injection 1,000 mcg  1,000 mcg Intramuscular Q28 Days Naty Lebron MD   1,000 mcg at 08/03/21 1149       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CMS/HCC)   • RA (rheumatoid arthritis) (CMS/HCC)   • Insomnia   • Essential hypertension   • Hypercholesteremia   • Acid reflux   • Anxiety   • Vitamin B12 deficiency   • Vitamin D deficiency   • DENZEL (obstructive sleep apnea)   • Atopic rhinitis   • Ataxic gait   • Shoulder pain   • Ophthalmoplegia   • Coronary artery disease involving native coronary artery of native heart without angina pectoris   • Seasonal allergies   • Benign non-nodular prostatic hyperplasia with lower urinary tract symptoms   • Class 2 severe obesity due to excess calories with serious comorbidity in adult (CMS/HCC)   • Weakness   • Tension headache   • 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 Patient does not have an advance directive, declines further assistance.    Review of Systems   Constitutional: Negative for fever.   Respiratory: Positive for shortness of breath.    Musculoskeletal: Positive for arthralgias and gait problem.   Psychiatric/Behavioral: Positive for sleep disturbance.   All other systems reviewed and are negative.      Compared to one year ago, the patient feels his physical health is worse. \"My balance is worse; I'm falling more.\"  Compared to one year ago, the patient feels his mental health is the same.    Reviewed chart for potential of high risk medication in the elderly: yes  Reviewed chart for potential of harmful drug interactions in the elderly:yes    Objective         Vitals:    08/16/21 0823   BP: 125/75   Pulse: 74   Resp: 18   Temp: 97.3 °F (36.3 °C)   TempSrc: Temporal   SpO2: 95%   Weight: 108 kg (237 lb)   Height: 170.4 cm (67.09\")   PainSc: 0-No pain       Body mass index is 37.02 kg/m².  Discussed the patient's BMI with him. The BMI is above average; BMI management plan is completed.    Physical Exam  Vitals and nursing note reviewed.   Constitutional:       General: He is not in acute distress.     Appearance: Normal appearance. He is well-developed and well-groomed. He is obese. He is not ill-appearing, toxic-appearing or diaphoretic.      Interventions: Face mask in place.   HENT:      Head: Normocephalic and atraumatic.      Right Ear: Hearing, tympanic membrane, ear canal and external ear normal.      Left Ear: Hearing, tympanic membrane, ear canal and external ear normal.   Eyes:      General: Lids are normal. Gaze aligned appropriately. No scleral icterus.        Right eye: No discharge.         Left eye: No discharge.      Extraocular Movements: " Extraocular movements intact.      Conjunctiva/sclera: Conjunctivae normal.      Pupils: Pupils are equal, round, and reactive to light.   Neck:      Thyroid: No thyromegaly.      Trachea: Phonation normal.   Cardiovascular:      Rate and Rhythm: Normal rate and regular rhythm.      Heart sounds: Normal heart sounds.   Pulmonary:      Effort: Pulmonary effort is normal.      Breath sounds: Normal breath sounds and air entry.   Abdominal:      General: Bowel sounds are normal. There is no distension.      Palpations: Abdomen is soft. Abdomen is not rigid. There is no mass.      Tenderness: There is no abdominal tenderness. There is no guarding or rebound.   Musculoskeletal:         General: No signs of injury.      Right elbow: Deformity present.      Right hand: Deformity present.      Left hand: Deformity present.      Cervical back: Neck supple.   Skin:     General: Skin is warm.      Capillary Refill: Capillary refill takes less than 2 seconds.      Coloration: Skin is not cyanotic, jaundiced or pale.      Findings: No rash.   Neurological:      General: No focal deficit present.      Mental Status: He is alert and oriented to person, place, and time. Mental status is at baseline.      Cranial Nerves: No dysarthria.      Motor: No tremor, abnormal muscle tone or seizure activity.      Gait: Gait abnormal.   Psychiatric:         Attention and Perception: Attention and perception normal.         Mood and Affect: Mood and affect normal.         Speech: Speech normal.         Behavior: Behavior normal. Behavior is cooperative.         Thought Content: Thought content normal.         Cognition and Memory: Cognition and memory normal.         Judgment: Judgment normal.               Assessment/Plan   Medicare Risks and Personalized Health Plan  CMS Preventative Services Quick Reference  Advance Directive Discussion  Chronic Pain   Dementia/Memory   Fall Risk  Immunizations Discussed/Encouraged (specific immunizations;  Shingrix )  Inactivity/Sedentary  Obesity/Overweight   Polypharmacy    The above risks/problems have been discussed with the patient.  Pertinent information has been shared with the patient in the After Visit Summary.  Follow up plans and orders are seen below in the Assessment/Plan Section.    Diagnoses and all orders for this visit:    1. Medicare annual wellness visit, subsequent (Primary)    2. 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CMS/[facility])  Comments:  encouraged use of assistive devices such as cane    3. Rheumatoid arthritis involving multiple sites, unspecified whether rheumatoid factor present (CMS/[facility])  Comments:  consider rheumatology consult, declines at this time  Orders:  -     Diclofenac Sodium (VOLTAREN) 1 % gel gel; Apply 4 g topically to the appropriate area as directed 4 (Four) Times a Day As Needed (arthritis pain).  Dispense: 350 g; Refill: 11  -     tiZANidine (Zanaflex) 4 MG tablet; Take 1 tablet by mouth Every Night.  Dispense: 90 tablet; Refill: 3  -     meloxicam (MOBIC) 15 MG tablet; Take 1 tablet by mouth Daily.  Dispense: 90 tablet; Refill: 3    4. Class 2 severe obesity due to excess calories with serious comorbidity and body mass index (BMI) of 37.0 to 37.9 in adult (CMS/[facility])  Comments:  limited on ability to exercise given ataxia    5. 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CMS/[facility])  Comments:  per [employer] report, must keep in mind when prescribing new medicines    7. RLS (restless legs syndrome)  Comments:  Trial Requip. Can consider gabapentin down road but would have to watch for worsening in gait.  Orders:  -     rOPINIRole (Requip) 0.25 MG tablet; Take 1-2 tab po nightly restless leg syndrome. Take 1 hour before bedtime.  Dispense: 180 tablet; Refill: 3    8. Essential hypertension  -      carvedilol (COREG) 12.5 MG tablet; Take 1 tablet by mouth 2 (Two) Times a Day With Meals.  Dispense: 180 tablet; Refill: 3  -     amLODIPine (NORVASC) 2.5 MG tablet; Take 1 tablet by mouth Daily.  Dispense: 90 tablet; Refill: 3  -     hydroCHLOROthiazide (MICROZIDE) 12.5 MG capsule; Take 1 capsule by mouth Every Morning.  Dispense: 90 capsule; Refill: 3    9. Coronary artery disease involving native coronary artery of native heart without angina pectoris  Comments:  follow up with cardiology  Orders:  -     carvedilol (COREG) 12.5 MG tablet; Take 1 tablet by mouth 2 (Two) Times a Day With Meals.  Dispense: 180 tablet; Refill: 3  -     nitroglycerin (Nitrostat) 0.4 MG SL tablet; Place 1 tablet under the tongue Every 5 (Five) Minutes As Needed for Chest Pain. Take no more than 3 doses in 15 minutes.  Dispense: 30 tablet; Refill: 1    10. Anxiety  Comments:  continue buspar  Orders:  -     busPIRone (BUSPAR) 15 MG tablet; Take 1 tablet by mouth 2 (Two) Times a Day As Needed (anxiety).  Dispense: 180 tablet; Refill: 3    11. Benign non-nodular prostatic hyperplasia with lower urinary tract symptoms  -     tamsulosin (FLOMAX) 0.4 MG capsule 24 hr capsule; Take 1 capsule by mouth Every Night.  Dispense: 90 capsule; Refill: 3    12. Gastroesophageal reflux disease without esophagitis  -     pantoprazole (PROTONIX) 40 MG EC tablet; Take 1 tablet by mouth Daily.  Dispense: 90 tablet; Refill: 3    13. Vitamin D deficiency  -     Cholecalciferol (Vitamin D3) 50 MCG (2000 UT) capsule; Take 1 capsule by mouth Daily.  Dispense: 90 capsule; Refill: 4    14. Need for shingles vaccine  -     Zoster Vac Recomb Adjuvanted (Shingrix) 50 MCG/0.5ML reconstituted suspension; Inject 0.5 mL into the appropriate muscle as directed by prescriber 1 (One) Time for 1 dose.  Dispense: 1 each; Refill: 1      Follow Up:  Return in about 3 months (around 11/16/2021) for recheck rls.     An After Visit Summary and PPPS were given to the patient.

## 2021-08-31 ENCOUNTER — APPOINTMENT (OUTPATIENT)
Dept: GENERAL RADIOLOGY | Facility: HOSPITAL | Age: 61
End: 2021-08-31

## 2021-08-31 ENCOUNTER — HOSPITAL ENCOUNTER (EMERGENCY)
Facility: HOSPITAL | Age: 61
Discharge: HOME | End: 2021-08-31
Attending: EMERGENCY MEDICINE | Admitting: EMERGENCY MEDICINE

## 2021-08-31 ENCOUNTER — APPOINTMENT (OUTPATIENT)
Dept: CT IMAGING | Facility: HOSPITAL | Age: 61
End: 2021-08-31

## 2021-08-31 VITALS
WEIGHT: 238 LBS | OXYGEN SATURATION: 98 % | HEART RATE: 64 BPM | HEIGHT: 67 IN | DIASTOLIC BLOOD PRESSURE: 103 MMHG | TEMPERATURE: 97.7 F | BODY MASS INDEX: 37.35 KG/M2 | SYSTOLIC BLOOD PRESSURE: 158 MMHG | RESPIRATION RATE: 20 BRPM

## 2021-08-31 DIAGNOSIS — M54.50 ACUTE BILATERAL LOW BACK PAIN WITHOUT SCIATICA: ICD-10-CM

## 2021-08-31 DIAGNOSIS — S09.90XA ACUTE HEAD INJURY WITHOUT LOSS OF CONSCIOUSNESS, INITIAL ENCOUNTER: ICD-10-CM
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
## 2021-08-31 DIAGNOSIS — S79.912A HIP INJURY, LEFT, INITIAL ENCOUNTER: ICD-10-CM

## 2021-08-31 PROCEDURE — 70450 CT HEAD/BRAIN W/O DYE: CPT

## 2021-08-31 PROCEDURE — 72100 X-RAY EXAM L-S SPINE 2/3 VWS: CPT

## 2021-08-31 PROCEDURE — 99283 EMERGENCY DEPT VISIT LOW MDM: CPT

## 2021-08-31 PROCEDURE — 73502 X-RAY EXAM HIP UNI 2-3 VIEWS: CPT

## 2021-08-31 RX ORDER — IBUPROFEN 200 MG
1 TABLET ORAL ONCE
Status: COMPLETED | OUTPATIENT
Start: 2021-08-31 | End: 2021-08-31

## 2021-08-31 RX ADMIN — Medication 1 APPLICATION: at 13:05

## 2021-09-01 ENCOUNTER — PATIENT OUTREACH (OUTPATIENT)
Dept: CASE MANAGEMENT | Facility: OTHER | Age: 61
End: 2021-09-01

## 2021-09-03 ENCOUNTER — CLINICAL SUPPORT (OUTPATIENT)
Dept: INTERNAL MEDICINE | Facility: CLINIC | Age: 61
End: 2021-09-03

## 2021-09-03 DIAGNOSIS — E53.8 VITAMIN B12 DEFICIENCY: ICD-10-CM

## 2021-09-03 DIAGNOSIS — K21.9 GASTROESOPHAGEAL REFLUX DISEASE WITHOUT ESOPHAGITIS: ICD-10-CM

## 2021-09-03 PROCEDURE — 96372 THER/PROPH/DIAG INJ SC/IM: CPT | Performed by: FAMILY MEDICINE

## 2021-09-03 RX ADMIN — CYANOCOBALAMIN 1000 MCG: 1000 INJECTION, SOLUTION INTRAMUSCULAR; SUBCUTANEOUS at 13:17

## 2021-09-07 RX ORDER — PANTOPRAZOLE SODIUM 40 MG/1
TABLET, DELAYED RELEASE ORAL
Qty: 90 TABLET | Refills: 3 | Status: SHIPPED | OUTPATIENT
Start: 2021-09-07 | End: 2022-03-10 | Stop reason: SDUPTHER

## 2021-09-07 NOTE — TELEPHONE ENCOUNTER
Rx Refill Note  Requested Prescriptions     Pending Prescriptions Disp Refills   • pantoprazole (PROTONIX) 40 MG EC tablet [Pharmacy Med Name: PANTOPRAZOLE SODIUM 40 MG Tablet Delayed Release] 90 tablet 3     Sig: TAKE 1 TABLET EVERY DAY      Last office visit with prescribing clinician: 8/16/2021      Next office visit with prescribing clinician: 11/18/2021            Andressa Rosario LPN  09/07/21, 13:40 EDT

## 2021-09-23 ENCOUNTER — TELEPHONE (OUTPATIENT)
Dept: INTERNAL MEDICINE | Facility: CLINIC | Age: 61
End: 2021-09-23

## 2021-09-23 NOTE — TELEPHONE ENCOUNTER
Caller: Kaela Mejia    Relationship to patient: Emergency Contact    Best call back number: [phone]    Patient is needing: KAELA STATED THAT SHE GAVE PERSON WRONG MEDICATION AND rOPINIRole (Requip) 0.25 MG tablet IS THE CORRECT NAME OF MEDICATION

## 2021-09-23 NOTE — TELEPHONE ENCOUNTER
Caller: Diana Mejia    Relationship: Emergency Contact    Best call back number: [phone] (H)    What medications are you currently taking:   Current Outpatient Medications on File Prior to Visit   Medication Sig Dispense Refill   • amLODIPine (NORVASC) 2.5 MG tablet Take 1 tablet by mouth Daily. 90 tablet 3   • aspirin 81 MG tablet Take 1 tablet by mouth Daily. 30 tablet 11   • azelastine (ASTELIN) 0.1 % nasal spray 1 spray into the nostril(s) as directed by provider 2 (Two) Times a Day. Use in each nostril as directed 90 mL 3   • busPIRone (BUSPAR) 15 MG tablet Take 1 tablet by mouth 2 (Two) Times a Day As Needed (anxiety). 180 tablet 3   • carBAMazepine (TEGretol) 200 MG tablet Take 1 tablet by mouth At Night As Needed (headache). 90 tablet 3   • carvedilol (COREG) 12.5 MG tablet Take 1 tablet by mouth 2 (Two) Times a Day With Meals. 180 tablet 3   • Cholecalciferol (Vitamin D3) 50 MCG (2000 UT) capsule Take 1 capsule by mouth Daily. 90 capsule 4   • ciclopirox (LOPROX) 0.77 % suspension Apply  topically to the appropriate area as directed Every 12 (Twelve) Hours. 60 mL 3   • clotrimazole (LOTRIMIN) 1 % cream Apply  topically to the appropriate area as directed 2 (Two) Times a Day. 180 g 3   • Diclofenac Sodium (VOLTAREN) 1 % gel gel Apply 4 g topically to the appropriate area as directed 4 (Four) Times a Day As Needed (arthritis pain). 350 g 11   • hydroCHLOROthiazide (MICROZIDE) 12.5 MG capsule Take 1 capsule by mouth Every Morning. 90 capsule 3   • levocetirizine (XYZAL) 5 MG tablet Take 1 tablet by mouth Every Evening. 90 tablet 3   • meloxicam (MOBIC) 15 MG tablet Take 1 tablet by mouth Daily. 90 tablet 3   • nitroglycerin (Nitrostat) 0.4 MG SL tablet Place 1 tablet under the tongue Every 5 (Five) Minutes As Needed for Chest Pain. Take no more than 3 doses in 15 minutes. 30 tablet 1   • pantoprazole (PROTONIX) 40 MG EC tablet TAKE 1 TABLET EVERY DAY 90 tablet 3   • pravastatin (PRAVACHOL) 40 MG  tablet Take 1 tablet by mouth Every Night. For high cholesterol, stroke risk reduction. 90 tablet 3   • rOPINIRole (Requip) 0.25 MG tablet Take 1-2 tab po nightly restless leg syndrome. Take 1 hour before bedtime. 180 tablet 3   • tamsulosin (FLOMAX) 0.4 MG capsule 24 hr capsule Take 1 capsule by mouth Every Night. 90 capsule 3   • tiZANidine (Zanaflex) 4 MG tablet Take 1 tablet by mouth Every Night. 90 tablet 3     Current Facility-Administered Medications on File Prior to Visit   Medication Dose Route Frequency Provider Last Rate Last Admin   • cyanocobalamin injection 1,000 mcg  1,000 mcg Intramuscular Q28 Days Naty Lebron MD   1,000 mcg at 09/03/21 1317          When did you start taking these medications: 08/18/21    Which medication are you concerned about: PATIENTS RESTLESS LEG MEDICATION DOES NOT KNOW THE NAME OF THE MEDICATION THINKS IT LEVOCETIRIZINE    Who prescribed you this medication:DR LEBRON    What are your concerns: NOT WORKING     How long have you had these concerns: SINCE STARTING THE MEDICATION

## 2021-09-24 NOTE — TELEPHONE ENCOUNTER
Spoke with pt's wife and she states the 2 ropinirole and does not help at all. Advised he can take 3 at once and Dr. Lebron can increase the dose over time. She will have him try that and let us know if that does not improve his leg pain.

## 2021-09-28 ENCOUNTER — TELEPHONE (OUTPATIENT)
Dept: INTERNAL MEDICINE | Facility: CLINIC | Age: 61
End: 2021-09-28

## 2021-09-28 NOTE — TELEPHONE ENCOUNTER
Spoke with wife and advised to increase to 4 pills (1mg) and scheduled to f/u with Dr. Lebron on Monday.

## 2021-09-28 NOTE — TELEPHONE ENCOUNTER
Can go up to 4 (would equal 1 mg). This dose can go up to 4 mg over time.  Maybe schedule appointment with me if I have any openings on Monday/Tuesday? Gives time to try 1 mg then see me before I'm out of office in case it doesn't help

## 2021-09-28 NOTE — TELEPHONE ENCOUNTER
PT SPOUSE CALLED STATED THAT RX   rOPINIRole (Requip) 0.25 MG tablet      HAS BEEN INCREASED TO 3 TABLETS,AND RX STILL NOT HELPING PT LEG ISSUES, PT HAS BEEN JITTERY BUT NOT HELPING LEGS.    PLEASE ADVISE.  CALL BACK:[phone]

## 2021-09-28 NOTE — TELEPHONE ENCOUNTER
Pt's wife called last week and advised to increase ropinirole to 3 pills at night. At that time I told her to call back this week to let us know how he was doing. Please advise.

## 2021-10-04 ENCOUNTER — TRANSCRIBE ORDERS (OUTPATIENT)
Dept: LAB | Facility: HOSPITAL | Age: 61
End: 2021-10-04

## 2021-10-04 ENCOUNTER — OFFICE VISIT (OUTPATIENT)
Dept: INTERNAL MEDICINE | Facility: CLINIC | Age: 61
End: 2021-10-04

## 2021-10-04 ENCOUNTER — LAB (OUTPATIENT)
Dept: LAB | Facility: HOSPITAL | Age: 61
End: 2021-10-04

## 2021-10-04 VITALS
TEMPERATURE: 97.5 F | SYSTOLIC BLOOD PRESSURE: 125 MMHG | WEIGHT: 245 LBS | OXYGEN SATURATION: 93 % | BODY MASS INDEX: 39.37 KG/M2 | DIASTOLIC BLOOD PRESSURE: 70 MMHG | HEIGHT: 66 IN | HEART RATE: 70 BPM

## 2021-10-04 DIAGNOSIS — E53.8 VITAMIN B12 DEFICIENCY: ICD-10-CM

## 2021-10-04 DIAGNOSIS — Z20.822 COVID-19 RULED OUT: Primary | ICD-10-CM

## 2021-10-04 DIAGNOSIS — Z23 NEED FOR INFLUENZA VACCINATION: ICD-10-CM

## 2021-10-04 DIAGNOSIS — R45.1 RESTLESSNESS: Primary | ICD-10-CM

## 2021-10-04 DIAGNOSIS — Z20.822 COVID-19 RULED OUT: ICD-10-CM

## 2021-10-04 PROCEDURE — 90686 IIV4 VACC NO PRSV 0.5 ML IM: CPT | Performed by: FAMILY MEDICINE

## 2021-10-04 PROCEDURE — G0008 ADMIN INFLUENZA VIRUS VAC: HCPCS | Performed by: FAMILY MEDICINE

## 2021-10-04 PROCEDURE — 99214 OFFICE O/P EST MOD 30 MIN: CPT | Performed by: FAMILY MEDICINE

## 2021-10-04 PROCEDURE — U0004 COV-19 TEST NON-CDC HGH THRU: HCPCS

## 2021-10-04 RX ORDER — CARBIDOPA AND LEVODOPA 25; 100 MG/1; MG/1
1 TABLET, EXTENDED RELEASE ORAL 2 TIMES DAILY
Qty: 60 TABLET | Refills: 2 | Status: SHIPPED | OUTPATIENT
Start: 2021-10-04 | End: 2021-11-18

## 2021-10-04 RX ADMIN — CYANOCOBALAMIN 1000 MCG: 1000 INJECTION, SOLUTION INTRAMUSCULAR; SUBCUTANEOUS at 09:59

## 2021-10-04 NOTE — PROGRESS NOTES
"Subjective    Chris Mejia is a 61 y.o. male here for:  Chief Complaint   Patient presents with   • Restless Legs Syndrome     Up to 1mg of Ropinirole at night and his legs are still jerking uncontrollable. Happens most often when he is sitting is in recliner.        History per MA reviewed.    When he sits and watches tv he has to keep moving legs  to cross them and it works for short time.   Requip helps during night, sleeping well.   Med is more a problem in the daytime  Overall feels med hasn't helped as he'd like and isn't worth staying on.   sleeps good x 4 hours then up ready to roll.  Taking tegretol prn headache. Hasn't taken x 2 months.   Tizanidine 4 mg nightly, he doesn't feel it helps with comfort of legs (\"they do not hurt\") and wife says he's still moving legs around.          The following portions of the patient's history were reviewed and updated as appropriate: allergies, current medications, past family history, past medical history, past social history, past surgical history and problem list.    Review of Systems   Constitutional: Negative for fever.   Genitourinary: Positive for nocturia (0-1x nightly).   Psychiatric/Behavioral: Positive for sleep disturbance.         Objective   Visit Vitals  /70   Pulse 70   Temp 97.5 °F (36.4 °C) (Temporal)   Ht 168.9 cm (66.5\")   Wt 111 kg (245 lb)   SpO2 93%   BMI 38.96 kg/m²       Physical Exam  Vitals and nursing note reviewed.   Constitutional:       General: He is not in acute distress.     Appearance: Normal appearance. He is well-developed and well-groomed. He is obese. He is not ill-appearing, toxic-appearing or diaphoretic.      Interventions: Face mask in place.   HENT:      Head: Normocephalic and atraumatic.      Right Ear: Hearing normal.      Left Ear: Hearing normal.   Eyes:      General: Lids are normal. No scleral icterus.     Extraocular Movements: Extraocular movements intact.   Pulmonary:      Effort: Pulmonary effort is " normal.   Musculoskeletal:      Cervical back: Neck supple.   Skin:     Coloration: Skin is not jaundiced or pale.   Neurological:      General: No focal deficit present.      Mental Status: He is alert and oriented to person, place, and time.      Gait: Gait abnormal.   Psychiatric:         Attention and Perception: Attention and perception normal.         Mood and Affect: Mood and affect normal.         Speech: Speech normal.         Behavior: Behavior normal. Behavior is cooperative.         Thought Content: Thought content normal.         Cognition and Memory: Cognition and memory normal.         Judgment: Judgment normal.         For medical decision making review of the following was required:  Lab Results   Component Value Date    [id number] 964 (H) 02/10/2021     Lab Results   Component Value Date    FOLATE 10.30 02/10/2021      Lab Results   Component Value Date    HGBA1C 5.60 02/10/2021    HGBA1C 5.50 06/24/2019    HGBA1C 5.50 03/14/2018     Lab Results   Component Value Date    WBC 7.51 02/10/2021    HGB 18.0 (H) 02/10/2021    HCT 52.7 (H) 02/10/2021    MCV 96.3 02/10/2021     02/10/2021     Lab Results   Component Value Date    TSH 2.910 02/10/2021     Lab Results   Component Value Date    FREET4 1.45 02/10/2021     Genetic Testing - Blood, (02/11/2021 02:01)  HLA-B*1502 negative         Assessment/Plan     Problem List Items Addressed This Visit        Endocrine and Metabolic    Vitamin B12 deficiency      Other Visit Diagnoses     Restlessness    -  Primary    Relevant Medications    carbidopa-levodopa ER (SINEMET CR)  MG per tablet    Need for influenza vaccination        Relevant Orders    FluLaval/Fluarix >6 Months ([id number])          · Vitamin B12 shot given today  · May continue tegretol prn headache  · Discussed medicines, overall doesn't sound to be getting benefits from Zanaflex and Requip, so will stop. Adding sinemet to see if this helps with restlessness during day along with  night.  · No change Flomax, sounds to be effective.     Return for As scheduled previously.     Naty Lebron MD

## 2021-10-04 NOTE — PATIENT INSTRUCTIONS
Restless Legs Syndrome  Restless legs syndrome is a condition that causes uncomfortable feelings or sensations in the legs, especially while sitting or lying down. The sensations usually cause an overwhelming urge to move the legs. The arms can also sometimes be affected.  The condition can range from mild to severe. The symptoms often interfere with a person's ability to sleep.  What are the causes?  The cause of this condition is not known.  What increases the risk?  This condition is more likely to develop in:  · People who are older than age 50.  · Pregnant women. In general, restless legs syndrome is more common in women than in men.  · People who have a family history of the condition.  · People who have certain medical conditions, such as iron deficiency, kidney disease, Parkinson disease, or nerve damage.  · People who take certain medicines, such as medicines for high blood pressure, nausea, colds, allergies, depression, and some heart conditions.     What are the signs or symptoms?  The main symptom of this condition is uncomfortable sensations in the legs. These sensations may be:  · Described as pulling, tingling, prickling, throbbing, crawling, or burning.  · Worse while you are sitting or lying down.  · Worse during periods of rest or inactivity.  · Worse at night, often interfering with your sleep.  · Accompanied by a very strong urge to move your legs.  · Temporarily relieved by movement of your legs.     The sensations usually affect both sides of the body. The arms can also be affected, but this is rare. People who have this condition often have tiredness during the day because of their lack of sleep at night.  How is this diagnosed?  This condition may be diagnosed based on your description of the symptoms. You may also have tests, including blood tests, to check for other conditions that may lead to your symptoms. In some cases, you may be asked to spend some time in a sleep lab so your sleeping  can be monitored.  How is this treated?  Treatment for this condition is focused on managing the symptoms. Treatment may include:  · Self-help and lifestyle changes.  · Medicines.     Follow these instructions at home:  · Take medicines only as directed by your health care provider.  · Try these methods to get temporary relief from the uncomfortable sensations:  ? Massage your legs.  ? Walk or stretch.  ? Take a cold or hot bath.  · Practice good sleep habits. For example, go to bed and get up at the same time every day.  · Exercise regularly.  · Practice ways of relaxing, such as yoga or meditation.  · Avoid caffeine and alcohol.  · Do not use any tobacco products, including cigarettes, chewing tobacco, or electronic cigarettes. If you need help quitting, ask your health care provider.  · Keep all follow-up visits as directed by your health care provider. This is important.  Contact a health care provider if:  Your symptoms do not improve with treatment, or they get worse.  This information is not intended to replace advice given to you by your health care provider. Make sure you discuss any questions you have with your health care provider.    Document Released: 12/08/2003 Document Revised: 05/25/2017 Document Reviewed: 12/14/2015  [employer] Patient Education © 2019 [employer]

## 2021-10-05 ENCOUNTER — TELEPHONE (OUTPATIENT)
Dept: OTHER | Facility: HOSPITAL | Age: 61
End: 2021-10-05

## 2021-10-05 LAB — SARS-COV-2 RNA NOSE QL NAA+PROBE: NOT DETECTED

## 2021-11-05 ENCOUNTER — CLINICAL SUPPORT (OUTPATIENT)
Dept: INTERNAL MEDICINE | Facility: CLINIC | Age: 61
End: 2021-11-05

## 2021-11-05 DIAGNOSIS — E53.8 VITAMIN B12 DEFICIENCY: ICD-10-CM

## 2021-11-05 PROCEDURE — 96372 THER/PROPH/DIAG INJ SC/IM: CPT | Performed by: FAMILY MEDICINE

## 2021-11-05 RX ADMIN — CYANOCOBALAMIN 1000 MCG: 1000 INJECTION, SOLUTION INTRAMUSCULAR; SUBCUTANEOUS at 08:20

## 2021-11-18 ENCOUNTER — OFFICE VISIT (OUTPATIENT)
Dept: INTERNAL MEDICINE | Facility: CLINIC | Age: 61
End: 2021-11-18

## 2021-11-18 VITALS
BODY MASS INDEX: 38.61 KG/M2 | WEIGHT: 246 LBS | TEMPERATURE: 97.3 F | OXYGEN SATURATION: 90 % | SYSTOLIC BLOOD PRESSURE: 130 MMHG | DIASTOLIC BLOOD PRESSURE: 76 MMHG | HEART RATE: 74 BPM | RESPIRATION RATE: 16 BRPM | HEIGHT: 67 IN

## 2021-11-18 DIAGNOSIS — R45.1 RESTLESSNESS: Primary | ICD-10-CM

## 2021-11-18 DIAGNOSIS — I10 ESSENTIAL HYPERTENSION: Chronic | ICD-10-CM

## 2021-11-18 DIAGNOSIS — G11.8 SPINOCEREBELLAR ATAXIA (HCC): ICD-10-CM

## 2021-11-18 PROCEDURE — 99214 OFFICE O/P EST MOD 30 MIN: CPT | Performed by: FAMILY MEDICINE

## 2021-11-18 RX ORDER — PRAMIPEXOLE DIHYDROCHLORIDE 0.5 MG/1
0.5 TABLET ORAL 2 TIMES DAILY
Qty: 180 TABLET | Refills: 3 | Status: SHIPPED | OUTPATIENT
Start: 2021-11-18 | End: 2022-02-11

## 2021-12-03 ENCOUNTER — CLINICAL SUPPORT (OUTPATIENT)
Dept: INTERNAL MEDICINE | Facility: CLINIC | Age: 61
End: 2021-12-03

## 2021-12-03 DIAGNOSIS — E53.8 VITAMIN B12 DEFICIENCY: ICD-10-CM

## 2021-12-03 PROCEDURE — 96372 THER/PROPH/DIAG INJ SC/IM: CPT | Performed by: FAMILY MEDICINE

## 2021-12-03 RX ADMIN — CYANOCOBALAMIN 1000 MCG: 1000 INJECTION, SOLUTION INTRAMUSCULAR; SUBCUTANEOUS at 12:00

## 2022-01-04 ENCOUNTER — CLINICAL SUPPORT (OUTPATIENT)
Dept: INTERNAL MEDICINE | Facility: CLINIC | Age: 62
End: 2022-01-04

## 2022-01-04 DIAGNOSIS — E53.8 VITAMIN B12 DEFICIENCY: ICD-10-CM

## 2022-01-04 PROCEDURE — 96372 THER/PROPH/DIAG INJ SC/IM: CPT | Performed by: FAMILY MEDICINE

## 2022-01-04 RX ADMIN — CYANOCOBALAMIN 1000 MCG: 1000 INJECTION, SOLUTION INTRAMUSCULAR; SUBCUTANEOUS at 09:38

## 2022-02-03 ENCOUNTER — CLINICAL SUPPORT (OUTPATIENT)
Dept: INTERNAL MEDICINE | Facility: CLINIC | Age: 62
End: 2022-02-03

## 2022-02-03 DIAGNOSIS — E53.8 VITAMIN B12 DEFICIENCY: ICD-10-CM

## 2022-02-03 PROCEDURE — 96372 THER/PROPH/DIAG INJ SC/IM: CPT | Performed by: FAMILY MEDICINE

## 2022-02-03 RX ADMIN — CYANOCOBALAMIN 1000 MCG: 1000 INJECTION, SOLUTION INTRAMUSCULAR; SUBCUTANEOUS at 12:36

## 2022-02-11 ENCOUNTER — OFFICE VISIT (OUTPATIENT)
Dept: INTERNAL MEDICINE | Facility: CLINIC | Age: 62
End: 2022-02-11

## 2022-02-11 VITALS
HEIGHT: 67 IN | RESPIRATION RATE: 16 BRPM | DIASTOLIC BLOOD PRESSURE: 84 MMHG | OXYGEN SATURATION: 95 % | BODY MASS INDEX: 38.39 KG/M2 | WEIGHT: 244.6 LBS | HEART RATE: 67 BPM | SYSTOLIC BLOOD PRESSURE: 120 MMHG | TEMPERATURE: 97.7 F

## 2022-02-11 DIAGNOSIS — G11.8 SPINOCEREBELLAR ATAXIA: ICD-10-CM

## 2022-02-11 DIAGNOSIS — R79.9 ABNORMAL FINDING OF BLOOD CHEMISTRY, UNSPECIFIED: ICD-10-CM

## 2022-02-11 DIAGNOSIS — E55.9 VITAMIN D DEFICIENCY: ICD-10-CM

## 2022-02-11 DIAGNOSIS — Z12.5 PROSTATE CANCER SCREENING: ICD-10-CM

## 2022-02-11 DIAGNOSIS — I10 ESSENTIAL HYPERTENSION: Primary | ICD-10-CM

## 2022-02-11 DIAGNOSIS — R45.1 RESTLESSNESS: ICD-10-CM

## 2022-02-11 DIAGNOSIS — N40.1 BENIGN NON-NODULAR PROSTATIC HYPERPLASIA WITH LOWER URINARY TRACT SYMPTOMS: ICD-10-CM

## 2022-02-11 DIAGNOSIS — E53.8 VITAMIN B12 DEFICIENCY: ICD-10-CM

## 2022-02-11 DIAGNOSIS — R73.9 HYPERGLYCEMIA: ICD-10-CM

## 2022-02-11 DIAGNOSIS — R33.9 URINARY RETENTION: ICD-10-CM

## 2022-02-11 DIAGNOSIS — E78.00 HYPERCHOLESTEREMIA: ICD-10-CM

## 2022-02-11 LAB
25(OH)D3+25(OH)D2 SERPL-MCNC: 22.4 NG/ML (ref 30–100)
ALBUMIN SERPL-MCNC: 4.6 G/DL (ref 3.5–5.2)
ALBUMIN/GLOB SERPL: 1.4 G/DL
ALP SERPL-CCNC: 43 U/L (ref 39–117)
ALT SERPL-CCNC: 59 U/L (ref 1–41)
AST SERPL-CCNC: 36 U/L (ref 1–40)
BILIRUB SERPL-MCNC: 0.7 MG/DL (ref 0–1.2)
BUN SERPL-MCNC: 14 MG/DL (ref 8–23)
BUN/CREAT SERPL: 13 (ref 7–25)
CALCIUM SERPL-MCNC: 10 MG/DL (ref 8.6–10.5)
CHLORIDE SERPL-SCNC: 99 MMOL/L (ref 98–107)
CHOLEST SERPL-MCNC: 128 MG/DL (ref 0–200)
CO2 SERPL-SCNC: 29.1 MMOL/L (ref 22–29)
CREAT SERPL-MCNC: 1.08 MG/DL (ref 0.76–1.27)
ERYTHROCYTE [DISTWIDTH] IN BLOOD BY AUTOMATED COUNT: 12.8 % (ref 12.3–15.4)
FERRITIN SERPL-MCNC: 290 NG/ML (ref 30–400)
FOLATE SERPL-MCNC: 10.3 NG/ML (ref 4.78–24.2)
GLOBULIN SER CALC-MCNC: 3.4 GM/DL
GLUCOSE SERPL-MCNC: 105 MG/DL (ref 65–99)
HBA1C MFR BLD: 5.6 % (ref 4.8–5.6)
HCT VFR BLD AUTO: 54.2 % (ref 37.5–51)
HDLC SERPL-MCNC: 41 MG/DL (ref 40–60)
HGB BLD-MCNC: 18.7 G/DL (ref 13–17.7)
IRON SATN MFR SERPL: 44 % (ref 20–50)
IRON SERPL-MCNC: 176 MCG/DL (ref 59–158)
LDLC SERPL CALC-MCNC: 68 MG/DL (ref 0–100)
MCH RBC QN AUTO: 33.6 PG (ref 26.6–33)
MCHC RBC AUTO-ENTMCNC: 34.5 G/DL (ref 31.5–35.7)
MCV RBC AUTO: 97.5 FL (ref 79–97)
PLATELET # BLD AUTO: 167 10*3/MM3 (ref 140–450)
POTASSIUM SERPL-SCNC: 4.2 MMOL/L (ref 3.5–5.2)
PROT SERPL-MCNC: 8 G/DL (ref 6–8.5)
PSA SERPL-MCNC: 3.52 NG/ML (ref 0–4)
RBC # BLD AUTO: 5.56 10*6/MM3 (ref 4.14–5.8)
SODIUM SERPL-SCNC: 138 MMOL/L (ref 136–145)
T4 FREE SERPL-MCNC: 1.46 NG/DL (ref 0.93–1.7)
TIBC SERPL-MCNC: 396 MCG/DL
TRIGL SERPL-MCNC: 101 MG/DL (ref 0–150)
TSH SERPL DL<=0.005 MIU/L-ACNC: 3.67 UIU/ML (ref 0.27–4.2)
UIBC SERPL-MCNC: 220 MCG/DL (ref 112–346)
VIT B12 SERPL-MCNC: 1111 PG/ML (ref 211–946)
VLDLC SERPL CALC-MCNC: 19 MG/DL (ref 5–40)
WBC # BLD AUTO: 6.71 10*3/MM3 (ref 3.4–10.8)

## 2022-02-11 PROCEDURE — 99214 OFFICE O/P EST MOD 30 MIN: CPT | Performed by: FAMILY MEDICINE

## 2022-02-11 RX ORDER — TAMSULOSIN HYDROCHLORIDE 0.4 MG/1
2 CAPSULE ORAL NIGHTLY
Qty: 180 CAPSULE | Refills: 3 | Status: SHIPPED | OUTPATIENT
Start: 2022-02-11 | End: 2022-03-10 | Stop reason: SDUPTHER

## 2022-02-11 RX ORDER — CYANOCOBALAMIN 1000 UG/ML
1000 INJECTION, SOLUTION INTRAMUSCULAR; SUBCUTANEOUS
Qty: 3 ML | Refills: 3 | Status: SHIPPED | OUTPATIENT
Start: 2022-02-11 | End: 2022-03-10 | Stop reason: SDUPTHER

## 2022-02-11 RX ORDER — TIZANIDINE 4 MG/1
4 TABLET ORAL NIGHTLY
COMMUNITY
Start: 2021-12-30 | End: 2022-03-22 | Stop reason: SDUPTHER

## 2022-02-11 RX ORDER — CETIRIZINE HYDROCHLORIDE 10 MG/1
10 TABLET ORAL DAILY
COMMUNITY
End: 2022-11-22

## 2022-02-11 NOTE — PROGRESS NOTES
"Subjective    Chris Mejia is a 62 y.o. male here for:  Chief Complaint   Patient presents with   • Hypertension     follow up       History per MA reviewed.    More trouble with urination trouble getting urine out. On Flomax daily.     Saw Dr. Barlow in past and wife reports they were told cerebellum size of a dime and nothing they could do. Pt has tried physical therapy multiple times without any improvements. Trial of carbidopa/levodopa for restlessness wasn't helpful.          The following portions of the patient's history were reviewed and updated as appropriate: allergies, current medications, past family history, past medical history, past social history, past surgical history and problem list.    Review of Systems   Constitutional: Negative for fever.   Genitourinary: Positive for difficulty urinating.   Musculoskeletal: Positive for arthralgias and gait problem.         Objective   Visit Vitals  /84 (BP Location: Right arm, Patient Position: Sitting, Cuff Size: Adult)   Pulse 67   Temp 97.7 °F (36.5 °C) (Temporal)   Resp 16   Ht 168.9 cm (66.5\")   Wt 111 kg (244 lb 9.6 oz)   SpO2 95%   BMI 38.89 kg/m²       Physical Exam  Vitals and nursing note reviewed.   Constitutional:       General: He is not in acute distress.     Appearance: Normal appearance. He is well-developed and well-groomed. He is obese. He is not ill-appearing, toxic-appearing or diaphoretic.      Interventions: Face mask in place.   HENT:      Head: Normocephalic and atraumatic.      Right Ear: Hearing normal.      Left Ear: Hearing normal.   Eyes:      General: Lids are normal. No scleral icterus.     Extraocular Movements: Extraocular movements intact.   Cardiovascular:      Rate and Rhythm: Normal rate and regular rhythm.   Pulmonary:      Effort: Pulmonary effort is normal.      Breath sounds: Normal breath sounds.   Musculoskeletal:      Cervical back: Neck supple.   Skin:     Coloration: Skin is not jaundiced or pale. "   Neurological:      General: No focal deficit present.      Mental Status: He is alert and oriented to person, place, and time.      Gait: Gait abnormal.   Psychiatric:         Attention and Perception: Attention and perception normal.         Mood and Affect: Mood and affect normal.         Speech: Speech normal.         Behavior: Behavior normal. Behavior is cooperative.         Thought Content: Thought content normal.         Cognition and Memory: Cognition and memory normal.         Judgment: Judgment normal.         For medical decision making review of the following was required:  Lab Results   Component Value Date    WBC 7.51 02/10/2021    HGB 18.0 (H) 02/10/2021    HCT 52.7 (H) 02/10/2021    MCV 96.3 02/10/2021     02/10/2021     Lab Results   Component Value Date    GLUCOSE 103 (H) 02/10/2021    BUN 13 02/10/2021    CREATININE 1.11 02/10/2021    EGFRIFNONA 67 02/10/2021    EGFRIFAFRI 81 02/10/2021    BCR 11.7 02/10/2021    K 4.6 02/10/2021    CO2 29.3 (H) 02/10/2021    CALCIUM 9.7 02/10/2021    PROTENTOTREF 7.7 02/10/2021    ALBUMIN 4.20 02/10/2021    LABIL2 1.2 02/10/2021    AST 34 02/10/2021    ALT 40 02/10/2021         Assessment/Plan     Problem List Items Addressed This Visit        Cardiac and Vasculature    Essential hypertension - Primary (Chronic)    Relevant Orders    TSH+Free T4    Hypercholesteremia    Relevant Orders    Lipid Panel    Comprehensive Metabolic Panel       Endocrine and Metabolic    Vitamin B12 deficiency    Relevant Medications    cyanocobalamin 1000 MCG/ML injection    Syringe, Disposable, 1 ML misc    Other Relevant Orders    CBC (No Diff)    Vitamin B12    Folate    Vitamin D deficiency    Relevant Orders    Comprehensive Metabolic Panel    Vitamin D 25 Hydroxy       Genitourinary and Reproductive     Benign non-nodular prostatic hyperplasia with lower urinary tract symptoms    Relevant Medications    tamsulosin (FLOMAX) 0.4 MG capsule 24 hr capsule       Neuro     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HCC) (Chronic)    Relevant Orders    Ambulatory Referral to Neurology (Completed)      Other Visit Diagnoses     Urinary retention        Relevant Medications    tamsulosin (FLOMAX) 0.4 MG capsule 24 hr capsule    Prostate cancer screening        Relevant Orders    PSA Screen    Hyperglycemia        Relevant Orders    Hemoglobin A1c    Restlessness        Relevant Orders    TSH+Free T4    Iron Profile    Ferritin    Abnormal finding of blood chemistry, unspecified         Relevant Orders    Iron Profile    Ferritin          · Discussed possible need to refer to urology (if higher dose Flomax not helpful (reminded to take at bedtime due to possible hypotension) and/or PSA elevation  · Suggest second opinion regarding spinocerebellar ataxia from  [facility]. Reviewed imaging with patient/wife.     Return in 3 months (on 5/17/2022) for follow up same day as wife.     Naty Lebron MD

## 2022-02-15 DIAGNOSIS — D58.2 ELEVATED HEMOGLOBIN: Primary | ICD-10-CM

## 2022-02-15 DIAGNOSIS — R79.9 ABNORMAL BLOOD CHEMISTRY: ICD-10-CM

## 2022-02-15 NOTE — PROGRESS NOTES
Please let pt/wife know:  1. Cholesterol okay.  2. PSA (prostate lab) has gone up some but still in normal range. I had discussed referral to urology if the increase on Flomax didn't help with urination. Remind them of that.  3. Vitamin D low. Sending rx to take once a week.   4. Blood count high (opposite of anemia) and iron level is high. If on any supplements with iron stop use. Need to recheck this lab in about a month (will put in order, no appointment needed, doesn't have to fast).

## 2022-02-17 ENCOUNTER — TELEPHONE (OUTPATIENT)
Dept: INTERNAL MEDICINE | Facility: CLINIC | Age: 62
End: 2022-02-17

## 2022-02-17 NOTE — TELEPHONE ENCOUNTER
Caller: Diana Mejia    Relationship: Emergency Contact    Best call back number: [phone]    What medications are you currently taking:   Current Outpatient Medications on File Prior to Visit   Medication Sig Dispense Refill   • amLODIPine (NORVASC) 2.5 MG tablet Take 1 tablet by mouth Daily. 90 tablet 3   • aspirin 81 MG tablet Take 1 tablet by mouth Daily. 30 tablet 11   • busPIRone (BUSPAR) 15 MG tablet Take 1 tablet by mouth 2 (Two) Times a Day As Needed (anxiety). 180 tablet 3   • carBAMazepine (TEGretol) 200 MG tablet Take 1 tablet by mouth At Night As Needed (headache). 90 tablet 3   • carvedilol (COREG) 12.5 MG tablet Take 1 tablet by mouth 2 (Two) Times a Day With Meals. 180 tablet 3   • cetirizine (zyrTEC) 10 MG tablet Take 10 mg by mouth Daily.     • cyanocobalamin 1000 MCG/ML injection Inject 1 mL into the appropriate muscle as directed by prescriber Every 30 (Thirty) Days. 3 mL 3   • hydroCHLOROthiazide (MICROZIDE) 12.5 MG capsule Take 1 capsule by mouth Every Morning. 90 capsule 3   • levocetirizine (XYZAL) 5 MG tablet Take 1 tablet by mouth Every Evening. 90 tablet 3   • meloxicam (MOBIC) 15 MG tablet Take 1 tablet by mouth Daily. 90 tablet 3   • nitroglycerin (Nitrostat) 0.4 MG SL tablet Place 1 tablet under the tongue Every 5 (Five) Minutes As Needed for Chest Pain. Take no more than 3 doses in 15 minutes. 30 tablet 1   • pantoprazole (PROTONIX) 40 MG EC tablet TAKE 1 TABLET EVERY DAY 90 tablet 3   • pravastatin (PRAVACHOL) 40 MG tablet Take 1 tablet by mouth Every Night. For high cholesterol, stroke risk reduction. 90 tablet 3   • Syringe, Disposable, 1 ML misc Use to inject vitamin B12 every 30 days 3 each 3   • tamsulosin (FLOMAX) 0.4 MG capsule 24 hr capsule Take 2 capsules by mouth Every Night. 180 capsule 3   • tiZANidine (ZANAFLEX) 4 MG tablet Take 4 mg by mouth Every Night.       Current Facility-Administered Medications on File Prior to Visit   Medication Dose Route Frequency Provider  Last Rate Last Admin   • cyanocobalamin injection 1,000 mcg  1,000 mcg Intramuscular Q28 Days Naty Lebron MD   1,000 mcg at 02/03/22 1236          Which medication are you concerned about: CYANOCOBALAMIN 1000 [facility] INJ    Who prescribed you this medication: DR LEBRON    What are your concerns: [facility] ISN'T SURE ON HOW TO STORE THIS MEDICATION, PLEASE ADVISE?

## 2022-02-18 NOTE — TELEPHONE ENCOUNTER
Attempted to call-No VM    HUB may inform.    Pt needs to store B12  in a cabinet out of direct sunlight.  DOES NOT Need to be refrigerated.

## 2022-03-09 ENCOUNTER — OFFICE VISIT (OUTPATIENT)
Dept: INTERNAL MEDICINE | Facility: CLINIC | Age: 62
End: 2022-03-09

## 2022-03-09 ENCOUNTER — TELEPHONE (OUTPATIENT)
Dept: INTERNAL MEDICINE | Facility: CLINIC | Age: 62
End: 2022-03-09

## 2022-03-09 VITALS
SYSTOLIC BLOOD PRESSURE: 118 MMHG | WEIGHT: 242 LBS | HEART RATE: 70 BPM | RESPIRATION RATE: 16 BRPM | HEIGHT: 67 IN | OXYGEN SATURATION: 93 % | TEMPERATURE: 97.8 F | BODY MASS INDEX: 37.98 KG/M2 | DIASTOLIC BLOOD PRESSURE: 78 MMHG

## 2022-03-09 DIAGNOSIS — G11.8 SPINOCEREBELLAR ATAXIA: Chronic | ICD-10-CM

## 2022-03-09 DIAGNOSIS — I10 ESSENTIAL HYPERTENSION: Chronic | ICD-10-CM

## 2022-03-09 DIAGNOSIS — E66.01 CLASS 2 SEVERE OBESITY DUE TO EXCESS CALORIES WITH SERIOUS COMORBIDITY AND BODY MASS INDEX (BMI) OF 38.0 TO 38.9 IN ADULT: ICD-10-CM

## 2022-03-09 DIAGNOSIS — R21 GROIN RASH: Primary | ICD-10-CM

## 2022-03-09 DIAGNOSIS — R09.81 NASAL CONGESTION: ICD-10-CM

## 2022-03-09 PROCEDURE — 99214 OFFICE O/P EST MOD 30 MIN: CPT | Performed by: FAMILY MEDICINE

## 2022-03-09 RX ORDER — IPRATROPIUM BROMIDE 21 UG/1
2 SPRAY, METERED NASAL EVERY 12 HOURS
Qty: 30 ML | Refills: 12 | Status: SHIPPED | OUTPATIENT
Start: 2022-03-09 | End: 2022-11-22

## 2022-03-09 RX ORDER — FLUCONAZOLE 200 MG/1
200 TABLET ORAL WEEKLY
Qty: 4 TABLET | Refills: 0 | Status: SHIPPED | OUTPATIENT
Start: 2022-03-09 | End: 2022-03-31

## 2022-03-09 NOTE — TELEPHONE ENCOUNTER
Caller: Diana Mejia    Relationship: Emergency Contact    Best call back number: [phone]    What is the best time to reach you: ANYTIME     Who are you requesting to speak with (clinical staff, provider,  specific staff member): CLINICAL STAFF     What was the call regarding: PATIENT'S WIFE IS CALLING TO LET THEIR PCP KNOW THAT THEY ARE NO LONGER USING [facility]. INSTEAD THEY WILL BE GOING THROUGH [facility]    Do you require a callback: NO

## 2022-03-09 NOTE — PROGRESS NOTES
"Subjective    Chris Mejia is a 62 y.o. male here for:  Chief Complaint   Patient presents with   • Rash     On groin, requesting a cream       History per MA reviewed.    Red itchy rash in groin, clotrimazole didn't help  Tried wife's vaginal cream and it helped some         The following portions of the patient's history were reviewed and updated as appropriate: allergies, current medications, past family history, past medical history, past social history, past surgical history and problem list.    Review of Systems   Constitutional: Negative for fever.   HENT: Positive for congestion.    Musculoskeletal: Positive for gait problem (chronic).   Skin: Positive for rash.         Objective   Visit Vitals  /78 (BP Location: Right arm, Patient Position: Sitting, Cuff Size: Adult)   Pulse 70   Temp 97.8 °F (36.6 °C) (Temporal)   Resp 16   Ht 168.9 cm (66.5\")   Wt 110 kg (242 lb)   SpO2 93%   BMI 38.47 kg/m²       Physical Exam  Vitals and nursing note reviewed.   Constitutional:       General: He is not in acute distress.     Appearance: Normal appearance. He is well-developed and well-groomed. He is obese. He is not ill-appearing, toxic-appearing or diaphoretic.      Interventions: Face mask in place.   HENT:      Head: Normocephalic and atraumatic.      Right Ear: Hearing normal.      Left Ear: Hearing normal.   Eyes:      General: Lids are normal. No scleral icterus.     Extraocular Movements: Extraocular movements intact.   Pulmonary:      Effort: Pulmonary effort is normal.   Musculoskeletal:      Cervical back: Neck supple.   Skin:     Coloration: Skin is not jaundiced or pale.   Neurological:      General: No focal deficit present.      Mental Status: He is alert and oriented to person, place, and time.   Psychiatric:         Attention and Perception: Attention and perception normal.         Mood and Affect: Mood and affect normal.         Speech: Speech normal.         Behavior: Behavior normal. " Behavior is cooperative.         Thought Content: Thought content normal.         Cognition and Memory: Cognition and memory normal.         Judgment: Judgment normal.         For medical decision making review of the following was required:  Lab Results   Component Value Date    WBC 6.71 02/11/2022    HGB 18.7 (H) 02/11/2022    HCT 54.2 (H) 02/11/2022    MCV 97.5 (H) 02/11/2022     02/11/2022     Lab Results   Component Value Date    GLUCOSE 105 (H) 02/11/2022    BUN 14 02/11/2022    CREATININE 1.08 02/11/2022    EGFRIFNONA 69 02/11/2022    EGFRIFAFRI 83 02/11/2022    BCR 13.0 02/11/2022    K 4.2 02/11/2022    CO2 29.1 (H) 02/11/2022    CALCIUM 10.0 02/11/2022    PROTENTOTREF 8.0 02/11/2022    ALBUMIN 4.60 02/11/2022    LABIL2 1.4 02/11/2022    AST 36 02/11/2022    ALT 59 (H) 02/11/2022         Assessment/Plan     Problem List Items Addressed This Visit        Cardiac and Vasculature    Essential hypertension (Chronic)    Current Assessment & Plan     Hypertension is improving with treatment.  Continue current treatment regimen.  Blood pressure will be reassessed at the next regular appointment.              Endocrine and Metabolic    Class 2 severe obesity due to excess calories with serious comorbidity in adult (HCC)    Overview     · Associated with hypertension, hyperlipidemia               Neuro    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HCC) (Chronic)    Current Assessment & Plan     Complicates all aspects of care             Other Visit Diagnoses     Groin rash    -  Primary    weekly fluconazole x 4 weeks    Relevant Medications    fluconazole (Diflucan) 200 MG tablet    Nasal congestion        Relevant Medications    ipratropium (ATROVENT) 0.03 % nasal spray        Patient's Body mass index is 38.47 kg/m². indicating that he is obese (BMI >30). Obesity-related health conditions include the following: hypertension and dyslipidemias. Obesity is unchanged. BMI is is above average; BMI management plan is completed. We  discussed portion control..     Return for As scheduled previously.     Naty Lebron MD

## 2022-03-10 DIAGNOSIS — E78.00 HYPERCHOLESTEREMIA: ICD-10-CM

## 2022-03-10 DIAGNOSIS — G11.8 SPINOCEREBELLAR ATAXIA: Chronic | ICD-10-CM

## 2022-03-10 DIAGNOSIS — K21.9 GASTROESOPHAGEAL REFLUX DISEASE WITHOUT ESOPHAGITIS: ICD-10-CM

## 2022-03-10 DIAGNOSIS — I25.10 CORONARY ARTERY DISEASE INVOLVING NATIVE CORONARY ARTERY OF NATIVE HEART WITHOUT ANGINA PECTORIS: ICD-10-CM

## 2022-03-10 DIAGNOSIS — F41.9 ANXIETY: ICD-10-CM

## 2022-03-10 DIAGNOSIS — F51.01 PRIMARY INSOMNIA: ICD-10-CM

## 2022-03-10 DIAGNOSIS — G50.0 TRIGEMINAL NEURALGIA: ICD-10-CM

## 2022-03-10 DIAGNOSIS — J30.2 SEASONAL ALLERGIES: ICD-10-CM

## 2022-03-10 DIAGNOSIS — E53.8 VITAMIN B12 DEFICIENCY: ICD-10-CM

## 2022-03-10 DIAGNOSIS — M06.9 RHEUMATOID ARTHRITIS INVOLVING MULTIPLE SITES, UNSPECIFIED WHETHER RHEUMATOID FACTOR PRESENT: ICD-10-CM

## 2022-03-10 DIAGNOSIS — R33.9 URINARY RETENTION: ICD-10-CM

## 2022-03-10 DIAGNOSIS — N40.1 BENIGN NON-NODULAR PROSTATIC HYPERPLASIA WITH LOWER URINARY TRACT SYMPTOMS: ICD-10-CM

## 2022-03-10 DIAGNOSIS — I10 ESSENTIAL HYPERTENSION: Chronic | ICD-10-CM

## 2022-03-10 RX ORDER — LEVOCETIRIZINE DIHYDROCHLORIDE 5 MG/1
5 TABLET, FILM COATED ORAL EVERY EVENING
Qty: 90 TABLET | Refills: 3 | Status: SHIPPED | OUTPATIENT
Start: 2022-03-10 | End: 2022-11-22

## 2022-03-10 RX ORDER — AMLODIPINE BESYLATE 2.5 MG/1
2.5 TABLET ORAL DAILY
Qty: 90 TABLET | Refills: 3 | Status: SHIPPED | OUTPATIENT
Start: 2022-03-10 | End: 2022-08-17 | Stop reason: SDUPTHER

## 2022-03-10 RX ORDER — NITROGLYCERIN 0.4 MG/1
0.4 TABLET SUBLINGUAL
Qty: 30 TABLET | Refills: 1 | Status: SHIPPED | OUTPATIENT
Start: 2022-03-10 | End: 2023-02-24 | Stop reason: SDUPTHER

## 2022-03-10 RX ORDER — MELOXICAM 15 MG/1
15 TABLET ORAL DAILY
Qty: 90 TABLET | Refills: 3 | Status: SHIPPED | OUTPATIENT
Start: 2022-03-10 | End: 2022-11-22

## 2022-03-10 RX ORDER — CYANOCOBALAMIN 1000 UG/ML
1000 INJECTION, SOLUTION INTRAMUSCULAR; SUBCUTANEOUS
Qty: 3 ML | Refills: 3 | Status: SHIPPED | OUTPATIENT
Start: 2022-03-10 | End: 2022-05-11 | Stop reason: CLARIF

## 2022-03-10 RX ORDER — BUSPIRONE HYDROCHLORIDE 15 MG/1
15 TABLET ORAL 2 TIMES DAILY PRN
Qty: 180 TABLET | Refills: 3 | Status: SHIPPED | OUTPATIENT
Start: 2022-03-10 | End: 2023-02-20

## 2022-03-10 RX ORDER — PRAVASTATIN SODIUM 40 MG
40 TABLET ORAL NIGHTLY
Qty: 90 TABLET | Refills: 3 | Status: SHIPPED | OUTPATIENT
Start: 2022-03-10 | End: 2023-02-20

## 2022-03-10 RX ORDER — TAMSULOSIN HYDROCHLORIDE 0.4 MG/1
2 CAPSULE ORAL NIGHTLY
Qty: 180 CAPSULE | Refills: 3 | Status: SHIPPED | OUTPATIENT
Start: 2022-03-10 | End: 2023-02-20

## 2022-03-10 RX ORDER — CARBAMAZEPINE 200 MG/1
200 TABLET ORAL NIGHTLY PRN
Qty: 90 TABLET | Refills: 3 | Status: SHIPPED | OUTPATIENT
Start: 2022-03-10 | End: 2022-08-17

## 2022-03-10 RX ORDER — PANTOPRAZOLE SODIUM 40 MG/1
40 TABLET, DELAYED RELEASE ORAL DAILY
Qty: 90 TABLET | Refills: 3 | Status: SHIPPED | OUTPATIENT
Start: 2022-03-10 | End: 2023-02-20

## 2022-03-10 RX ORDER — CARVEDILOL 12.5 MG/1
12.5 TABLET ORAL 2 TIMES DAILY WITH MEALS
Qty: 180 TABLET | Refills: 3 | Status: SHIPPED | OUTPATIENT
Start: 2022-03-10 | End: 2023-02-20

## 2022-03-10 RX ORDER — HYDROCHLOROTHIAZIDE 12.5 MG/1
12.5 CAPSULE, GELATIN COATED ORAL EVERY MORNING
Qty: 90 CAPSULE | Refills: 3 | Status: SHIPPED | OUTPATIENT
Start: 2022-03-10 | End: 2023-02-20

## 2022-03-10 NOTE — TELEPHONE ENCOUNTER
Called pt wife, verbally informed we switched the pharmacy as well as sent all medications through.

## 2022-03-11 ENCOUNTER — PRIOR AUTHORIZATION (OUTPATIENT)
Dept: INTERNAL MEDICINE | Facility: CLINIC | Age: 62
End: 2022-03-11

## 2022-03-11 NOTE — TELEPHONE ENCOUNTER
PA for B12 injections was submitted and DENIED.     Medicare law does not include vitamins and minerals under part D coverage.

## 2022-03-22 RX ORDER — TIZANIDINE 4 MG/1
4 TABLET ORAL NIGHTLY
Qty: 90 TABLET | Refills: 3 | Status: SHIPPED | OUTPATIENT
Start: 2022-03-22 | End: 2023-02-20

## 2022-05-11 ENCOUNTER — OFFICE VISIT (OUTPATIENT)
Dept: INTERNAL MEDICINE | Facility: CLINIC | Age: 62
End: 2022-05-11

## 2022-05-11 VITALS
SYSTOLIC BLOOD PRESSURE: 116 MMHG | WEIGHT: 239 LBS | OXYGEN SATURATION: 93 % | HEIGHT: 67 IN | RESPIRATION RATE: 16 BRPM | BODY MASS INDEX: 37.51 KG/M2 | TEMPERATURE: 97.5 F | HEART RATE: 70 BPM | DIASTOLIC BLOOD PRESSURE: 78 MMHG

## 2022-05-11 DIAGNOSIS — J30.2 SEASONAL ALLERGIES: ICD-10-CM

## 2022-05-11 DIAGNOSIS — F51.01 PRIMARY INSOMNIA: ICD-10-CM

## 2022-05-11 DIAGNOSIS — G11.8 SPINOCEREBELLAR ATAXIA: ICD-10-CM

## 2022-05-11 DIAGNOSIS — G50.0 TRIGEMINAL NEURALGIA: ICD-10-CM

## 2022-05-11 DIAGNOSIS — K21.9 GASTROESOPHAGEAL REFLUX DISEASE WITHOUT ESOPHAGITIS: ICD-10-CM

## 2022-05-11 DIAGNOSIS — I10 ESSENTIAL HYPERTENSION: Primary | Chronic | ICD-10-CM

## 2022-05-11 DIAGNOSIS — E53.8 VITAMIN B12 DEFICIENCY: ICD-10-CM

## 2022-05-11 PROCEDURE — 96372 THER/PROPH/DIAG INJ SC/IM: CPT | Performed by: FAMILY MEDICINE

## 2022-05-11 PROCEDURE — 99214 OFFICE O/P EST MOD 30 MIN: CPT | Performed by: FAMILY MEDICINE

## 2022-05-11 RX ORDER — MOMETASONE FUROATE 50 UG/1
2 SPRAY, METERED NASAL DAILY
Qty: 17 G | Refills: 12 | Status: SHIPPED | OUTPATIENT
Start: 2022-05-11 | End: 2022-11-22

## 2022-05-11 RX ORDER — GABAPENTIN 100 MG/1
100 CAPSULE ORAL NIGHTLY
Qty: 90 CAPSULE | Refills: 1 | Status: SHIPPED | OUTPATIENT
Start: 2022-05-11 | End: 2022-08-17 | Stop reason: SINTOL

## 2022-05-11 RX ADMIN — CYANOCOBALAMIN 1000 MCG: 1000 INJECTION, SOLUTION INTRAMUSCULAR; SUBCUTANEOUS at 08:49

## 2022-05-11 NOTE — PROGRESS NOTES
05/11/2022      [unfilled]  Problem List Items Addressed This Visit        Allergies and Adverse Reactions    Seasonal allergies    Relevant Medications    mometasone (Nasonex) 50 MCG/ACT nasal spray       Cardiac and Vasculature    Essential hypertension - Primary (Chronic)    Current Assessment & Plan     Hypertension is improving with treatment.  Continue current treatment regimen.  Dietary sodium restriction.  Weight loss.  Blood pressure will be reassessed at the next regular appointment.              Endocrine and Metabolic    Vitamin B12 deficiency       Gastrointestinal Abdominal     Acid reflux       Neuro    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HCC) (Chronic)    Relevant Medications    gabapentin (NEURONTIN) 100 MG capsule    Trigeminal neuralgia    Relevant Medications    gabapentin (NEURONTIN) 100 MG capsule       Sleep    Insomnia    Relevant Medications    gabapentin (NEURONTIN) 100 MG capsule        Trial gabapentin low dose 100 mg at night only. Can titrate up if needed but must use caution due to high fall risk given ataxia.    Encouraged daily use of Nasonex. Nebulizer would not help with congestion he's describing. Mucinex okay to take twice a day.    Continue PPI for acid reflux.    Will resume vitamin vitamin B12 shots in office as insurance not covering home injections.      Return in about 14 weeks (around 8/17/2022) for Medicare Wellness.      Subjective    Chris Mejia is a 62 y.o. male here for:  Chief Complaint   Patient presents with   • Hypertension     3 month follow up   • Cough     Requesting nebulizer with full face mask    • Insomnia     Requesting medication       History per MA reviewed.    Insurance will not cover home vitamin B12 shots.    Congestion in throat area. Asks about nebulizer. Takes Mucinex once a day. Using Nasonex but not daily, only as needed. Does not like Flonase. Feels heartburn controlled for most part, not having issues with it, on Protonix 40 mg.    Continues  "to struggle with sleep off and on. Some tingling in fingers. Insomnia has been a chronic issue.         The following portions of the patient's history were reviewed and updated as appropriate: allergies, current medications, past family history, past medical history, past social history, past surgical history and problem list.    Review of Systems   Constitutional: Positive for fatigue. Negative for fever.   Musculoskeletal: Positive for gait problem.   Allergic/Immunologic: Positive for environmental allergies.   Neurological: Positive for numbness.   Psychiatric/Behavioral: Positive for sleep disturbance.         Objective   Visit Vitals  /78 (BP Location: Right arm, Patient Position: Sitting, Cuff Size: Adult)   Pulse 70   Temp 97.5 °F (36.4 °C) (Temporal)   Resp 16   Ht 168.9 cm (66.5\")   Wt 108 kg (239 lb)   SpO2 93%   BMI 38.00 kg/m²       Physical Exam  Vitals and nursing note reviewed.   Constitutional:       General: He is not in acute distress.     Appearance: Normal appearance. He is well-developed and well-groomed. He is obese. He is not ill-appearing, toxic-appearing or diaphoretic.      Interventions: Face mask in place.   HENT:      Head: Normocephalic and atraumatic.      Right Ear: Hearing normal.      Left Ear: Hearing normal.   Eyes:      General: Lids are normal. No scleral icterus.        Right eye: No discharge.         Left eye: No discharge.      Extraocular Movements: Extraocular movements intact.   Pulmonary:      Effort: Pulmonary effort is normal.   Musculoskeletal:      Cervical back: Neck supple.   Skin:     Coloration: Skin is not jaundiced or pale.   Neurological:      General: No focal deficit present.      Mental Status: He is alert and oriented to person, place, and time.   Psychiatric:         Attention and Perception: Attention and perception normal.         Mood and Affect: Mood and affect normal.         Speech: Speech normal.         Behavior: Behavior normal. Behavior " is cooperative.         Thought Content: Thought content normal.         Cognition and Memory: Cognition and memory normal.         Judgment: Judgment normal.           Naty Lebron MD

## 2022-06-10 ENCOUNTER — CLINICAL SUPPORT (OUTPATIENT)
Dept: INTERNAL MEDICINE | Facility: CLINIC | Age: 62
End: 2022-06-10

## 2022-06-10 DIAGNOSIS — E53.8 VITAMIN B12 DEFICIENCY: ICD-10-CM

## 2022-06-10 PROCEDURE — 96372 THER/PROPH/DIAG INJ SC/IM: CPT | Performed by: FAMILY MEDICINE

## 2022-06-10 RX ADMIN — CYANOCOBALAMIN 1000 MCG: 1000 INJECTION, SOLUTION INTRAMUSCULAR; SUBCUTANEOUS at 15:28

## 2022-07-06 ENCOUNTER — CLINICAL SUPPORT (OUTPATIENT)
Dept: INTERNAL MEDICINE | Facility: CLINIC | Age: 62
End: 2022-07-06

## 2022-07-06 DIAGNOSIS — E53.8 VITAMIN B12 DEFICIENCY: ICD-10-CM

## 2022-07-06 PROCEDURE — 96372 THER/PROPH/DIAG INJ SC/IM: CPT | Performed by: FAMILY MEDICINE

## 2022-07-06 RX ADMIN — CYANOCOBALAMIN 1000 MCG: 1000 INJECTION, SOLUTION INTRAMUSCULAR; SUBCUTANEOUS at 10:44

## 2022-08-03 ENCOUNTER — CLINICAL SUPPORT (OUTPATIENT)
Dept: INTERNAL MEDICINE | Facility: CLINIC | Age: 62
End: 2022-08-03

## 2022-08-03 DIAGNOSIS — E53.8 VITAMIN B12 DEFICIENCY: ICD-10-CM

## 2022-08-03 PROCEDURE — 96372 THER/PROPH/DIAG INJ SC/IM: CPT | Performed by: FAMILY MEDICINE

## 2022-08-03 RX ADMIN — CYANOCOBALAMIN 1000 MCG: 1000 INJECTION, SOLUTION INTRAMUSCULAR; SUBCUTANEOUS at 11:07

## 2022-08-13 ENCOUNTER — APPOINTMENT (OUTPATIENT)
Dept: GENERAL RADIOLOGY | Facility: HOSPITAL | Age: 62
End: 2022-08-13

## 2022-08-13 ENCOUNTER — HOSPITAL ENCOUNTER (EMERGENCY)
Facility: HOSPITAL | Age: 62
Discharge: HOME | End: 2022-08-14
Attending: EMERGENCY MEDICINE | Admitting: EMERGENCY MEDICINE

## 2022-08-13 ENCOUNTER — APPOINTMENT (OUTPATIENT)
Dept: CT IMAGING | Facility: HOSPITAL | Age: 62
End: 2022-08-13

## 2022-08-13 DIAGNOSIS — S29.9XXA RIB INJURY: Primary | ICD-10-CM

## 2022-08-13 DIAGNOSIS — S49.92XA INJURY OF LEFT SHOULDER, INITIAL ENCOUNTER: ICD-10-CM

## 2022-08-13 PROCEDURE — 71250 CT THORAX DX C-: CPT

## 2022-08-13 PROCEDURE — 73030 X-RAY EXAM OF SHOULDER: CPT

## 2022-08-13 PROCEDURE — 99283 EMERGENCY DEPT VISIT LOW MDM: CPT

## 2022-08-13 RX ORDER — LIDOCAINE 50 MG/G
1 PATCH TOPICAL
Status: DISCONTINUED | OUTPATIENT
Start: 2022-08-14 | End: 2022-08-13

## 2022-08-13 RX ORDER — LIDOCAINE 50 MG/G
1 PATCH TOPICAL EVERY 24 HOURS
Qty: 15 PATCH | Refills: 0 | Status: SHIPPED | OUTPATIENT
Start: 2022-08-13 | End: 2022-11-22

## 2022-08-13 RX ORDER — HYDROCODONE BITARTRATE AND ACETAMINOPHEN 5; 325 MG/1; MG/1
1 TABLET ORAL ONCE
Status: CANCELLED | OUTPATIENT
Start: 2022-08-13 | End: 2022-08-13

## 2022-08-13 RX ORDER — HYDROCODONE BITARTRATE AND ACETAMINOPHEN 5; 325 MG/1; MG/1
1 TABLET ORAL ONCE
Status: COMPLETED | OUTPATIENT
Start: 2022-08-13 | End: 2022-08-13

## 2022-08-13 RX ORDER — LIDOCAINE 50 MG/G
1 PATCH TOPICAL ONCE
Status: DISCONTINUED | OUTPATIENT
Start: 2022-08-13 | End: 2022-08-14

## 2022-08-13 RX ADMIN — HYDROCODONE BITARTRATE AND ACETAMINOPHEN 1 TABLET: 5; 325 TABLET ORAL at 23:53

## 2022-08-14 VITALS
SYSTOLIC BLOOD PRESSURE: 141 MMHG | TEMPERATURE: 99.5 F | HEIGHT: 67 IN | WEIGHT: 240 LBS | OXYGEN SATURATION: 99 % | HEART RATE: 88 BPM | DIASTOLIC BLOOD PRESSURE: 87 MMHG | RESPIRATION RATE: 16 BRPM | BODY MASS INDEX: 37.67 KG/M2

## 2022-08-14 RX ORDER — HYDROCODONE BITARTRATE AND ACETAMINOPHEN 5; 325 MG/1; MG/1
1 TABLET ORAL ONCE
Status: DISCONTINUED | OUTPATIENT
Start: 2022-08-14 | End: 2022-08-14

## 2022-08-14 RX ORDER — HYDROCODONE BITARTRATE AND ACETAMINOPHEN 5; 325 MG/1; MG/1
1 TABLET ORAL EVERY 6 HOURS PRN
Qty: 10 TABLET | Refills: 0 | Status: SHIPPED | OUTPATIENT
Start: 2022-08-14 | End: 2022-11-22

## 2022-08-14 NOTE — ED PROVIDER NOTES
Subjective   Chief Complaint: Left shoulder and left rib pain  History of Present Illness: 62-year-old male comes in for evaluation of above complaint.  He has a history of cerebellar issues and poor balance at baseline.  He states he got his foot caught on something yesterday and fell on the ground on his left shoulder and left ribs.  No head injury no neck or back injury.  Thought the pain would get better with time but it did not so he came in for evaluation.  Pain greatest in the left lateral ribs.  No shortness of breath.  Onset: Yesterday  Timing: Ongoing  Exacerbating / Alleviating factors: Worse with palpation of the left lateral ribs in the midaxillary line as well as range of motion of the left shoulder  Associated symptoms: None      Nurses Notes reviewed and agree, including vitals, allergies, social history and prior medical history.          Review of Systems   Constitutional: Negative.    HENT: Negative.    Eyes: Negative.    Respiratory: Negative.    Cardiovascular: Negative.    Gastrointestinal: Negative.    Genitourinary: Negative.    Musculoskeletal:        Left shoulder and rib pain   Skin: Negative.    Allergic/Immunologic: Negative.    Neurological: Negative.    Psychiatric/Behavioral: Negative.    All other systems reviewed and are negative.      Past Medical History:   Diagnosis Date   • Allergic rhinitis    • Anxiety    • Arthritis    • Balance problem    • Carpal tunnel syndrome    • Cluster headache    • Coronary artery disease    • Developmental delay    • Diabetes mellitus (HCC)     Noted by PCP   • Difficulty walking    • Dizziness    • Dysphagia    • Enlarged prostate    • GERD (gastroesophageal reflux disease)    • Gout    • Hip fracture, left (HCC)    • History of cardiovascular stress test 2018    positive for inferior and lateral ischemia    • History of echocardiogram    • Hyperlipidemia    • Hypertension    • Lymphadenitis    • Memory loss    • Migraine    • Myocardial infarction  ([facility]) 2000   • Obesity    • DENZEL (obstructive sleep apnea)    • Peptic ulcer    • Peptic ulceration    • Poor historian    • Rheumatoid arthritis ([facility])    • Right shoulder pain    • SOB (shortness of breath) on exertion    • 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[facility])    • Tension headache    • Trigeminal neuralgia    • Vitamin B 12 deficiency    • Vitamin D deficiency        Allergies   Allergen Reactions   • Oxycodone Hallucinations       Past Surgical History:   Procedure Laterality Date   • CARDIAC CATHETERIZATION     • CARDIAC CATHETERIZATION Left 3/14/2018    Procedure: Cardiac Catheterization/Vascular Study;  Surgeon: Adam Perkins MD;  Location: [facility] CATH INVASIVE LOCATION;  Service: Cardiovascular   • COLONOSCOPY     • COLONOSCOPY N/A 9/13/2019    Procedure: COLONOSCOPY W/ COLD SNARE POLYPECTOMY;  Surgeon: Melba Lopez MD;  Location: [facility] ENDOSCOPY;  Service: Gastroenterology   • ELBOW ARTHROPLASTY Bilateral    • ENDOSCOPY     • HERNIA REPAIR      x3   • HIP HEMIARTHROPLASTY Left    • NEUROPLASTY Bilateral     decompression median nerve at carpal tunnel   • TOTAL KNEE ARTHROPLASTY Bilateral     2005, 2012       Family History   Problem Relation Age of Onset   • Deep vein thrombosis Mother    • Diabetes Mother    • Hyperlipidemia Mother    • Hypertension Mother    • Heart attack Mother    • Cancer Father    • Kidney disease Father    • Deep vein thrombosis Daughter    • Arthritis Sister    • Diabetes Sister    • Hyperlipidemia Sister    • Hypertension Sister    • Osteoporosis Sister    • Thyroid disease Sister    • Liver disease Sister    • Arthritis Brother    • Diabetes Brother    • Hyperlipidemia Brother    • Hypertension Brother    • Stroke Brother    • Cancer Brother    • Heart attack Brother    • Mental illness Brother        Social History     Socioeconomic History   • Marital status:    Tobacco Use   • Smoking status: Never Smoker   • Smokeless tobacco: Never Used   Vaping Use   • Vaping Use: Never  used   Substance and Sexual Activity   • Alcohol use: No   • Drug use: No   • Sexual activity: Defer           Objective   Physical Exam  Vitals and nursing note reviewed.   Constitutional:       General: He is not in acute distress.     Appearance: Normal appearance. He is obese. He is not ill-appearing, toxic-appearing or diaphoretic.   HENT:      Head: Normocephalic and atraumatic.      Nose: Nose normal.   Eyes:      Extraocular Movements: Extraocular movements intact.   Cardiovascular:      Rate and Rhythm: Normal rate and regular rhythm.      Pulses: Normal pulses.   Pulmonary:      Effort: Pulmonary effort is normal.      Breath sounds: Normal breath sounds.   Chest:          Comments: Tenderness to the left ribs in the midaxillary line.  No crepitus.  No ecchymosis to the chest wall  Abdominal:      General: Abdomen is flat.      Palpations: Abdomen is soft.      Tenderness: There is no abdominal tenderness.   Musculoskeletal:      Left shoulder: No deformity or crepitus. Normal range of motion.        Arms:       Cervical back: Normal range of motion and neck supple. No tenderness.   Skin:     General: Skin is warm and dry.   Neurological:      General: No focal deficit present.      Mental Status: He is alert. Mental status is at baseline.   Psychiatric:         Mood and Affect: Mood normal.         Behavior: Behavior normal.         Procedures           ED Course                                           MDM    Final diagnoses:   Rib injury   Injury of left shoulder, initial encounter       ED Disposition  ED Disposition     ED Disposition   Discharge    Condition   Stable    Comment   --             Naty Lebron MD  [address]  [facility] [address] [phone]      As needed    [facility] Emergency Department  [address] [address] [phone]    If symptoms worsen         Medication List      New Prescriptions    lidocaine 5 %  Commonly  known as: LIDODERM  Place 1 patch on the skin as directed by provider Daily. Remove & Discard patch within 12 hours or as directed by MD           Where to Get Your Medications      These medications were sent to [facility]/pharmacy #[id number] - [place], [place] - [address] - [phone]  - [phone] [address] [address]    Phone: [phone]   · lidocaine 5 %          Amrik Steel PA-C  08/13/22 0061

## 2022-08-16 NOTE — PROGRESS NOTES
The ABCs of the Annual Wellness Visit  Subsequent Medicare Wellness Visit    Chief Complaint   Patient presents with   • Medicare Wellness-subsequent     AWV and preventive care      Subjective    History of Present Illness:  Chris Mejia is a 62 y.o. male who presents for a Subsequent Medicare Wellness Visit and follow up on chronic health conditions.    Ataxia: Recurrent falls. Has fallen twice in recent weeks. Recent ER visit, imaging showed shoulder arthritis and old rib fracture on left 8th rib. Has tried PT in past multiple times and hasn't helped. Lives in trailer and wife has had wheelchair in past that didn't fit.     Hypertension borderline controlled. On amlodipine 2.5 mg since at least 2014. Also taking HCTZ, coreg.     Vitamin B12 deficiency. Insurance stopped covering injections at home and he's been coming here for those. Has failed oral replacement.    The following portions of the patient's history were reviewed and   updated as appropriate: allergies, current medications, past family history, past medical history, past social history, past surgical history and problem list.    Compared to one year ago, the patient feels his physical   health is worse.     Compared to one year ago, the patient feels his mental   health is the same.    Recent Hospitalizations:  He was not admitted to the hospital during the last year.       Current Medical Providers:  Patient Care Team:  Naty Lebron MD as PCP - General (Family Medicine)  Adam Perkins MD as Consulting Physician (Cardiology)  Melba Lopez MD as Surgeon (General Surgery)  Nils Salinas (Optometry)    Outpatient Medications Prior to Visit   Medication Sig Dispense Refill   • aspirin 81 MG tablet Take 1 tablet by mouth Daily. 30 tablet 11   • busPIRone (BUSPAR) 15 MG tablet Take 1 tablet by mouth 2 (Two) Times a Day As Needed (anxiety). 180 tablet 3   • carvedilol (COREG) 12.5 MG tablet Take 1 tablet by mouth 2 (Two) Times a Day With  Meals. 180 tablet 3   • cetirizine (zyrTEC) 10 MG tablet Take 10 mg by mouth Daily.     • hydroCHLOROthiazide (MICROZIDE) 12.5 MG capsule Take 1 capsule by mouth Every Morning. 90 capsule 3   • ipratropium (ATROVENT) 0.03 % nasal spray 2 sprays into the nostril(s) as directed by provider Every 12 (Twelve) Hours. 30 mL 12   • levocetirizine (XYZAL) 5 MG tablet Take 1 tablet by mouth Every Evening. 90 tablet 3   • lidocaine (LIDODERM) 5 % Place 1 patch on the skin as directed by provider Daily. Remove & Discard patch within 12 hours or as directed by MD 15 patch 0   • meloxicam (MOBIC) 15 MG tablet Take 1 tablet by mouth Daily. 90 tablet 3   • mometasone (Nasonex) 50 MCG/ACT nasal spray 2 sprays into the nostril(s) as directed by provider Daily. 17 g 12   • nitroglycerin (Nitrostat) 0.4 MG SL tablet Place 1 tablet under the tongue Every 5 (Five) Minutes As Needed for Chest Pain. Take no more than 3 doses in 15 minutes. 30 tablet 1   • pantoprazole (PROTONIX) 40 MG EC tablet Take 1 tablet by mouth Daily. 90 tablet 3   • pravastatin (PRAVACHOL) 40 MG tablet Take 1 tablet by mouth Every Night. For high cholesterol, stroke risk reduction. 90 tablet 3   • tamsulosin (FLOMAX) 0.4 MG capsule 24 hr capsule Take 2 capsules by mouth Every Night. 180 capsule 3   • tiZANidine (ZANAFLEX) 4 MG tablet Take 1 tablet by mouth Every Night. 90 tablet 3   • amLODIPine (NORVASC) 2.5 MG tablet Take 1 tablet by mouth Daily. 90 tablet 3   • gabapentin (NEURONTIN) 100 MG capsule Take 1 capsule by mouth Every Night. 90 capsule 1   • HYDROcodone-acetaminophen (NORCO) 5-325 MG per tablet Take 1 tablet by mouth Every 6 (Six) Hours As Needed for Severe Pain . 10 tablet 0   • carBAMazepine (TEGretol) 200 MG tablet Take 1 tablet by mouth At Night As Needed (headache). 90 tablet 3     Facility-Administered Medications Prior to Visit   Medication Dose Route Frequency Provider Last Rate Last Admin   • cyanocobalamin injection 1,000 mcg  1,000 mcg  Intramuscular Q28 Days Naty Lebron MD   1,000 mcg at 08/03/22 1107       Opioid medication/s are on active medication list.  and I have evaluated his active treatment plan and pain score trends (see table).  Vitals:    08/17/22 0909   PainSc:   1   PainLoc: Rib Cage     I have reviewed the chart for potential of high risk medication and harmful drug interactions in the elderly.            Aspirin is on active medication list. Aspirin use is indicated based on review of current medical condition/s. Pros and cons of this therapy have been discussed today. Benefits of this medication outweigh potential harm.  Patient has been encouraged to continue taking this medication.  .      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[facility])   • RA (rheumatoid arthritis) ([facility])   • Insomnia   • Essential hypertension   • Hypercholesteremia   • Acid reflux   • Anxiety   • Vitamin B12 deficiency   • Vitamin D deficiency   • DENZEL (obstructive sleep apnea)   • Atopic rhinitis   • Ataxic gait   • Shoulder pain   • Ophthalmoplegia   • Coronary artery disease involving native coronary artery of native heart without angina pectoris   • Seasonal allergies   • Benign non-nodular prostatic hyperplasia with lower urinary tract symptoms   • Class 2 severe obesity due to excess calories with serious comorbidity and body mass index (BMI) of 36.0 to 36.9 in adult ([facility])   • Weakness   • Tension headache   • Limited response to serotonin reuptake inhibitors 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  ACP discussion was held with the patient during this visit. Patient does not have an advance directive, declines further assistance.    Review of Systems   Constitutional: Negative for fever.  "  Musculoskeletal: Positive for arthralgias and gait problem.   Neurological: Positive for weakness.   Psychiatric/Behavioral: Positive for dysphoric mood and sleep disturbance.        Stress   All other systems reviewed and are negative.       Objective    Vitals:    08/17/22 0909   BP: 130/84   BP Location: Right arm   Patient Position: Sitting   Cuff Size: Adult   Pulse: 79   Resp: 16   Temp: 97.5 °F (36.4 °C)   TempSrc: Temporal   SpO2: 94%   Weight: 105 kg (232 lb 6.4 oz)   Height: 168.9 cm (66.5\")   PainSc:   1   PainLoc: Rib Cage     Estimated body mass index is 36.95 kg/m² as calculated from the following:    Height as of this encounter: 168.9 cm (66.5\").    Weight as of this encounter: 105 kg (232 lb 6.4 oz).    Class 2 Severe Obesity (BMI >=35 and <=39.9). Obesity-related health conditions include the following: hypertension, dyslipidemias and osteoarthritis. Obesity is unchanged. BMI is is above average; BMI management plan is completed. We discussed info per avs.      Does the patient have evidence of cognitive impairment? Yes    Physical Exam  Vitals and nursing note reviewed.   Constitutional:       General: He is not in acute distress.     Appearance: Normal appearance. He is well-developed and well-groomed. He is morbidly obese. He is not ill-appearing, toxic-appearing or diaphoretic.      Interventions: Face mask in place.   HENT:      Head: Normocephalic and atraumatic.      Right Ear: Hearing, tympanic membrane, ear canal and external ear normal.      Left Ear: Hearing, tympanic membrane, ear canal and external ear normal.   Eyes:      General: Lids are normal. No scleral icterus.        Right eye: No discharge.         Left eye: No discharge.      Extraocular Movements: Extraocular movements intact.   Cardiovascular:      Rate and Rhythm: Normal rate and regular rhythm.      Heart sounds: Normal heart sounds.   Pulmonary:      Effort: Pulmonary effort is normal.      Breath sounds: Normal breath " sounds and air entry.   Musculoskeletal:      Right hand: Deformity (joint nodules) present.      Left hand: Deformity (joint nodules) present.      Cervical back: Neck supple.   Skin:     Coloration: Skin is not jaundiced or pale.   Neurological:      General: No focal deficit present.      Mental Status: He is alert and oriented to person, place, and time.      Gait: Gait abnormal.   Psychiatric:         Attention and Perception: Attention and perception normal.         Mood and Affect: Mood and affect normal.         Speech: Speech normal.         Behavior: Behavior normal. Behavior is cooperative.         Thought Content: Thought content normal.         Cognition and Memory: Cognition normal.         Judgment: Judgment normal.                 HEALTH RISK ASSESSMENT    Smoking Status:  Social History     Tobacco Use   Smoking Status Never Smoker   Smokeless Tobacco Never Used     Alcohol Consumption:  Social History     Substance and Sexual Activity   Alcohol Use No     Fall Risk Screen:    [facility] Fall Risk Assessment was completed, and patient is at HIGH risk for falls. Assessment completed on:8/17/2022    Depression Screening:  PHQ-2/PHQ-9 Depression Screening 8/17/2022   Retired PHQ-9 Total Score -   Retired Total Score -   Little Interest or Pleasure in Doing Things 0-->not at all   Feeling Down, Depressed or Hopeless 0-->not at all   PHQ-9: Brief Depression Severity Measure Score 0       Health Habits and Functional and Cognitive Screening:  Functional & Cognitive Status 8/17/2022   Do you have difficulty preparing food and eating? No   Do you have difficulty bathing yourself, getting dressed or grooming yourself? No   Do you have difficulty using the toilet? No   Do you have difficulty moving around from place to place? No   Do you have trouble with steps or getting out of a bed or a chair? Yes   Current Diet Well Balanced Diet   Dental Exam Not up to date   Eye Exam Up to date   Exercise (times per week) 0  times per week   Current Exercises Include No Regular Exercise   Current Exercise Activities Include -   Do you need help using the phone?  No   Are you deaf or do you have serious difficulty hearing?  No   Do you need help with transportation? No   Do you need help shopping? No   Do you need help preparing meals?  No   Do you need help with housework?  No   Do you need help with laundry? No   Do you need help taking your medications? No   Do you need help managing money? No   Do you ever drive or ride in a car without wearing a seat belt? No   Have you felt unusual stress, anger or loneliness in the last month? Yes   Who do you live with? Spouse   If you need help, do you have trouble finding someone available to you? Yes   Have you been bothered in the last four weeks by sexual problems? No   Do you have difficulty concentrating, remembering or making decisions? Yes       Age-appropriate Screening Schedule:  Refer to the list below for future screening recommendations based on patient's age, sex and/or medical conditions. Orders for these recommended tests are listed in the plan section. The patient has been provided with a written plan.    Health Maintenance   Topic Date Due   • ZOSTER VACCINE (1 of 2) 08/01/2023 (Originally 1/25/2008)   • INFLUENZA VACCINE  10/01/2022   • TDAP/TD VACCINES (3 - Td or Tdap) 11/15/2022   • LIPID PANEL  02/11/2023              Assessment & Plan   CMS Preventative Services Quick Reference  Risk Factors Identified During Encounter  Chronic Pain   Depression/Dysphoria  Fall Risk-High or Moderate  Immunizations Discussed/Encouraged (specific Immunizations; Shingrix  Inactivity/Sedentary  Obesity/Overweight   Polypharmacy  The above risks/problems have been discussed with the patient.  Follow up actions/plans if indicated are seen below in the Assessment/Plan Section.  Pertinent information has been shared with the patient in the After Visit Summary.    Diagnoses and all orders for this  visit:    1. Medicare annual wellness visit, subsequent (Primary)    2. Rheumatoid arthritis involving multiple sites, unspecified whether rheumatoid factor present (HCC)  Comments:  d/c'd gabapentin from med list due to dizziness per wife, med increases risk of falls.  Orders:  -     CBC (No Diff)  -     Comprehensive Metabolic Panel  -     Motorized Wheelchair  -     Ambulatory Referral to Physical Therapy (Power Chair evaluation)    3. 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HCC)  Comments:  will not order repeat PT for this issue (PT order is for power chair evaluation) as he's done multiple times. Need to find way to make safer--power chair?  Orders:  -     Methylmalonic Acid, Serum  -     Motorized Wheelchair  -     Ambulatory Referral to Physical Therapy (Power Chair evaluation)    4. Elevated hemoglobin (HCC)  -     Hemochromatosis Mutation  -     Iron Profile  -     Ferritin  -     CBC (No Diff)    5. Abnormal blood level of iron  Comments:  new issue x months found on labs and requires further investigation  Orders:  -     Hemochromatosis Mutation  -     Iron Profile  -     Ferritin  -     CBC (No Diff)  -     Transferrin Saturation    6. Chronic intractable headache, unspecified headache type  Comments:  failed carbamazepine. trial of topamax, discussed possible need to titrate up. no hx kidney stones  Orders:  -     topiramate (Topamax) 25 MG tablet; Take 1 tablet by mouth 2 (Two) Times a Day.  Dispense: 180 tablet; Refill: 3    7. Essential hypertension  Comments:  not adequately controlled; amlodipine increased to 5 mg. continue other bp meds current doses.  Orders:  -     amLODIPine (NORVASC) 5 MG tablet; Take 1 tablet by mouth Daily. Indications: High Blood Pressure Disorder  Dispense: 90 tablet; Refill: 3    8. Vitamin D deficiency  -     Comprehensive Metabolic Panel  -     Vitamin D 25 Hydroxy    9. Vitamin B12 deficiency  Comments:  continue b12 shots  Orders:  -     CBC (No Diff)  -     Methylmalonic Acid,  Serum    10. Class 2 severe obesity due to excess calories with serious comorbidity and body mass index (BMI) of 36.0 to 36.9 in adult ([facility])  Comments:  info on mediterranean diet via avs    11. 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 Weakness  Comments:  PT order is for power chair evaluation  Orders:  -     Motorized Wheelchair  -     Ambulatory Referral to Physical Therapy (Power Chair evaluation)    13. Frequent falls  Comments:  use walker, cane but may benefit from scooter or power chair, evaluation ordered  Orders:  -     Motorized Wheelchair  -     Ambulatory Referral to Physical Therapy (Power Chair evaluation)    14. At high risk for injury related to fall  -     Motorized Wheelchair  -     Ambulatory Referral to Physical Therapy (Power Chair evaluation)    15. Need for shingles vaccine  Comments:  discussed, rx sent to pharmacy  Orders:  -     Zoster Vac Recomb Adjuvanted (Shingrix) 50 MCG/0.5ML reconstituted suspension; Inject 0.5 mL into the appropriate muscle as directed by prescriber 1 (One) Time for 1 dose.  Dispense: 1 each; Refill: 1        Follow Up:   Return in about 3 months (around 11/20/2022) for follow up chronic issues.     An After Visit Summary and PPPS were made available to the patient.          I spent 55 minutes caring for Chris on this date of service. This time includes time spent by me in the following activities:preparing for the visit, reviewing tests, obtaining and/or reviewing a separately obtained history, performing a medically appropriate examination and/or evaluation , counseling and educating the patient/family/caregiver, ordering medications, tests, or procedures and documenting information in the medical record     I spent 30 minutes on the separately reported service of [id number]. This time is not included in the time used to support the E/M service also reported today.

## 2022-08-17 ENCOUNTER — OFFICE VISIT (OUTPATIENT)
Dept: INTERNAL MEDICINE | Facility: CLINIC | Age: 62
End: 2022-08-17

## 2022-08-17 VITALS
SYSTOLIC BLOOD PRESSURE: 130 MMHG | HEART RATE: 79 BPM | BODY MASS INDEX: 36.47 KG/M2 | RESPIRATION RATE: 16 BRPM | DIASTOLIC BLOOD PRESSURE: 84 MMHG | TEMPERATURE: 97.5 F | HEIGHT: 67 IN | WEIGHT: 232.4 LBS | OXYGEN SATURATION: 94 %

## 2022-08-17 DIAGNOSIS — E88.89 CYP2B6 INTERMEDIATE METABOLIZER: ICD-10-CM

## 2022-08-17 DIAGNOSIS — R29.6 FREQUENT FALLS: ICD-10-CM

## 2022-08-17 DIAGNOSIS — D58.2 ELEVATED HEMOGLOBIN: ICD-10-CM

## 2022-08-17 DIAGNOSIS — R51.9 CHRONIC INTRACTABLE HEADACHE, UNSPECIFIED HEADACHE TYPE: ICD-10-CM

## 2022-08-17 DIAGNOSIS — E53.8 VITAMIN B12 DEFICIENCY: ICD-10-CM

## 2022-08-17 DIAGNOSIS — Z23 NEED FOR SHINGLES VACCINE: ICD-10-CM

## 2022-08-17 DIAGNOSIS — E66.01 CLASS 2 SEVERE OBESITY DUE TO EXCESS CALORIES WITH SERIOUS COMORBIDITY AND BODY MASS INDEX (BMI) OF 36.0 TO 36.9 IN ADULT: ICD-10-CM

## 2022-08-17 DIAGNOSIS — R79.0 ABNORMAL BLOOD LEVEL OF IRON: ICD-10-CM

## 2022-08-17 DIAGNOSIS — R53.1 WEAKNESS: ICD-10-CM

## 2022-08-17 DIAGNOSIS — G89.29 CHRONIC INTRACTABLE HEADACHE, UNSPECIFIED HEADACHE TYPE: ICD-10-CM

## 2022-08-17 DIAGNOSIS — I10 ESSENTIAL HYPERTENSION: ICD-10-CM

## 2022-08-17 DIAGNOSIS — Z00.00 MEDICARE ANNUAL WELLNESS VISIT, SUBSEQUENT: Primary | ICD-10-CM

## 2022-08-17 DIAGNOSIS — M06.9 RHEUMATOID ARTHRITIS INVOLVING MULTIPLE SITES, UNSPECIFIED WHETHER RHEUMATOID FACTOR PRESENT: ICD-10-CM

## 2022-08-17 DIAGNOSIS — G11.8 SPINOCEREBELLAR ATAXIA: Chronic | ICD-10-CM

## 2022-08-17 DIAGNOSIS — Z91.81 AT HIGH RISK FOR INJURY RELATED TO FALL: ICD-10-CM

## 2022-08-17 DIAGNOSIS — E55.9 VITAMIN D DEFICIENCY: ICD-10-CM

## 2022-08-17 PROCEDURE — 1125F AMNT PAIN NOTED PAIN PRSNT: CPT | Performed by: FAMILY MEDICINE

## 2022-08-17 PROCEDURE — 1159F MED LIST DOCD IN RCRD: CPT | Performed by: FAMILY MEDICINE

## 2022-08-17 PROCEDURE — 1170F FXNL STATUS ASSESSED: CPT | Performed by: FAMILY MEDICINE

## 2022-08-17 PROCEDURE — 99396 PREV VISIT EST AGE 40-64: CPT | Performed by: FAMILY MEDICINE

## 2022-08-17 PROCEDURE — G0439 PPPS, SUBSEQ VISIT: HCPCS | Performed by: FAMILY MEDICINE

## 2022-08-17 PROCEDURE — 99214 OFFICE O/P EST MOD 30 MIN: CPT | Performed by: FAMILY MEDICINE

## 2022-08-17 RX ORDER — AMLODIPINE BESYLATE 5 MG/1
5 TABLET ORAL DAILY
Qty: 90 TABLET | Refills: 3 | Status: SHIPPED | OUTPATIENT
Start: 2022-08-17 | End: 2022-11-22 | Stop reason: SDUPTHER

## 2022-08-17 RX ORDER — ZOSTER VACCINE RECOMBINANT, ADJUVANTED 50 MCG/0.5
0.5 KIT INTRAMUSCULAR ONCE
Qty: 1 EACH | Refills: 1 | Status: SHIPPED | OUTPATIENT
Start: 2022-08-17 | End: 2022-08-17

## 2022-08-17 RX ORDER — TOPIRAMATE 25 MG/1
25 TABLET ORAL 2 TIMES DAILY
Qty: 180 TABLET | Refills: 3 | Status: SHIPPED | OUTPATIENT
Start: 2022-08-17 | End: 2023-02-24 | Stop reason: SDUPTHER

## 2022-08-17 NOTE — PATIENT INSTRUCTIONS
Medicare Wellness  Personal Prevention Plan of Service     Date of Office Visit:    Encounter Provider:  Naty Lebron MD  Place of Service:  [facility]  Patient Name: Chris Mejia  :  1960    As part of the Medicare Wellness portion of your visit today, we are providing you with this personalized preventive plan of services (PPPS). This plan is based upon recommendations of the [place] Preventive Services Task Force (USPSTF) and the Advisory Committee on Immunization Practices (ACIP).    This lists the preventive care services that should be considered, and provides dates of when you are due. Items listed as completed are up-to-date and do not require any further intervention.    Health Maintenance   Topic Date Due    ANNUAL WELLNESS VISIT  2022    COVID-19 Vaccine (4 - Booster for Moderna series) 2022 (Originally 2022)    ZOSTER VACCINE (1 of 2) 2023 (Originally 2008)    INFLUENZA VACCINE  10/01/2022    TDAP/TD VACCINES (3 - Td or Tdap) 11/15/2022    LIPID PANEL  2023    COLORECTAL CANCER SCREENING  2024    HEPATITIS C SCREENING  Completed    Pneumococcal Vaccine 0-64  Aged Out       Orders Placed This Encounter   Procedures    Motorized Wheelchair     Order Specific Question:   Wheelchair motorized     Answer:   W/ Fixed Full Length Arms Leg Rests     Order Specific Question:   Length of Need (99 Months = Lifetime)     Answer:   99 Months = Lifetime    Hemochromatosis Mutation     Order Specific Question:   Release to patient     Answer:   Routine Release    Iron Profile    Ferritin    CBC (No Diff)     Order Specific Question:   Release to patient     Answer:   Routine Release    Comprehensive Metabolic Panel     Order Specific Question:   Release to patient     Answer:   Routine Release    Methylmalonic Acid, Serum     Order Specific Question:   Release to patient     Answer:   Routine Release    Vitamin D 25  Hydroxy     Order Specific Question:   Release to patient     Answer:   Routine Release    Transferrin Saturation     Order Specific Question:   Release to patient     Answer:   Routine Release    Ambulatory Referral to Physical Therapy (Power Chair evaluation)     Referral Priority:   Routine     Referral Type:   Physical Therapy     Referral Reason:   Specialty Services Required     Requested Specialty:   Physical Therapy     Number of Visits Requested:   1       Return in about 3 months (around 11/20/2022) for follow up chronic issues.

## 2022-08-18 LAB — SPECIMEN STATUS: NORMAL

## 2022-08-20 DIAGNOSIS — E55.9 VITAMIN D DEFICIENCY: Primary | ICD-10-CM

## 2022-08-20 RX ORDER — ACETAMINOPHEN 160 MG
2000 TABLET,DISINTEGRATING ORAL DAILY
Qty: 90 CAPSULE | Refills: 3 | Status: SHIPPED | OUTPATIENT
Start: 2022-08-20 | End: 2023-03-01

## 2022-08-20 NOTE — PROGRESS NOTES
Three labs pending, two related to iron (hemochromatosis gene), other related to vitamin B12 (methylmalonic acid) and I will review when back in office.    STAFF--please contact [facility] and see if they're going to contact patient regarding CBC--lab states cancelled. Needs to be recollected.    Iron level this time is normal range.     Vitamin D low. Suggest taking vitamin D3 over the counter 2000 IU daily and trying to get 15 min of sun exposure daily to face and arms (when possible).

## 2022-08-22 LAB
25(OH)D3+25(OH)D2 SERPL-MCNC: 31.1 NG/ML (ref 30–100)
ALBUMIN SERPL-MCNC: 4.2 G/DL (ref 3.8–4.8)
ALBUMIN/GLOB SERPL: 1.4 {RATIO} (ref 1.2–2.2)
ALP SERPL-CCNC: 43 IU/L (ref 44–121)
ALT SERPL-CCNC: 24 IU/L (ref 0–44)
AST SERPL-CCNC: 22 IU/L (ref 0–40)
BILIRUB SERPL-MCNC: 0.5 MG/DL (ref 0–1.2)
BUN SERPL-MCNC: 16 MG/DL (ref 8–27)
BUN/CREAT SERPL: 17 (ref 10–24)
CALCIUM SERPL-MCNC: 9.3 MG/DL (ref 8.6–10.2)
CHLORIDE SERPL-SCNC: 101 MMOL/L (ref 96–106)
CO2 SERPL-SCNC: 23 MMOL/L (ref 20–29)
CREAT SERPL-MCNC: 0.92 MG/DL (ref 0.76–1.27)
EGFRCR-CYS SERPLBLD CKD-EPI 2021: 93 ML/MIN/1.73
FERRITIN SERPL-MCNC: 232 NG/ML (ref 30–400)
GLOBULIN SER CALC-MCNC: 3.1 G/DL (ref 1.5–4.5)
GLUCOSE SERPL-MCNC: 93 MG/DL (ref 65–99)
HFE GENE MUT ANL BLD/T: NORMAL
IRON SATN MFR SERPL: 40 % SATURATION
IRON SATN MFR SERPL: 46 % (ref 15–55)
IRON SERPL-MCNC: 135 UG/DL (ref 38–169)
IRON SERPL-MCNC: 137 UG/DL
METHYLMALONATE SERPL-SCNC: 142 NMOL/L (ref 0–378)
NRBC BLD AUTO-RTO: NORMAL %
POTASSIUM SERPL-SCNC: 4.2 MMOL/L (ref 3.5–5.2)
PROT SERPL-MCNC: 7.3 G/DL (ref 6–8.5)
SODIUM SERPL-SCNC: 139 MMOL/L (ref 134–144)
SPECIMEN STATUS: NORMAL
TIBC SERPL-MCNC: 293 UG/DL (ref 250–450)
TRANSFERRIN SERPL-MCNC: 246 MG/DL
UIBC SERPL-MCNC: 158 UG/DL (ref 111–343)
WBC # BLD AUTO: NORMAL X10E3/UL

## 2022-08-23 ENCOUNTER — TELEPHONE (OUTPATIENT)
Dept: INTERNAL MEDICINE | Facility: CLINIC | Age: 62
End: 2022-08-23

## 2022-08-23 DIAGNOSIS — R79.0 ABNORMAL BLOOD LEVEL OF IRON: ICD-10-CM

## 2022-08-23 DIAGNOSIS — D58.2 ELEVATED HEMOGLOBIN: Primary | ICD-10-CM

## 2022-08-23 DIAGNOSIS — M06.9 RHEUMATOID ARTHRITIS INVOLVING MULTIPLE SITES, UNSPECIFIED WHETHER RHEUMATOID FACTOR PRESENT: ICD-10-CM

## 2022-08-23 DIAGNOSIS — E53.8 VITAMIN B12 DEFICIENCY: ICD-10-CM

## 2022-08-23 NOTE — TELEPHONE ENCOUNTER
Called lab about cancelled CBC, they stated they just need a new order due to insufficiently specimen collection. Please advise as Dr. Lebron is out of the office this week and pt is in town now requesting to come on in.

## 2022-08-24 LAB
ERYTHROCYTE [DISTWIDTH] IN BLOOD BY AUTOMATED COUNT: 12.7 % (ref 12.3–15.4)
HCT VFR BLD AUTO: 50.4 % (ref 37.5–51)
HGB BLD-MCNC: 17.5 G/DL (ref 13–17.7)
MCH RBC QN AUTO: 33.6 PG (ref 26.6–33)
MCHC RBC AUTO-ENTMCNC: 34.7 G/DL (ref 31.5–35.7)
MCV RBC AUTO: 96.7 FL (ref 79–97)
PLATELET # BLD AUTO: 201 10*3/MM3 (ref 140–450)
RBC # BLD AUTO: 5.21 10*6/MM3 (ref 4.14–5.8)
WBC # BLD AUTO: 7.4 10*3/MM3 (ref 3.4–10.8)

## 2022-09-02 ENCOUNTER — CLINICAL SUPPORT (OUTPATIENT)
Dept: INTERNAL MEDICINE | Facility: CLINIC | Age: 62
End: 2022-09-02

## 2022-09-02 DIAGNOSIS — E53.8 VITAMIN B12 DEFICIENCY: ICD-10-CM

## 2022-09-02 PROCEDURE — 96372 THER/PROPH/DIAG INJ SC/IM: CPT | Performed by: FAMILY MEDICINE

## 2022-09-02 RX ADMIN — CYANOCOBALAMIN 1000 MCG: 1000 INJECTION, SOLUTION INTRAMUSCULAR; SUBCUTANEOUS at 12:33

## 2022-10-03 ENCOUNTER — CLINICAL SUPPORT (OUTPATIENT)
Dept: INTERNAL MEDICINE | Facility: CLINIC | Age: 62
End: 2022-10-03

## 2022-10-03 DIAGNOSIS — E53.8 VITAMIN B12 DEFICIENCY: ICD-10-CM

## 2022-10-03 PROCEDURE — 96372 THER/PROPH/DIAG INJ SC/IM: CPT | Performed by: FAMILY MEDICINE

## 2022-10-03 RX ADMIN — CYANOCOBALAMIN 1000 MCG: 1000 INJECTION, SOLUTION INTRAMUSCULAR; SUBCUTANEOUS at 12:53

## 2022-11-03 ENCOUNTER — CLINICAL SUPPORT (OUTPATIENT)
Dept: INTERNAL MEDICINE | Facility: CLINIC | Age: 62
End: 2022-11-03

## 2022-11-03 PROCEDURE — 96372 THER/PROPH/DIAG INJ SC/IM: CPT | Performed by: FAMILY MEDICINE

## 2022-11-03 RX ADMIN — CYANOCOBALAMIN 1000 MCG: 1000 INJECTION, SOLUTION INTRAMUSCULAR; SUBCUTANEOUS at 12:45

## 2022-11-06 ENCOUNTER — APPOINTMENT (OUTPATIENT)
Dept: GENERAL RADIOLOGY | Facility: HOSPITAL | Age: 62
End: 2022-11-06

## 2022-11-06 ENCOUNTER — HOSPITAL ENCOUNTER (EMERGENCY)
Facility: HOSPITAL | Age: 62
Discharge: HOME | End: 2022-11-06
Attending: EMERGENCY MEDICINE | Admitting: EMERGENCY MEDICINE

## 2022-11-06 VITALS
HEIGHT: 67 IN | WEIGHT: 240 LBS | BODY MASS INDEX: 37.67 KG/M2 | TEMPERATURE: 98.4 F | DIASTOLIC BLOOD PRESSURE: 95 MMHG | RESPIRATION RATE: 20 BRPM | SYSTOLIC BLOOD PRESSURE: 136 MMHG | HEART RATE: 104 BPM | OXYGEN SATURATION: 95 %
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
## 2022-11-06 DIAGNOSIS — M53.3 PAIN IN THE COCCYX: Primary | ICD-10-CM

## 2022-11-06 PROCEDURE — 72220 X-RAY EXAM SACRUM TAILBONE: CPT

## 2022-11-06 PROCEDURE — 25010000002 KETOROLAC TROMETHAMINE PER 15 MG

## 2022-11-06 PROCEDURE — 99283 EMERGENCY DEPT VISIT LOW MDM: CPT

## 2022-11-06 PROCEDURE — 96374 THER/PROPH/DIAG INJ IV PUSH: CPT

## 2022-11-06 RX ORDER — KETOROLAC TROMETHAMINE 30 MG/ML
30 INJECTION, SOLUTION INTRAMUSCULAR; INTRAVENOUS ONCE
Status: COMPLETED | OUTPATIENT
Start: 2022-11-06 | End: 2022-11-06

## 2022-11-06 RX ADMIN — KETOROLAC TROMETHAMINE 30 MG: 30 INJECTION, SOLUTION INTRAMUSCULAR; INTRAVENOUS at 20:52

## 2022-11-07 NOTE — DISCHARGE INSTRUCTIONS
No obvious abnormalities of the sacrum and coccyx noted on the x-ray.  The official radiology report will be released tomorrow, and if there is an abnormality you will be notified.  Maintain your upcoming appointment with Dr. Lebron for reevaluation.  Stop the meloxicam while taking the diclofenac to see if this is more beneficial in treating your pain.  Be sure not to take both of these medications at the same time.

## 2022-11-07 NOTE — ED PROVIDER NOTES
Subjective   History of Present Illness  Patient is a 62-year-old male here today with pain after a fall.  He states that he was attempting to sit down on a chair when the chair moved.  He ultimately fell onto his buttocks and then backwards hitting the floor with his back and head.  He denies any current head pain and denies loss of consciousness.  No changes to his vision and no nausea or vomiting.  He states that primarily his pain is in his buttocks and tailbone.  Was brought in by EMS.  Denies pain to any other part of his body.  Reports having a celebellar issue that makes him chronically unsteady.  He denies passing out as the result of his fall.        Review of Systems   Constitutional: Negative for chills and fever.   Eyes: Negative for visual disturbance.   Respiratory: Negative for shortness of breath.    Cardiovascular: Negative for chest pain.   Musculoskeletal: Positive for back pain. Negative for neck pain and neck stiffness.   Neurological: Negative for headaches.   All other systems reviewed and are negative.      Past Medical History:   Diagnosis Date   • Allergic rhinitis    • Anxiety    • Arthritis    • Balance problem    • Carpal tunnel syndrome    • Cluster headache    • Coronary artery disease    • Developmental delay    • Diabetes mellitus (HCC)     Noted by PCP   • Difficulty walking    • Dizziness    • Dysphagia    • Enlarged prostate    • GERD (gastroesophageal reflux disease)    • Gout    • Hip fracture, left ([facility])    • History of cardiovascular stress test 2018    positive for inferior and lateral ischemia    • History of echocardiogram    • Hyperlipidemia    • Hypertension    • Lymphadenitis    • Memory loss    • Migraine    • Myocardial infarction (HCC) 2000   • Obesity    • DENZEL (obstructive sleep apnea)    • Peptic ulcer    • Peptic ulceration    • Poor historian    • Rheumatoid arthritis ([facility])    • Right shoulder pain    • SOB (shortness of breath) on exertion    • Spinocerebellar  ataxia (HCC)    • Tension headache    • Trigeminal neuralgia    • Vitamin B 12 deficiency    • Vitamin D deficiency        Allergies   Allergen Reactions   • Oxycodone Hallucinations       Past Surgical History:   Procedure Laterality Date   • CARDIAC CATHETERIZATION     • CARDIAC CATHETERIZATION Left 3/14/2018    Procedure: Cardiac Catheterization/Vascular Study;  Surgeon: Adam Perkins MD;  Location: [facility] CATH INVASIVE LOCATION;  Service: Cardiovascular   • COLONOSCOPY     • COLONOSCOPY N/A 9/13/2019    Procedure: COLONOSCOPY W/ COLD SNARE POLYPECTOMY;  Surgeon: Melba Lopez MD;  Location: [facility] ENDOSCOPY;  Service: Gastroenterology   • ELBOW ARTHROPLASTY Bilateral    • ENDOSCOPY     • HERNIA REPAIR      x3   • HIP HEMIARTHROPLASTY Left    • NEUROPLASTY Bilateral     decompression median nerve at carpal tunnel   • TOTAL KNEE ARTHROPLASTY Bilateral     2005, 2012       Family History   Problem Relation Age of Onset   • Deep vein thrombosis Mother    • Diabetes Mother    • Hyperlipidemia Mother    • Hypertension Mother    • Heart attack Mother    • Cancer Father    • Kidney disease Father    • Deep vein thrombosis Daughter    • Arthritis Sister    • Diabetes Sister    • Hyperlipidemia Sister    • Hypertension Sister    • Osteoporosis Sister    • Thyroid disease Sister    • Liver disease Sister    • Arthritis Brother    • Diabetes Brother    • Hyperlipidemia Brother    • Hypertension Brother    • Stroke Brother    • Cancer Brother    • Heart attack Brother    • Mental illness Brother        Social History     Socioeconomic History   • Marital status:    Tobacco Use   • Smoking status: Never   • Smokeless tobacco: Never   Vaping Use   • Vaping Use: Never used   Substance and Sexual Activity   • Alcohol use: No   • Drug use: No   • Sexual activity: Defer           Objective   Physical Exam  Vitals and nursing note reviewed.   Constitutional:       Appearance: Normal appearance.   HENT:      Head:  Normocephalic and atraumatic.      Right Ear: Tympanic membrane, ear canal and external ear normal.      Left Ear: Tympanic membrane, ear canal and external ear normal.      Nose: Nose normal.      Mouth/Throat:      Mouth: Mucous membranes are moist.      Pharynx: Oropharynx is clear.   Eyes:      Extraocular Movements: Extraocular movements intact.      Conjunctiva/sclera: Conjunctivae normal.      Pupils: Pupils are equal, round, and reactive to light.   Neck:      Vascular: No carotid bruit.   Cardiovascular:      Rate and Rhythm: Normal rate and regular rhythm.      Pulses: Normal pulses.      Heart sounds: Normal heart sounds.   Pulmonary:      Effort: Pulmonary effort is normal.      Breath sounds: Normal breath sounds.   Abdominal:      General: Abdomen is flat. Bowel sounds are normal. There is no distension.      Palpations: Abdomen is soft.      Tenderness: There is no abdominal tenderness.   Musculoskeletal:      Cervical back: Neck supple. No tenderness.      Right lower leg: No edema.      Left lower leg: No edema.   Lymphadenopathy:      Cervical: No cervical adenopathy.   Skin:     General: Skin is warm and dry.      Capillary Refill: Capillary refill takes less than 2 seconds.   Neurological:      General: No focal deficit present.      Mental Status: He is alert and oriented to person, place, and time.   Psychiatric:         Mood and Affect: Mood normal.         Behavior: Behavior normal.         Procedures           ED Course                                           MDM  Number of Diagnoses or Management Options  Fall, initial encounter  Pain in the coccyx  Diagnosis management comments: Patient is a 62-year-old male here today with pain after a fall.  He does not appear to be in acute distress and vital signs are within normal limits.  He was placed on supplemental oxygen by EMS, however he does not wear his oxygen at home.  Immediately placed back on room air and maintain oxygen saturation  averaging 92%.  After performing the physical assessment, his main complaint of pain was in his sacrum/coccyx region.  An x-ray was obtained of this and there was no obvious abnormalities noted, reviewed with Dr. Martínez.  Radiology report is still pending.  Provided the patient with a dose of Toradol which provided significant relief in his pain.  Discussed the x-ray with the patient and the plan of care.  The patient takes meloxicam daily at home for pain, but states that he does not get much relief with this medication every day.  He is agreeable to trial diclofenac instead to see if this provides better pain relief.  Instructed not to take meloxicam and diclofenac at the same time.  He has an upcoming appointment with his primary provider in a few weeks, advised him to maintain this appointment and to discuss the potential continuation of diclofenac if it is beneficial.       Amount and/or Complexity of Data Reviewed  Tests in the radiology section of CPT®: ordered and reviewed  Tests in the medicine section of CPT®: ordered and reviewed  Discussion of test results with the performing providers: yes  Discuss the patient with other providers: yes    Patient Progress  Patient progress: stable      Final diagnoses:   Pain in the coccyx   Fall, initial encounter       ED Disposition  ED Disposition     ED Disposition   Discharge    Condition   Stable    Comment   --             Naty Lebron MD  [address] [place] [address] [address]  [facility] [address] [phone]    Schedule an appointment as soon as possible for a visit            Medication List      New Prescriptions    diclofenac 50 MG EC tablet  Commonly known as: VOLTAREN  Take 1 tablet by mouth 3 (Three) Times a Day As Needed (Back pain).           Where to Get Your Medications      These medications were sent to [id number] - [place], [place] - [address] [phone]  - [phone]   [address] [address]    Phone:  [phone]   · diclofenac 50 MG EC tablet          John Mondragon, APRN  11/06/22 2145

## 2022-11-09 DIAGNOSIS — F51.01 PRIMARY INSOMNIA: ICD-10-CM

## 2022-11-09 DIAGNOSIS — G11.8 SPINOCEREBELLAR ATAXIA: ICD-10-CM

## 2022-11-09 DIAGNOSIS — G50.0 TRIGEMINAL NEURALGIA: ICD-10-CM

## 2022-11-09 RX ORDER — GABAPENTIN 100 MG/1
CAPSULE ORAL
Qty: 90 CAPSULE | Refills: 1 | OUTPATIENT
Start: 2022-11-09

## 2022-11-22 ENCOUNTER — OFFICE VISIT (OUTPATIENT)
Dept: INTERNAL MEDICINE | Facility: CLINIC | Age: 62
End: 2022-11-22

## 2022-11-22 VITALS
HEIGHT: 67 IN | WEIGHT: 237.4 LBS | HEART RATE: 73 BPM | OXYGEN SATURATION: 94 % | DIASTOLIC BLOOD PRESSURE: 84 MMHG | BODY MASS INDEX: 37.26 KG/M2 | SYSTOLIC BLOOD PRESSURE: 126 MMHG | TEMPERATURE: 97.7 F | RESPIRATION RATE: 16 BRPM

## 2022-11-22 DIAGNOSIS — I10 ESSENTIAL HYPERTENSION: Primary | ICD-10-CM

## 2022-11-22 DIAGNOSIS — E53.8 VITAMIN B12 DEFICIENCY: ICD-10-CM

## 2022-11-22 DIAGNOSIS — M06.9 RHEUMATOID ARTHRITIS INVOLVING MULTIPLE SITES, UNSPECIFIED WHETHER RHEUMATOID FACTOR PRESENT: ICD-10-CM

## 2022-11-22 DIAGNOSIS — G25.81 RESTLESS LEGS SYNDROME: ICD-10-CM

## 2022-11-22 PROCEDURE — 99214 OFFICE O/P EST MOD 30 MIN: CPT | Performed by: FAMILY MEDICINE

## 2022-11-22 RX ORDER — PRAMIPEXOLE DIHYDROCHLORIDE 0.25 MG/1
0.25 TABLET ORAL 3 TIMES DAILY
Qty: 270 TABLET | Refills: 3 | Status: SHIPPED | OUTPATIENT
Start: 2022-11-22

## 2022-11-22 RX ORDER — CELECOXIB 100 MG/1
100 CAPSULE ORAL 2 TIMES DAILY PRN
Qty: 180 CAPSULE | Refills: 3 | Status: ON HOLD | OUTPATIENT
Start: 2022-11-22 | End: 2023-03-02

## 2022-11-22 RX ORDER — AMLODIPINE BESYLATE 10 MG/1
10 TABLET ORAL DAILY
Qty: 90 TABLET | Refills: 3 | Status: SHIPPED | OUTPATIENT
Start: 2022-11-22 | End: 2023-02-27 | Stop reason: SDUPTHER

## 2022-11-22 NOTE — PROGRESS NOTES
"Chief Complaint  Hypertension (3 month follow up on chronic issues )    Subjective        Chris Mejia presents to [facility]  History of Present Illness  Still hasn't gotten power chair but they've realized it won't fit in their trailer anyway, too wide unfortunately. He has 4 walkers per wife but doesn't use. Has one that has seat attachment. Recently fell and that led to EMS coming and taking him to ER. Continues to struggle with restless legs, issue both during day and at night. Has been on Mirapex in past and it helped some. Tried gabapentin but fell more and was loopy on it. Failed rquip.       Objective   Vital Signs:  /84 (BP Location: Right arm, Patient Position: Sitting, Cuff Size: Adult)   Pulse 73   Temp 97.7 °F (36.5 °C) (Temporal)   Resp 16   Ht 170.2 cm (67\")   Wt 108 kg (237 lb 6.4 oz)   SpO2 94%   BMI 37.18 kg/m²   Estimated body mass index is 37.18 kg/m² as calculated from the following:    Height as of this encounter: 170.2 cm (67\").    Weight as of this encounter: 108 kg (237 lb 6.4 oz).          Physical Exam  Vitals and nursing note reviewed.   Constitutional:       General: He is not in acute distress.     Appearance: Normal appearance. He is well-developed and well-groomed. He is obese. He is not ill-appearing, toxic-appearing or diaphoretic.      Interventions: Face mask in place.   HENT:      Head: Normocephalic and atraumatic.      Right Ear: Hearing normal.      Left Ear: Hearing normal.   Eyes:      General: Lids are normal. No scleral icterus.        Right eye: No discharge.         Left eye: No discharge.      Extraocular Movements: Extraocular movements intact.   Cardiovascular:      Rate and Rhythm: Normal rate and regular rhythm.   Pulmonary:      Effort: Pulmonary effort is normal.   Musculoskeletal:      Cervical back: Neck supple.   Skin:     Coloration: Skin is not jaundiced or pale.   Neurological:      General: No focal deficit " present.      Mental Status: He is alert and oriented to person, place, and time.      Gait: Gait abnormal.   Psychiatric:         Attention and Perception: Attention and perception normal.         Mood and Affect: Mood and affect normal.         Speech: Speech normal.         Behavior: Behavior normal. Behavior is cooperative.         Thought Content: Thought content normal.         Cognition and Memory: Cognition and memory normal.         Judgment: Judgment normal.        Result Review :                Assessment and Plan   Diagnoses and all orders for this visit:    1. Essential hypertension (Primary)  Comments:  dbp still >80. increased amlodipine to full dose 10 mg.   Orders:  -     amLODIPine (NORVASC) 10 MG tablet; Take 1 tablet by mouth Daily. Indications: High Blood Pressure Disorder  Dispense: 90 tablet; Refill: 3    2. Rheumatoid arthritis involving multiple sites, unspecified whether rheumatoid factor present ([facility])  -     celecoxib (CeleBREX) 100 MG capsule; Take 1 capsule by mouth 2 (Two) Times a Day As Needed (arthritic pain).  Dispense: 180 capsule; Refill: 3    3. Restless legs syndrome  Comments:  if prefers can take only at bedtime  Orders:  -     pramipexole (Mirapex) 0.25 MG tablet; Take 1 tablet by mouth 3 (Three) Times a Day.  Dispense: 270 tablet; Refill: 3    4. Vitamin B12 deficiency  Comments:  continue b12 shots once a month indefinitely             Follow Up   Return in about 3 months (around 2/25/2023) for Blood Pressure follow up.  Patient was given instructions and counseling regarding his condition or for health maintenance advice. Please see specific information pulled into the AVS if appropriate.

## 2022-12-05 ENCOUNTER — CLINICAL SUPPORT (OUTPATIENT)
Dept: INTERNAL MEDICINE | Facility: CLINIC | Age: 62
End: 2022-12-05

## 2022-12-05 DIAGNOSIS — E53.8 VITAMIN B12 DEFICIENCY: ICD-10-CM

## 2022-12-05 PROCEDURE — 96372 THER/PROPH/DIAG INJ SC/IM: CPT | Performed by: FAMILY MEDICINE

## 2022-12-05 RX ADMIN — CYANOCOBALAMIN 1000 MCG: 1000 INJECTION, SOLUTION INTRAMUSCULAR; SUBCUTANEOUS at 10:57

## 2022-12-29 ENCOUNTER — TELEPHONE (OUTPATIENT)
Dept: INTERNAL MEDICINE | Facility: CLINIC | Age: 62
End: 2022-12-29

## 2022-12-29 NOTE — TELEPHONE ENCOUNTER
PHONE CALL FROM [facility]  WILL BE SENDING A FAX TO REQUEST RECORDS FOR POWER WHEEL CHAIR APPEAL REQUEST.      CALL IF NEEDED [phone]

## 2023-01-03 ENCOUNTER — CLINICAL SUPPORT (OUTPATIENT)
Dept: INTERNAL MEDICINE | Facility: CLINIC | Age: 63
End: 2023-01-03
Payer: MEDICARE

## 2023-01-03 DIAGNOSIS — E53.8 VITAMIN B12 DEFICIENCY: ICD-10-CM

## 2023-01-03 PROCEDURE — 96372 THER/PROPH/DIAG INJ SC/IM: CPT | Performed by: FAMILY MEDICINE

## 2023-01-03 RX ADMIN — CYANOCOBALAMIN 1000 MCG: 1000 INJECTION, SOLUTION INTRAMUSCULAR; SUBCUTANEOUS at 11:15

## 2023-02-03 ENCOUNTER — CLINICAL SUPPORT (OUTPATIENT)
Dept: INTERNAL MEDICINE | Facility: CLINIC | Age: 63
End: 2023-02-03
Payer: MEDICARE

## 2023-02-03 PROCEDURE — 96372 THER/PROPH/DIAG INJ SC/IM: CPT | Performed by: FAMILY MEDICINE

## 2023-02-03 RX ADMIN — CYANOCOBALAMIN 1000 MCG: 1000 INJECTION, SOLUTION INTRAMUSCULAR; SUBCUTANEOUS at 11:17

## 2023-02-13 NOTE — OUTREACH NOTE
Ambulatory Case Management Note    Patient Outreach    RN-ACM outreach with patient and spouse.  Patient had an ED visit at [facility] 08/31/21.  Patient presented after a fall.  Patient was treated and discharged to home without acute admission.  No medication changes noted at discharge.      Unmet needs/questions/concerns for RN-ACM to address were denied.  Patient had accompanied his wife to her surgical procedure today and would be providing transportation for her.      Care Evaluation    Questions/Answers      Most Recent Value   Suggested Appointments  -- [Follow with PCP as recommended.  Report new/worsening/unresolved symptoms to PCP.  Return to ED for acute needs. ]   Annual Wellness Visit:   Patient Has Completed   Care Gaps Addressed  Colon Cancer Screening, Diabetic A1C   Colon Cancer Screening Type  Colonoscopy   Colonoscopy Status  Up to Date (< 10 yrs)   HbA1c Status  Up to Date-within defined limits   Care Gap Comments  Diabetes composite score of 4 with A1C 5.6%   Other Patient Education/Resources   24/7 [facility] Nurse Call Line [AVS, education, and recommended f/u reviewed.  Patient was provided Nurse Call Line information with AVS.  Patient does not use MyChart. ]   Advanced Directives:  Send Materials   Medication Adherence  Medications understood   Healthy Lifestyle (Self-Efficacy)  recognizes when to stop activity, recognizes when to contact medical assistance, self-reports important symptoms to medical professional          General & Health Literacy Assessment    Questions/Answers      Most Recent Value   Assessment Completed With  Patient, Spouse or Significant Other   Living Arrangement  Spouse   Type of Residence  Private Residence   Home Care Services  No   Communication Device  No   Bed or Wheelchair Confined  No   Difficulty Keeping Appointments  No   Yazidism or Spiritual Beliefs that Impact Treatment  No          Yaneth Wood RN  Ambulatory Case  Management    9/1/2021, 12:57 EDT     PACU

## 2023-02-20 DIAGNOSIS — I25.10 CORONARY ARTERY DISEASE INVOLVING NATIVE CORONARY ARTERY OF NATIVE HEART WITHOUT ANGINA PECTORIS: ICD-10-CM

## 2023-02-20 DIAGNOSIS — R33.9 URINARY RETENTION: ICD-10-CM

## 2023-02-20 DIAGNOSIS — E78.00 HYPERCHOLESTEREMIA: ICD-10-CM

## 2023-02-20 DIAGNOSIS — I10 ESSENTIAL HYPERTENSION: Chronic | ICD-10-CM

## 2023-02-20 DIAGNOSIS — J30.2 SEASONAL ALLERGIES: ICD-10-CM

## 2023-02-20 DIAGNOSIS — K21.9 GASTROESOPHAGEAL REFLUX DISEASE WITHOUT ESOPHAGITIS: ICD-10-CM

## 2023-02-20 DIAGNOSIS — F41.9 ANXIETY: ICD-10-CM

## 2023-02-20 DIAGNOSIS — N40.1 BENIGN NON-NODULAR PROSTATIC HYPERPLASIA WITH LOWER URINARY TRACT SYMPTOMS: ICD-10-CM

## 2023-02-20 RX ORDER — PANTOPRAZOLE SODIUM 40 MG/1
TABLET, DELAYED RELEASE ORAL
Qty: 90 TABLET | Refills: 3 | Status: SHIPPED | OUTPATIENT
Start: 2023-02-20

## 2023-02-20 RX ORDER — TAMSULOSIN HYDROCHLORIDE 0.4 MG/1
CAPSULE ORAL
Qty: 180 CAPSULE | Refills: 3 | Status: SHIPPED | OUTPATIENT
Start: 2023-02-20 | End: 2023-03-20 | Stop reason: SDUPTHER

## 2023-02-20 RX ORDER — PRAVASTATIN SODIUM 40 MG
TABLET ORAL
Qty: 90 TABLET | Refills: 3 | Status: SHIPPED | OUTPATIENT
Start: 2023-02-20

## 2023-02-20 RX ORDER — CARVEDILOL 12.5 MG/1
TABLET ORAL
Qty: 180 TABLET | Refills: 3 | Status: SHIPPED | OUTPATIENT
Start: 2023-02-20

## 2023-02-20 RX ORDER — HYDROCHLOROTHIAZIDE 12.5 MG/1
CAPSULE, GELATIN COATED ORAL
Qty: 90 CAPSULE | Refills: 3 | Status: SHIPPED | OUTPATIENT
Start: 2023-02-20

## 2023-02-20 RX ORDER — BUSPIRONE HYDROCHLORIDE 15 MG/1
TABLET ORAL
Qty: 180 TABLET | Refills: 3 | Status: SHIPPED | OUTPATIENT
Start: 2023-02-20

## 2023-02-20 RX ORDER — LEVOCETIRIZINE DIHYDROCHLORIDE 5 MG/1
TABLET, FILM COATED ORAL
Qty: 90 TABLET | Refills: 3 | OUTPATIENT
Start: 2023-02-20

## 2023-02-20 RX ORDER — TIZANIDINE 4 MG/1
TABLET ORAL
Qty: 90 TABLET | Refills: 3 | Status: SHIPPED | OUTPATIENT
Start: 2023-02-20

## 2023-02-20 NOTE — TELEPHONE ENCOUNTER
Rx Refill Note  Requested Prescriptions     Pending Prescriptions Disp Refills   • hydroCHLOROthiazide (MICROZIDE) 12.5 MG capsule [Pharmacy Med Name: HYDROCHLOROT CAP 12.5MG] 90 capsule 3     Sig: TAKE 1 CAPSULE EVERY       MORNING   • pravastatin (PRAVACHOL) 40 MG tablet [Pharmacy Med Name: PRAVASTATIN  TAB 40MG] 90 tablet 3     Sig: TAKE 1 TABLET EVERY NIGHT  FOR HIGH CHOLESTEROL,      STROKE RISK REDUCTION   • busPIRone (BUSPAR) 15 MG tablet [Pharmacy Med Name: BUSPIRONE TAB 15MG] 180 tablet 3     Sig: TAKE 1 TABLET TWICE DAILY  AS NEEDED FOR ANXIETY   • levocetirizine (XYZAL) 5 MG tablet [Pharmacy Med Name: LEVOCETIRIZI TAB 5MG] 90 tablet 3     Sig: TAKE 1 TABLET EVERY EVENING   • tiZANidine (ZANAFLEX) 4 MG tablet [Pharmacy Med Name: TIZANIDINE TAB 4MG] 90 tablet 3     Sig: TAKE 1 TABLET EVERY NIGHT   • tamsulosin (FLOMAX) 0.4 MG capsule 24 hr capsule [Pharmacy Med Name: TAMSULOSIN CAP 0.4MG] 180 capsule 3     Sig: TAKE 2 CAPSULES NIGHTLY   • carvedilol (COREG) 12.5 MG tablet [Pharmacy Med Name: CARVEDILOL TAB 12.5MG] 180 tablet 3     Sig: TAKE 1 TABLET TWICE DAILY  WITH MEALS   • pantoprazole (PROTONIX) 40 MG EC tablet [Pharmacy Med Name: PANTOPRAZOLE TAB 40MG DR] 90 tablet 3     Sig: TAKE 1 TABLET DAILY      Last office visit with prescribing clinician: 11/22/2022   Last telemedicine visit with prescribing clinician:  Next office visit with prescribing clinician: 2/24/2023       Amanda Smallwood MA  02/20/23, 13:10 EST     Levocetirizine was DC on 11/22/22

## 2023-02-24 ENCOUNTER — OFFICE VISIT (OUTPATIENT)
Dept: INTERNAL MEDICINE | Facility: CLINIC | Age: 63
End: 2023-02-24
Payer: MEDICARE

## 2023-02-24 ENCOUNTER — HOSPITAL ENCOUNTER (OUTPATIENT)
Dept: GENERAL RADIOLOGY | Facility: HOSPITAL | Age: 63
Discharge: HOME | End: 2023-02-24
Admitting: FAMILY MEDICINE
Payer: MEDICARE

## 2023-02-24 VITALS
DIASTOLIC BLOOD PRESSURE: 82 MMHG | WEIGHT: 241.4 LBS | HEART RATE: 71 BPM | OXYGEN SATURATION: 95 % | TEMPERATURE: 97.1 F | SYSTOLIC BLOOD PRESSURE: 126 MMHG | HEIGHT: 67 IN | RESPIRATION RATE: 16 BRPM | BODY MASS INDEX: 37.89 KG/M2

## 2023-02-24 DIAGNOSIS — E55.9 VITAMIN D DEFICIENCY: ICD-10-CM

## 2023-02-24 DIAGNOSIS — E78.00 HYPERCHOLESTEREMIA: ICD-10-CM

## 2023-02-24 DIAGNOSIS — R07.81 RIB PAIN ON RIGHT SIDE: Primary | ICD-10-CM

## 2023-02-24 DIAGNOSIS — R51.9 CHRONIC INTRACTABLE HEADACHE, UNSPECIFIED HEADACHE TYPE: ICD-10-CM

## 2023-02-24 DIAGNOSIS — Z91.81 AT HIGH RISK FOR FALLS: ICD-10-CM

## 2023-02-24 DIAGNOSIS — R07.81 RIB PAIN ON RIGHT SIDE: ICD-10-CM

## 2023-02-24 DIAGNOSIS — D58.2 ELEVATED HEMOGLOBIN: ICD-10-CM

## 2023-02-24 DIAGNOSIS — I10 ESSENTIAL HYPERTENSION: ICD-10-CM

## 2023-02-24 DIAGNOSIS — E53.8 VITAMIN B12 DEFICIENCY: ICD-10-CM

## 2023-02-24 DIAGNOSIS — Z23 NEED FOR PNEUMOCOCCAL VACCINATION: ICD-10-CM

## 2023-02-24 DIAGNOSIS — I25.10 CORONARY ARTERY DISEASE INVOLVING NATIVE CORONARY ARTERY OF NATIVE HEART WITHOUT ANGINA PECTORIS: ICD-10-CM

## 2023-02-24 DIAGNOSIS — Z12.5 PROSTATE CANCER SCREENING: ICD-10-CM

## 2023-02-24 DIAGNOSIS — R79.9 ABNORMAL BLOOD CHEMISTRY: ICD-10-CM

## 2023-02-24 DIAGNOSIS — N52.9 VASCULOGENIC ERECTILE DYSFUNCTION, UNSPECIFIED VASCULOGENIC ERECTILE DYSFUNCTION TYPE: ICD-10-CM

## 2023-02-24 DIAGNOSIS — G89.29 CHRONIC INTRACTABLE HEADACHE, UNSPECIFIED HEADACHE TYPE: ICD-10-CM

## 2023-02-24 PROCEDURE — G0009 ADMIN PNEUMOCOCCAL VACCINE: HCPCS | Performed by: FAMILY MEDICINE

## 2023-02-24 PROCEDURE — 99214 OFFICE O/P EST MOD 30 MIN: CPT | Performed by: FAMILY MEDICINE

## 2023-02-24 PROCEDURE — 90677 PCV20 VACCINE IM: CPT | Performed by: FAMILY MEDICINE

## 2023-02-24 PROCEDURE — 71101 X-RAY EXAM UNILAT RIBS/CHEST: CPT

## 2023-02-24 RX ORDER — TOPIRAMATE 25 MG/1
25 TABLET ORAL 2 TIMES DAILY
Qty: 180 TABLET | Refills: 3 | Status: SHIPPED | OUTPATIENT
Start: 2023-02-24 | End: 2023-03-08

## 2023-02-24 RX ORDER — NITROGLYCERIN 0.4 MG/1
0.4 TABLET SUBLINGUAL
Qty: 30 TABLET | Refills: 1 | Status: SHIPPED | OUTPATIENT
Start: 2023-02-24

## 2023-02-24 RX ORDER — SILDENAFIL 25 MG/1
25 TABLET, FILM COATED ORAL DAILY PRN
Qty: 15 TABLET | Refills: 0 | Status: SHIPPED | OUTPATIENT
Start: 2023-02-24 | End: 2023-03-01

## 2023-02-24 RX ORDER — HYDROCODONE BITARTRATE AND ACETAMINOPHEN 5; 325 MG/1; MG/1
.5-1 TABLET ORAL EVERY 8 HOURS PRN
Qty: 20 TABLET | Refills: 0 | Status: SHIPPED | OUTPATIENT
Start: 2023-02-24 | End: 2023-02-27 | Stop reason: SDUPTHER

## 2023-02-24 NOTE — PATIENT INSTRUCTIONS
Fall Prevention in the Home, Adult  Falls can cause injuries and affect people of all ages. There are many simple things that you can do to make your home safe and to help prevent falls. Ask for help when making these changes, if needed.  What actions can I take to prevent falls?  General instructions  • Use good lighting in all rooms. Replace any light bulbs that burn out, turn on lights if it is dark, and use night-lights.  • Place frequently used items in easy-to-reach places. Lower the shelves around your home if necessary.  • Set up furniture so that there are clear paths around it. Avoid moving your furniture around.  • Remove throw rugs and other tripping hazards from the floor.  • Avoid walking on wet floors.  • Fix any uneven floor surfaces.  • Add color or contrast paint or tape to grab bars and handrails in your home. Place contrasting color strips on the first and last steps of staircases.  • When you use a stepladder, make sure that it is completely opened and that the sides and supports are firmly locked. Have someone hold the ladder while you are using it. Do not climb a closed stepladder.  • Know where your pets are when moving through your home.  What can I do in the bathroom?     • Keep the floor dry. Immediately clean up any water that is on the floor.  • Remove soap buildup in the tub or shower regularly.  • Use nonskid mats or decals on the floor of the tub or shower.  • Attach bath mats securely with double-sided, nonslip rug tape.  • If you need to sit down while you are in the shower, use a plastic, nonslip stool.  • Install grab bars by the toilet and in the tub and shower. Do not use towel bars as grab bars.  What can I do in the bedroom?  • Make sure that a bedside light is easy to reach.  • Do not use oversized bedding that reaches the floor.  • Have a firm chair that has side arms to use for getting dressed.  What can I do in the kitchen?  • Clean up any spills right away.  • If you  need to reach for something above you, use a sturdy step stool that has a grab bar.  • Keep electrical cables out of the way.  • Do not use floor polish or wax that makes floors slippery. If you must use wax, make sure that it is non-skid floor wax.  What can I do with my stairs?  • Do not leave any items on the stairs.  • Make sure that you have a light switch at the top and the bottom of the stairs. Have them installed if you do not have them.  • Make sure that there are handrails on both sides of the stairs. Fix handrails that are broken or loose. Make sure that handrails are as long as the staircases.  • Install non-slip stair treads on all stairs in your home.  • Avoid having throw rugs at the top or bottom of stairs, or secure the rugs with carpet tape to prevent them from moving.  • Choose a carpet design that does not hide the edge of steps on the stairs.  • Check any carpeting to make sure that it is firmly attached to the stairs. Fix any carpet that is loose or worn.  What can I do on the outside of my home?  • Use bright outdoor lighting.  • Regularly repair the edges of walkways and driveways and fix any cracks.  • Remove high doorway thresholds.  • Trim any shrubbery on the main path into your home.  • Regularly check that handrails are securely fastened and in good repair. Both sides of all steps should have handrails.  • Install guardrails along the edges of any raised decks or porches.  • Clear walkways of debris and clutter, including tools and rocks.  • Have leaves, snow, and ice cleared regularly.  • Use sand or salt on walkways during winter months.  • In the garage, clean up any spills right away, including grease or oil spills.  What other actions can I take?  • Wear closed-toe shoes that fit well and support your feet. Wear shoes that have rubber soles or low heels.  • Use mobility aids as needed, such as canes, walkers, scooters, and crutches.  • Review your medicines with your health care  provider. Some medicines can cause dizziness or changes in blood pressure, which increase your risk of falling.  Talk with your health care provider about other ways that you can decrease your risk of falls. This may include working with a physical therapist or  to improve your strength, balance, and endurance.  Where to find more information  • Centers for Disease Control and Prevention, [place]: www.cdc.gov  • National [place] on Aging: www.wilmer.nih.gov  Contact a health care provider if:  • You are afraid of falling at home.  • You feel weak, drowsy, or dizzy at home.  • You fall at home.  Summary  • There are many simple things that you can do to make your home safe and to help prevent falls.  • Ways to make your home safe include removing tripping hazards and installing grab bars in the bathroom.  • Ask for help when making these changes in your home.  This information is not intended to replace advice given to you by your health care provider. Make sure you discuss any questions you have with your health care provider.  Document Revised: 09/19/2022 Document Reviewed: 07/21/2021  [employer] Patient Education © 2022 [employer]      Sit-to-Stand Exercise  The sit-to-stand exercise (also known as the chair stand or chair rise exercise) strengthens your lower body and helps you maintain or improve your mobility and independence. The end goal is to do the sit-to-stand exercise without using your hands. This will be easier as you become stronger. You should always talk with your health care provider before starting any exercise program, especially if you have had recent surgery.  Do the exercise exactly as told by your health care provider and adjust it as directed. It is normal to feel mild stretching, pulling, tightness, or discomfort as you do this exercise, but you should stop right away if you feel sudden pain or your pain gets worse. Do not begin doing this exercise until told by your health care  provider.  What the sit-to-stand exercise does  The sit-to-stand exercise helps to strengthen the muscles in your thighs and the muscles in the center of your body that give you stability (core muscles). This exercise is especially helpful if:  • You have had knee or hip surgery.  • You have trouble getting up from a chair, out of a car, or off the toilet due to muscle weakness.  How to do the sit-to-stand exercise  1. Sit toward the front edge of a sturdy chair without armrests. Your knees should be bent and your feet should be flat on the floor and shoulder-width apart and underneath your hips.  2. Place your hands lightly on each side of the seat. Keep your back and neck as straight as possible, with your chest slightly forward.  3. Breathe in slowly. Lean forward and slightly shift your weight to the front of your feet.  4. Breathe out as you slowly stand up. Try not to support any weight with your hands.  5. Stand and pause for a full breath in and out.  6. Breathe in as you sit down slowly. Tighten your core and abdominal muscles to control your lowering as much as possible. You should lower yourself back to the chair slowly, not just drop back into the seat.  7. Breathe out slowly.  8. Do this exercise 10-15 times. If needed, do it fewer times until you build up strength.  9. Rest for 1 minute, then do another set of 10-15 repetitions.  To change the difficulty of the sit-to-stand exercise  • If the exercise is too difficult, use a chair with sturdy armrests, and push off the armrests to help you come to the standing position. You can also use the armrests to help slowly lower yourself back to sitting. As this gets easier, try to use your arms less. You can also place a firm cushion or pillow on the chair to make the surface higher.  • If this exercise is too easy, do not use your arms to help raise or lower yourself. You can also wear a weighted vest, use hand weights, increase your repetitions, or try a  lower chair.  General tips  • You may feel tired when starting an exercise routine. This is normal.  • You may have muscle soreness that lasts a few days. This is normal. As you get stronger, you may not feel muscle soreness.  • Use smooth, steady movements.  • Do not  hold your breath during strength exercises. This can cause unsafe changes in your blood pressure.  • Breathe in slowly through your nose, and breathe out slowly through your mouth.  Summary  • Strengthening your lower body is an important step to help you move safely and independently.  • The sit-to-stand exercise helps strengthen the muscles in your thighs and core.  • You should always talk with your health care provider before starting any exercise program, especially if you have had recent surgery.  This information is not intended to replace advice given to you by your health care provider. Make sure you discuss any questions you have with your health care provider.  Document Revised: 04/10/2022 Document Reviewed: 04/10/2022  [employer] Patient Education © 2022 [employer]      Exercising to Stay Healthy  To become healthy and stay healthy, it is recommended that you do moderate-intensity and vigorous-intensity exercise. You can tell that you are exercising at a moderate intensity if your heart starts beating faster and you start breathing faster but can still hold a conversation. You can tell that you are exercising at a vigorous intensity if you are breathing much harder and faster and cannot hold a conversation while exercising.  How can exercise benefit me?  Exercising regularly is important. It has many health benefits, such as:  • Improving overall fitness, flexibility, and endurance.  • Increasing bone density.  • Helping with weight control.  • Decreasing body fat.  • Increasing muscle strength and endurance.  • Reducing stress and tension, anxiety, depression, or anger.  • Improving overall health.  What guidelines should I follow while  exercising?  • Before you start a new exercise program, talk with your health care provider.  • Do not exercise so much that you hurt yourself, feel dizzy, or get very short of breath.  • Wear comfortable clothes and wear shoes with good support.  • Drink plenty of water while you exercise to prevent dehydration or heat stroke.  • Work out until your breathing and your heartbeat get faster (moderate intensity).  How often should I exercise?  Choose an activity that you enjoy, and set realistic goals. Your health care provider can help you make an activity plan that is individually designed and works best for you.  Exercise regularly as told by your health care provider. This may include:  • Doing strength training two times a week, such as:  ? Lifting weights.  ? Using resistance bands.  ? Push-ups.  ? Sit-ups.  ? Yoga.  • Doing a certain intensity of exercise for a given amount of time. Choose from these options:  ? A total of 150 minutes of moderate-intensity exercise every week.  ? A total of 75 minutes of vigorous-intensity exercise every week.  ? A mix of moderate-intensity and vigorous-intensity exercise every week.  Children, pregnant women, people who have not exercised regularly, people who are overweight, and older adults may need to talk with a health care provider about what activities are safe to perform. If you have a medical condition, be sure to talk with your health care provider before you start a new exercise program.  What are some exercise ideas?  Moderate-intensity exercise ideas include:  • Walking 1 mile (1.6 km) in about 15 minutes.  • Biking.  • Hiking.  • Golfing.  • Dancing.  • Water aerobics.  Vigorous-intensity exercise ideas include:  • Walking 4.5 miles (7.2 km) or more in about 1 hour.  • Jogging or running 5 miles (8 km) in about 1 hour.  • Biking 10 miles (16.1 km) or more in about 1 hour.  • Lap swimming.  • Roller-skating or in-line skating.  • Cross-country skiing.  • Vigorous  competitive sports, such as football, basketball, and soccer.  • Jumping rope.  • Aerobic dancing.  What are some everyday activities that can help me get exercise?  • Yard work, such as:  ? Pushing a .  ? Raking and bagging leaves.  • Washing your car.  • Pushing a stroller.  • Shoveling snow.  • Gardening.  • Washing windows or floors.  How can I be more active in my day-to-day activities?  • Use stairs instead of an elevator.  • Take a walk during your lunch break.  • If you drive, park your car farther away from your work or school.  • If you take public transportation, get off one stop early and walk the rest of the way.  • Stand up or walk around during all of your indoor phone calls.  • Get up, stretch, and walk around every 30 minutes throughout the day.  • Enjoy exercise with a friend. Support to continue exercising will help you keep a regular routine of activity.  Where to find more information  You can find more information about exercising to stay healthy from:  • U.S. Department of Health and Human Services: www.hhs.gov  • Centers for Disease Control and Prevention (CDC): www.cdc.gov  Summary  • Exercising regularly is important. It will improve your overall fitness, flexibility, and endurance.  • Regular exercise will also improve your overall health. It can help you control your weight, reduce stress, and improve your bone density.  • Do not exercise so much that you hurt yourself, feel dizzy, or get very short of breath.  • Before you start a new exercise program, talk with your health care provider.  This information is not intended to replace advice given to you by your health care provider. Make sure you discuss any questions you have with your health care provider.  Document Revised: 04/15/2022 Document Reviewed: 04/15/2022  [employer] Patient Education © 2022 [employer]

## 2023-02-27 ENCOUNTER — TELEPHONE (OUTPATIENT)
Dept: INTERNAL MEDICINE | Facility: CLINIC | Age: 63
End: 2023-02-27
Payer: MEDICARE

## 2023-02-27 DIAGNOSIS — R07.81 RIB PAIN ON RIGHT SIDE: ICD-10-CM

## 2023-02-27 DIAGNOSIS — I10 ESSENTIAL HYPERTENSION: ICD-10-CM

## 2023-02-27 RX ORDER — AMLODIPINE BESYLATE 10 MG/1
10 TABLET ORAL DAILY
Qty: 90 TABLET | Refills: 3 | Status: SHIPPED | OUTPATIENT
Start: 2023-02-27

## 2023-02-27 RX ORDER — HYDROCODONE BITARTRATE AND ACETAMINOPHEN 5; 325 MG/1; MG/1
.5-1 TABLET ORAL EVERY 8 HOURS PRN
Qty: 20 TABLET | Refills: 0 | Status: SHIPPED | OUTPATIENT
Start: 2023-02-27 | End: 2023-03-01

## 2023-02-27 NOTE — TELEPHONE ENCOUNTER
No rib fractures on xray.    Amlodipine was likely denied as it was refilled in November and sent to [facility] pharmacy at that time but I went ahead and resent it there.     Norco sent to [facility] [place] [place].

## 2023-02-27 NOTE — TELEPHONE ENCOUNTER
Caller: DennisadrienneDiana    Relationship: Emergency Contact    Best call back number: [phone]     What was the call regarding: PATIENT'S WIFE CALLED ASKING FOR FOR THE XRAY RESULTS FROM Friday.  SHE STATED THAT THE MEDICATION THAT WAS CALLED IN FOR PAIN THE INSURANCE WILL BE NOT PAY FOR. THE PHARMACY IS SUPPOSE TO CALL TO REQUEST A DIFFERENT MEDICATION.  SHE ALSO STATED THAT THE MAIL PHARMACY STATED THAT THEY COULD NOT FILL THE AMLODIPINE BECAUSE DR HANNA DENIED THE REFILL REQUEST.    Do you require a callback: YES

## 2023-02-27 NOTE — PROGRESS NOTES
One lab pending related to vitamin B12.     Vitamin D low. Suggest taking vitamin D3 over the counter 2000 IU daily (as discussed at appointment) and trying to get 15 min of sun exposure daily to face and arms (when possible).     Remainder of labs are normal.

## 2023-02-27 NOTE — TELEPHONE ENCOUNTER
Please advise re: xray, amlodipine.  Patient can purchase Evans with [facility] at local [employer] for $20.70.

## 2023-03-01 ENCOUNTER — APPOINTMENT (OUTPATIENT)
Dept: CT IMAGING | Facility: HOSPITAL | Age: 63
End: 2023-03-01
Payer: MEDICARE

## 2023-03-01 ENCOUNTER — HOSPITAL ENCOUNTER (OUTPATIENT)
Facility: HOSPITAL | Age: 63
Setting detail: OBSERVATION
Discharge: HOME HEALTH | End: 2023-03-02
Attending: EMERGENCY MEDICINE | Admitting: INTERNAL MEDICINE
Payer: MEDICARE

## 2023-03-01 ENCOUNTER — TELEPHONE (OUTPATIENT)
Dept: INTERNAL MEDICINE | Facility: CLINIC | Age: 63
End: 2023-03-01
Payer: MEDICARE

## 2023-03-01 ENCOUNTER — APPOINTMENT (OUTPATIENT)
Dept: GENERAL RADIOLOGY | Facility: HOSPITAL | Age: 63
End: 2023-03-01
Payer: MEDICARE

## 2023-03-01 DIAGNOSIS — R42 VERTIGO: Primary | ICD-10-CM

## 2023-03-01 DIAGNOSIS — G11.8 SPINOCEREBELLAR ATAXIA: Chronic | ICD-10-CM

## 2023-03-01 DIAGNOSIS — R11.2 NAUSEA AND VOMITING, UNSPECIFIED VOMITING TYPE: ICD-10-CM

## 2023-03-01 DIAGNOSIS — H55.00 NYSTAGMUS: ICD-10-CM

## 2023-03-01 DIAGNOSIS — R26.0 ATAXIC GAIT: ICD-10-CM

## 2023-03-01 LAB
ALBUMIN SERPL-MCNC: 4 G/DL (ref 3.5–5.2)
ALBUMIN/GLOB SERPL: 1 G/DL
ALP SERPL-CCNC: 53 U/L (ref 39–117)
ALT SERPL W P-5'-P-CCNC: 21 U/L (ref 1–41)
ANION GAP SERPL CALCULATED.3IONS-SCNC: 9.5 MMOL/L (ref 5–15)
APTT PPP: 31.8 SECONDS (ref 23.5–35.5)
AST SERPL-CCNC: 21 U/L (ref 1–40)
BASOPHILS # BLD AUTO: 0.06 10*3/MM3 (ref 0–0.2)
BASOPHILS NFR BLD AUTO: 0.7 % (ref 0–1.5)
BILIRUB SERPL-MCNC: 0.4 MG/DL (ref 0–1.2)
BUN SERPL-MCNC: 14 MG/DL (ref 8–23)
BUN/CREAT SERPL: 15.2 (ref 7–25)
CALCIUM SPEC-SCNC: 9.3 MG/DL (ref 8.6–10.5)
CHLORIDE SERPL-SCNC: 101 MMOL/L (ref 98–107)
CO2 SERPL-SCNC: 25.5 MMOL/L (ref 22–29)
CREAT SERPL-MCNC: 0.92 MG/DL (ref 0.76–1.27)
DEPRECATED RDW RBC AUTO: 41.4 FL (ref 37–54)
EGFRCR SERPLBLD CKD-EPI 2021: 92.3 ML/MIN/1.73
EOSINOPHIL # BLD AUTO: 0.3 10*3/MM3 (ref 0–0.4)
EOSINOPHIL NFR BLD AUTO: 3.5 % (ref 0.3–6.2)
ERYTHROCYTE [DISTWIDTH] IN BLOOD BY AUTOMATED COUNT: 12.5 % (ref 12.3–15.4)
GLOBULIN UR ELPH-MCNC: 4.2 GM/DL
GLUCOSE SERPL-MCNC: 102 MG/DL (ref 65–99)
HCT VFR BLD AUTO: 49.2 % (ref 37.5–51)
HGB BLD-MCNC: 17.6 G/DL (ref 13–17.7)
HOLD SPECIMEN: NORMAL
HOLD SPECIMEN: NORMAL
IMM GRANULOCYTES # BLD AUTO: 0.03 10*3/MM3 (ref 0–0.05)
IMM GRANULOCYTES NFR BLD AUTO: 0.3 % (ref 0–0.5)
INR PPP: 0.95 (ref 0.9–1.1)
LYMPHOCYTES # BLD AUTO: 2.41 10*3/MM3 (ref 0.7–3.1)
LYMPHOCYTES NFR BLD AUTO: 27.8 % (ref 19.6–45.3)
MAGNESIUM SERPL-MCNC: 2.2 MG/DL (ref 1.6–2.4)
MCH RBC QN AUTO: 32.3 PG (ref 26.6–33)
MCHC RBC AUTO-ENTMCNC: 35.8 G/DL (ref 31.5–35.7)
MCV RBC AUTO: 90.3 FL (ref 79–97)
MONOCYTES # BLD AUTO: 0.88 10*3/MM3 (ref 0.1–0.9)
MONOCYTES NFR BLD AUTO: 10.1 % (ref 5–12)
NEUTROPHILS NFR BLD AUTO: 5 10*3/MM3 (ref 1.7–7)
NEUTROPHILS NFR BLD AUTO: 57.6 % (ref 42.7–76)
NRBC BLD AUTO-RTO: 0 /100 WBC (ref 0–0.2)
PLATELET # BLD AUTO: 194 10*3/MM3 (ref 140–450)
PMV BLD AUTO: 8.9 FL (ref 6–12)
POTASSIUM SERPL-SCNC: 3.5 MMOL/L (ref 3.5–5.2)
PROT SERPL-MCNC: 8.2 G/DL (ref 6–8.5)
PROTHROMBIN TIME: 12.9 SECONDS (ref 12.5–14.5)
RBC # BLD AUTO: 5.45 10*6/MM3 (ref 4.14–5.8)
SODIUM SERPL-SCNC: 136 MMOL/L (ref 136–145)
TROPONIN T SERPL HS-MCNC: 18 NG/L
WBC NRBC COR # BLD: 8.68 10*3/MM3 (ref 3.4–10.8)
WHOLE BLOOD HOLD COAG: NORMAL
WHOLE BLOOD HOLD SPECIMEN: NORMAL

## 2023-03-01 PROCEDURE — 85610 PROTHROMBIN TIME: CPT | Performed by: EMERGENCY MEDICINE

## 2023-03-01 PROCEDURE — 70498 CT ANGIOGRAPHY NECK: CPT

## 2023-03-01 PROCEDURE — 99285 EMERGENCY DEPT VISIT HI MDM: CPT

## 2023-03-01 PROCEDURE — 85730 THROMBOPLASTIN TIME PARTIAL: CPT | Performed by: EMERGENCY MEDICINE

## 2023-03-01 PROCEDURE — 71045 X-RAY EXAM CHEST 1 VIEW: CPT

## 2023-03-01 PROCEDURE — 70496 CT ANGIOGRAPHY HEAD: CPT

## 2023-03-01 PROCEDURE — 96374 THER/PROPH/DIAG INJ IV PUSH: CPT

## 2023-03-01 PROCEDURE — G0378 HOSPITAL OBSERVATION PER HR: HCPCS

## 2023-03-01 PROCEDURE — 0042T HC CT CEREBRAL PERFUSION W/WO CONTRAST: CPT

## 2023-03-01 PROCEDURE — 25010000002 DIAZEPAM PER 5 MG: Performed by: EMERGENCY MEDICINE

## 2023-03-01 PROCEDURE — 80053 COMPREHEN METABOLIC PANEL: CPT | Performed by: EMERGENCY MEDICINE

## 2023-03-01 PROCEDURE — 99223 1ST HOSP IP/OBS HIGH 75: CPT | Performed by: FAMILY MEDICINE

## 2023-03-01 PROCEDURE — 96361 HYDRATE IV INFUSION ADD-ON: CPT

## 2023-03-01 PROCEDURE — 84484 ASSAY OF TROPONIN QUANT: CPT | Performed by: EMERGENCY MEDICINE

## 2023-03-01 PROCEDURE — 70450 CT HEAD/BRAIN W/O DYE: CPT

## 2023-03-01 PROCEDURE — 85025 COMPLETE CBC W/AUTO DIFF WBC: CPT | Performed by: EMERGENCY MEDICINE

## 2023-03-01 PROCEDURE — 93005 ELECTROCARDIOGRAM TRACING: CPT | Performed by: EMERGENCY MEDICINE

## 2023-03-01 PROCEDURE — 25010000002 ONDANSETRON PER 1 MG: Performed by: EMERGENCY MEDICINE

## 2023-03-01 PROCEDURE — 25510000001 IOPAMIDOL 61 % SOLUTION: Performed by: EMERGENCY MEDICINE

## 2023-03-01 PROCEDURE — 96375 TX/PRO/DX INJ NEW DRUG ADDON: CPT

## 2023-03-01 PROCEDURE — 83735 ASSAY OF MAGNESIUM: CPT | Performed by: FAMILY MEDICINE

## 2023-03-01 RX ORDER — SODIUM CHLORIDE 9 MG/ML
75 INJECTION, SOLUTION INTRAVENOUS CONTINUOUS
Status: ACTIVE | OUTPATIENT
Start: 2023-03-02 | End: 2023-03-02

## 2023-03-01 RX ORDER — TIZANIDINE 4 MG/1
4 TABLET ORAL EVERY 12 HOURS PRN
Status: DISCONTINUED | OUTPATIENT
Start: 2023-03-01 | End: 2023-03-02 | Stop reason: HOSPADM

## 2023-03-01 RX ORDER — ACETAMINOPHEN 650 MG/1
650 SUPPOSITORY RECTAL EVERY 4 HOURS PRN
Status: DISCONTINUED | OUTPATIENT
Start: 2023-03-01 | End: 2023-03-02 | Stop reason: HOSPADM

## 2023-03-01 RX ORDER — DIAZEPAM 5 MG/ML
5 INJECTION, SOLUTION INTRAMUSCULAR; INTRAVENOUS ONCE
Status: COMPLETED | OUTPATIENT
Start: 2023-03-01 | End: 2023-03-01

## 2023-03-01 RX ORDER — SODIUM CHLORIDE 0.9 % (FLUSH) 0.9 %
10 SYRINGE (ML) INJECTION EVERY 12 HOURS SCHEDULED
Status: DISCONTINUED | OUTPATIENT
Start: 2023-03-02 | End: 2023-03-02 | Stop reason: HOSPADM

## 2023-03-01 RX ORDER — PANTOPRAZOLE SODIUM 40 MG/1
40 TABLET, DELAYED RELEASE ORAL DAILY
Status: DISCONTINUED | OUTPATIENT
Start: 2023-03-02 | End: 2023-03-02 | Stop reason: HOSPADM

## 2023-03-01 RX ORDER — SODIUM CHLORIDE 0.9 % (FLUSH) 0.9 %
10 SYRINGE (ML) INJECTION AS NEEDED
Status: DISCONTINUED | OUTPATIENT
Start: 2023-03-01 | End: 2023-03-02 | Stop reason: HOSPADM

## 2023-03-01 RX ORDER — CHOLECALCIFEROL (VITAMIN D3) 125 MCG
5 CAPSULE ORAL NIGHTLY PRN
Status: DISCONTINUED | OUTPATIENT
Start: 2023-03-01 | End: 2023-03-02 | Stop reason: HOSPADM

## 2023-03-01 RX ORDER — CARVEDILOL 12.5 MG/1
12.5 TABLET ORAL 2 TIMES DAILY WITH MEALS
Status: DISCONTINUED | OUTPATIENT
Start: 2023-03-02 | End: 2023-03-02 | Stop reason: HOSPADM

## 2023-03-01 RX ORDER — PRAMIPEXOLE DIHYDROCHLORIDE 0.25 MG/1
0.25 TABLET ORAL 3 TIMES DAILY
Status: DISCONTINUED | OUTPATIENT
Start: 2023-03-02 | End: 2023-03-02 | Stop reason: HOSPADM

## 2023-03-01 RX ORDER — ENOXAPARIN SODIUM 100 MG/ML
40 INJECTION SUBCUTANEOUS NIGHTLY
Status: DISCONTINUED | OUTPATIENT
Start: 2023-03-02 | End: 2023-03-02 | Stop reason: HOSPADM

## 2023-03-01 RX ORDER — ACETAMINOPHEN 325 MG/1
650 TABLET ORAL EVERY 4 HOURS PRN
Status: DISCONTINUED | OUTPATIENT
Start: 2023-03-01 | End: 2023-03-02 | Stop reason: HOSPADM

## 2023-03-01 RX ORDER — AMLODIPINE BESYLATE 5 MG/1
10 TABLET ORAL DAILY
Status: DISCONTINUED | OUTPATIENT
Start: 2023-03-02 | End: 2023-03-02 | Stop reason: HOSPADM

## 2023-03-01 RX ORDER — PRAVASTATIN SODIUM 20 MG
40 TABLET ORAL DAILY
Status: DISCONTINUED | OUTPATIENT
Start: 2023-03-02 | End: 2023-03-02 | Stop reason: HOSPADM

## 2023-03-01 RX ORDER — MECLIZINE HYDROCHLORIDE 25 MG/1
25 TABLET ORAL 3 TIMES DAILY PRN
Status: DISCONTINUED | OUTPATIENT
Start: 2023-03-01 | End: 2023-03-02 | Stop reason: HOSPADM

## 2023-03-01 RX ORDER — ONDANSETRON 2 MG/ML
4 INJECTION INTRAMUSCULAR; INTRAVENOUS EVERY 6 HOURS PRN
Status: DISCONTINUED | OUTPATIENT
Start: 2023-03-01 | End: 2023-03-02 | Stop reason: HOSPADM

## 2023-03-01 RX ORDER — SODIUM CHLORIDE 9 MG/ML
40 INJECTION, SOLUTION INTRAVENOUS AS NEEDED
Status: DISCONTINUED | OUTPATIENT
Start: 2023-03-01 | End: 2023-03-02 | Stop reason: HOSPADM

## 2023-03-01 RX ORDER — ONDANSETRON 2 MG/ML
4 INJECTION INTRAMUSCULAR; INTRAVENOUS ONCE
Status: COMPLETED | OUTPATIENT
Start: 2023-03-01 | End: 2023-03-01

## 2023-03-01 RX ORDER — ACETAMINOPHEN 160 MG/5ML
650 SOLUTION ORAL EVERY 4 HOURS PRN
Status: DISCONTINUED | OUTPATIENT
Start: 2023-03-01 | End: 2023-03-02 | Stop reason: HOSPADM

## 2023-03-01 RX ORDER — TAMSULOSIN HYDROCHLORIDE 0.4 MG/1
0.8 CAPSULE ORAL DAILY
Status: DISCONTINUED | OUTPATIENT
Start: 2023-03-02 | End: 2023-03-02 | Stop reason: HOSPADM

## 2023-03-01 RX ORDER — BUSPIRONE HYDROCHLORIDE 15 MG/1
15 TABLET ORAL 2 TIMES DAILY PRN
Status: DISCONTINUED | OUTPATIENT
Start: 2023-03-01 | End: 2023-03-02 | Stop reason: HOSPADM

## 2023-03-01 RX ORDER — ONDANSETRON 4 MG/1
4 TABLET, FILM COATED ORAL EVERY 6 HOURS PRN
Status: DISCONTINUED | OUTPATIENT
Start: 2023-03-01 | End: 2023-03-02 | Stop reason: HOSPADM

## 2023-03-01 RX ORDER — ASPIRIN 81 MG/1
81 TABLET ORAL DAILY
Status: DISCONTINUED | OUTPATIENT
Start: 2023-03-02 | End: 2023-03-02 | Stop reason: HOSPADM

## 2023-03-01 RX ADMIN — SODIUM CHLORIDE 75 ML/HR: 9 INJECTION, SOLUTION INTRAVENOUS at 23:39

## 2023-03-01 RX ADMIN — SODIUM CHLORIDE 1000 ML: 9 INJECTION, SOLUTION INTRAVENOUS at 22:25

## 2023-03-01 RX ADMIN — DIAZEPAM 5 MG: 5 INJECTION, SOLUTION INTRAMUSCULAR; INTRAVENOUS at 21:09

## 2023-03-01 RX ADMIN — IOPAMIDOL 50 ML: 612 INJECTION, SOLUTION INTRAVENOUS at 21:04

## 2023-03-01 RX ADMIN — ONDANSETRON 4 MG: 2 INJECTION INTRAMUSCULAR; INTRAVENOUS at 21:08

## 2023-03-01 RX ADMIN — IOPAMIDOL 100 ML: 612 INJECTION, SOLUTION INTRAVENOUS at 21:04

## 2023-03-01 NOTE — TELEPHONE ENCOUNTER
Called pt, verbally informed lab results to his wife([email or web address]). She had another concern about his feet swelling and is wondering if he needs to be seen. After talking with Dr. Lebron in office she would like to get more information about the swelling and dizziness. I attempted to call her back but no answer, will call again in the morning.

## 2023-03-01 NOTE — TELEPHONE ENCOUNTER
Caller: Diana Mejia    Relationship: Emergency Contact    Best call back number: [phone]    What medications are you currently taking:   Current Outpatient Medications on File Prior to Visit   Medication Sig Dispense Refill   • amLODIPine (NORVASC) 10 MG tablet Take 1 tablet by mouth Daily. Indications: High Blood Pressure Disorder 90 tablet 3   • aspirin 81 MG tablet Take 1 tablet by mouth Daily. 30 tablet 11   • busPIRone (BUSPAR) 15 MG tablet TAKE 1 TABLET TWICE DAILY  AS NEEDED FOR ANXIETY 180 tablet 3   • carvedilol (COREG) 12.5 MG tablet TAKE 1 TABLET TWICE DAILY  WITH MEALS 180 tablet 3   • celecoxib (CeleBREX) 100 MG capsule Take 1 capsule by mouth 2 (Two) Times a Day As Needed (arthritic pain). 180 capsule 3   • Cholecalciferol (Vitamin D3) 50 MCG (2000 UT) capsule Take 1 capsule by mouth Daily. For low vitamin D. 90 capsule 3   • hydroCHLOROthiazide (MICROZIDE) 12.5 MG capsule TAKE 1 CAPSULE EVERY       MORNING 90 capsule 3   • HYDROcodone-acetaminophen (Norco) 5-325 MG per tablet Take 0.5-1 tablets by mouth Every 8 (Eight) Hours As Needed for Severe Pain. 20 tablet 0   • nitroglycerin (Nitrostat) 0.4 MG SL tablet Place 1 tablet under the tongue Every 5 (Five) Minutes As Needed for Chest Pain. Take no more than 3 doses in 15 minutes. 30 tablet 1   • pantoprazole (PROTONIX) 40 MG EC tablet TAKE 1 TABLET DAILY 90 tablet 3   • pramipexole (Mirapex) 0.25 MG tablet Take 1 tablet by mouth 3 (Three) Times a Day. 270 tablet 3   • pravastatin (PRAVACHOL) 40 MG tablet TAKE 1 TABLET EVERY NIGHT  FOR HIGH CHOLESTEROL,      STROKE RISK REDUCTION 90 tablet 3   • sildenafil (Viagra) 25 MG tablet Take 1 tablet by mouth Daily As Needed for Erectile Dysfunction. 15 tablet 0   • tamsulosin (FLOMAX) 0.4 MG capsule 24 hr capsule TAKE 2 CAPSULES NIGHTLY 180 capsule 3   • tiZANidine (ZANAFLEX) 4 MG tablet TAKE 1 TABLET EVERY NIGHT 90 tablet 3   • topiramate (Topamax) 25 MG tablet Take 1 tablet by mouth 2 (Two) Times  a Day. 180 tablet 3     Current Facility-Administered Medications on File Prior to Visit   Medication Dose Route Frequency Provider Last Rate Last Admin   • cyanocobalamin injection 1,000 mcg  1,000 mcg Intramuscular Q28 Days Naty Lebron MD   1,000 mcg at 02/03/23 1117          When did you start taking these medications: 02.26.23    Which medication are you concerned about: TOPIRAMAX    Who prescribed you this medication: CYNDI    What are your concerns: PATIENT EXPERIENCING SEVERE DIZZINESS    How long have you had these concerns: SINCE PRESCRIBED    PLEASE ADVISE. PATIENT WAS TOLD NOT TO DISCONTINUE UNTIL SPEAKING WITH PROVIDER

## 2023-03-02 ENCOUNTER — TELEPHONE (OUTPATIENT)
Dept: INTERNAL MEDICINE | Facility: CLINIC | Age: 63
End: 2023-03-02
Payer: MEDICARE

## 2023-03-02 ENCOUNTER — APPOINTMENT (OUTPATIENT)
Dept: MRI IMAGING | Facility: HOSPITAL | Age: 63
End: 2023-03-02
Payer: MEDICARE

## 2023-03-02 ENCOUNTER — READMISSION MANAGEMENT (OUTPATIENT)
Dept: CALL CENTER | Facility: HOSPITAL | Age: 63
End: 2023-03-02
Payer: MEDICARE

## 2023-03-02 VITALS
SYSTOLIC BLOOD PRESSURE: 125 MMHG | TEMPERATURE: 97.5 F | OXYGEN SATURATION: 91 % | DIASTOLIC BLOOD PRESSURE: 82 MMHG | HEIGHT: 67 IN | RESPIRATION RATE: 18 BRPM | HEART RATE: 77 BPM | BODY MASS INDEX: 38.24 KG/M2 | WEIGHT: 243.61 LBS

## 2023-03-02 DIAGNOSIS — G11.8 SPINOCEREBELLAR ATAXIA: Primary | Chronic | ICD-10-CM

## 2023-03-02 LAB
AMPHET+METHAMPHET UR QL: NEGATIVE
AMPHETAMINES UR QL: NEGATIVE
BARBITURATES UR QL SCN: NEGATIVE
BENZODIAZ UR QL SCN: NEGATIVE
BILIRUB UR QL STRIP: NEGATIVE
BUPRENORPHINE SERPL-MCNC: NEGATIVE NG/ML
CANNABINOIDS SERPL QL: NEGATIVE
CLARITY UR: CLEAR
COCAINE UR QL: NEGATIVE
COLOR UR: YELLOW
GLUCOSE UR STRIP-MCNC: NEGATIVE MG/DL
HGB UR QL STRIP.AUTO: NEGATIVE
KETONES UR QL STRIP: NEGATIVE
LEUKOCYTE ESTERASE UR QL STRIP.AUTO: NEGATIVE
METHADONE UR QL SCN: NEGATIVE
NITRITE UR QL STRIP: NEGATIVE
OPIATES UR QL: NEGATIVE
OXYCODONE UR QL SCN: NEGATIVE
PCP UR QL SCN: NEGATIVE
PH UR STRIP.AUTO: 8 [PH] (ref 5–8)
PROPOXYPH UR QL: NEGATIVE
PROT UR QL STRIP: NEGATIVE
SP GR UR STRIP: >1.03 (ref 1–1.03)
TRICYCLICS UR QL SCN: NEGATIVE
UROBILINOGEN UR QL STRIP: ABNORMAL

## 2023-03-02 PROCEDURE — 97161 PT EVAL LOW COMPLEX 20 MIN: CPT

## 2023-03-02 PROCEDURE — 25010000002 ENOXAPARIN PER 10 MG: Performed by: FAMILY MEDICINE

## 2023-03-02 PROCEDURE — 96361 HYDRATE IV INFUSION ADD-ON: CPT

## 2023-03-02 PROCEDURE — 80306 DRUG TEST PRSMV INSTRMNT: CPT | Performed by: FAMILY MEDICINE

## 2023-03-02 PROCEDURE — 81003 URINALYSIS AUTO W/O SCOPE: CPT | Performed by: FAMILY MEDICINE

## 2023-03-02 PROCEDURE — G0378 HOSPITAL OBSERVATION PER HR: HCPCS

## 2023-03-02 PROCEDURE — 99239 HOSP IP/OBS DSCHRG MGMT >30: CPT | Performed by: INTERNAL MEDICINE

## 2023-03-02 PROCEDURE — 96372 THER/PROPH/DIAG INJ SC/IM: CPT

## 2023-03-02 PROCEDURE — 70551 MRI BRAIN STEM W/O DYE: CPT

## 2023-03-02 PROCEDURE — 99213 OFFICE O/P EST LOW 20 MIN: CPT | Performed by: PSYCHIATRY & NEUROLOGY

## 2023-03-02 RX ADMIN — ENOXAPARIN SODIUM 40 MG: 100 INJECTION SUBCUTANEOUS at 00:05

## 2023-03-02 RX ADMIN — PRAMIPEXOLE DIHYDROCHLORIDE 0.25 MG: 0.25 TABLET ORAL at 08:31

## 2023-03-02 RX ADMIN — Medication 10 ML: at 00:06

## 2023-03-02 RX ADMIN — PANTOPRAZOLE SODIUM 40 MG: 40 TABLET, DELAYED RELEASE ORAL at 06:11

## 2023-03-02 RX ADMIN — AMLODIPINE BESYLATE 10 MG: 5 TABLET ORAL at 08:32

## 2023-03-02 RX ADMIN — PRAVASTATIN SODIUM 40 MG: 20 TABLET ORAL at 08:31

## 2023-03-02 RX ADMIN — TAMSULOSIN HYDROCHLORIDE 0.8 MG: 0.4 CAPSULE ORAL at 08:32

## 2023-03-02 RX ADMIN — PRAMIPEXOLE DIHYDROCHLORIDE 0.25 MG: 0.25 TABLET ORAL at 16:03

## 2023-03-02 RX ADMIN — Medication 10 ML: at 08:32

## 2023-03-02 RX ADMIN — CARVEDILOL 12.5 MG: 12.5 TABLET, FILM COATED ORAL at 08:31

## 2023-03-02 RX ADMIN — ASPIRIN 81 MG: 81 TABLET, COATED ORAL at 08:32

## 2023-03-02 NOTE — DISCHARGE SUMMARY
[facility]   DISCHARGE SUMMARY      Name:  Chris Mejia   Age:  63 y.o.  Sex:  male  :  1960  MRN:  [id number]   Visit Number:  [id number]    Admission Date:  3/1/2023  Date of Discharge:  3/2/2023  Primary Care Physician:  Naty Lebron MD      Discharge Diagnoses:     1. Dizziness, suspected vertigo  2. Vision changes  3. Anxiety  4. CAD  5. History of spinocerebellar ataxia  6. Hypertension  7. RA  8. 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     Presenting Problem:    Chief Complaint   Patient presents with   • Dizziness   • Blurred Vision      Consults:     Consulting Physician(s)     Provider Relationship Specialty    Adam Cummings MD Consulting Physician Neurology        Procedures Performed:        History of presenting illness/Hospital Course:    Patient is a pleasant 63-year-old gentleman with multiple medical comorbidities including history of DENZEL, RA, spinocerebellar ataxia, arthritis, dysphagia, BPH, hypertension, GERD, who had presented from home due to a 1 day complaint of dizziness.  Patient and his wife confirmed that he does have gait abnormalities due to his chronic ataxia as well as some intermittent dizziness that is not uncommon.  He presented to the ER this time because dizziness persisted throughout the day when it will oftentimes resolved without intervention.  No falls or traumas.    ER work-up: Vital stable.  CMP, CBC, INR, magnesium within normal limits.  Recent lipid panel and TSH unremarkable.  CT angio of the head unremarkable.  CT angio of the neck was showing 50% stenosis of the proximal left internal carotid artery.  CT perfusion study  unremarkable.  CT head without contrast unremarkable.  Chest x-ray unremarkable.  ER had talked with teleneurology, they recommended observation and an MRI of the brain.    MRI of the brain obtained, no acute intracranial abnormality.  Neurology recommended no further intervention, suspect dizziness is peripheral based vertigo.  Had resolved and patient participated well with physical therapy.  Follow-up with primary physician and referral sent to reestablish with neurology.    Vital Signs:    Temp:  [97.5 °F (36.4 °C)-98.4 °F (36.9 °C)] 97.5 °F (36.4 °C)  Heart Rate:  [67-90] 77  Resp:  [16-20] 18  BP: (123-155)/() 125/82    Physical Exam:    General Appearance:  Alert and cooperative.    Head:  Atraumatic and normocephalic.   Eyes: Conjunctivae and sclerae normal, no icterus. No pallor.   Ears:  Ears with no abnormalities noted.   Throat: No oral lesions, no thrush, oral mucosa moist.   Neck: Supple, trachea midline, no thyromegaly.   Back:   No kyphoscoliosis present. No tenderness to palpation.   Lungs:   Breath sounds heard bilaterally equally.  No crackles or wheezing. No Pleural rub or bronchial breathing.   Heart:  Normal S1 and S2, no murmur, no gallop, no rub. No JVD.   Abdomen:   Normal bowel sounds, no masses, no organomegaly. Soft, nontender, nondistended, no rebound tenderness.   Extremities: Supple, no edema, no cyanosis, no clubbing.   Pulses: Pulses palpable bilaterally.   Skin: No bleeding or rash.   Neurologic: Alert and oriented x 3. No facial asymmetry. Moves all four limbs. No tremors.     Pertinent Lab Results:     Results from last 7 days   Lab Units 03/01/23 2035 02/24/23  0943   SODIUM mmol/L 136 141   POTASSIUM mmol/L 3.5 3.5   CHLORIDE mmol/L 101 102   TOTAL CO2 mmol/L  --  28.7   CO2 mmol/L 25.5  --    BUN mg/dL 14 14   CREATININE mg/dL 0.92 1.10   CALCIUM mg/dL 9.3 9.7   BILIRUBIN mg/dL 0.4 0.6   ALK PHOS U/L 53 44   ALT (SGPT) U/L 21 21   AST (SGOT) U/L 21 21   GLUCOSE mg/dL  102* 94     Results from last 7 days   Lab Units 03/01/23 2035 02/24/23  0943   WBC 10*3/mm3 8.68 7.54   HEMOGLOBIN g/dL 17.6 17.4   HEMATOCRIT % 49.2 49.9   PLATELETS 10*3/mm3 194 169     Results from last 7 days   Lab Units 03/01/23 2035   INR  0.95     Results from last 7 days   Lab Units 03/01/23 2035   HSTROP T ng/L 18*                           Pertinent Radiology Results:    Imaging Results (All)     Procedure Component Value Units Date/Time    MRI Brain Without Contrast [[id number]] Collected: 03/02/23 1552     Updated: 03/02/23 1552    Narrative:      MRI BRAIN WITHOUT CONTRAST-     HISTORY: Dizziness, non-specific; R42-Dizziness and giddiness;  H55.00-Unspecified nystagmus; R11.2-Nausea with vomiting, unspecified.     TECHNIQUE: Multi-planar imaging was performed of the brain without  contrast administration.       COMPARISON: CT head dated same day.     FINDINGS: There is no mass effect or midline shift.  Mild atrophy is  present. There are no areas of abnormal signal intensity.  Signal  intensity in the posterior fossa is within normal limits. No areas of  restricted diffusion to suggest acute ischemia.  No hemorrhage.  No  acute soft tissue abnormality.       Impression:      No acute intracranial abnormality.               XR Chest 1 View [[id number]] Collected: 03/02/23 0733     Updated: 03/02/23 0737    Narrative:      PROCEDURE: XR CHEST 1 VW-     HISTORY: Acute Stroke Protocol (onset < 12 hrs)     COMPARISON: 02/24/2023.     FINDINGS: The heart is mildly enlarged, stable. Tortuosity of the  thoracic aorta again noted. 2 oval calcifications again noted in the  right axilla. Azygos lobe, normal variant, again noted. There is a  increased distention of the azygos vein compared to prior exam. There is  mild, lateral left basilar scarring similar to CT of 01/08/2019. Right  lung is clear.. The lungs are clear. The mediastinum is unremarkable.  There is no pneumothorax.  There are no acute osseous  abnormalities.       Impression:      New distention of the azygos vein otherwise stable chest..           This report was signed and finalized on 3/2/2023 7:35 AM by Taty Ramos MD.    CT Angiogram Neck [[id number]] Collected: 03/01/23 2143     Updated: 03/01/23 2246    Narrative:      FINAL REPORT    TECHNIQUE:  Axial images through the neck were obtained following IV  contrast administration.    CLINICAL HISTORY:  blurry vision, dizziness    FINDINGS:  The aortic arch and the carotid arteries are normal as are the  bilateral vertebral arteries.  There is groundglass opacity in  the upper lobes.  There is an azygos pseudo-lobe.  There is no  significant cervical mass.  The right carotid bifurcation is  widely patent.  There is moderate vascular calcification at the  left carotid bifurcation and the proximal left internal carotid  artery where there is approximately 50% stenosis of the proximal  left internal carotid artery.      Impression:      As above.    Authenticated and Electronically Signed by Charles Chang M.D. on  03/01/2023 09:43:31 PM    CT Head Without Contrast Stroke Protocol [[id number]] Collected: 03/01/23 2150     Updated: 03/01/23 2151    Narrative:      FINAL REPORT    TECHNIQUE:  Axial CT images were performed through the head. Coronal  reformatted images were submitted. This study was performed with  techniques to keep radiation doses as low as reasonably  achievable (ALARA). Individualized dose reduction techniques  using automated exposure control or adjustment of mA and/or kV  according to the patient's size were employed.    CLINICAL HISTORY:  blurry vision, dizziness  stroke protocol    FINDINGS:  The ventricles are normal in size.  There is no evidence of  hemorrhage.  There is no mass or edema identified.  There is no  abnormal extra-axial fluid seen.  The sinuses are well aerated.      Impression:      No acute intracranial process.    Authenticated and Electronically Signed by Clayton HARVEY  MD Cong  on 03/01/2023 09:50:10 PM    CT CEREBRAL PERFUSION WITH & WITHOUT CONTRAST [[id number]] Collected: 03/01/23 2150     Updated: 03/01/23 2151    Narrative:      FINAL REPORT    TECHNIQUE:  CT images were obtained before and after contrast  administration.  Post contrast images were analyzed according to  a perfusion protocol with maps of regional blood flow and volume.    CLINICAL HISTORY:  Neuro deficit, acute, stroke suspected    FINDINGS:  The noncontrast images are normal.  Postcontrast imaging shows  no abnormality of regional blood flow or volume.      Impression:      Unremarkable.    Authenticated and Electronically Signed by Clayton Gar MD  on 03/01/2023 09:50:04 PM    CT Angiogram Head w AI Analysis of LVO [[id number]] Collected: 03/01/23 2149     Updated: 03/01/23 2150    Narrative:      FINAL REPORT    TECHNIQUE:  Multiple axial CT angiography images were performed from the  foramen magnum to the vertex before and during IV contrast  administration. This study was performed with techniques to keep  radiation doses as low as reasonably achievable (ALARA).  Individualized dose reduction techniques using automated  exposure control or adjustment of mA and/or kV according to the  patient's size were employed.    CLINICAL HISTORY:  Stroke, follow up    FINDINGS:  CTA HEAD:  The major intracranial arterial system is patent  without hemodynamically significant stenosis or major vessel  occlusion.No aneurysm is identified.      Impression:      No acute intracranial hemorrhage or large acute cortical  infarct.  No evidence of vascular injury, aneurysm,  hemodynamically significant stenosis or major vessel occlusion  of the intracranial arterial system.    Authenticated and Electronically Signed by Clayton Gar MD  on 03/01/2023 09:49:53 PM          Echo:    Results for orders placed during the hospital encounter of 04/05/18    Adult Transthoracic Echo Complete W/ Cont if Necessary Per  Protocol    Interpretation Summary  · Left ventricular systolic function is normal. Estimated EF = 55%.  · Left ventricular diastolic dysfunction (grade II) consistent with pseudonormalization.    Condition on Discharge:      Stable.    Code status during the hospital stay:    Code Status and Medical Interventions:   Ordered at: 03/01/23 5304     Code Status (Patient has no pulse and is not breathing):    CPR (Attempt to Resuscitate)     Medical Interventions (Patient has pulse or is breathing):    Full Support     Discharge Disposition:    Home-Health Care [facility] [place]    Discharge Medications:       Discharge Medications      Continue These Medications      Instructions Start Date   amLODIPine 10 MG tablet  Commonly known as: NORVASC   10 mg, Oral, Daily      aspirin 81 MG tablet   81 mg, Oral, Daily      busPIRone 15 MG tablet  Commonly known as: BUSPAR   TAKE 1 TABLET TWICE DAILY  AS NEEDED FOR ANXIETY      carvedilol 12.5 MG tablet  Commonly known as: COREG   TAKE 1 TABLET TWICE DAILY  WITH MEALS      hydroCHLOROthiazide 12.5 MG capsule  Commonly known as: MICROZIDE   TAKE 1 CAPSULE EVERY       MORNING      nitroglycerin 0.4 MG SL tablet  Commonly known as: Nitrostat   0.4 mg, Sublingual, Every 5 Minutes PRN, Take no more than 3 doses in 15 minutes.      pantoprazole 40 MG EC tablet  Commonly known as: PROTONIX   TAKE 1 TABLET DAILY      pramipexole 0.25 MG tablet  Commonly known as: Mirapex   0.25 mg, Oral, 3 Times Daily      pravastatin 40 MG tablet  Commonly known as: PRAVACHOL   TAKE 1 TABLET EVERY NIGHT  FOR HIGH CHOLESTEROL,      STROKE RISK REDUCTION      tamsulosin 0.4 MG capsule 24 hr capsule  Commonly known as: FLOMAX   TAKE 2 CAPSULES NIGHTLY      tiZANidine 4 MG tablet  Commonly known as: ZANAFLEX   TAKE 1 TABLET EVERY NIGHT      topiramate 25 MG tablet  Commonly known as: Topamax   25 mg, Oral, 2 Times Daily           Discharge Diet:     Diet Instructions     Diet: Regular      Discharge Diet: Regular         Activity at Discharge:       Follow-up Appointments:    Additional Instructions for the Follow-ups that You Need to Schedule     Ambulatory Referral to Home Health (Hospital)   As directed      Face to Face Visit Date: 3/2/2023    Follow-up provider for Plan of Care?: I treated the patient in an acute care facility and will not continue treatment after discharge.    Follow-up provider: RUPESH LEBRON [[id number]]    Reason/Clinical Findings: ataxia, debility    Describe mobility limitations that make leaving home difficult: ataxia, debility    Nursing/Therapeutic Services Requested: Physical Therapy Occupational Therapy    PT orders: Total joint pathway Therapeutic exercise Strengthening Transfer training Gait Training Home safety assessment    Weight Bearing Status: As Tolerated    Occupational orders: Activities of daily living Strengthening Cognition Fine motor Energy conservation Home safety assessment    Frequency: 1 Week 1         Discharge Follow-up with PCP   As directed       Currently Documented PCP:    Rupesh Lebron MD    PCP Phone Number:    [phone]     Follow Up Details: 2 weeks            Follow-up Information     Rupesh Lebron MD .    Specialty: Family Medicine  Why: 2 weeks  Contact information:  [address] [place], [place] [id number]  [phone]                       Future Appointments   Date Time Provider Department Center   3/8/2023  2:00 PM Rupesh Lebron MD [facility]   5/31/2023  9:00 AM Rupesh Lebron MD [facility]     Test Results Pending at Discharge:           Smith Castillo DO  03/02/23  16:20 EST    Time: I spent >30 minutes on this discharge activity which included: face-to-face encounter with the patient, reviewing the data in the system, coordination of the care with the nursing staff as well as consultants, documentation, and entering orders.     Dictated utilizing Dragon dictation.

## 2023-03-02 NOTE — ED PROVIDER NOTES
Subjective  History of Present Illness:    Chief Complaint: Blurry vision, dizziness, nausea vomiting  History of Present Illness: 63-year-old male presents for evaluation above complaint, began this morning at 9 AM.  2 episodes of vomiting, reports continued symptomatic blurry vision dizziness, no headache,    Nurses Notes reviewed and agree, including vitals, allergies, social history and prior medical history.     REVIEW OF SYSTEMS: All systems reviewed and not pertinent unless noted.  Review of Systems      Positive for: Blurry vision nausea vomiting and dizzy    Negative for: Head injury chest pain palpitations edema    Past Medical History:   Diagnosis Date   • Allergic rhinitis    • Anxiety    • Arthritis    • Balance problem    • Carpal tunnel syndrome    • Cluster headache    • Coronary artery disease    • Developmental delay    • Diabetes mellitus ([facility])     Noted by PCP   • Difficulty walking    • Dizziness    • Dysphagia    • Enlarged prostate    • GERD (gastroesophageal reflux disease)    • Gout    • Hip fracture, left ([facility])    • History of cardiovascular stress test 2018    positive for inferior and lateral ischemia    • History of echocardiogram    • Hyperlipidemia    • Hypertension    • Lymphadenitis    • Memory loss    • Migraine    • Myocardial infarction ([facility]) 2000   • Obesity    • DENZEL (obstructive sleep apnea)    • Peptic ulcer    • Peptic ulceration    • Poor historian    • Rheumatoid arthritis ([facility])    • Right shoulder pain    • SOB (shortness of breath) on exertion    • 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[facility])    • Tension headache    • Trigeminal neuralgia    • Vitamin B 12 deficiency    • Vitamin D deficiency        Allergies:    Oxycodone      Past Surgical History:   Procedure Laterality Date   • CARDIAC CATHETERIZATION     • CARDIAC CATHETERIZATION Left 3/14/2018    Procedure: Cardiac Catheterization/Vascular Study;  Surgeon: Adam Perkins MD;  Location: [facility] INVASIVE LOCATION;  Service:  "Cardiovascular   • COLONOSCOPY     • COLONOSCOPY N/A 9/13/2019    Procedure: COLONOSCOPY W/ COLD SNARE POLYPECTOMY;  Surgeon: Melba Lopez MD;  Location: [facility] ENDOSCOPY;  Service: Gastroenterology   • ELBOW ARTHROPLASTY Bilateral    • ENDOSCOPY     • HERNIA REPAIR      x3   • HIP HEMIARTHROPLASTY Left    • NEUROPLASTY Bilateral     decompression median nerve at carpal tunnel   • TOTAL KNEE ARTHROPLASTY Bilateral     2005, 2012         Social History     Socioeconomic History   • Marital status:    Tobacco Use   • Smoking status: Never   • Smokeless tobacco: Never   Vaping Use   • Vaping Use: Never used   Substance and Sexual Activity   • Alcohol use: No   • Drug use: No   • Sexual activity: Defer         Family History   Problem Relation Age of Onset   • Deep vein thrombosis Mother    • Diabetes Mother    • Hyperlipidemia Mother    • Hypertension Mother    • Heart attack Mother    • Cancer Father    • Kidney disease Father    • Deep vein thrombosis Daughter    • Arthritis Sister    • Diabetes Sister    • Hyperlipidemia Sister    • Hypertension Sister    • Osteoporosis Sister    • Thyroid disease Sister    • Liver disease Sister    • Arthritis Brother    • Diabetes Brother    • Hyperlipidemia Brother    • Hypertension Brother    • Stroke Brother    • Cancer Brother    • Heart attack Brother    • Mental illness Brother        Objective  Physical Exam:  /83   Pulse 73   Temp 98.4 °F (36.9 °C) (Oral)   Resp 20   Ht 171.5 cm (67.5\")   Wt 111 kg (245 lb)   SpO2 92%   BMI 37.81 kg/m²      Physical Exam    CONSTITUTIONAL: Well developed, nontoxic 63-year-old, somewhat symptomatic in regards to dizziness and reported blurry vision   VITAL SIGNS: per nursing, reviewed and noted  SKIN: exposed skin with no rashes, ulcerations or petechiae  EYES: Grossly EOMI, no icterus, lateral nystagmus at rest, worse with rotation.  Unable to assess visual acuity secondary to patient symptomatology,  ENT: Normal " voice.  Patient maintained wearing a mask throughout patient encounter due to coronavirus pandemic  RESPIRATORY:  No increased work of breathing. No retractions.  Chest clear to auscultation bilaterally  CARDIOVASCULAR:  regular rate and rhythm, no murmurs.  Good Peripheral pulses. Good cap refill to extremities.   GI: Abdomen soft, no distention   MUSCULOSKELETAL: Age-appropriate bulk and tone, moves all 4 extremities  NEUROLOGIC: Alert, oriented x 3.  GCS 15.  No facial droop no drift, no dysmetria no weakness.  NIH stroke scale 0  PSYCH: appropriate affect.  : no bladder tenderness or distention, no CVA tenderness    Procedures  No attending physician procedures were performed on this patient.  ED Course:        ED Course as of 03/01/23 2227   Wed Mar 01, 2023   2147 EKG interpreted by me reveals sinus rhythm with first-degree block.  No ectopy no ischemic changes nonspecific T wave changes [PF]      ED Course User Index  [PF] Ludin Jay, DO       Lab Results (last 24 hours)     Procedure Component Value Units Date/Time    CBC & Differential [[id number]]  (Abnormal) Collected: 03/01/23 2035    Specimen: Blood Updated: 03/01/23 2044    Narrative:      The following orders were created for panel order CBC & Differential.  Procedure                               Abnormality         Status                     ---------                               -----------         ------                     CBC Auto Differential[[id number]]        Abnormal            Final result                 Please view results for these tests on the individual orders.    Comprehensive Metabolic Panel [[id number]]  (Abnormal) Collected: 03/01/23 2035    Specimen: Blood Updated: 03/01/23 2100     Glucose 102 mg/dL      BUN 14 mg/dL      Creatinine 0.92 mg/dL      Sodium 136 mmol/L      Potassium 3.5 mmol/L      Chloride 101 mmol/L      CO2 25.5 mmol/L      Calcium 9.3 mg/dL      Total Protein 8.2 g/dL      Albumin 4.0 g/dL      ALT (SGPT)  21 U/L      AST (SGOT) 21 U/L      Alkaline Phosphatase 53 U/L      Total Bilirubin 0.4 mg/dL      Globulin 4.2 gm/dL      A/G Ratio 1.0 g/dL      BUN/Creatinine Ratio 15.2     Anion Gap 9.5 mmol/L      eGFR 92.3 mL/min/1.73     Narrative:      GFR Normal >60  Chronic Kidney Disease <60  Kidney Failure <15      Protime-INR [[id number]]  (Normal) Collected: 03/01/23 2035    Specimen: Blood Updated: 03/01/23 2111     Protime 12.9 Seconds      INR 0.95    Narrative:      Suggested INR therapeutic range for stable oral anticoagulant therapy:    Low Intensity therapy:   1.5-2.0  Moderate Intensity therapy:   2.0-3.0  High Intensity therapy:   2.5-4.0    aPTT [[id number]]  (Normal) Collected: 03/01/23 2035    Specimen: Blood Updated: 03/01/23 2111     PTT 31.8 seconds     Single High Sensitivity Troponin T [[id number]]  (Abnormal) Collected: 03/01/23 2035    Specimen: Blood Updated: 03/01/23 2100     HS Troponin T 18 ng/L     Narrative:      High Sensitive Troponin T Reference Range:  <10.0 ng/L- Negative Female for AMI  <15.0 ng/L- Negative Male for AMI  >=10 - Abnormal Female indicating possible myocardial injury.  >=15 - Abnormal Male indicating possible myocardial injury.   Clinicians would have to utilize clinical acumen, EKG, Troponin, and serial changes to determine if it is an Acute Myocardial Infarction or myocardial injury due to an underlying chronic condition.         CBC Auto Differential [[id number]]  (Abnormal) Collected: 03/01/23 2035    Specimen: Blood Updated: 03/01/23 2044     WBC 8.68 10*3/mm3      RBC 5.45 10*6/mm3      Hemoglobin 17.6 g/dL      Hematocrit 49.2 %      MCV 90.3 fL      MCH 32.3 pg      MCHC 35.8 g/dL      RDW 12.5 %      RDW-SD 41.4 fl      MPV 8.9 fL      Platelets 194 10*3/mm3      Neutrophil % 57.6 %      Lymphocyte % 27.8 %      Monocyte % 10.1 %      Eosinophil % 3.5 %      Basophil % 0.7 %      Immature Grans % 0.3 %      Neutrophils, Absolute 5.00 10*3/mm3      Lymphocytes, Absolute  2.41 10*3/mm3      Monocytes, Absolute 0.88 10*3/mm3      Eosinophils, Absolute 0.30 10*3/mm3      Basophils, Absolute 0.06 10*3/mm3      Immature Grans, Absolute 0.03 10*3/mm3      nRBC 0.0 /100 WBC     Magnesium [[id number]]  (Normal) Collected: 03/01/23 2035    Specimen: Blood Updated: 03/01/23 2219     Magnesium 2.2 mg/dL            CT Head Without Contrast Stroke Protocol    Result Date: 3/1/2023  FINAL REPORT TECHNIQUE: Axial CT images were performed through the head. Coronal reformatted images were submitted. This study was performed with techniques to keep radiation doses as low as reasonably achievable (ALARA). Individualized dose reduction techniques using automated exposure control or adjustment of mA and/or kV according to the patient's size were employed. CLINICAL HISTORY: blurry vision, dizziness  stroke protocol FINDINGS: The ventricles are normal in size.  There is no evidence of hemorrhage.  There is no mass or edema identified.  There is no abnormal extra-axial fluid seen.  The sinuses are well aerated.     Impression: No acute intracranial process. Authenticated and Electronically Signed by Clayton Gar MD on 03/01/2023 09:50:10 PM    CT Angiogram Head w AI Analysis of LVO    Result Date: 3/1/2023  FINAL REPORT TECHNIQUE: Multiple axial CT angiography images were performed from the foramen magnum to the vertex before and during IV contrast administration. This study was performed with techniques to keep radiation doses as low as reasonably achievable (ALARA). Individualized dose reduction techniques using automated exposure control or adjustment of mA and/or kV according to the patient's size were employed. CLINICAL HISTORY: Stroke, follow up FINDINGS: CTA HEAD:  The major intracranial arterial system is patent without hemodynamically significant stenosis or major vessel occlusion.No aneurysm is identified.     Impression: No acute intracranial hemorrhage or large acute cortical infarct.  No  evidence of vascular injury, aneurysm, hemodynamically significant stenosis or major vessel occlusion of the intracranial arterial system. Authenticated and Electronically Signed by Clayton Gar MD on 03/01/2023 09:49:53 PM    CT CEREBRAL PERFUSION WITH & WITHOUT CONTRAST    Result Date: 3/1/2023  FINAL REPORT TECHNIQUE: CT images were obtained before and after contrast administration.  Post contrast images were analyzed according to a perfusion protocol with maps of regional blood flow and volume. CLINICAL HISTORY: Neuro deficit, acute, stroke suspected FINDINGS: The noncontrast images are normal.  Postcontrast imaging shows no abnormality of regional blood flow or volume.     Impression: Unremarkable. Authenticated and Electronically Signed by Clayton Gar MD on 03/01/2023 09:50:04 PM         MDM    Initial impression of presenting illness: 63-year-old male presents with severe dizziness, blurry vision, nausea vomiting    DDX: includes but is not limited to: Vertigo, cerebellar stroke    Patient arrives normotensive afebrile no tachycardia oxygen saturations 92% room air with vitals interpreted by myself.     Pertinent features from physical exam: Lateral nystagmus.    Initial diagnostic plan: Consulted with stroke neurology Dr. Cummings recommended imaging, symptomatic care, admit    Results from initial plan were reviewed and interpreted by me revealing no acute intracranial findings on CT head, CT angiogram of the head neck, perfusion studies.  Add MRI if no contraindications.  Labs nonactionable nonischemic EKG, troponin 18    Diagnostic information from other sources: None    Interventions / Re-evaluation: Still symptomatic after interventions of Valium and Zofran, will add IV fluids,    Results/clinical rationale were discussed with patient and family    Consultations/Discussion of results with other physicians: Dr. Cummings neurology, Dr. Lepe hospitalist will admit      -----    Final diagnoses:    Vertigo   Nystagmus   Nausea and vomiting, unspecified vomiting type        Ludin Jay, DO  03/01/23 8043

## 2023-03-02 NOTE — CASE MANAGEMENT/SOCIAL WORK
Discharge Planning Assessment  [facility]     Patient Name: Chris Mejia  MRN: [id number]  Today's Date: 3/2/2023    Admit Date: 3/1/2023    Plan: Pt plans to discharge home. Spouse to transport. Pharmacy of choice is [facility]. Denies any needs.   Discharge Needs Assessment     Row Name 03/02/23 1140       Living Environment    People in Home spouse    Current Living Arrangements home    Potentially Unsafe Housing Conditions unable to assess    Primary Care Provided by self    Provides Primary Care For no one    Family Caregiver if Needed spouse    Quality of Family Relationships helpful;involved    Able to Return to Prior Arrangements yes       Food Insecurity    Within the past 12 months, you worried that your food would run out before you got the money to buy more. Never true    Within the past 12 months, the food you bought just didn't last and you didn't have money to get more. Never true       Transition Planning    Patient/Family Anticipates Transition to home       Discharge Needs Assessment    Equipment Currently Used at Home wheelchair;walker, standard    Concerns to be Addressed no discharge needs identified    Equipment Needed After Discharge none    Provided Post Acute Provider List? N/A    Provided Post Acute Provider Quality & Resource List? N/A               Discharge Plan     Row Name 03/02/23 1142       Plan    Plan Pt plans to discharge home. Spouse to transport. [facility]acy of choice is [facility]. Denies any needs.    Patient/Family in Agreement with Plan yes    Final Discharge Disposition Code 30 - still a patient              Continued Care and Services - Admitted Since 3/1/2023    Coordination has not been started for this encounter.          Demographic Summary     Row Name 03/02/23 1135       General Information    Admission Type observation    Arrived From home    Referral Source admission list    Reason for Consult discharge planning    Preferred Language English       Contact Information     Permission Granted to Share Info With     Contact Information Obtained for                Functional Status     Row Name 03/02/23 1135       Functional Status    Usual Activity Tolerance good    Current Activity Tolerance good       Functional Status, IADL    Medications independent    Meal Preparation independent    Housekeeping independent    Laundry independent    Shopping independent       Mental Status    General Appearance WDL WDL       Mental Status Summary    Recent Changes in Mental Status/Cognitive Functioning no changes       Employment/    Employment Status disabled               Psychosocial     Row Name 03/02/23 1135       Values/Beliefs    Spiritual, Cultural Beliefs, Worship Practices, Values that Affect Care no       Behavior WDL    Behavior WDL WDL       Emotion Mood WDL    Emotion/Mood/Affect WDL WDL       Speech WDL    Speech WDL WDL       Perceptual State WDL    Perceptual State WDL WDL       Thought Process WDL    Thought Process WDL WDL       Intellectual Performance WDL    Intellectual Performance WDL WDL       Coping/Stress    Major Change/Loss/Stressor none    Patient Personal Strengths positive attitude    Sources of Support spouse    Reaction to Health Status realistic    Understanding of Condition and Treatment adequate understanding of medical condition       Developmental Stage (Eriksson's)    Developmental Stage Stage 8 (63 years-death/Late Adulthood) Integrity vs. Despair       C-SSRS (Recent)    Q1 Wished to be Dead (Past Month) no    Q2 Suicidal Thoughts (Past Month) no    Q6 Suicide Behavior (Lifetime) no       Violence Risk    Feels Like Hurting Others no    Previous Attempt to Harm Others no               Abuse/Neglect    No documentation.                Legal    No documentation.                Substance Abuse    No documentation.                Patient Forms    No documentation.                   Mishel Spence

## 2023-03-02 NOTE — CASE MANAGEMENT/SOCIAL WORK
Case Management/Social Work    Patient Name:  Chris Mejia  YOB: 1960  MRN: [id number]  Admit Date:  3/1/2023      SW met with pt regarding home health orders. Pt's spouse also in the room. Pt states he has had home health multiple times in the past and knows the exercises.  Declines home health at discharge.     Electronically signed by:  Mishel Spence  03/02/23 15:38 EST

## 2023-03-02 NOTE — PROGRESS NOTES
"Pharmacy Consult - Enoxaparin Dosing    Chris Mejia is a 63 y.o. male who has been consulted to dose enoxaparin for VTE prophylaxis.     Allergies    Topamax [topiramate] and Oxycodone    Relevant clinical data and objective history reviewed:     [Ht: 170.2 cm (67\"); Wt: 110 kg (241 lb 13.5 oz)]  Body mass index is 37.88 kg/m².    Estimated Creatinine Clearance: 94.8 mL/min (by C-G formula based on SCr of 0.92 mg/dL).    Results from last 7 days   Lab Units 03/01/23 2035 02/24/23  0943   HEMOGLOBIN g/dL 17.6 17.4   HEMATOCRIT % 49.2 49.9   PLATELETS 10*3/mm3 194 169   CREATININE mg/dL 0.92 1.10       Asessment/Plan    Initiate Enoxaparin 40 mg SQ every 24 hours  Pharmacy will monitor Mr. Mejia's renal function and clinical status and adjust the enoxaparin dose and/or frequency as needed.    Thank you,    Cherelle Álvarez RP, PharmD  3/2/2023  00:02 EST      "

## 2023-03-02 NOTE — H&P
[facility]   HISTORY AND PHYSICAL      Name:  Chris Mejia   Age:  63 y.o.  Sex:  male  :  1960  MRN:  [id number]   Visit Number:  [id number]  Admission Date:  3/1/2023  Date Of Service:  23  Primary Care Physician:  Naty Lebron MD    Chief Complaint:     Dizziness    History Of Presenting Illness:    Patient is a pleasant 63-year-old gentleman with multiple medical comorbidities including history of DENZEL, RA, spinocerebellar ataxia, arthritis, dysphagia, BPH, hypertension, GERD, who had presented from home due to a 1 day complaint of dizziness.  History obtained from patient and his wife.  He notes he does have issues quite often with gait abnormalities due to his ataxia as well as some dizziness, however it was severe this morning upon waking up.  Had persisted throughout the day to the point he was having blurry vision and feeling very nauseous.  He has had no falls or traumas due to this.  He does sometimes use a walker at home, they have been apparently working on getting him a motorized scooter.  He notes he used to see Dr. Barlow as his neurologist, has not followed with anyone in about 3 years.  He does note some medication changes recently, had been started on topiramate, he thinks this may have made it all worse and his last dose of this was yesterday.  He is feeling better at time of visit and notes his vision is significantly improved.  He denies any current headache.  He does admit to family history of stroke and coronary disease.    ER work-up: Vital stable.  CMP, CBC, INR, magnesium within normal limits.  Recent lipid panel and TSH unremarkable.  CT angio of the head unremarkable.  CT angio of the neck was showing 50% stenosis of the proximal left internal carotid artery.  CT perfusion study unremarkable.  CT head without contrast unremarkable.  Chest x-ray unremarkable.  ER had talked with teleneurology, they recommended observation and an MRI of  the brain.  We will admit    Review Of Systems:    All systems were reviewed and negative except as mentioned in history of presenting illness, assessment and plan.    Past Medical History: Patient  has a past medical history of Allergic rhinitis, Anxiety, Arthritis, Balance problem, Carpal tunnel syndrome, Cluster headache, Coronary artery disease, Developmental delay, Diabetes mellitus (HCC), Difficulty walking, Dizziness, Dysphagia, Enlarged prostate, GERD (gastroesophageal reflux disease), Gout, Hip fracture, left (HCC), History of cardiovascular stress test (2018), History of echocardiogram, Hyperlipidemia, Hypertension, Lymphadenitis, Memory loss, Migraine, Myocardial infarction (HCC) (2000), Obesity, DENZEL (obstructive sleep apnea), Peptic ulcer, Peptic ulceration, Poor historian, Rheumatoid arthritis (HCC), Right shoulder pain, SOB (shortness of breath) on exertion, 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), Tension headache, Trigeminal neuralgia, Vitamin B 12 deficiency, and Vitamin D deficiency.    Past Surgical History: Patient  has a past surgical history that includes Elbow Arthroplasty (Bilateral); Hernia repair; Total knee arthroplasty (Bilateral); Neuroplasty (Bilateral); Cardiac catheterization; Cardiac catheterization (Left, 3/14/2018); Hip hemiArthroplasty (Left); Colonoscopy; Esophagogastroduodenoscopy; and Colonoscopy (N/A, 9/13/2019).    Social History: Patient  reports that he has never smoked. He has never used smokeless tobacco. He reports that he does not drink alcohol and does not use drugs.    Family History:  Patient's family history has been reviewed and found to be noncontributory.     Allergies:      Oxycodone    Home Medications:    Prior to Admission Medications     Prescriptions Last Dose Informant Patient Reported? Taking?    amLODIPine (NORVASC) 10 MG tablet 3/1/2023  No Yes    Take 1 tablet by mouth Daily. Indications: High Blood Pressure Disorder    aspirin 81 MG tablet 3/1/2023  Yes Yes     Take 1 tablet by mouth Daily.    busPIRone (BUSPAR) 15 MG tablet 3/1/2023  No Yes    TAKE 1 TABLET TWICE DAILY  AS NEEDED FOR ANXIETY    carvedilol (COREG) 12.5 MG tablet 3/1/2023  No Yes    TAKE 1 TABLET TWICE DAILY  WITH MEALS    hydroCHLOROthiazide (MICROZIDE) 12.5 MG capsule 3/1/2023  No Yes    TAKE 1 CAPSULE EVERY       MORNING    pantoprazole (PROTONIX) 40 MG EC tablet 3/1/2023  No Yes    TAKE 1 TABLET DAILY    pramipexole (Mirapex) 0.25 MG tablet 3/1/2023  No Yes    Take 1 tablet by mouth 3 (Three) Times a Day.    pravastatin (PRAVACHOL) 40 MG tablet 2/28/2023  No Yes    TAKE 1 TABLET EVERY NIGHT  FOR HIGH CHOLESTEROL,      STROKE RISK REDUCTION    tamsulosin (FLOMAX) 0.4 MG capsule 24 hr capsule 3/1/2023  No Yes    TAKE 2 CAPSULES NIGHTLY    tiZANidine (ZANAFLEX) 4 MG tablet 2/28/2023  No Yes    TAKE 1 TABLET EVERY NIGHT    topiramate (Topamax) 25 MG tablet 2/28/2023  No Yes    Take 1 tablet by mouth 2 (Two) Times a Day.    celecoxib (CeleBREX) 100 MG capsule Not Taking  No No    Take 1 capsule by mouth 2 (Two) Times a Day As Needed (arthritic pain).    Patient not taking:  Reported on 3/1/2023    cyanocobalamin injection 1,000 mcg   No No    nitroglycerin (Nitrostat) 0.4 MG SL tablet More than a month  No No    Place 1 tablet under the tongue Every 5 (Five) Minutes As Needed for Chest Pain. Take no more than 3 doses in 15 minutes.        ED Medications:    Medications   sodium chloride 0.9 % flush 10 mL (has no administration in time range)   sodium chloride 0.9 % bolus 1,000 mL (1,000 mL Intravenous New Bag 3/1/23 2225)   diazePAM (VALIUM) injection 5 mg (5 mg Intravenous Given 3/1/23 2109)   ondansetron (ZOFRAN) injection 4 mg (4 mg Intravenous Given 3/1/23 2108)   iopamidol (ISOVUE-300) 61 % injection 100 mL (100 mL Intravenous Given 3/1/23 2104)   iopamidol (ISOVUE-300) 61 % injection 50 mL (50 mL Intravenous Given 3/1/23 2104)     Vital Signs:  Temp:  [98.4 °F (36.9 °C)] 98.4 °F (36.9 °C)  Heart Rate:   "[73-77] 73  Resp:  [18-20] 20  BP: (125-155)/() 125/83        03/01/23 2026   Weight: 111 kg (245 lb)     Body mass index is 37.81 kg/m².    Physical Exam:     Most recent vital Signs: /83   Pulse 73   Temp 98.4 °F (36.9 °C) (Oral)   Resp 20   Ht 171.5 cm (67.5\")   Wt 111 kg (245 lb)   SpO2 92%   BMI 37.81 kg/m²     Physical Exam  Constitutional:       Appearance: Normal appearance.   HENT:      Head: Normocephalic and atraumatic.      Mouth/Throat:      Mouth: Mucous membranes are moist.      Pharynx: Oropharynx is clear.   Eyes:      Extraocular Movements: Extraocular movements intact.      Pupils: Pupils are equal, round, and reactive to light.      Comments: No nystagmus on my exam   Cardiovascular:      Rate and Rhythm: Normal rate and regular rhythm.      Pulses: Normal pulses.   Pulmonary:      Effort: Pulmonary effort is normal. No respiratory distress.      Breath sounds: No wheezing or rales.   Abdominal:      General: Abdomen is flat. Bowel sounds are normal. There is no distension.      Tenderness: There is no abdominal tenderness. There is no guarding.   Musculoskeletal:         General: Normal range of motion.      Cervical back: No tenderness.      Right lower leg: No edema.      Left lower leg: No edema.      Comments: RA changes of the hands bilaterally, somewhat contracted left hand which is chronic   Lymphadenopathy:      Cervical: No cervical adenopathy.   Skin:     General: Skin is warm.      Capillary Refill: Capillary refill takes less than 2 seconds.      Coloration: Skin is not jaundiced.      Findings: No bruising or lesion.   Neurological:      Mental Status: He is alert and oriented to person, place, and time.      Motor: Weakness present.      Gait: Gait abnormal.   Psychiatric:         Mood and Affect: Mood normal.         Thought Content: Thought content normal.         Laboratory data:    I have reviewed the labs done in the emergency room.    Results from last 7 " days   Lab Units 03/01/23 2035 02/24/23  0943   SODIUM mmol/L 136 141   POTASSIUM mmol/L 3.5 3.5   CHLORIDE mmol/L 101 102   TOTAL CO2 mmol/L  --  28.7   CO2 mmol/L 25.5  --    BUN mg/dL 14 14   CREATININE mg/dL 0.92 1.10   CALCIUM mg/dL 9.3 9.7   BILIRUBIN mg/dL 0.4 0.6   ALK PHOS U/L 53 44   ALT (SGPT) U/L 21 21   AST (SGOT) U/L 21 21   GLUCOSE mg/dL 102* 94     Results from last 7 days   Lab Units 03/01/23 2035 02/24/23  0943   WBC 10*3/mm3 8.68 7.54   HEMOGLOBIN g/dL 17.6 17.4   HEMATOCRIT % 49.2 49.9   PLATELETS 10*3/mm3 194 169     Results from last 7 days   Lab Units 03/01/23 2035   INR  0.95     Results from last 7 days   Lab Units 03/01/23 2035   HSTROP T ng/L 18*                           Invalid input(s): USDES,  BLOODU, NITRITITE, BACT, EP    Pain Management Panel    There is no flowsheet data to display.         EKG:      Sinus rhythm, normal rate, no acute ST or T wave abnormality    Radiology:    CT Head Without Contrast Stroke Protocol    Result Date: 3/1/2023  FINAL REPORT TECHNIQUE: Axial CT images were performed through the head. Coronal reformatted images were submitted. This study was performed with techniques to keep radiation doses as low as reasonably achievable (ALARA). Individualized dose reduction techniques using automated exposure control or adjustment of mA and/or kV according to the patient's size were employed. CLINICAL HISTORY: blurry vision, dizziness  stroke protocol FINDINGS: The ventricles are normal in size.  There is no evidence of hemorrhage.  There is no mass or edema identified.  There is no abnormal extra-axial fluid seen.  The sinuses are well aerated.     No acute intracranial process. Authenticated and Electronically Signed by Clayton Gar MD on 03/01/2023 09:50:10 PM    CT Angiogram Head w AI Analysis of LVO    Result Date: 3/1/2023  FINAL REPORT TECHNIQUE: Multiple axial CT angiography images were performed from the foramen magnum to the vertex before and  during IV contrast administration. This study was performed with techniques to keep radiation doses as low as reasonably achievable (ALARA). Individualized dose reduction techniques using automated exposure control or adjustment of mA and/or kV according to the patient's size were employed. CLINICAL HISTORY: Stroke, follow up FINDINGS: CTA HEAD:  The major intracranial arterial system is patent without hemodynamically significant stenosis or major vessel occlusion.No aneurysm is identified.     No acute intracranial hemorrhage or large acute cortical infarct.  No evidence of vascular injury, aneurysm, hemodynamically significant stenosis or major vessel occlusion of the intracranial arterial system. Authenticated and Electronically Signed by Clayton Gar MD on 03/01/2023 09:49:53 PM    CT CEREBRAL PERFUSION WITH & WITHOUT CONTRAST    Result Date: 3/1/2023  FINAL REPORT TECHNIQUE: CT images were obtained before and after contrast administration.  Post contrast images were analyzed according to a perfusion protocol with maps of regional blood flow and volume. CLINICAL HISTORY: Neuro deficit, acute, stroke suspected FINDINGS: The noncontrast images are normal.  Postcontrast imaging shows no abnormality of regional blood flow or volume.     Unremarkable. Authenticated and Electronically Signed by Clayton Gar MD on 03/01/2023 09:50:04 PM      Assessment:    1. Dizziness, suspected vertigo, present on admission, acute  2. Vision changes  3. Anxiety  4. CAD  5. History of spinocerebellar ataxia  6. Hypertension  7. RA  8. Chronic weakness    Plan:    Admit for observation    Dizziness/vision changes:  Suspect BPPV type symptoms, possibly medication side effect with Topamax.  Less likely TIA/stroke based off current exam, teleneurology was consulted and is recommending MRI of the brain, ordered for the morning.  Telemetry monitoring.  Continue with some gentle IV fluids.  Meclizine trial.  PT OT after MRI rules out  CVA.    Spinocerebellar ataxia/chronic weakness:  Patient's baseline functional status is poor.  PT and OT will be seeing him.  May need home health.  Already has some equipment at home.  Apparently working on a motorized scooter/wheelchair    Of note, he probably should be following up with a neurologist on an outpatient basis.    Hypertension/RA/CAD/DENZEL:  Reconcile home meds once able.    Patient otherwise meets observation level care with anticipate stay less than 2 midnights.  Further recommendations be depend upon clinical course.  Plan of care discussed with him and his wife.    Risk Assessment: High  DVT Prophylaxis: Lovenox  Code Status: Full  Diet: As tolerated    Advance Care Planning   ACP discussion was held with the patient during this visit. Patient does not have an advance directive, declines further assistance.           Jo Lepe DO  03/01/23  22:28 EST    Dictated utilizing Dragon dictation.

## 2023-03-02 NOTE — CASE MANAGEMENT/SOCIAL WORK
Case Management Discharge Note           Provided Post Acute Provider List?: N/A  Provided Post Acute Provider Quality & Resource List?: N/A    Selected Continued Care - Admitted Since 3/1/2023     Destination    No services have been selected for the patient.              Durable Medical Equipment    No services have been selected for the patient.              Dialysis/Infusion    No services have been selected for the patient.              Home Medical Care    No services have been selected for the patient.              Therapy    No services have been selected for the patient.              Community Resources    No services have been selected for the patient.              Community & DME    No services have been selected for the patient.                  Transportation Services  Private: Car    Final Discharge Disposition Code: 01 - home or self-care

## 2023-03-02 NOTE — CONSULTS
"Teleneurology Consultation Note - Video:    Date of Consultation: 2023-03-02 11:26 EST  Reason for consult: Dizziness, Unsteady gait  Location: FLOOR  Date/Time Telestroke doctor on video: 2023-03-02 11:33 EST  Diagnosis: Peripheral vertigo    Patient Demographics:  MRN: [id number]  Name: Chris Mejia  Age: 63  Gender: Male      History of Presenting Illness:  62 yo presented with dizziness unsteady gait, found to have some nystagmus in ED yesterday. Room was moving and couldn't \"steady my eyes.\"   Today is alot better. No ear pain or hearing issues. No tinnitus.     Pertinent medication prior to arrival:  Current medication reviewed? Yes: aspirin, cardvedilol, amlodipine, pravastatin    Physical Exam:  General Appearance: In no apparent distress  Neurological:  Mental status: Awake, Alert and Oriented to name, date, location and birthdate. Following commands.  Cranial Nerves:  Visual fields: Full to confrontation bilaterally  Eye motility: Full motility both eyes in all directions  Facial sensation: No sensory loss to light touch in face  Facial motor: No facial droop or facial asymmetry  Tongue: No tongue deviation  Motor: 5/5 strength on extension and flexion in arms and legs. Handgrip equal B/L. No drift in arms or legs B/L  Sensation: No sensory loss to light touch in face, arms or legs B/L  Coordination:  Finger to nose: Intact  Heel to shin: Intact  Gait: Not tested      Pre-stroke mRS: 0 - The patient had no residual symptoms.  Post-stroke mRS: 0 - The patient had no residual symptoms.    Personal review of CNS Imaging:  CTH performed? Yes: No acute intracranial abnormality  CTA head and neck performed? Yes  CTA review date and time: 2023-03-02 11:30 EST  Results of CTA: No intracranial or extracranial large vessel occlusion or significant stenosis  TTE: No  MRI brain: Yes: pending  Other imaging findings: No    Assessment and Plan:   Likely peripheral based vertigo. Will follow up " MRI    Recommendation:  -MRI brain, if neg no further workup needed  -swap Pravastatin to atrovastatin 80 mg daily for prevention  -outpatient follow up for PCP    Follow-up recommendations: The TeleNeuro team will follow up on the above recommendations and will follow up if indicated.      Teleneurology patient verification & consent    I have proceeded with this evaluation at the direct request of the referring practitioner as this is felt to be an emergency setting and no appropriate specialist is available at bedside to perform these evaluations. The [facility] ([facility]) practitioner has reported to me this is the correct patient and has obtained verbal consent from the patient/surrogate to perform this voluntary telemedicine evaluation (including obtaining history, performing examination and reviewing data provided by the patient and/or originating [facility] care provider). I attest that I introduced myself to the patient, provided my credentials, disclosed my location, and determined that, based on review of the patient's chart and discussion with the patient's primary team, telemedicine via a HIPAA-compliant, real-time, face-to-face, two-way, interactive audio and video platform is an appropriate and effective means of providing this service. The patient/surrogate has the right to refuse this evaluation as they have been explained risks (including potential loss of confidentiality), benefits, alternatives, and the potential need for subsequent face to face care. Patient/surrogate understands that there is a risk of medical inaccuracies given that our recommendations will be made based on reported data (and we must therefore assume this information is accurate). Knowing that there is a risk that this information is not reported accurately, and that the telemedicine video, audio, or data feed may be incomplete, the patient agrees to proceed with evaluation and holds us harmless knowing  these risks. In this evaluation, we will be providing recommendations only. The ultimate decision to follow, or not follow, these recommendations will be left to the bedside treating/requesting practitioner. The patient/surrogate has been notified that other healthcare professionals (including technical personnel) may be involved in this audio-video evaluation. All laws concerning confidentiality and patient access to medical records and copies of medical records apply to telemedicine. The patient/surrogate has received the originating site's Health Notice of Privacy Practices.    Medical Data Reviewed:  Data reviewed include clinical labs, radiology, medical test;  Obtaining/reviewing old medical records;  Obtaining case history from another source;  Independent review of CNS images    I, Dr. Luis Cummings, saw patient on 03/02/2023, 11:40 EST for an initial in-patient or emergency room telemedicine face-to-face consult using interactive technology and have spent  greater than 20 minutes of time on the care of the patient today including time spent reviewing medical records, reviewing laboratory data, radiology, and other diagnostic tests, coordinating care with ancillary staff in addition to examining the patient and formulating a plan of care, discussing and counseling the patient and family. Greater than 50 percent of this time was spent in face to face and coordination of care. The location of the patient was at [facility]. My location was [place], [place]. I was assisted with the exam by Nurse at bedside

## 2023-03-02 NOTE — CONSULTS
Teleneurology Phone Consultation Note:    Date of Consultation: 2023-03-01 20:34 EST  Reason for consult: Dizziness, Vertigo  Location: ED  Last known well Date/Time: 2023-03-01 09:00 EST  Date/Time Telestroke doctor on phone: 2023-03-01 20:34 EST  Diagnosis: Other(r/o stroke)    Patient Demographics:  MRN: [id number]  Name: Chris Mejia  Age: 63  Gender: Male      History of Presenting Illness:  62 yo male presented since 9 am this morning dizziness, unsteady. No focal weakness seen in ED. No headaches, weakness. There is reported nystagmus per ED  Allergies: Unknown    Pertinent medication prior to arrival:  Antiplatelet prior to arrival? Unable to determine  Anticoagulation prior to arrival? Unable to determine    NIHSS:  Initial assessment: 2023-03-01 21:06 EST  NIHSS unable to access due to: phone    Personal review of CNS Imaging:  CTH performed? Yes: No acute intracranial abnormality  CTA head and neck performed? No: CTA recommended and pending  MRI brain: No    Assessment and Plan:  Assessment: Dizziness, vertigo, r/o new strokes  TNK/tPA consent: No  TNK/tPA exclusion reason: Out of 4.5 hr window for tPA  Is this patient candidate for dual antiplatelet? No    Recommendation:  -MRI brain r/o strokes  -ok to treat symptomatically for now  -if no strokes on MRI, this is likely peripheral    Follow-up recommendations: The tele-neurology team will continue to follow up with the patient.    Updated ED provider with recommendations at: 2023-03-01 21:07 EST      Teleneurology patient verification & consent    I, Dr. Luis Cummings, was consulted about this patient on 03/01/2023, 21:08 EST for discussion over the phone regarding management of the patient's care via a provider to provider discussion for 15 minutes which includes reviewing medical records, reviewing laboratory data, radiology, personal review of CNS images and other diagnostic tests as well as clinical documentation. My location was [place], [place]. The  recommendations are based on information I received from the consulting provider.

## 2023-03-02 NOTE — PLAN OF CARE
Goal Outcome Evaluation:  Plan of Care Reviewed With: (P) patient, spouse        Progress: (P) no change  Outcome Evaluation: (P) Pt was agreeable and participated in IE this date. Pt was supine in the bed w/ spouse present in the room for the IE. Pt reports he lives in a single level home w/ spouse w/ two steps to enter the home w/ HR on the R side. Pt reports he was independent w/ ADL's and mobility at baseline. Pt reports he does not use an AD for household mobility but uses walls and furniture for balance and stability when ambulating in the home. Pt reports he uses a SPC when ambulating in the community. Pt reports he has a RW, rollator, WC, and SPC at home. Pt performed supine to sit w/ Mod I and HOB elevated. Pt reported dizziness while sitting EOB that resolved within ~2 minutes. Pt performed sit to stand w/ CGA and RW. Pt ambulated 108' w/ CGA and RW. During ambulation pt displayed a forward trunk lean, decreased maxine, decreased gait speed, decreased heel strike bilaterally, and weight bearing on the lateral aspects of the foot during midstance. Pt performed sit to supine w/ Mod I and HOB elevated. Pt would benefit from skilled PT during inpatient stay in order to increase LE strength, endurance, and functional mobility. PT recommends the pt use a RW for mobility at d/c.

## 2023-03-02 NOTE — TELEPHONE ENCOUNTER
Caller: HOSPITAL    Relationship:     Best call back number: [phone]    What specialty or service is being requested:     NEUROLOGY     What is the office location:     [place]    Any additional details:     PLEASE CALL PATIENT WITH DETAILS

## 2023-03-02 NOTE — THERAPY EVALUATION
Patient Name: Chris Mejia  : 1960    MRN: [id number]                              Today's Date: 3/2/2023       Admit Date: 3/1/2023    Visit Dx:     ICD-10-CM ICD-9-CM   1. Vertigo  R42 780.4   2. Nystagmus  H55.00 379.50   3. Nausea and vomiting, unspecified vomiting type  R11.2 787.01   4. Ataxic gait  R26.0 781.2   5. 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[facility])   • RA (rheumatoid arthritis) ([facility])   • Insomnia   • Essential hypertension   • Hypercholesteremia   • Acid reflux   • Anxiety   • Vitamin B12 deficiency   • Vitamin D deficiency   • DENZEL (obstructive sleep apnea)   • Atopic rhinitis   • Ataxic gait   • Shoulder pain   • Ophthalmoplegia   • Coronary artery disease involving native coronary artery of native heart without angina pectoris   • Seasonal allergies   • Benign non-nodular prostatic hyperplasia with lower urinary tract symptoms   • Class 2 severe obesity due to excess calories with serious comorbidity and body mass index (BMI) of 36.0 to 36.9 in adult ([facility])   • Weakness   • Tension headache   • Limited response to serotonin reuptake inhibitors 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 ([facility])   • HTR2A GG genotype   • HLA-B*1502 allele negative   • 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[facility])   • Vertigo     Past Medical History:   Diagnosis Date   • Allergic rhinitis    • Anxiety    • Arthritis    • Balance problem    • Carpal tunnel syndrome    • Cluster headache    • Coronary artery disease    • Developmental delay    • Diabetes mellitus ([facility])     Noted by PCP   • Difficulty walking    • Dizziness    • Dysphagia    • Enlarged prostate    • GERD (gastroesophageal reflux disease)    • Gout    • Hip fracture, left ([facility])    • History of cardiovascular stress test 2018    positive for inferior and lateral ischemia    • History of echocardiogram    •  Hyperlipidemia    • Hypertension    • Impaired mobility    • Lymphadenitis    • Memory loss    • Migraine    • Myocardial infarction ([facility]) 2000   • Obesity    • DENZEL (obstructive sleep apnea)    • Peptic ulcer    • Peptic ulceration    • Poor historian    • Rheumatoid arthritis ([facility])    • Right shoulder pain    • SOB (shortness of breath) on exertion    • 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[facility])    • Tension headache    • Trigeminal neuralgia    • Vitamin B 12 deficiency    • Vitamin D deficiency      Past Surgical History:   Procedure Laterality Date   • CARDIAC CATHETERIZATION     • CARDIAC CATHETERIZATION Left 3/14/2018    Procedure: Cardiac Catheterization/Vascular Study;  Surgeon: Adam Perkins MD;  Location: [facility] CATH INVASIVE LOCATION;  Service: Cardiovascular   • COLONOSCOPY     • COLONOSCOPY N/A 9/13/2019    Procedure: COLONOSCOPY W/ COLD SNARE POLYPECTOMY;  Surgeon: Melba Lopez MD;  Location: [facility] ENDOSCOPY;  Service: Gastroenterology   • ELBOW ARTHROPLASTY Bilateral    • ENDOSCOPY     • HERNIA REPAIR      x3   • HIP HEMIARTHROPLASTY Left    • NEUROPLASTY Bilateral     decompression median nerve at carpal tunnel   • TOTAL KNEE ARTHROPLASTY Bilateral     2005, 2012      General Information     Row Name 03/02/23 1505          Physical Therapy Time and Intention    Document Type evaluation (P)   -LG     Mode of Treatment physical therapy (P)   -     Row Name 03/02/23 1505          General Information    Patient Profile Reviewed yes (P)   -LG     Prior Level of Function independent:;ADL's;all household mobility (P)   -LG     Row Name 03/02/23 1505          Living Environment    People in Home spouse (P)   -LG     Row Name 03/02/23 1505          Home Main Entrance    Number of Stairs, Main Entrance two (P)   -LG     Stair Railings, Main Entrance railing on right side (ascending) (P)   -LG     Row Name 03/02/23 1505          Stairs Within Home, Primary    Number of Stairs, Within Home, Primary none (P)   -LG      Row Name 03/02/23 1505          Cognition    Orientation Status (Cognition) oriented x 4 (P)   -LG     Row Name 03/02/23 1505          Safety Issues, Functional Mobility    Impairments Affecting Function (Mobility) postural/trunk control;endurance/activity tolerance;strength;balance (P)   -LG           User Key  (r) = Recorded By, (t) = Taken By, (c) = Cosigned By    Initials Name Provider Type    LG Elisha Zarate, PT Student PT Student               Mobility     Row Name 03/02/23 1506          Bed Mobility    Bed Mobility supine-sit;sit-supine (P)   -LG     Supine-Sit [place] (Bed Mobility) modified independence (P)   -LG     Sit-Supine [place] (Bed Mobility) modified independence (P)   -LG     Assistive Device (Bed Mobility) head of bed elevated (P)   -LG     Row Name 03/02/23 1506          Bed-Chair Transfer    Bed-Chair [place] (Transfers) not tested (P)   -LG     Row Name 03/02/23 1506          Sit-Stand Transfer    Sit-Stand [place] (Transfers) contact guard (P)   -LG     Assistive Device (Sit-Stand Transfers) walker, front-wheeled (P)   -LG     Row Name 03/02/23 1506          Gait/Stairs (Locomotion)    [place] Level (Gait) contact guard (P)   -LG     Assistive Device (Gait) walker, front-wheeled (P)   -LG     Distance in Feet (Gait) 108' (P)   -LG     Deviations/Abnormal Patterns (Gait) gait speed decreased;maxine decreased;stride length decreased;other (see comments) (P)   Pt displayed weight bearing on the lateral aspect of bilateral feet durnig ambulation.  -LG     Bilateral Gait Deviations heel strike decreased;forward flexed posture (P)   -LG           User Key  (r) = Recorded By, (t) = Taken By, (c) = Cosigned By    Initials Name Provider Type    LG Elisha Zarate, PT Student PT Student               Obj/Interventions     Row Name 03/02/23 1508          Range of Motion Comprehensive    General Range of Motion bilateral lower extremity ROM WFL (P)   -LG     Row Name  03/02/23 1508          Strength Comprehensive (MMT)    Comment, General Manual Muscle Testing (MMT) Assessment BLE 3+/5 MMT (P)   -LG     Row Name 03/02/23 1508          Balance    Balance Assessment sitting static balance;standing dynamic balance (P)   -LG     Static Sitting Balance independent (P)   -LG     Position, Sitting Balance unsupported;sitting edge of bed (P)   -LG     Dynamic Standing Balance contact guard (P)   -LG     Position/Device Used, Standing Balance supported;walker, rolling (P)   -LG           User Key  (r) = Recorded By, (t) = Taken By, (c) = Cosigned By    Initials Name Provider Type    LG Elisha Zarate, PT Student PT Student               Goals/Plan     Row Name 03/02/23 1517          Bed Mobility Goal 1 (PT)    Activity/Assistive Device (Bed Mobility Goal 1, PT) bed mobility activities, all (P)   -LG     [place] Level/Cues Needed (Bed Mobility Goal 1, PT) independent (P)   -LG     Time Frame (Bed Mobility Goal 1, PT) long term goal (LTG);2 weeks (P)   -LG     Row Name 03/02/23 1517          Transfer Goal 1 (PT)    Activity/Assistive Device (Transfer Goal 1, PT) sit-to-stand/stand-to-sit;bed-to-chair/chair-to-bed;walker, rolling (P)   -LG     [place] Level/Cues Needed (Transfer Goal 1, PT) modified independence (P)   -LG     Time Frame (Transfer Goal 1, PT) long term goal (LTG);2 weeks (P)   -     Row Name 03/02/23 1517          Gait Training Goal 1 (PT)    Activity/Assistive Device (Gait Training Goal 1, PT) gait (walking locomotion);assistive device use;increase endurance/gait distance;diminish gait deviation;decrease fall risk;increase energy conservation;improve balance and speed;walker, rolling (P)   -LG     [place] Level (Gait Training Goal 1, PT) modified independence (P)   -LG     Distance (Gait Training Goal 1, PT) 300' (P)   -LG     Time Frame (Gait Training Goal 1, PT) long term goal (LTG);2 weeks (P)   -LG     Row Name 03/02/23 1517          Patient Education  Goal (PT)    Activity (Patient Education Goal, PT) BLE ther ex AROM 15 reps (P)   -LG     [place]/Cues/Accuracy (Memory Goal 2, PT) demonstrates adequately (P)   -LG     Time Frame (Patient Education Goal, PT) short term goal (STG);1 week (P)   -LG     Row Name 03/02/23 1517          Therapy Assessment/Plan (PT)    Planned Therapy Interventions (PT) balance training;bed mobility training;gait training;home exercise program;postural re-education;patient/family education;stretching;transfer training;strengthening;stair training;ROM (range of motion);neuromuscular re-education (P)   -LG           User Key  (r) = Recorded By, (t) = Taken By, (c) = Cosigned By    Initials Name Provider Type    LG Elisha Zarate, PT Student PT Student               Clinical Impression     Row Name 03/02/23 1509          Pain    Pretreatment Pain Rating 2/10 (P)   -LG     Posttreatment Pain Rating 2/10 (P)   -LG     Pain Location - Side/Orientation Right (P)   -LG     Pain Location - flank (P)   -LG     Pain Intervention(s) Ambulation/increased activity;Repositioned (P)   -LG     Row Name 03/02/23 1505          Plan of Care Review    Plan of Care Reviewed With patient;spouse (P)   -LG     Progress no change (P)   -LG     Outcome Evaluation Pt was agreeable and participated in IE this date. Pt was supine in the bed w/ spouse present in the room for the IE. Pt reports he lives in a single level home w/ spouse w/ two steps to enter the home w/ HR on the R side. Pt reports he was independent w/ ADL's and mobility at baseline. Pt reports he does not use an AD for household mobility but uses walls and furniture for balance and stability when ambulating in the home. Pt reports he uses a SPC when ambulating in the community. Pt reports he has a RW, rollator, WC, and SPC at home. Pt performed supine to sit w/ Mod I and HOB elevated. Pt reported dizziness while sitting EOB that resolved within ~2 minutes. Pt performed sit to stand w/ CGA and  RW. Pt ambulated 108' w/ CGA and RW. During ambulation pt displayed a forward trunk lean, decreased maxine, decreased gait speed, decreased heel strike bilaterally, and weight bearing on the lateral aspects of the foot during midstance. Pt performed sit to supine w/ Mod I and HOB elevated. Pt would benefit from skilled PT during inpatient stay in order to increase LE strength, endurance, and functional mobility. PT recommends the pt use a RW for mobility at d/c. (P)   -LG     Row Name 03/02/23 1509          Therapy Assessment/Plan (PT)    Patient/Family Therapy Goals Statement (PT) To go home (P)   -LG     Rehab Potential (PT) good, to achieve stated therapy goals (P)   -LG     Criteria for Skilled Interventions Met (PT) yes;meets criteria (P)   -LG     Therapy Frequency (PT) 5 times/wk (P)   -LG     Row Name 03/02/23 1509          Vital Signs    O2 Delivery Pre Treatment room air (P)   -LG     O2 Delivery Intra Treatment room air (P)   -LG     O2 Delivery Post Treatment room air (P)   -LG     Pre Patient Position Supine (P)   -LG     Intra Patient Position Standing (P)   -LG     Post Patient Position Supine (P)   -LG     Row Name 03/02/23 1509          Positioning and Restraints    Pre-Treatment Position in bed (P)   -LG     Post Treatment Position bed (P)   -LG     In Bed fowlers;with family/caregiver;exit alarm on;encouraged to call for assist;call light within reach (P)   -LG           User Key  (r) = Recorded By, (t) = Taken By, (c) = Cosigned By    Initials Name Provider Type    LG Elisha Zarate, PT Student PT Student               Outcome Measures     Row Name 03/02/23 1518          How much help from another person do you currently need...    Turning from your back to your side while in flat bed without using bedrails? 3 (P)   -LG     Moving from lying on back to sitting on the side of a flat bed without bedrails? 3 (P)   -LG     Moving to and from a bed to a chair (including a wheelchair)? 3 (P)   -LG      Standing up from a chair using your arms (e.g., wheelchair, bedside chair)? 4 (P)   -LG     Climbing 3-5 steps with a railing? 2 (P)   -LG     To walk in hospital room? 3 (P)   -LG     AM-PAC 6 Clicks Score (PT) 18 (P)   -LG     Highest level of mobility 6 --> Walked 10 steps or more (P)   -LG     Row Name 03/02/23 1518          Functional Assessment    Outcome Measure Options AM-PAC 6 Clicks Basic Mobility (PT) (P)   -LG           User Key  (r) = Recorded By, (t) = Taken By, (c) = Cosigned By    Initials Name Provider Type    LG Elisha Zarate, PT Student PT Student                             Physical Therapy Education     Title: PT OT SLP Therapies (In Progress)     Topic: Physical Therapy (In Progress)     Point: Mobility training (Done)     Learning Progress Summary           Patient Acceptance, E, VU by  at 3/2/2023 1519    Comment: Importance of mobility                   Point: Home exercise program (Done)     Learning Progress Summary           Patient Acceptance, E, VU by  at 3/2/2023 1519    Comment: Importance of mobility                   Point: Body mechanics (Not Started)     Learner Progress:  Not documented in this visit.          Point: Precautions (Not Started)     Learner Progress:  Not documented in this visit.                      User Key     Initials Effective Dates Name Provider Type Discipline     12/29/22 -  Elisha Zarate, PT Student PT Student PT              PT Recommendation and Plan  Planned Therapy Interventions (PT): (P) balance training, bed mobility training, gait training, home exercise program, postural re-education, patient/family education, stretching, transfer training, strengthening, stair training, ROM (range of motion), neuromuscular re-education  Plan of Care Reviewed With: (P) patient, spouse  Progress: (P) no change  Outcome Evaluation: (P) Pt was agreeable and participated in IE this date. Pt was supine in the bed w/ spouse present in the room for the IE.  Pt reports he lives in a single level home w/ spouse w/ two steps to enter the home w/ HR on the R side. Pt reports he was independent w/ ADL's and mobility at baseline. Pt reports he does not use an AD for household mobility but uses walls and furniture for balance and stability when ambulating in the home. Pt reports he uses a SPC when ambulating in the community. Pt reports he has a RW, rollator, WC, and SPC at home. Pt performed supine to sit w/ Mod I and HOB elevated. Pt reported dizziness while sitting EOB that resolved within ~2 minutes. Pt performed sit to stand w/ CGA and RW. Pt ambulated 108' w/ CGA and RW. During ambulation pt displayed a forward trunk lean, decreased maxine, decreased gait speed, decreased heel strike bilaterally, and weight bearing on the lateral aspects of the foot during midstance. Pt performed sit to supine w/ Mod I and HOB elevated. Pt would benefit from skilled PT during inpatient stay in order to increase LE strength, endurance, and functional mobility. PT recommends the pt use a RW for mobility at d/c.     Time Calculation:    PT Charges     Row Name 03/02/23 1519             Time Calculation    Start Time 1347 (P)   -LG      PT Received On 03/02/23 (P)   -LG      PT Goal Re-Cert Due Date 03/12/23 (P)   -LG         Untimed Charges    PT Eval/Re-eval Minutes 45 (P)   -LG         Total Minutes    Untimed Charges Total Minutes 45 (P)   -LG       Total Minutes 45 (P)   -LG            User Key  (r) = Recorded By, (t) = Taken By, (c) = Cosigned By    Initials Name Provider Type    LG Elisha Zarate, PT Student PT Student              Therapy Charges for Today     Code Description Service Date Service Provider Modifiers Qty    [id number]  PT EVAL LOW COMPLEXITY 3 3/2/2023 Elisha Zarate, PT Student GP 1          PT G-Codes  Outcome Measure Options: (P) AM-PAC 6 Clicks Basic Mobility (PT)  AM-PAC 6 Clicks Score (PT): (P) 18  PT Discharge Summary  Anticipated Discharge Disposition  (PT): (P) home with assist    Elisha Zarate, PT Student  3/2/2023

## 2023-03-03 ENCOUNTER — TRANSITIONAL CARE MANAGEMENT TELEPHONE ENCOUNTER (OUTPATIENT)
Dept: CALL CENTER | Facility: HOSPITAL | Age: 63
End: 2023-03-03
Payer: MEDICARE

## 2023-03-03 ENCOUNTER — HOME HEALTH ADMISSION (OUTPATIENT)
Dept: HOME HEALTH SERVICES | Facility: HOME HEALTHCARE | Age: 63
End: 2023-03-03
Payer: MEDICARE

## 2023-03-03 NOTE — OUTREACH NOTE
Call Center TCM Note    Flowsheet Row Responses   [facility] patient discharged from? [facility]   Does the patient have one of the following disease processes/diagnoses(primary or secondary)? Other   TCM attempt successful? Yes   Call start time 0933   Call end time 0935   Discharge diagnosis Vertigo   Is patient permission given to speak with other caregiver? Yes   List who call center can speak with spouse- Diana   Person spoke with today (if not patient) and relationship spouse   Does the patient have all medications ordered at discharge? N/A   Is the patient taking all medications as directed (includes completed medication regime)? Yes   Comments Hospital f/u with Dr. Lebron on 3/8   Does the patient have an appointment with their PCP within 7 days of discharge? Yes   Has home health visited the patient within 72 hours of discharge? N/A   Psychosocial issues? No   Did the patient receive a copy of their discharge instructions? Yes   What is the patient's perception of their health status since discharge? Improving   Is the patient/caregiver able to teach back the hierarchy of who to call/visit for symptoms/problems? PCP, Specialist, Home health nurse, Urgent Care, ED, 911 Yes   TCM call completed? Yes   Wrap up additional comments Per spouse, patient is doing well, confirmed hospital f/u with PCP on 3/8.   Call end time 0935   Would this patient benefit from a Referral to Amb Social Work? No   Is the patient interested in additional calls from an ambulatory ?  NOTE:  applies to high risk patients requiring additional follow-up. No          Brynn Marcano RN    3/3/2023, 09:36 EST

## 2023-03-03 NOTE — OUTREACH NOTE
Prep Survey    Flowsheet Row Responses   [facility] discharged from? [facility]   Is LACE score < 7 ? Yes   Eligibility [facility]   Date of Admission 03/01/23   Date of Discharge 03/02/23   Discharge Disposition Home-Health Care S[facility]   Discharge diagnosis Vertigo   Does the patient have one of the following disease processes/diagnoses(primary or secondary)? Other   Does the patient have Home health ordered? No   Is there a DME ordered? No   Prep survey completed? Yes          ELKE CREWS - Registered Nurse

## 2023-03-06 NOTE — TELEPHONE ENCOUNTER
Called pt, his wife confirmed he was needing [facility] but the referral was from a year ago, since pt was scheduled with them before she is going to call and see how quickly they can get scheduled with them. Will likely need a new referral. Will update once Diana gives me a call back.

## 2023-03-06 NOTE — TELEPHONE ENCOUNTER
Caller: Diana Mejia    Relationship: Emergency Contact    Best call back number: [phone]    What is the medical concern/diagnosis:     What specialty or service is being requested: NEUROLOGY    What is the provider, practice or medical service name:      What is the office location:     What is the office phone number: FAX# [phone]    Any additional details: WIFE STATES TO PUT STAT ON REFERRAL TO GET PATIENT IN [place]

## 2023-03-08 ENCOUNTER — OFFICE VISIT (OUTPATIENT)
Dept: INTERNAL MEDICINE | Facility: CLINIC | Age: 63
End: 2023-03-08
Payer: MEDICARE

## 2023-03-08 VITALS
OXYGEN SATURATION: 98 % | BODY MASS INDEX: 38.14 KG/M2 | WEIGHT: 243 LBS | SYSTOLIC BLOOD PRESSURE: 128 MMHG | TEMPERATURE: 97 F | HEART RATE: 79 BPM | RESPIRATION RATE: 16 BRPM | DIASTOLIC BLOOD PRESSURE: 88 MMHG | HEIGHT: 67 IN

## 2023-03-08 DIAGNOSIS — I51.7 CARDIOMEGALY: ICD-10-CM

## 2023-03-08 DIAGNOSIS — G11.8 SPINOCEREBELLAR ATAXIA: ICD-10-CM

## 2023-03-08 DIAGNOSIS — I65.22 CAROTID ATHEROSCLEROSIS, LEFT: ICD-10-CM

## 2023-03-08 DIAGNOSIS — R07.81 RIB PAIN ON RIGHT SIDE: ICD-10-CM

## 2023-03-08 DIAGNOSIS — Z09 HOSPITAL DISCHARGE FOLLOW-UP: ICD-10-CM

## 2023-03-08 DIAGNOSIS — R60.0 BILATERAL LOWER EXTREMITY EDEMA: ICD-10-CM

## 2023-03-08 DIAGNOSIS — R42 VERTIGO: Primary | ICD-10-CM

## 2023-03-08 DIAGNOSIS — Z91.81 AT HIGH RISK FOR FALLS: ICD-10-CM

## 2023-03-08 DIAGNOSIS — R91.8 GROUND GLASS OPACITY PRESENT ON IMAGING OF LUNG: ICD-10-CM

## 2023-03-08 DIAGNOSIS — E53.8 VITAMIN B12 DEFICIENCY: ICD-10-CM

## 2023-03-08 DIAGNOSIS — R93.89 ABNORMAL CHEST X-RAY: ICD-10-CM

## 2023-03-08 DIAGNOSIS — G43.919 INTRACTABLE MIGRAINE WITHOUT STATUS MIGRAINOSUS, UNSPECIFIED MIGRAINE TYPE: ICD-10-CM

## 2023-03-08 LAB
25(OH)D3+25(OH)D2 SERPL-MCNC: 23.2 NG/ML (ref 30–100)
ALBUMIN SERPL-MCNC: 4.5 G/DL (ref 3.5–5.2)
ALBUMIN/GLOB SERPL: 1.3 G/DL
ALP SERPL-CCNC: 44 U/L (ref 39–117)
ALT SERPL-CCNC: 21 U/L (ref 1–41)
AST SERPL-CCNC: 21 U/L (ref 1–40)
BASOPHILS # BLD AUTO: 0.04 10*3/MM3 (ref 0–0.2)
BASOPHILS NFR BLD AUTO: 0.5 % (ref 0–1.5)
BILIRUB SERPL-MCNC: 0.6 MG/DL (ref 0–1.2)
BUN SERPL-MCNC: 14 MG/DL (ref 8–23)
BUN/CREAT SERPL: 12.7 (ref 7–25)
CALCIUM SERPL-MCNC: 9.7 MG/DL (ref 8.6–10.5)
CHLORIDE SERPL-SCNC: 102 MMOL/L (ref 98–107)
CHOLEST SERPL-MCNC: 123 MG/DL (ref 0–200)
CO2 SERPL-SCNC: 28.7 MMOL/L (ref 22–29)
CREAT SERPL-MCNC: 1.1 MG/DL (ref 0.76–1.27)
EGFRCR SERPLBLD CKD-EPI 2021: 74.5 ML/MIN/1.73
EOSINOPHIL # BLD AUTO: 0.27 10*3/MM3 (ref 0–0.4)
EOSINOPHIL NFR BLD AUTO: 3.6 % (ref 0.3–6.2)
ERYTHROCYTE [DISTWIDTH] IN BLOOD BY AUTOMATED COUNT: 12.7 % (ref 12.3–15.4)
FERRITIN SERPL-MCNC: 247 NG/ML (ref 30–400)
GLOBULIN SER CALC-MCNC: 3.6 GM/DL
GLUCOSE SERPL-MCNC: 94 MG/DL (ref 65–99)
HCT VFR BLD AUTO: 49.9 % (ref 37.5–51)
HDLC SERPL-MCNC: 42 MG/DL (ref 40–60)
HGB BLD-MCNC: 17.4 G/DL (ref 13–17.7)
IMM GRANULOCYTES # BLD AUTO: 0.02 10*3/MM3 (ref 0–0.05)
IMM GRANULOCYTES NFR BLD AUTO: 0.3 % (ref 0–0.5)
IRON SATN MFR SERPL: 24 % (ref 20–50)
IRON SERPL-MCNC: 99 MCG/DL (ref 59–158)
LDLC SERPL CALC-MCNC: 59 MG/DL (ref 0–100)
LYMPHOCYTES # BLD AUTO: 1.78 10*3/MM3 (ref 0.7–3.1)
LYMPHOCYTES NFR BLD AUTO: 23.6 % (ref 19.6–45.3)
MCH RBC QN AUTO: 33.5 PG (ref 26.6–33)
MCHC RBC AUTO-ENTMCNC: 34.9 G/DL (ref 31.5–35.7)
MCV RBC AUTO: 96 FL (ref 79–97)
METHYLMALONATE SERPL-SCNC: 133 NMOL/L (ref 0–378)
MONOCYTES # BLD AUTO: 0.74 10*3/MM3 (ref 0.1–0.9)
MONOCYTES NFR BLD AUTO: 9.8 % (ref 5–12)
NEUTROPHILS # BLD AUTO: 4.69 10*3/MM3 (ref 1.7–7)
NEUTROPHILS NFR BLD AUTO: 62.2 % (ref 42.7–76)
NRBC BLD AUTO-RTO: 0 /100 WBC (ref 0–0.2)
PLATELET # BLD AUTO: 169 10*3/MM3 (ref 140–450)
POTASSIUM SERPL-SCNC: 3.5 MMOL/L (ref 3.5–5.2)
PROT SERPL-MCNC: 8.1 G/DL (ref 6–8.5)
PSA SERPL-MCNC: 3.19 NG/ML (ref 0–4)
RBC # BLD AUTO: 5.2 10*6/MM3 (ref 4.14–5.8)
SODIUM SERPL-SCNC: 141 MMOL/L (ref 136–145)
T4 FREE SERPL-MCNC: 1.33 NG/DL (ref 0.93–1.7)
TIBC SERPL-MCNC: 414 MCG/DL
TRIGL SERPL-MCNC: 125 MG/DL (ref 0–150)
TSH SERPL DL<=0.005 MIU/L-ACNC: 3.68 UIU/ML (ref 0.27–4.2)
UIBC SERPL-MCNC: 315 MCG/DL (ref 112–346)
VLDLC SERPL CALC-MCNC: 22 MG/DL (ref 5–40)
WBC # BLD AUTO: 7.54 10*3/MM3 (ref 3.4–10.8)

## 2023-03-08 PROCEDURE — 96372 THER/PROPH/DIAG INJ SC/IM: CPT | Performed by: FAMILY MEDICINE

## 2023-03-08 PROCEDURE — 1111F DSCHRG MED/CURRENT MED MERGE: CPT | Performed by: FAMILY MEDICINE

## 2023-03-08 PROCEDURE — 3074F SYST BP LT 130 MM HG: CPT | Performed by: FAMILY MEDICINE

## 2023-03-08 PROCEDURE — 1160F RVW MEDS BY RX/DR IN RCRD: CPT | Performed by: FAMILY MEDICINE

## 2023-03-08 PROCEDURE — 1159F MED LIST DOCD IN RCRD: CPT | Performed by: FAMILY MEDICINE

## 2023-03-08 PROCEDURE — 99495 TRANSJ CARE MGMT MOD F2F 14D: CPT | Performed by: FAMILY MEDICINE

## 2023-03-08 PROCEDURE — 3079F DIAST BP 80-89 MM HG: CPT | Performed by: FAMILY MEDICINE

## 2023-03-08 RX ORDER — GALCANEZUMAB 120 MG/ML
120 INJECTION, SOLUTION SUBCUTANEOUS
Qty: 1 ML | Refills: 11 | Status: SHIPPED | OUTPATIENT
Start: 2023-03-08 | End: 2023-03-20 | Stop reason: SDUPTHER

## 2023-03-08 RX ADMIN — CYANOCOBALAMIN 1000 MCG: 1000 INJECTION, SOLUTION INTRAMUSCULAR; SUBCUTANEOUS at 14:35

## 2023-03-08 NOTE — PATIENT INSTRUCTIONS
Fall Prevention in the Home, Adult  Falls can cause injuries and affect people of all ages. There are many simple things that you can do to make your home safe and to help prevent falls. Ask for help when making these changes, if needed.  What actions can I take to prevent falls?  General instructions  • Use good lighting in all rooms. Replace any light bulbs that burn out, turn on lights if it is dark, and use night-lights.  • Place frequently used items in easy-to-reach places. Lower the shelves around your home if necessary.  • Set up furniture so that there are clear paths around it. Avoid moving your furniture around.  • Remove throw rugs and other tripping hazards from the floor.  • Avoid walking on wet floors.  • Fix any uneven floor surfaces.  • Add color or contrast paint or tape to grab bars and handrails in your home. Place contrasting color strips on the first and last steps of staircases.  • When you use a stepladder, make sure that it is completely opened and that the sides and supports are firmly locked. Have someone hold the ladder while you are using it. Do not climb a closed stepladder.  • Know where your pets are when moving through your home.  What can I do in the bathroom?     • Keep the floor dry. Immediately clean up any water that is on the floor.  • Remove soap buildup in the tub or shower regularly.  • Use nonskid mats or decals on the floor of the tub or shower.  • Attach bath mats securely with double-sided, nonslip rug tape.  • If you need to sit down while you are in the shower, use a plastic, nonslip stool.  • Install grab bars by the toilet and in the tub and shower. Do not use towel bars as grab bars.  What can I do in the bedroom?  • Make sure that a bedside light is easy to reach.  • Do not use oversized bedding that reaches the floor.  • Have a firm chair that has side arms to use for getting dressed.  What can I do in the kitchen?  • Clean up any spills right away.  • If you  need to reach for something above you, use a sturdy step stool that has a grab bar.  • Keep electrical cables out of the way.  • Do not use floor polish or wax that makes floors slippery. If you must use wax, make sure that it is non-skid floor wax.  What can I do with my stairs?  • Do not leave any items on the stairs.  • Make sure that you have a light switch at the top and the bottom of the stairs. Have them installed if you do not have them.  • Make sure that there are handrails on both sides of the stairs. Fix handrails that are broken or loose. Make sure that handrails are as long as the staircases.  • Install non-slip stair treads on all stairs in your home.  • Avoid having throw rugs at the top or bottom of stairs, or secure the rugs with carpet tape to prevent them from moving.  • Choose a carpet design that does not hide the edge of steps on the stairs.  • Check any carpeting to make sure that it is firmly attached to the stairs. Fix any carpet that is loose or worn.  What can I do on the outside of my home?  • Use bright outdoor lighting.  • Regularly repair the edges of walkways and driveways and fix any cracks.  • Remove high doorway thresholds.  • Trim any shrubbery on the main path into your home.  • Regularly check that handrails are securely fastened and in good repair. Both sides of all steps should have handrails.  • Install guardrails along the edges of any raised decks or porches.  • Clear walkways of debris and clutter, including tools and rocks.  • Have leaves, snow, and ice cleared regularly.  • Use sand or salt on walkways during winter months.  • In the garage, clean up any spills right away, including grease or oil spills.  What other actions can I take?  • Wear closed-toe shoes that fit well and support your feet. Wear shoes that have rubber soles or low heels.  • Use mobility aids as needed, such as canes, walkers, scooters, and crutches.  • Review your medicines with your health care  provider. Some medicines can cause dizziness or changes in blood pressure, which increase your risk of falling.  Talk with your health care provider about other ways that you can decrease your risk of falls. This may include working with a physical therapist or  to improve your strength, balance, and endurance.  Where to find more information  • Centers for Disease Control and Prevention, [place]: www.cdc.gov  • [place] [employer] [place]: www.wilmer.nih.gov  Contact a health care provider if:  • You are afraid of falling at home.  • You feel weak, drowsy, or dizzy at home.  • You fall at home.  Summary  • There are many simple things that you can do to make your home safe and to help prevent falls.  • Ways to make your home safe include removing tripping hazards and installing grab bars in the bathroom.  • Ask for help when making these changes in your home.  This information is not intended to replace advice given to you by your health care provider. Make sure you discuss any questions you have with your health care provider.  Document Revised: 09/19/2022 Document Reviewed: 07/21/2021  [employer] Patient Education © 2022 [employer]      Sit-to-Stand Exercise  The sit-to-stand exercise (also known as the chair stand or chair rise exercise) strengthens your lower body and helps you maintain or improve your mobility and independence. The end goal is to do the sit-to-stand exercise without using your hands. This will be easier as you become stronger. You should always talk with your health care provider before starting any exercise program, especially if you have had recent surgery.  Do the exercise exactly as told by your health care provider and adjust it as directed. It is normal to feel mild stretching, pulling, tightness, or discomfort as you do this exercise, but you should stop right away if you feel sudden pain or your pain gets worse. Do not begin doing this exercise until told by your health care  provider.  What the sit-to-stand exercise does  The sit-to-stand exercise helps to strengthen the muscles in your thighs and the muscles in the center of your body that give you stability (core muscles). This exercise is especially helpful if:  • You have had knee or hip surgery.  • You have trouble getting up from a chair, out of a car, or off the toilet due to muscle weakness.  How to do the sit-to-stand exercise  1. Sit toward the front edge of a sturdy chair without armrests. Your knees should be bent and your feet should be flat on the floor and shoulder-width apart and underneath your hips.  2. Place your hands lightly on each side of the seat. Keep your back and neck as straight as possible, with your chest slightly forward.  3. Breathe in slowly. Lean forward and slightly shift your weight to the front of your feet.  4. Breathe out as you slowly stand up. Try not to support any weight with your hands.  5. Stand and pause for a full breath in and out.  6. Breathe in as you sit down slowly. Tighten your core and abdominal muscles to control your lowering as much as possible. You should lower yourself back to the chair slowly, not just drop back into the seat.  7. Breathe out slowly.  8. Do this exercise 10-15 times. If needed, do it fewer times until you build up strength.  9. Rest for 1 minute, then do another set of 10-15 repetitions.  To change the difficulty of the sit-to-stand exercise  • If the exercise is too difficult, use a chair with sturdy armrests, and push off the armrests to help you come to the standing position. You can also use the armrests to help slowly lower yourself back to sitting. As this gets easier, try to use your arms less. You can also place a firm cushion or pillow on the chair to make the surface higher.  • If this exercise is too easy, do not use your arms to help raise or lower yourself. You can also wear a weighted vest, use hand weights, increase your repetitions, or try a  lower chair.  General tips  • You may feel tired when starting an exercise routine. This is normal.  • You may have muscle soreness that lasts a few days. This is normal. As you get stronger, you may not feel muscle soreness.  • Use smooth, steady movements.  • Do not  hold your breath during strength exercises. This can cause unsafe changes in your blood pressure.  • Breathe in slowly through your nose, and breathe out slowly through your mouth.  Summary  • Strengthening your lower body is an important step to help you move safely and independently.  • The sit-to-stand exercise helps strengthen the muscles in your thighs and core.  • You should always talk with your health care provider before starting any exercise program, especially if you have had recent surgery.  This information is not intended to replace advice given to you by your health care provider. Make sure you discuss any questions you have with your health care provider.  Document Revised: 04/10/2022 Document Reviewed: 04/10/2022  [employer] Patient Education © 2022 [employer]      Exercising to Stay Healthy  To become healthy and stay healthy, it is recommended that you do moderate-intensity and vigorous-intensity exercise. You can tell that you are exercising at a moderate intensity if your heart starts beating faster and you start breathing faster but can still hold a conversation. You can tell that you are exercising at a vigorous intensity if you are breathing much harder and faster and cannot hold a conversation while exercising.  How can exercise benefit me?  Exercising regularly is important. It has many health benefits, such as:  • Improving overall fitness, flexibility, and endurance.  • Increasing bone density.  • Helping with weight control.  • Decreasing body fat.  • Increasing muscle strength and endurance.  • Reducing stress and tension, anxiety, depression, or anger.  • Improving overall health.  What guidelines should I follow while  exercising?  • Before you start a new exercise program, talk with your health care provider.  • Do not exercise so much that you hurt yourself, feel dizzy, or get very short of breath.  • Wear comfortable clothes and wear shoes with good support.  • Drink plenty of water while you exercise to prevent dehydration or heat stroke.  • Work out until your breathing and your heartbeat get faster (moderate intensity).  How often should I exercise?  Choose an activity that you enjoy, and set realistic goals. Your health care provider can help you make an activity plan that is individually designed and works best for you.  Exercise regularly as told by your health care provider. This may include:  • Doing strength training two times a week, such as:  ? Lifting weights.  ? Using resistance bands.  ? Push-ups.  ? Sit-ups.  ? Yoga.  • Doing a certain intensity of exercise for a given amount of time. Choose from these options:  ? A total of 150 minutes of moderate-intensity exercise every week.  ? A total of 75 minutes of vigorous-intensity exercise every week.  ? A mix of moderate-intensity and vigorous-intensity exercise every week.  Children, pregnant women, people who have not exercised regularly, people who are overweight, and older adults may need to talk with a health care provider about what activities are safe to perform. If you have a medical condition, be sure to talk with your health care provider before you start a new exercise program.  What are some exercise ideas?  Moderate-intensity exercise ideas include:  • Walking 1 mile (1.6 km) in about 15 minutes.  • Biking.  • Hiking.  • Golfing.  • Dancing.  • Water aerobics.  Vigorous-intensity exercise ideas include:  • Walking 4.5 miles (7.2 km) or more in about 1 hour.  • Jogging or running 5 miles (8 km) in about 1 hour.  • Biking 10 miles (16.1 km) or more in about 1 hour.  • Lap swimming.  • Roller-skating or in-line skating.  • Cross-country skiing.  • Vigorous  competitive sports, such as football, basketball, and soccer.  • Jumping rope.  • Aerobic dancing.  What are some everyday activities that can help me get exercise?  • Yard work, such as:  ? Pushing a .  ? Raking and bagging leaves.  • Washing your car.  • Pushing a stroller.  • Shoveling snow.  • Gardening.  • Washing windows or floors.  How can I be more active in my day-to-day activities?  • Use stairs instead of an elevator.  • Take a walk during your lunch break.  • If you drive, park your car farther away from your work or school.  • If you take public transportation, get off one stop early and walk the rest of the way.  • Stand up or walk around during all of your indoor phone calls.  • Get up, stretch, and walk around every 30 minutes throughout the day.  • Enjoy exercise with a friend. Support to continue exercising will help you keep a regular routine of activity.  Where to find more information  You can find more information about exercising to stay healthy from:  • U.[place] Department of Health and Human Services: www.hhs.gov  • Centers for Disease Control and Prevention (CDC): www.cdc.gov  Summary  • Exercising regularly is important. It will improve your overall fitness, flexibility, and endurance.  • Regular exercise will also improve your overall health. It can help you control your weight, reduce stress, and improve your bone density.  • Do not exercise so much that you hurt yourself, feel dizzy, or get very short of breath.  • Before you start a new exercise program, talk with your health care provider.  This information is not intended to replace advice given to you by your health care provider. Make sure you discuss any questions you have with your health care provider.  Document Revised: 04/15/2022 Document Reviewed: 04/15/2022  [employer] Patient Education © 2022 [employer]

## 2023-03-08 NOTE — PROGRESS NOTES
Transitional Care Follow Up Visit  Subjective     Chrisshyann Mejia is a 63 y.o. male who presents for a transitional care management visit.    Within 48 business hours after discharge our office contacted him via telephone to coordinate his care and needs.      I reviewed and discussed the details of that call along with the discharge summary, hospital problems, inpatient lab results, inpatient diagnostic studies, and consultation reports with Chris.     Current outpatient and discharge medications have been reconciled for the patient.  Reviewed by: Naty Lebron MD      Date of TCM Phone Call 3/2/2023   [facility]   Date of Admission 3/1/2023   Date of Discharge 3/2/2023   [place] [facility] [place] for [place] (LACE) Score: 5 (3/2/2023  6:00 AM)      History of Present Illness  Vertigo continues. Would like to see ENT for this (a friend did that and it helped).    Ataxia--scheduled with  movement clinic in October. Diagnosed by Dr. Barlow years ago. Struggles with gait, balance. Has tried physical therapy for it previously, doesn't feel it helped. Has a weighted vest which he also feels doesn't help.    Headaches--frequent. Has tried tegretol, Elavil, Topamax, Celebrex, carvedilol, ibuprofen, melatonin, etc.     Course During Hospital Stay:  Per discharge summary, reviewed:  Discharge Summary by Smith Castillo DO (03/02/2023 16:20)    The following portions of the patient's history were reviewed and updated as appropriate: allergies, current medications, past family history, past medical history, past social history, past surgical history and problem list.    Review of Systems    Objective   Physical Exam  Vitals and nursing note reviewed.   Constitutional:       General: He is not in acute distress.     Appearance: Normal appearance. He is well-developed and well-groomed. He is obese. He is not ill-appearing, toxic-appearing or diaphoretic.      Interventions: Face  "mask in place.   HENT:      Head: Normocephalic and atraumatic.      Right Ear: Hearing normal.      Left Ear: Hearing normal.   Eyes:      General: Lids are normal. No scleral icterus.        Right eye: No discharge.         Left eye: No discharge.      Extraocular Movements: Extraocular movements intact.   Pulmonary:      Effort: Pulmonary effort is normal.   Musculoskeletal:      Cervical back: Neck supple.   Skin:     Coloration: Skin is not jaundiced or pale.   Neurological:      General: No focal deficit present.      Mental Status: He is alert and oriented to person, place, and time.      Gait: Gait abnormal.   Psychiatric:         Attention and Perception: Attention and perception normal.         Mood and Affect: Mood and affect normal.         Speech: Speech normal.         Behavior: Behavior normal. Behavior is cooperative.         Thought Content: Thought content normal.         Cognition and Memory: Cognition and memory normal.         Judgment: Judgment normal.        Comprehensive Metabolic Panel (03/01/2023 20:35)  Consults by Adam Cummings MD (03/02/2023 11:45)  MRI Brain Without Contrast (03/02/2023 10:23)  CT Angiogram Neck (03/01/2023 21:05) --mentions \"groundglass opacity in upper lobes\"      Assessment & Plan   Diagnoses and all orders for this visit:    1. Vertigo (Primary)  -     Ambulatory Referral to ENT (Otolaryngology)    2. Intractable migraine without status migrainosus, unspecified migraine type  -     Ambulatory Referral to Neurology  -     galcanezumab-gnlm (Emgality) 120 MG/ML auto-injector pen; Inject 1 mL under the skin into the appropriate area as directed Every 30 (Thirty) Days.  Dispense: 1 mL; Refill: 11    3. 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HCC)  -     Ambulatory Referral to Neurology    4. Abnormal chest x-ray  -     CT Chest With Contrast; Future    5. Ground glass opacity present on imaging of lung  -     CT Chest With Contrast; Future    6. Rib pain on right side  -     CT " Chest With Contrast; Future    7. Bilateral lower extremity edema    8. Cardiomegaly    9. Vitamin B12 deficiency    10. Carotid atherosclerosis, left  Comments:  50%, continue statin    11. At high risk for falls  -     Ambulatory Referral to ENT (Otolaryngology)    12. Hospital discharge follow-up      Patient continues to struggle with vertigo, reports it is somewhat better but he continues to be at high risk for injury from falls. Referral to ENT, if for any reason not able to see ENT will refer to physical therapy for vertigo. Discussed migraines, has tried/failed many medicines (most recently came of Topamax as he felt it was worsening his dizziness). Sample of Emgality given to patient today, provider administered two injections, each >2 in from umbilicus bilateral abdomen, pt tolerated well. rx sent to start next month. Discussed [facility] referral, he's on cancellation list but there is a very long wait to get in. Second opinion order in. Discussed edema, compression socks/stockings are safest course of action. Cardiomegaly chronic finding on imaging, we may need to order ECHO at future visit. Discussed xray findings, continued pain on right ribs, further imaging ordered.     I spent 42 minutes caring for Chris Mejia on this date of service. This time includes time spent by me in the following activities: preparing for the visit, reviewing tests, performing a medically appropriate examination and/or evaluation, counseling and educating the patient/family/caregiver, ordering medications, tests, or procedures and documenting information in the medical record.

## 2023-03-09 DIAGNOSIS — J30.2 SEASONAL ALLERGIES: ICD-10-CM

## 2023-03-09 RX ORDER — LEVOCETIRIZINE DIHYDROCHLORIDE 5 MG/1
TABLET, FILM COATED ORAL
Qty: 90 TABLET | Refills: 3 | OUTPATIENT
Start: 2023-03-09

## 2023-03-16 ENCOUNTER — PRIOR AUTHORIZATION (OUTPATIENT)
Dept: INTERNAL MEDICINE | Facility: CLINIC | Age: 63
End: 2023-03-16
Payer: MEDICARE

## 2023-03-16 NOTE — TELEPHONE ENCOUNTER
PA for Emgality 120 submitted and APPROVED     Coverage Starts on: 12/15/2022, Coverage Ends on: 6/14/2023.

## 2023-03-17 ENCOUNTER — HOSPITAL ENCOUNTER (EMERGENCY)
Facility: HOSPITAL | Age: 63
Discharge: HOME | End: 2023-03-17
Attending: EMERGENCY MEDICINE | Admitting: EMERGENCY MEDICINE
Payer: MEDICARE

## 2023-03-17 ENCOUNTER — TELEPHONE (OUTPATIENT)
Dept: INTERNAL MEDICINE | Facility: CLINIC | Age: 63
End: 2023-03-17
Payer: MEDICARE

## 2023-03-17 ENCOUNTER — APPOINTMENT (OUTPATIENT)
Dept: CT IMAGING | Facility: HOSPITAL | Age: 63
End: 2023-03-17
Payer: MEDICARE

## 2023-03-17 VITALS
HEIGHT: 67 IN | RESPIRATION RATE: 16 BRPM | OXYGEN SATURATION: 95 % | BODY MASS INDEX: 37.67 KG/M2 | WEIGHT: 240 LBS | SYSTOLIC BLOOD PRESSURE: 128 MMHG | DIASTOLIC BLOOD PRESSURE: 86 MMHG | HEART RATE: 68 BPM | TEMPERATURE: 98.3 F

## 2023-03-17 DIAGNOSIS — R42 VERTIGO: Primary | ICD-10-CM

## 2023-03-17 LAB
ALBUMIN SERPL-MCNC: 4.1 G/DL (ref 3.5–5.2)
ALBUMIN/GLOB SERPL: 1 G/DL
ALP SERPL-CCNC: 55 U/L (ref 39–117)
ALT SERPL W P-5'-P-CCNC: 31 U/L (ref 1–41)
ANION GAP SERPL CALCULATED.3IONS-SCNC: 9.5 MMOL/L (ref 5–15)
AST SERPL-CCNC: 27 U/L (ref 1–40)
BASOPHILS # BLD AUTO: 0.07 10*3/MM3 (ref 0–0.2)
BASOPHILS NFR BLD AUTO: 0.6 % (ref 0–1.5)
BILIRUB SERPL-MCNC: 0.5 MG/DL (ref 0–1.2)
BUN SERPL-MCNC: 14 MG/DL (ref 8–23)
BUN/CREAT SERPL: 14 (ref 7–25)
CALCIUM SPEC-SCNC: 9.8 MG/DL (ref 8.6–10.5)
CHLORIDE SERPL-SCNC: 101 MMOL/L (ref 98–107)
CO2 SERPL-SCNC: 28.5 MMOL/L (ref 22–29)
CREAT SERPL-MCNC: 1 MG/DL (ref 0.76–1.27)
DEPRECATED RDW RBC AUTO: 43.1 FL (ref 37–54)
EGFRCR SERPLBLD CKD-EPI 2021: 83.5 ML/MIN/1.73
EOSINOPHIL # BLD AUTO: 0.35 10*3/MM3 (ref 0–0.4)
EOSINOPHIL NFR BLD AUTO: 3.2 % (ref 0.3–6.2)
ERYTHROCYTE [DISTWIDTH] IN BLOOD BY AUTOMATED COUNT: 12.7 % (ref 12.3–15.4)
GLOBULIN UR ELPH-MCNC: 4.2 GM/DL
GLUCOSE SERPL-MCNC: 120 MG/DL (ref 65–99)
HCT VFR BLD AUTO: 51.5 % (ref 37.5–51)
HGB BLD-MCNC: 18.3 G/DL (ref 13–17.7)
HOLD SPECIMEN: NORMAL
HOLD SPECIMEN: NORMAL
IMM GRANULOCYTES # BLD AUTO: 0.05 10*3/MM3 (ref 0–0.05)
IMM GRANULOCYTES NFR BLD AUTO: 0.5 % (ref 0–0.5)
LYMPHOCYTES # BLD AUTO: 1.81 10*3/MM3 (ref 0.7–3.1)
LYMPHOCYTES NFR BLD AUTO: 16.5 % (ref 19.6–45.3)
MCH RBC QN AUTO: 32.7 PG (ref 26.6–33)
MCHC RBC AUTO-ENTMCNC: 35.5 G/DL (ref 31.5–35.7)
MCV RBC AUTO: 92 FL (ref 79–97)
MONOCYTES # BLD AUTO: 0.71 10*3/MM3 (ref 0.1–0.9)
MONOCYTES NFR BLD AUTO: 6.5 % (ref 5–12)
NEUTROPHILS NFR BLD AUTO: 72.7 % (ref 42.7–76)
NEUTROPHILS NFR BLD AUTO: 8 10*3/MM3 (ref 1.7–7)
NRBC BLD AUTO-RTO: 0 /100 WBC (ref 0–0.2)
PLATELET # BLD AUTO: 186 10*3/MM3 (ref 140–450)
PMV BLD AUTO: 9.5 FL (ref 6–12)
POTASSIUM SERPL-SCNC: 4.4 MMOL/L (ref 3.5–5.2)
PROT SERPL-MCNC: 8.3 G/DL (ref 6–8.5)
RBC # BLD AUTO: 5.6 10*6/MM3 (ref 4.14–5.8)
SODIUM SERPL-SCNC: 139 MMOL/L (ref 136–145)
WBC NRBC COR # BLD: 10.99 10*3/MM3 (ref 3.4–10.8)
WHOLE BLOOD HOLD COAG: NORMAL
WHOLE BLOOD HOLD SPECIMEN: NORMAL

## 2023-03-17 PROCEDURE — 25010000002 DIPHENHYDRAMINE PER 50 MG: Performed by: EMERGENCY MEDICINE

## 2023-03-17 PROCEDURE — 96374 THER/PROPH/DIAG INJ IV PUSH: CPT

## 2023-03-17 PROCEDURE — 85025 COMPLETE CBC W/AUTO DIFF WBC: CPT | Performed by: EMERGENCY MEDICINE

## 2023-03-17 PROCEDURE — 93005 ELECTROCARDIOGRAM TRACING: CPT | Performed by: EMERGENCY MEDICINE

## 2023-03-17 PROCEDURE — 96375 TX/PRO/DX INJ NEW DRUG ADDON: CPT

## 2023-03-17 PROCEDURE — 25010000002 KETOROLAC TROMETHAMINE PER 15 MG: Performed by: EMERGENCY MEDICINE

## 2023-03-17 PROCEDURE — 71275 CT ANGIOGRAPHY CHEST: CPT

## 2023-03-17 PROCEDURE — 99284 EMERGENCY DEPT VISIT MOD MDM: CPT

## 2023-03-17 PROCEDURE — 80053 COMPREHEN METABOLIC PANEL: CPT | Performed by: EMERGENCY MEDICINE

## 2023-03-17 PROCEDURE — 70450 CT HEAD/BRAIN W/O DYE: CPT

## 2023-03-17 PROCEDURE — 25010000002 METOCLOPRAMIDE PER 10 MG: Performed by: EMERGENCY MEDICINE

## 2023-03-17 PROCEDURE — 25010000002 DIAZEPAM PER 5 MG: Performed by: EMERGENCY MEDICINE

## 2023-03-17 PROCEDURE — 25510000001 IOPAMIDOL 61 % SOLUTION: Performed by: EMERGENCY MEDICINE

## 2023-03-17 RX ORDER — SODIUM CHLORIDE 0.9 % (FLUSH) 0.9 %
10 SYRINGE (ML) INJECTION AS NEEDED
Status: DISCONTINUED | OUTPATIENT
Start: 2023-03-17 | End: 2023-03-17 | Stop reason: HOSPADM

## 2023-03-17 RX ORDER — METOCLOPRAMIDE HYDROCHLORIDE 5 MG/ML
10 INJECTION INTRAMUSCULAR; INTRAVENOUS ONCE
Status: COMPLETED | OUTPATIENT
Start: 2023-03-17 | End: 2023-03-17

## 2023-03-17 RX ORDER — DIAZEPAM 2 MG/1
2 TABLET ORAL EVERY 6 HOURS PRN
Qty: 10 TABLET | Refills: 0 | Status: SHIPPED | OUTPATIENT
Start: 2023-03-17 | End: 2023-03-20 | Stop reason: SDUPTHER

## 2023-03-17 RX ORDER — MECLIZINE HYDROCHLORIDE 25 MG/1
25 TABLET ORAL ONCE
Status: COMPLETED | OUTPATIENT
Start: 2023-03-17 | End: 2023-03-17

## 2023-03-17 RX ORDER — KETOROLAC TROMETHAMINE 30 MG/ML
10 INJECTION, SOLUTION INTRAMUSCULAR; INTRAVENOUS ONCE
Status: COMPLETED | OUTPATIENT
Start: 2023-03-17 | End: 2023-03-17

## 2023-03-17 RX ORDER — DIAZEPAM 5 MG/ML
2.5 INJECTION, SOLUTION INTRAMUSCULAR; INTRAVENOUS ONCE
Status: COMPLETED | OUTPATIENT
Start: 2023-03-17 | End: 2023-03-17

## 2023-03-17 RX ORDER — MECLIZINE HYDROCHLORIDE 25 MG/1
25 TABLET ORAL 3 TIMES DAILY PRN
Qty: 15 TABLET | Refills: 0 | Status: SHIPPED | OUTPATIENT
Start: 2023-03-17 | End: 2023-03-20 | Stop reason: SDUPTHER

## 2023-03-17 RX ORDER — DIPHENHYDRAMINE HYDROCHLORIDE 50 MG/ML
25 INJECTION INTRAMUSCULAR; INTRAVENOUS ONCE
Status: COMPLETED | OUTPATIENT
Start: 2023-03-17 | End: 2023-03-17

## 2023-03-17 RX ADMIN — MECLIZINE HYDROCHLORIDE 25 MG: 25 TABLET ORAL at 12:00

## 2023-03-17 RX ADMIN — IOPAMIDOL 100 ML: 612 INJECTION, SOLUTION INTRAVENOUS at 12:21

## 2023-03-17 RX ADMIN — DIPHENHYDRAMINE HYDROCHLORIDE 25 MG: 50 INJECTION, SOLUTION INTRAMUSCULAR; INTRAVENOUS at 11:59

## 2023-03-17 RX ADMIN — SODIUM CHLORIDE 1000 ML: 9 INJECTION, SOLUTION INTRAVENOUS at 11:59

## 2023-03-17 RX ADMIN — METOCLOPRAMIDE 10 MG: 5 INJECTION, SOLUTION INTRAMUSCULAR; INTRAVENOUS at 12:02

## 2023-03-17 RX ADMIN — KETOROLAC TROMETHAMINE 10 MG: 30 INJECTION, SOLUTION INTRAMUSCULAR; INTRAVENOUS at 12:01

## 2023-03-17 RX ADMIN — DIAZEPAM 2.5 MG: 5 INJECTION, SOLUTION INTRAMUSCULAR; INTRAVENOUS at 16:58

## 2023-03-17 NOTE — ED PROVIDER NOTES
TRIAGE CHIEF COMPLAINT:     Nursing and triage notes reviewed    Chief Complaint   Patient presents with   • Dizziness      HPI: Chris Mejia is a 63 y.o. male who presents to the emergency department complaining of and dizziness.  Patient states symptoms began yesterday evening.  He describes a sensation that the room is spinning circles around him.  Symptoms are worse with changes in position and movement.  He has had some nausea but denies vomiting.  He does have a mild headache.  Patient was admitted to the hospital recently for similar symptoms where work-up was negative for acute intracranial abnormality including an MRI.  Patient was diagnosed with peripheral vertigo and states his symptoms are the exact same as they were before.  He has no new symptoms or complaints.  He does state that he had fallen previously and had some rib pain and his provider had ordered an outpatient CT scan.    REVIEW OF SYSTEMS: All other systems reviewed and are negative     PAST MEDICAL HISTORY:   Past Medical History:   Diagnosis Date   • Allergic rhinitis    • Anxiety    • Arthritis    • Balance problem    • Carpal tunnel syndrome    • Cluster headache    • Coronary artery disease    • Developmental delay    • Diabetes mellitus (Formerly [facility])     Noted by PCP   • Difficulty walking    • Dizziness    • Dysphagia    • Enlarged prostate    • GERD (gastroesophageal reflux disease)    • Gout    • Hip fracture, left (Formerly [facility])    • History of cardiovascular stress test 2018    positive for inferior and lateral ischemia    • History of echocardiogram    • Hyperlipidemia    • Hypertension    • Impaired mobility    • Lymphadenitis    • Memory loss    • Migraine    • Myocardial infarction ([facility]ly [facility]) 2000   • Obesity    • DENZEL (obstructive sleep apnea)    • Peptic ulcer    • Peptic ulceration    • Poor historian    • Rheumatoid arthritis ([facility]ly [facility])    • Right shoulder pain    • SOB (shortness of breath) on exertion    • 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[facility])    •  Tension headache    • Trigeminal neuralgia    • Vitamin B 12 deficiency    • Vitamin D deficiency         FAMILY HISTORY:   Family History   Problem Relation Age of Onset   • Deep vein thrombosis Mother    • Diabetes Mother    • Hyperlipidemia Mother    • Hypertension Mother    • Heart attack Mother    • Cancer Father    • Kidney disease Father    • Deep vein thrombosis Daughter    • Arthritis Sister    • Diabetes Sister    • Hyperlipidemia Sister    • Hypertension Sister    • Osteoporosis Sister    • Thyroid disease Sister    • Liver disease Sister    • Arthritis Brother    • Diabetes Brother    • Hyperlipidemia Brother    • Hypertension Brother    • Stroke Brother    • Cancer Brother    • Heart attack Brother    • Mental illness Brother         SOCIAL HISTORY:   Social History     Socioeconomic History   • Marital status:    Tobacco Use   • Smoking status: Never   • Smokeless tobacco: Never   Vaping Use   • Vaping Use: Never used   Substance and Sexual Activity   • Alcohol use: No   • Drug use: No   • Sexual activity: Defer        SURGICAL HISTORY:   Past Surgical History:   Procedure Laterality Date   • CARDIAC CATHETERIZATION     • CARDIAC CATHETERIZATION Left 3/14/2018    Procedure: Cardiac Catheterization/Vascular Study;  Surgeon: Adam Perkins MD;  Location: [facility] INVASIVE LOCATION;  Service: Cardiovascular   • COLONOSCOPY     • COLONOSCOPY N/A 9/13/2019    Procedure: COLONOSCOPY W/ COLD SNARE POLYPECTOMY;  Surgeon: Melba Lopez MD;  Location: [facility] ENDOSCOPY;  Service: Gastroenterology   • ELBOW ARTHROPLASTY Bilateral    • ENDOSCOPY     • HERNIA REPAIR      x3   • HIP HEMIARTHROPLASTY Left    • NEUROPLASTY Bilateral     decompression median nerve at carpal tunnel   • TOTAL KNEE ARTHROPLASTY Bilateral     2005, 2012        CURRENT MEDICATIONS:      Medication List      ASK your doctor about these medications    amLODIPine 10 MG tablet  Commonly known as: NORVASC  Take 1 tablet by mouth  Daily. Indications: High Blood Pressure Disorder     aspirin 81 MG tablet     busPIRone 15 MG tablet  Commonly known as: BUSPAR  TAKE 1 TABLET TWICE DAILY  AS NEEDED FOR ANXIETY     carvedilol 12.5 MG tablet  Commonly known as: COREG  TAKE 1 TABLET TWICE DAILY  WITH MEALS     Emgality 120 MG/ML auto-injector pen  Generic drug: galcanezumab-gnlm  Inject 1 mL under the skin into the appropriate area as directed Every 30 (Thirty) Days.     hydroCHLOROthiazide 12.5 MG capsule  Commonly known as: MICROZIDE  TAKE 1 CAPSULE EVERY       MORNING     nitroglycerin 0.4 MG SL tablet  Commonly known as: Nitrostat  Place 1 tablet under the tongue Every 5 (Five) Minutes As Needed for Chest Pain. Take no more than 3 doses in 15 minutes.     pantoprazole 40 MG EC tablet  Commonly known as: PROTONIX  TAKE 1 TABLET DAILY     pramipexole 0.25 MG tablet  Commonly known as: Mirapex  Take 1 tablet by mouth 3 (Three) Times a Day.     pravastatin 40 MG tablet  Commonly known as: PRAVACHOL  TAKE 1 TABLET EVERY NIGHT  FOR HIGH CHOLESTEROL,      STROKE RISK REDUCTION     tamsulosin 0.4 MG capsule 24 hr capsule  Commonly known as: FLOMAX  TAKE 2 CAPSULES NIGHTLY     tiZANidine 4 MG tablet  Commonly known as: ZANAFLEX  TAKE 1 TABLET EVERY NIGHT             ALLERGIES: Topamax [topiramate] and Oxycodone     PHYSICAL EXAM:   VITAL SIGNS:   Vitals:    03/17/23 1058   BP: 141/88   Pulse: 77   Resp: 16   Temp: 98.5 °F (36.9 °C)   SpO2: 95%      CONSTITUTIONAL: Awake, oriented, appears nontoxic   HENT: Atraumatic, normocephalic, oral mucosa pink and moist, airway patent. Nares patent without drainage. External ears normal.   EYES: Conjunctivae clear, EOMI, PERRL, significant nystagmus with lateral gaze, worse on the left side  NECK: Trachea midline, nontender, supple   CARDIOVASCULAR: Normal heart rate, Normal rhythm, No murmurs, rubs, gallops   PULMONARY/CHEST: Clear to auscultation, no rhonchi, wheezes, or rales. Symmetrical breath sounds  ABDOMINAL:  Nondistended, soft, nontender - no rebound or guarding.  NEUROLOGIC: Nonfocal, moving all four extremities, no gross sensory or motor deficits.  Normal strength and sensation in all extremities.  Cranial nerves intact.  Patient's symptoms are reproducible with changes in movement.  There is nystagmus with lateral gaze primarily to the left.  EXTREMITIES: No clubbing, cyanosis, or edema   SKIN: Warm, Dry, No erythema, No rash     ED COURSE / MEDICAL DECISION MAKING:   Chris Mejia is a 63 y.o. male who presents to the emergency department for evaluation of dizziness.  Patient appears nontoxic on arrival in the emergency department.  Vital signs are stable.  Exam does reveal reproducible symptoms and lateral gaze nystagmus.    Differential diagnosis includes peripheral vertigo, CVA, headache among other etiologies.    CT scan of the head, CT scan of the chest, EKG, basic labs was ordered for further evaluation of the patient's presentation.    Diagnostic information from other sources: Chart review, patient's wife provides much of the history    Interventions: IV fluids, Toradol, Reglan, Benadryl, meclizine    EKG interpreted by me reveals sinus rhythm with rate of 76 bpm.  There are no acute ST segment or T wave changes.  This is a normal-appearing EKG.    Narrative: Patient presents to the emergency department with dizziness.  The patient has signs of peripheral vertigo on examination.  CT scan of the head per radiology interpretation does not reveal any obvious acute process.  CT scan of the chest per radiology interpretation reveals multiple right-sided rib fractures.  Patient is adamant that he has not fallen at any point recently and has been having similar pain for the past 6 weeks at least.  Given this I suspect these fractures are at least subacute unless patient has not remembered falling.  Wife confirms story.  Labs are largely unremarkable.  Patient was treated with multiple medications to help treat  peripheral vertigo.  Patient feels improved.  Still has some difficulty with ambulation.  I did offer admission for safety.  Patient after speaking with his wife and think about it wishes to be discharged home.  Advised him to return at any point for worsening symptoms.    Re-evaluation: Feeling more comfortable    Plan for disposition is discharge    DECISION TO DISCHARGE/ADMIT: see ED care timeline     FINAL IMPRESSION:   1 --vertigo  2 --   3 --     Electronically signed by: Alma Delia Hilton MD, 3/17/2023 11:44 EDT       Alma Delia Hilton MD  03/17/23 3922

## 2023-03-17 NOTE — TELEPHONE ENCOUNTER
"PHONE CALL FROM WIFE.  PATIENT HAD ANOTHER SPELL \"VERTIGO\".  WHAT TO DO?  HAD YESTERDAY AND STILL NOT FULLY COHERENT.  WOULD LIKE MEDICATION FOR DIZZINESS AND NAUSEA.  WAS GIVEN DIAZEPAM IN THE HOSPITAL WHICH HELPED.  SHOULD THEY GO BACK TO THE HOSPITAL?    PLEASE CALL [phone]  "

## 2023-03-20 ENCOUNTER — OFFICE VISIT (OUTPATIENT)
Dept: INTERNAL MEDICINE | Facility: CLINIC | Age: 63
End: 2023-03-20
Payer: MEDICARE

## 2023-03-20 VITALS
WEIGHT: 244 LBS | RESPIRATION RATE: 16 BRPM | BODY MASS INDEX: 38.3 KG/M2 | DIASTOLIC BLOOD PRESSURE: 82 MMHG | OXYGEN SATURATION: 95 % | SYSTOLIC BLOOD PRESSURE: 124 MMHG | TEMPERATURE: 97.3 F | HEIGHT: 67 IN | HEART RATE: 84 BPM

## 2023-03-20 DIAGNOSIS — H53.9 VISION CHANGES: ICD-10-CM

## 2023-03-20 DIAGNOSIS — R05.3 CHRONIC COUGH: ICD-10-CM

## 2023-03-20 DIAGNOSIS — N40.1 BENIGN NON-NODULAR PROSTATIC HYPERPLASIA WITH LOWER URINARY TRACT SYMPTOMS: ICD-10-CM

## 2023-03-20 DIAGNOSIS — R33.9 URINARY RETENTION: ICD-10-CM

## 2023-03-20 DIAGNOSIS — S22.43XD MULTIPLE CLOSED FRACTURES OF RIBS OF BOTH SIDES WITH ROUTINE HEALING, SUBSEQUENT ENCOUNTER: ICD-10-CM

## 2023-03-20 DIAGNOSIS — G11.8 SPINOCEREBELLAR ATAXIA: ICD-10-CM

## 2023-03-20 DIAGNOSIS — Z91.81 AT HIGH RISK FOR FALLS: ICD-10-CM

## 2023-03-20 DIAGNOSIS — G43.919 INTRACTABLE MIGRAINE WITHOUT STATUS MIGRAINOSUS, UNSPECIFIED MIGRAINE TYPE: ICD-10-CM

## 2023-03-20 DIAGNOSIS — R42 VERTIGO: Primary | ICD-10-CM

## 2023-03-20 DIAGNOSIS — M06.9 RHEUMATOID ARTHRITIS INVOLVING MULTIPLE SITES, UNSPECIFIED WHETHER RHEUMATOID FACTOR PRESENT: ICD-10-CM

## 2023-03-20 PROCEDURE — 1160F RVW MEDS BY RX/DR IN RCRD: CPT | Performed by: FAMILY MEDICINE

## 2023-03-20 PROCEDURE — 99214 OFFICE O/P EST MOD 30 MIN: CPT | Performed by: FAMILY MEDICINE

## 2023-03-20 PROCEDURE — 1159F MED LIST DOCD IN RCRD: CPT | Performed by: FAMILY MEDICINE

## 2023-03-20 PROCEDURE — 3074F SYST BP LT 130 MM HG: CPT | Performed by: FAMILY MEDICINE

## 2023-03-20 PROCEDURE — 3079F DIAST BP 80-89 MM HG: CPT | Performed by: FAMILY MEDICINE

## 2023-03-20 RX ORDER — TAMSULOSIN HYDROCHLORIDE 0.4 MG/1
1 CAPSULE ORAL NIGHTLY
Qty: 90 CAPSULE | Refills: 3 | Status: SHIPPED | OUTPATIENT
Start: 2023-03-20

## 2023-03-20 RX ORDER — MECLIZINE HYDROCHLORIDE 25 MG/1
25 TABLET ORAL 3 TIMES DAILY PRN
Qty: 90 TABLET | Refills: 2 | Status: SHIPPED | OUTPATIENT
Start: 2023-03-20

## 2023-03-20 RX ORDER — GALCANEZUMAB 120 MG/ML
120 INJECTION, SOLUTION SUBCUTANEOUS
Qty: 1 ML | Refills: 11 | Status: SHIPPED | OUTPATIENT
Start: 2023-03-20

## 2023-03-20 RX ORDER — TIOTROPIUM BROMIDE INHALATION SPRAY 1.56 UG/1
1-2 SPRAY, METERED RESPIRATORY (INHALATION) DAILY
Qty: 4 G | Refills: 0 | COMMUNITY
Start: 2023-03-20

## 2023-03-20 RX ORDER — DIAZEPAM 2 MG/1
2 TABLET ORAL EVERY 6 HOURS PRN
Qty: 10 TABLET | Refills: 0 | Status: SHIPPED | OUTPATIENT
Start: 2023-03-20

## 2023-03-20 NOTE — PROGRESS NOTES
"Chief Complaint  Hospital Follow Up Visit (Vertigo, discuss CT)    Subjective        Chris Mejia presents to [facility]  History of Present Illness  Still having bouts of vertigo with horizondal nystagmus. Hasn't heard yet about ENT appointment. meclzine helps, as does diazepam but he doesn't want to be on a habit-forming medicine. Has also had some bouts of double vision. Also has had pain in behind eyes. Reports last eye exam approx May 2022. Urinating okay at this time with Flomax, we went up on dose last month to two pills. Continues to cough productive of phlegm. Continues to have pain in ribs, CT confirmed fractures. He denies injuries/falls to the right ribs (previous visit he noted leaning over a car console and then developing the pain in the area). Was told previously he has osteoporosis. Wife feels Emgality has helped some with his headaches.       Objective   Vital Signs:  /82 (BP Location: Left arm, Patient Position: Sitting, Cuff Size: Adult)   Pulse 84   Temp 97.3 °F (36.3 °C) (Temporal)   Resp 16   Ht 170.2 cm (67\")   Wt 111 kg (244 lb)   SpO2 95%   BMI 38.22 kg/m²   Estimated body mass index is 38.22 kg/m² as calculated from the following:    Height as of this encounter: 170.2 cm (67\").    Weight as of this encounter: 111 kg (244 lb).             Physical Exam  Vitals and nursing note reviewed.   Constitutional:       General: He is not in acute distress.     Appearance: Normal appearance. He is well-developed and well-groomed. He is obese. He is not ill-appearing, toxic-appearing or diaphoretic.      Interventions: Face mask in place.   HENT:      Head: Normocephalic and atraumatic.      Right Ear: Hearing normal.      Left Ear: Hearing normal.   Eyes:      General: Lids are normal. No scleral icterus.        Right eye: No discharge.         Left eye: No discharge.      Extraocular Movements: Extraocular movements intact.   Pulmonary:      Effort: " Pulmonary effort is normal.   Musculoskeletal:      Cervical back: Neck supple.   Skin:     Coloration: Skin is not jaundiced or pale.   Neurological:      General: No focal deficit present.      Mental Status: He is alert and oriented to person, place, and time.      Gait: Gait abnormal.   Psychiatric:         Attention and Perception: Attention and perception normal.         Mood and Affect: Mood and affect normal.         Speech: Speech normal.         Behavior: Behavior normal. Behavior is cooperative.         Thought Content: Thought content normal.         Cognition and Memory: Cognition and memory normal.         Judgment: Judgment normal.        Result Review :  The following data was reviewed by: Naty Lebron MD on 03/20/2023:  CT Angiogram Chest Pulmonary Embolism (03/17/2023 12:21)                 Assessment and Plan   Diagnoses and all orders for this visit:    1. Vertigo (Primary)  -     meclizine (ANTIVERT) 25 MG tablet; Take 1 tablet by mouth 3 (Three) Times a Day As Needed for Dizziness.  Dispense: 90 tablet; Refill: 2  -     diazePAM (VALIUM) 2 MG tablet; Take 1 tablet by mouth Every 6 (Six) Hours As Needed (dizziness).  Dispense: 10 tablet; Refill: 0    2. Chronic cough  -     Hospital Bed  -     Tiotropium Bromide Monohydrate (Spiriva Respimat) 1.25 MCG/ACT aerosol solution inhaler; Inhale 1-2 puffs Daily.  Dispense: 4 g; Refill: 0    3. Multiple closed fractures of ribs of both sides with routine healing, subsequent encounter  -     Hospital Bed    4. Benign non-nodular prostatic hyperplasia with lower urinary tract symptoms  -     tamsulosin (FLOMAX) 0.4 MG capsule 24 hr capsule; Take 1 capsule by mouth Every Night.  Dispense: 90 capsule; Refill: 3    5. Vision changes    6. Urinary retention  -     tamsulosin (FLOMAX) 0.4 MG capsule 24 hr capsule; Take 1 capsule by mouth Every Night.  Dispense: 90 capsule; Refill: 3    7. Intractable migraine without status migrainosus, unspecified  migraine type  -     galcanezumab-gnlm (Emgality) 120 MG/ML auto-injector pen; Inject 1 mL under the skin into the appropriate area as directed Every 30 (Thirty) Days.  Dispense: 1 mL; Refill: 11    8. 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HCC)  -     Hospital Bed    9. Rheumatoid arthritis involving multiple sites, unspecified whether rheumatoid factor present ([facility])  -     Hospital Bed    10. At high risk for falls  -     Hospital Bed      Decreasing Flomax back to 0.4 mg nightly to see if this helps with vision. Encouraged eye exam if double vision continues. Discussed difference between horizontal and vertical nystagmus, if has latter needs to return to ER. Horizontal likely due to inner ear etiology for which he's been referred to ENT and that referral is being addressed today by our referral team. Wife asked about getting a hospital bed to help him elevate for sleep due to cough and to help with getting out of bed. Discouraged long term/frquent use of valium as can be habit forming, use sparingly. Cough--suspect degree of chronic bronchitis, would need PFTs to confirm. Sample of Spiriva 1.25 provided , if helpful let us know and will prescribe. If urination worsens with Spiriva cease use.     I spent 35 minutes caring for Chris on this date of service. This time includes time spent by me in the following activities:preparing for the visit, reviewing tests, obtaining and/or reviewing a separately obtained history, counseling and educating the patient/family/caregiver, ordering medications, tests, or procedures and documenting information in the medical record  Follow Up   Return for As scheduled previously.  Patient was given instructions and counseling regarding his condition or for health maintenance advice. Please see specific information pulled into the AVS if appropriate.

## 2023-03-20 NOTE — PATIENT INSTRUCTIONS
Fall Prevention in the Home, Adult  Falls can cause injuries and affect people of all ages. There are many simple things that you can do to make your home safe and to help prevent falls. Ask for help when making these changes, if needed.  What actions can I take to prevent falls?  General instructions  • Use good lighting in all rooms. Replace any light bulbs that burn out, turn on lights if it is dark, and use night-lights.  • Place frequently used items in easy-to-reach places. Lower the shelves around your home if necessary.  • Set up furniture so that there are clear paths around it. Avoid moving your furniture around.  • Remove throw rugs and other tripping hazards from the floor.  • Avoid walking on wet floors.  • Fix any uneven floor surfaces.  • Add color or contrast paint or tape to grab bars and handrails in your home. Place contrasting color strips on the first and last steps of staircases.  • When you use a stepladder, make sure that it is completely opened and that the sides and supports are firmly locked. Have someone hold the ladder while you are using it. Do not climb a closed stepladder.  • Know where your pets are when moving through your home.  What can I do in the bathroom?     • Keep the floor dry. Immediately clean up any water that is on the floor.  • Remove soap buildup in the tub or shower regularly.  • Use nonskid mats or decals on the floor of the tub or shower.  • Attach bath mats securely with double-sided, nonslip rug tape.  • If you need to sit down while you are in the shower, use a plastic, nonslip stool.  • Install grab bars by the toilet and in the tub and shower. Do not use towel bars as grab bars.  What can I do in the bedroom?  • Make sure that a bedside light is easy to reach.  • Do not use oversized bedding that reaches the floor.  • Have a firm chair that has side arms to use for getting dressed.  What can I do in the kitchen?  • Clean up any spills right away.  • If you  need to reach for something above you, use a sturdy step stool that has a grab bar.  • Keep electrical cables out of the way.  • Do not use floor polish or wax that makes floors slippery. If you must use wax, make sure that it is non-skid floor wax.  What can I do with my stairs?  • Do not leave any items on the stairs.  • Make sure that you have a light switch at the top and the bottom of the stairs. Have them installed if you do not have them.  • Make sure that there are handrails on both sides of the stairs. Fix handrails that are broken or loose. Make sure that handrails are as long as the staircases.  • Install non-slip stair treads on all stairs in your home.  • Avoid having throw rugs at the top or bottom of stairs, or secure the rugs with carpet tape to prevent them from moving.  • Choose a carpet design that does not hide the edge of steps on the stairs.  • Check any carpeting to make sure that it is firmly attached to the stairs. Fix any carpet that is loose or worn.  What can I do on the outside of my home?  • Use bright outdoor lighting.  • Regularly repair the edges of walkways and driveways and fix any cracks.  • Remove high doorway thresholds.  • Trim any shrubbery on the main path into your home.  • Regularly check that handrails are securely fastened and in good repair. Both sides of all steps should have handrails.  • Install guardrails along the edges of any raised decks or porches.  • Clear walkways of debris and clutter, including tools and rocks.  • Have leaves, snow, and ice cleared regularly.  • Use sand or salt on walkways during winter months.  • In the garage, clean up any spills right away, including grease or oil spills.  What other actions can I take?  • Wear closed-toe shoes that fit well and support your feet. Wear shoes that have rubber soles or low heels.  • Use mobility aids as needed, such as canes, walkers, scooters, and crutches.  • Review your medicines with your health care  provider. Some medicines can cause dizziness or changes in blood pressure, which increase your risk of falling.  Talk with your health care provider about other ways that you can decrease your risk of falls. This may include working with a physical therapist or  to improve your strength, balance, and endurance.  Where to find more information  • Centers for Disease Control and Prevention, [place]: www.cdc.gov  • National [place] on Aging: www.wilmer.nih.gov  Contact a health care provider if:  • You are afraid of falling at home.  • You feel weak, drowsy, or dizzy at home.  • You fall at home.  Summary  • There are many simple things that you can do to make your home safe and to help prevent falls.  • Ways to make your home safe include removing tripping hazards and installing grab bars in the bathroom.  • Ask for help when making these changes in your home.  This information is not intended to replace advice given to you by your health care provider. Make sure you discuss any questions you have with your health care provider.  Document Revised: 09/19/2022 Document Reviewed: 07/21/2021  [employer] Patient Education © 2022 [employer]      Sit-to-Stand Exercise  The sit-to-stand exercise (also known as the chair stand or chair rise exercise) strengthens your lower body and helps you maintain or improve your mobility and independence. The end goal is to do the sit-to-stand exercise without using your hands. This will be easier as you become stronger. You should always talk with your health care provider before starting any exercise program, especially if you have had recent surgery.  Do the exercise exactly as told by your health care provider and adjust it as directed. It is normal to feel mild stretching, pulling, tightness, or discomfort as you do this exercise, but you should stop right away if you feel sudden pain or your pain gets worse. Do not begin doing this exercise until told by your health care  provider.  What the sit-to-stand exercise does  The sit-to-stand exercise helps to strengthen the muscles in your thighs and the muscles in the center of your body that give you stability (core muscles). This exercise is especially helpful if:  • You have had knee or hip surgery.  • You have trouble getting up from a chair, out of a car, or off the toilet due to muscle weakness.  How to do the sit-to-stand exercise  1. Sit toward the front edge of a sturdy chair without armrests. Your knees should be bent and your feet should be flat on the floor and shoulder-width apart and underneath your hips.  2. Place your hands lightly on each side of the seat. Keep your back and neck as straight as possible, with your chest slightly forward.  3. Breathe in slowly. Lean forward and slightly shift your weight to the front of your feet.  4. Breathe out as you slowly stand up. Try not to support any weight with your hands.  5. Stand and pause for a full breath in and out.  6. Breathe in as you sit down slowly. Tighten your core and abdominal muscles to control your lowering as much as possible. You should lower yourself back to the chair slowly, not just drop back into the seat.  7. Breathe out slowly.  8. Do this exercise 10-15 times. If needed, do it fewer times until you build up strength.  9. Rest for 1 minute, then do another set of 10-15 repetitions.  To change the difficulty of the sit-to-stand exercise  • If the exercise is too difficult, use a chair with sturdy armrests, and push off the armrests to help you come to the standing position. You can also use the armrests to help slowly lower yourself back to sitting. As this gets easier, try to use your arms less. You can also place a firm cushion or pillow on the chair to make the surface higher.  • If this exercise is too easy, do not use your arms to help raise or lower yourself. You can also wear a weighted vest, use hand weights, increase your repetitions, or try a  lower chair.  General tips  • You may feel tired when starting an exercise routine. This is normal.  • You may have muscle soreness that lasts a few days. This is normal. As you get stronger, you may not feel muscle soreness.  • Use smooth, steady movements.  • Do not  hold your breath during strength exercises. This can cause unsafe changes in your blood pressure.  • Breathe in slowly through your nose, and breathe out slowly through your mouth.  Summary  • Strengthening your lower body is an important step to help you move safely and independently.  • The sit-to-stand exercise helps strengthen the muscles in your thighs and core.  • You should always talk with your health care provider before starting any exercise program, especially if you have had recent surgery.  This information is not intended to replace advice given to you by your health care provider. Make sure you discuss any questions you have with your health care provider.  Document Revised: 04/10/2022 Document Reviewed: 04/10/2022  [employer] Patient Education © 2022 [employer]      Exercising to Stay Healthy  To become healthy and stay healthy, it is recommended that you do moderate-intensity and vigorous-intensity exercise. You can tell that you are exercising at a moderate intensity if your heart starts beating faster and you start breathing faster but can still hold a conversation. You can tell that you are exercising at a vigorous intensity if you are breathing much harder and faster and cannot hold a conversation while exercising.  How can exercise benefit me?  Exercising regularly is important. It has many health benefits, such as:  • Improving overall fitness, flexibility, and endurance.  • Increasing bone density.  • Helping with weight control.  • Decreasing body fat.  • Increasing muscle strength and endurance.  • Reducing stress and tension, anxiety, depression, or anger.  • Improving overall health.  What guidelines should I follow while  exercising?  • Before you start a new exercise program, talk with your health care provider.  • Do not exercise so much that you hurt yourself, feel dizzy, or get very short of breath.  • Wear comfortable clothes and wear shoes with good support.  • Drink plenty of water while you exercise to prevent dehydration or heat stroke.  • Work out until your breathing and your heartbeat get faster (moderate intensity).  How often should I exercise?  Choose an activity that you enjoy, and set realistic goals. Your health care provider can help you make an activity plan that is individually designed and works best for you.  Exercise regularly as told by your health care provider. This may include:  • Doing strength training two times a week, such as:  ? Lifting weights.  ? Using resistance bands.  ? Push-ups.  ? Sit-ups.  ? Yoga.  • Doing a certain intensity of exercise for a given amount of time. Choose from these options:  ? A total of 150 minutes of moderate-intensity exercise every week.  ? A total of 75 minutes of vigorous-intensity exercise every week.  ? A mix of moderate-intensity and vigorous-intensity exercise every week.  Children, pregnant women, people who have not exercised regularly, people who are overweight, and older adults may need to talk with a health care provider about what activities are safe to perform. If you have a medical condition, be sure to talk with your health care provider before you start a new exercise program.  What are some exercise ideas?  Moderate-intensity exercise ideas include:  • Walking 1 mile (1.6 km) in about 15 minutes.  • Biking.  • Hiking.  • Golfing.  • Dancing.  • Water aerobics.  Vigorous-intensity exercise ideas include:  • Walking 4.5 miles (7.2 km) or more in about 1 hour.  • Jogging or running 5 miles (8 km) in about 1 hour.  • Biking 10 miles (16.1 km) or more in about 1 hour.  • Lap swimming.  • Roller-skating or in-line skating.  • Cross-country skiing.  • Vigorous  competitive sports, such as football, basketball, and soccer.  • Jumping rope.  • Aerobic dancing.  What are some everyday activities that can help me get exercise?  • Yard work, such as:  ? Pushing a .  ? Raking and bagging leaves.  • Washing your car.  • Pushing a stroller.  • Shoveling snow.  • Gardening.  • Washing windows or floors.  How can I be more active in my day-to-day activities?  • Use stairs instead of an elevator.  • Take a walk during your lunch break.  • If you drive, park your car farther away from your work or school.  • If you take public transportation, get off one stop early and walk the rest of the way.  • Stand up or walk around during all of your indoor phone calls.  • Get up, stretch, and walk around every 30 minutes throughout the day.  • Enjoy exercise with a friend. Support to continue exercising will help you keep a regular routine of activity.  Where to find more information  You can find more information about exercising to stay healthy from:  • U.[place] Department of Health and Human Services: www.hhs.gov  • Centers for Disease Control and Prevention (CDC): www.cdc.gov  Summary  • Exercising regularly is important. It will improve your overall fitness, flexibility, and endurance.  • Regular exercise will also improve your overall health. It can help you control your weight, reduce stress, and improve your bone density.  • Do not exercise so much that you hurt yourself, feel dizzy, or get very short of breath.  • Before you start a new exercise program, talk with your health care provider.  This information is not intended to replace advice given to you by your health care provider. Make sure you discuss any questions you have with your health care provider.  Document Revised: 04/15/2022 Document Reviewed: 04/15/2022  [employer] Patient Education © 2022 [employer]

## 2023-03-21 ENCOUNTER — TELEPHONE (OUTPATIENT)
Dept: INTERNAL MEDICINE | Facility: CLINIC | Age: 63
End: 2023-03-21
Payer: MEDICARE

## 2023-03-21 DIAGNOSIS — J30.2 SEASONAL ALLERGIES: Primary | ICD-10-CM

## 2023-03-21 RX ORDER — LEVOCETIRIZINE DIHYDROCHLORIDE 5 MG/1
5 TABLET, FILM COATED ORAL EVERY EVENING
Qty: 90 TABLET | Refills: 3 | Status: SHIPPED | OUTPATIENT
Start: 2023-03-21

## 2023-03-21 NOTE — TELEPHONE ENCOUNTER
Caller: Diana Mejia    Relationship: Emergency Contact    Best call back number: [phone]  Requested Prescriptions:   Requested Prescriptions      No prescriptions requested or ordered in this encounter        Pharmacy where request should be sent: [facility] - [place], [place] - [facility] - [phone] [facility] [phone] FX     Last office visit with prescribing clinician: 3/20/2023   Last telemedicine visit with prescribing clinician: 5/31/2023   Next office visit with prescribing clinician: 5/31/2023     Additional details provided by patient: PATIENT IS OUT OF LEVOCETIRIZINE HCL, PLEASE SEND AS 90 DAY SUPPLY TO [facility]    Does the patient have less than a 3 day supply:  [x] Yes  [] No    Would you like a call back once the refill request has been completed: [] Yes [] No    If the office needs to give you a call back, can they leave a voicemail: [] Yes [] No    Gumaro Daniel Rep   03/21/23 14:53 EDT

## 2023-03-21 NOTE — TELEPHONE ENCOUNTER
Called Diana back and informed her that the medication was discontinued at an appointment with us in November, she stated she was unsure why that neither her nor leonard remember and it could have been a miscommunication unless Dr. Lebron had reasoning they had forgotten about.     levocetirizine (XYZAL) 5 MG tablet         Please advise if refill is appropriate, no longer a current medication in his list. Requesting to be sent to mail order

## 2023-04-07 ENCOUNTER — CLINICAL SUPPORT (OUTPATIENT)
Dept: INTERNAL MEDICINE | Facility: CLINIC | Age: 63
End: 2023-04-07
Payer: MEDICARE

## 2023-04-07 DIAGNOSIS — E53.8 VITAMIN B12 DEFICIENCY: ICD-10-CM

## 2023-04-07 PROCEDURE — 96372 THER/PROPH/DIAG INJ SC/IM: CPT | Performed by: FAMILY MEDICINE

## 2023-04-07 RX ADMIN — CYANOCOBALAMIN 1000 MCG: 1000 INJECTION, SOLUTION INTRAMUSCULAR; SUBCUTANEOUS at 12:20

## 2023-05-04 ENCOUNTER — CLINICAL SUPPORT (OUTPATIENT)
Dept: INTERNAL MEDICINE | Facility: CLINIC | Age: 63
End: 2023-05-04
Payer: MEDICARE

## 2023-05-04 DIAGNOSIS — E53.8 B12 DEFICIENCY: ICD-10-CM

## 2023-05-04 PROCEDURE — 96372 THER/PROPH/DIAG INJ SC/IM: CPT | Performed by: FAMILY MEDICINE

## 2023-05-04 RX ADMIN — CYANOCOBALAMIN 1000 MCG: 1000 INJECTION, SOLUTION INTRAMUSCULAR; SUBCUTANEOUS at 11:27

## 2023-06-02 ENCOUNTER — CLINICAL SUPPORT (OUTPATIENT)
Dept: INTERNAL MEDICINE | Facility: CLINIC | Age: 63
End: 2023-06-02

## 2023-06-02 DIAGNOSIS — E53.8 VITAMIN B12 DEFICIENCY: ICD-10-CM

## 2023-06-02 PROCEDURE — 96372 THER/PROPH/DIAG INJ SC/IM: CPT | Performed by: FAMILY MEDICINE

## 2023-06-02 RX ADMIN — CYANOCOBALAMIN 1000 MCG: 1000 INJECTION, SOLUTION INTRAMUSCULAR; SUBCUTANEOUS at 11:50

## 2023-08-09 ENCOUNTER — OFFICE VISIT (OUTPATIENT)
Dept: INTERNAL MEDICINE | Facility: CLINIC | Age: 63
End: 2023-08-09
Payer: MEDICARE

## 2023-08-09 VITALS
TEMPERATURE: 97.3 F | SYSTOLIC BLOOD PRESSURE: 126 MMHG | HEIGHT: 67 IN | BODY MASS INDEX: 38.77 KG/M2 | RESPIRATION RATE: 16 BRPM | WEIGHT: 247 LBS | DIASTOLIC BLOOD PRESSURE: 80 MMHG | HEART RATE: 70 BPM | OXYGEN SATURATION: 95 %

## 2023-08-09 DIAGNOSIS — E66.01 CLASS 2 SEVERE OBESITY DUE TO EXCESS CALORIES WITH SERIOUS COMORBIDITY AND BODY MASS INDEX (BMI) OF 38.0 TO 38.9 IN ADULT: ICD-10-CM

## 2023-08-09 DIAGNOSIS — I10 ESSENTIAL HYPERTENSION: Chronic | ICD-10-CM

## 2023-08-09 DIAGNOSIS — Z00.00 MEDICARE ANNUAL WELLNESS VISIT, SUBSEQUENT: Primary | ICD-10-CM

## 2023-08-09 DIAGNOSIS — E53.8 VITAMIN B12 DEFICIENCY: ICD-10-CM

## 2023-08-09 DIAGNOSIS — Z91.81 AT HIGH RISK FOR FALLS: ICD-10-CM

## 2023-08-09 PROCEDURE — 99397 PER PM REEVAL EST PAT 65+ YR: CPT | Performed by: FAMILY MEDICINE

## 2023-08-09 PROCEDURE — 1170F FXNL STATUS ASSESSED: CPT | Performed by: FAMILY MEDICINE

## 2023-08-09 PROCEDURE — 1159F MED LIST DOCD IN RCRD: CPT | Performed by: FAMILY MEDICINE

## 2023-08-09 PROCEDURE — 3079F DIAST BP 80-89 MM HG: CPT | Performed by: FAMILY MEDICINE

## 2023-08-09 PROCEDURE — 96372 THER/PROPH/DIAG INJ SC/IM: CPT | Performed by: FAMILY MEDICINE

## 2023-08-09 PROCEDURE — G0439 PPPS, SUBSEQ VISIT: HCPCS | Performed by: FAMILY MEDICINE

## 2023-08-09 PROCEDURE — 1160F RVW MEDS BY RX/DR IN RCRD: CPT | Performed by: FAMILY MEDICINE

## 2023-08-09 PROCEDURE — 3074F SYST BP LT 130 MM HG: CPT | Performed by: FAMILY MEDICINE

## 2023-08-09 RX ADMIN — CYANOCOBALAMIN 1000 MCG: 1000 INJECTION, SOLUTION INTRAMUSCULAR; SUBCUTANEOUS at 13:52

## 2023-08-09 NOTE — ASSESSMENT & PLAN NOTE
Hypertension is improving with treatment.  Discussed amlodipine side effect of edema, goal of blood pressure <130/80, continue current medicines and compression, discussed/encouraged elevation of lower extremities.  Blood pressure will be reassessed at the next regular appointment.

## 2023-08-09 NOTE — PATIENT INSTRUCTIONS
Medicare Wellness  Personal Prevention Plan of Service     Date of Office Visit:    Encounter Provider:  Naty Lebron MD  Place of Service:  [facility]  Patient Name: Chris Mejia  :  1960    As part of the Medicare Wellness portion of your visit today, we are providing you with this personalized preventive plan of services (PPPS). This plan is based upon recommendations of the [place] Preventive Services Task Force (USPSTF) and the Advisory Committee on Immunization Practices (ACIP).    This lists the preventive care services that should be considered, and provides dates of when you are due. Items listed as completed are up-to-date and do not require any further intervention.    Health Maintenance   Topic Date Due    ZOSTER VACCINE (1 of 2) 2023 (Originally 2008)    COVID-19 Vaccine (5 - Moderna series) 2024 (Originally 3/4/2023)    TDAP/TD VACCINES (3 - Td or Tdap) 2024 (Originally 11/15/2022)    ANNUAL WELLNESS VISIT  2023    INFLUENZA VACCINE  10/01/2023    LIPID PANEL  2024    COLORECTAL CANCER SCREENING  2024    HEPATITIS C SCREENING  Completed    Pneumococcal Vaccine 65+  Completed       No orders of the defined types were placed in this encounter.      Return in about 4 months (around 2023) for follow up.        Health Maintenance, Male  A healthy lifestyle and preventive care is important for your health and wellness. Ask your health care provider about what schedule of regular examinations is right for you.  What should I know about weight and diet?    Eat a Healthy Diet  Eat plenty of vegetables, fruits, whole grains, low-fat dairy products, and lean protein.  Do not eat a lot of foods high in solid fats, added sugars, or salt.     Maintain a Healthy Weight  Regular exercise can help you achieve or maintain a healthy weight. You should:  Do at least 150 minutes of exercise each week. The exercise  should increase your heart rate and make you sweat (moderate-intensity exercise).  Do strength-training exercises at least twice a week.     Watch Your Levels of Cholesterol and Blood Lipids  Have your blood tested for lipids and cholesterol every 5 years starting at 35 years of age. If you are at high risk for heart disease, you should start having your blood tested when you are 20 years old. You may need to have your cholesterol levels checked more often if:  Your lipid or cholesterol levels are high.  You are older than 50 years of age.  You are at high risk for heart disease.     What should I know about cancer screening?  Many types of cancers can be detected early and may often be prevented.  Lung Cancer  You should be screened every year for lung cancer if:  You are a current smoker who has smoked for at least 30 years.  You are a former smoker who has quit within the past 15 years.  Talk to your health care provider about your screening options, when you should start screening, and how often you should be screened.     Colorectal Cancer  Routine colorectal cancer screening usually begins at 50 years of age and should be repeated every 5-10 years until you are 75 years old. You may need to be screened more often if early forms of precancerous polyps or small growths are found. Your health care provider may recommend screening at an earlier age if you have risk factors for colon cancer.  Your health care provider may recommend using home test kits to check for hidden blood in the stool.  A small camera at the end of a tube can be used to examine your colon (sigmoidoscopy or colonoscopy). This checks for the earliest forms of colorectal cancer.     Prostate and Testicular Cancer  Depending on your age and overall health, your health care provider may do certain tests to screen for prostate and testicular cancer.  Talk to your health care provider about any symptoms or concerns you have about testicular or  prostate cancer.     Skin Cancer  Check your skin from head to toe regularly.  Tell your health care provider about any new moles or changes in moles, especially if:  There is a change in a mole's size, shape, or color.  You have a mole that is larger than a pencil eraser.  Always use sunscreen. Apply sunscreen liberally and repeat throughout the day.  Protect yourself by wearing long sleeves, pants, a wide-brimmed hat, and sunglasses when outside.     What should I know about heart disease, diabetes, and high blood pressure?  If you are 18-39 years of age, have your blood pressure checked every 3-5 years. If you are 40 years of age or older, have your blood pressure checked every year. You should have your blood pressure measured twice--once when you are at a hospital or clinic, and once when you are not at a hospital or clinic. Record the average of the two measurements. To check your blood pressure when you are not at a hospital or clinic, you can use:  An automated blood pressure machine at a pharmacy.  A home blood pressure monitor.  Talk to your health care provider about your target blood pressure.  If you are between 45-79 years old, ask your health care provider if you should take aspirin to prevent heart disease.  Have regular diabetes screenings by checking your fasting blood sugar level.  If you are at a normal weight and have a low risk for diabetes, have this test once every three years after the age of 45.  If you are overweight and have a high risk for diabetes, consider being tested at a younger age or more often.  A one-time screening for abdominal aortic aneurysm (AAA) by ultrasound is recommended for men aged 65-75 years who are current or former smokers.  What should I know about preventing infection?  Hepatitis B  If you have a higher risk for hepatitis B, you should be screened for this virus. Talk with your health care provider to find out if you are at risk for hepatitis B  infection.  Hepatitis C  Blood testing is recommended for:  Everyone born from 1945 through 1965.  Anyone with known risk factors for hepatitis C.     Sexually Transmitted Diseases (STDs)  You should be screened each year for STDs including gonorrhea and chlamydia if:  You are sexually active and are younger than 24 years of age.  You are older than 24 years of age and your health care provider tells you that you are at risk for this type of infection.  Your sexual activity has changed since you were last screened and you are at an increased risk for chlamydia or gonorrhea. Ask your health care provider if you are at risk.  Talk with your health care provider about whether you are at high risk of being infected with HIV. Your health care provider may recommend a prescription medicine to help prevent HIV infection.     What else can I do?    Schedule regular health, dental, and eye exams.  Stay current with your vaccines (immunizations).  Do not use any tobacco products, such as cigarettes, chewing tobacco, and e-cigarettes. If you need help quitting, ask your health care provider.  Limit alcohol intake to no more than 2 drinks per day. One drink equals 12 ounces of beer, 5 ounces of wine, or 1« ounces of hard liquor.  Do not use street drugs.  Do not share needles.  Ask your health care provider for help if you need support or information about quitting drugs.  Tell your health care provider if you often feel depressed.  Tell your health care provider if you have ever been abused or do not feel safe at home.      This information is not intended to replace advice given to you by your health care provider. Make sure you discuss any questions you have with your health care provider.  Document Released: 06/15/2009 Document Revised: 08/16/2017 Document Reviewed: 09/20/2016  [employer] Patient Education c 2018 [employer]      Fall Prevention in the Home, Adult  Falls can cause injuries and affect people of  all ages. There are many simple things that you can do to make your home safe and to help prevent falls. Ask for help when making these changes, if needed.  What actions can I take to prevent falls?  General instructions  Use good lighting in all rooms. Replace any light bulbs that burn out, turn on lights if it is dark, and use night-lights.  Place frequently used items in easy-to-reach places. Lower the shelves around your home if necessary.  Set up furniture so that there are clear paths around it. Avoid moving your furniture around.  Remove throw rugs and other tripping hazards from the floor.  Avoid walking on wet floors.  Fix any uneven floor surfaces.  Add color or contrast paint or tape to grab bars and handrails in your home. Place contrasting color strips on the first and last steps of staircases.  When you use a stepladder, make sure that it is completely opened and that the sides and supports are firmly locked. Have someone hold the ladder while you are using it. Do not climb a closed stepladder.  Know where your pets are when moving through your home.  What can I do in the bathroom?         Keep the floor dry. Immediately clean up any water that is on the floor.  Remove soap buildup in the tub or shower regularly.  Use nonskid mats or decals on the floor of the tub or shower.  Attach bath mats securely with double-sided, nonslip rug tape.  If you need to sit down while you are in the shower, use a plastic, nonslip stool.  Install grab bars by the toilet and in the tub and shower. Do not use towel bars as grab bars.  What can I do in the bedroom?  Make sure that a bedside light is easy to reach.  Do not use oversized bedding that reaches the floor.  Have a firm chair that has side arms to use for getting dressed.  What can I do in the kitchen?  Clean up any spills right away.  If you need to reach for something above you, use a sturdy step stool that has a grab bar.  Keep electrical cables out of the  way.  Do not use floor polish or wax that makes floors slippery. If you must use wax, make sure that it is non-skid floor wax.  What can I do with my stairs?  Do not leave any items on the stairs.  Make sure that you have a light switch at the top and the bottom of the stairs. Have them installed if you do not have them.  Make sure that there are handrails on both sides of the stairs. Fix handrails that are broken or loose. Make sure that handrails are as long as the staircases.  Install non-slip stair treads on all stairs in your home.  Avoid having throw rugs at the top or bottom of stairs, or secure the rugs with carpet tape to prevent them from moving.  Choose a carpet design that does not hide the edge of steps on the stairs.  Check any carpeting to make sure that it is firmly attached to the stairs. Fix any carpet that is loose or worn.  What can I do on the outside of my home?  Use bright outdoor lighting.  Regularly repair the edges of walkways and driveways and fix any cracks.  Remove high doorway thresholds.  Trim any shrubbery on the main path into your home.  Regularly check that handrails are securely fastened and in good repair. Both sides of all steps should have handrails.  Install guardrails along the edges of any raised decks or porches.  Clear walkways of debris and clutter, including tools and rocks.  Have leaves, snow, and ice cleared regularly.  Use sand or salt on walkways during winter months.  In the garage, clean up any spills right away, including grease or oil spills.  What other actions can I take?  Wear closed-toe shoes that fit well and support your feet. Wear shoes that have rubber soles or low heels.  Use mobility aids as needed, such as canes, walkers, scooters, and crutches.  Review your medicines with your health care provider. Some medicines can cause dizziness or changes in blood pressure, which increase your risk of falling.  Talk with your health care provider about other ways  that you can decrease your risk of falls. This may include working with a physical therapist or  to improve your strength, balance, and endurance.  Where to find more information  Centers for Disease Control and Prevention[place]ENA: www.cdc.gov  [place] on Aging: www.wilmer.nih.gov  Contact a health care provider if:  You are afraid of falling at home.  You feel weak, drowsy, or dizzy at home.  You fall at home.  Summary  There are many simple things that you can do to make your home safe and to help prevent falls.  Ways to make your home safe include removing tripping hazards and installing grab bars in the bathroom.  Ask for help when making these changes in your home.  This information is not intended to replace advice given to you by your health care provider. Make sure you discuss any questions you have with your health care provider.  Document Revised: 09/19/2022 Document Reviewed: 07/21/2021  [employer] Patient Education c 2023 [employer]    Sit-to-Stand Exercise    The sit-to-stand exercise (also known as the chair stand or chair rise exercise) strengthens your lower body and helps you maintain or improve your mobility and independence. The end goal is to do the sit-to-stand exercise without using your hands. This will be easier as you become stronger. You should always talk with your health care provider before starting any exercise program, especially if you have had recent surgery.  Do the exercise exactly as told by your health care provider and adjust it as directed. It is normal to feel mild stretching, pulling, tightness, or discomfort as you do this exercise, but you should stop right away if you feel sudden pain or your pain gets worse. Do not begin doing this exercise until told by your health care provider.  What the sit-to-stand exercise does  The sit-to-stand exercise helps to strengthen the muscles in your thighs and the muscles in the center of your body that give you stability  (core muscles). This exercise is especially helpful if:  You have had knee or hip surgery.  You have trouble getting up from a chair, out of a car, or off the toilet due to muscle weakness.  How to do the sit-to-stand exercise  Sit toward the front edge of a sturdy chair without armrests. Your knees should be bent and your feet should be flat on the floor and shoulder-width apart and underneath your hips.  Place your hands lightly on each side of the seat. Keep your back and neck as straight as possible, with your chest slightly forward.  Breathe in slowly. Lean forward and slightly shift your weight to the front of your feet.  Breathe out as you slowly stand up. Try not to support any weight with your hands.  Stand and pause for a full breath in and out.  Breathe in as you sit down slowly. Tighten your core and abdominal muscles to control your lowering as much as possible. You should lower yourself back to the chair slowly, not just drop back into the seat.  Breathe out slowly.  Do this exercise 10-15 times. If needed, do it fewer times until you build up strength.  Rest for 1 minute, then do another set of 10-15 repetitions.  To change the difficulty of the sit-to-stand exercise  If the exercise is too difficult, use a chair with sturdy armrests, and push off the armrests to help you come to the standing position. You can also use the armrests to help slowly lower yourself back to sitting. As this gets easier, try to use your arms less. You can also place a firm cushion or pillow on the chair to make the surface higher.  If this exercise is too easy, do not use your arms to help raise or lower yourself. You can also wear a weighted vest, use hand weights, increase your repetitions, or try a lower chair.  General tips  You may feel tired when starting an exercise routine. This is normal.  You may have muscle soreness that lasts a few days. This is normal. As you get stronger, you may not feel muscle soreness.  Use  smooth, steady movements.  Do not  hold your breath during strength exercises. This can cause unsafe changes in your blood pressure.  Breathe in slowly through your nose, and breathe out slowly through your mouth.  Summary  Strengthening your lower body is an important step to help you move safely and independently.  The sit-to-stand exercise helps strengthen the muscles in your thighs and core.  You should always talk with your health care provider before starting any exercise program, especially if you have had recent surgery.  This information is not intended to replace advice given to you by your health care provider. Make sure you discuss any questions you have with your health care provider.  Document Revised: 04/10/2022 Document Reviewed: 04/10/2022  [employer] Patient Education c 2023 [employer]    Exercising to Stay Healthy  To become healthy and stay healthy, it is recommended that you do moderate-intensity and vigorous-intensity exercise. You can tell that you are exercising at a moderate intensity if your heart starts beating faster and you start breathing faster but can still hold a conversation. You can tell that you are exercising at a vigorous intensity if you are breathing much harder and faster and cannot hold a conversation while exercising.  How can exercise benefit me?  Exercising regularly is important. It has many health benefits, such as:  Improving overall fitness, flexibility, and endurance.  Increasing bone density.  Helping with weight control.  Decreasing body fat.  Increasing muscle strength and endurance.  Reducing stress and tension, anxiety, depression, or anger.  Improving overall health.  What guidelines should I follow while exercising?  Before you start a new exercise program, talk with your health care provider.  Do not exercise so much that you hurt yourself, feel dizzy, or get very short of breath.  Wear comfortable clothes and wear shoes with good support.  Drink plenty of  water while you exercise to prevent dehydration or heat stroke.  Work out until your breathing and your heartbeat get faster (moderate intensity).  How often should I exercise?  Choose an activity that you enjoy, and set realistic goals. Your health care provider can help you make an activity plan that is individually designed and works best for you.  Exercise regularly as told by your health care provider. This may include:  Doing strength training two times a week, such as:  Lifting weights.  Using resistance bands.  Push-ups.  Sit-ups.  Yoga.  Doing a certain intensity of exercise for a given amount of time. Choose from these options:  A total of 150 minutes of moderate-intensity exercise every week.  A total of 75 minutes of vigorous-intensity exercise every week.  A mix of moderate-intensity and vigorous-intensity exercise every week.  Children, pregnant women, people who have not exercised regularly, people who are overweight, and older adults may need to talk with a health care provider about what activities are safe to perform. If you have a medical condition, be sure to talk with your health care provider before you start a new exercise program.  What are some exercise ideas?  Moderate-intensity exercise ideas include:  Walking 1 mile (1.6 km) in about 15 minutes.  Biking.  Hiking.  Golfing.  Dancing.  Water aerobics.  Vigorous-intensity exercise ideas include:  Walking 4.5 miles (7.2 km) or more in about 1 hour.  Jogging or running 5 miles (8 km) in about 1 hour.  Biking 10 miles (16.1 km) or more in about 1 hour.  Lap swimming.  Roller-skating or in-line skating.  Cross-country skiing.  Vigorous competitive sports, such as football, basketball, and soccer.  Jumping rope.  Aerobic dancing.  What are some everyday activities that can help me get exercise?  Yard work, such as:  Pushing a .  Raking and bagging leaves.  Washing your car.  Pushing a stroller.  Shoveling snow.  Gardening.  Washing  windows or floors.  How can I be more active in my day-to-day activities?  Use stairs instead of an elevator.  Take a walk during your lunch break.  If you drive, park your car farther away from your work or school.  If you take public transportation, get off one stop early and walk the rest of the way.  Stand up or walk around during all of your indoor phone calls.  Get up, stretch, and walk around every 30 minutes throughout the day.  Enjoy exercise with a friend. Support to continue exercising will help you keep a regular routine of activity.  Where to find more information  You can find more information about exercising to stay healthy from:  [place] Department of Health and Human Services: www.hhs.gov  Centers for Disease Control and Prevention (CDC): www.cdc.gov  Summary  Exercising regularly is important. It will improve your overall fitness, flexibility, and endurance.  Regular exercise will also improve your overall health. It can help you control your weight, reduce stress, and improve your bone density.  Do not exercise so much that you hurt yourself, feel dizzy, or get very short of breath.  Before you start a new exercise program, talk with your health care provider.  This information is not intended to replace advice given to you by your health care provider. Make sure you discuss any questions you have with your health care provider.  Document Revised: 04/15/2022 Document Reviewed: 04/15/2022  [employer] Patient Education c 2023 [employer]

## 2023-08-09 NOTE — PROGRESS NOTES
The ABCs of the Annual Wellness Visit  Subsequent Medicare Wellness Visit    Subjective    Chris Mejia is a 63 y.o. male who presents for a Subsequent Medicare Wellness Visit.    The following portions of the patient's history were reviewed and   updated as appropriate: allergies, current medications, past family history, past medical history, past social history, past surgical history, and problem list.    Compared to one year ago, the patient feels his physical   health is worse.    Compared to one year ago, the patient feels his mental   health is the same.    Recent Hospitalizations:  This patient has had a [facility] admission record on file within the last 365 days.    Current Medical Providers:  Patient Care Team:  Naty Lebron MD as PCP - General (Family Medicine)  Adam Perkins MD as Consulting Physician (Cardiology)  Melba Lopez MD as Surgeon (General Surgery)  Nils Salinas (Optometry)    Outpatient Medications Prior to Visit   Medication Sig Dispense Refill    amLODIPine (NORVASC) 10 MG tablet Take 1 tablet by mouth Daily. Indications: High Blood Pressure Disorder 90 tablet 3    aspirin 81 MG tablet Take 1 tablet by mouth Daily. 30 tablet 11    busPIRone (BUSPAR) 15 MG tablet TAKE 1 TABLET TWICE DAILY  AS NEEDED FOR ANXIETY 180 tablet 3    carvedilol (COREG) 12.5 MG tablet TAKE 1 TABLET TWICE DAILY  WITH MEALS 180 tablet 3    diazePAM (VALIUM) 2 MG tablet Take 1 tablet by mouth Every 6 (Six) Hours As Needed (dizziness). 10 tablet 0    galcanezumab-gnlm (Emgality) 120 MG/ML auto-injector pen Inject 1 mL under the skin into the appropriate area as directed Every 30 (Thirty) Days. 1 mL 11    hydroCHLOROthiazide (MICROZIDE) 12.5 MG capsule TAKE 1 CAPSULE EVERY       MORNING 90 capsule 3    levocetirizine (XYZAL) 5 MG tablet Take 1 tablet by mouth Every Evening. 90 tablet 3    meclizine (ANTIVERT) 25 MG tablet Take 1 tablet by mouth 3 (Three) Times a Day As Needed for Dizziness. 90  tablet 2    nitroglycerin (Nitrostat) 0.4 MG SL tablet Place 1 tablet under the tongue Every 5 (Five) Minutes As Needed for Chest Pain. Take no more than 3 doses in 15 minutes. 30 tablet 1    pantoprazole (PROTONIX) 40 MG EC tablet TAKE 1 TABLET DAILY 90 tablet 3    pramipexole (Mirapex) 0.25 MG tablet Take 1 tablet by mouth 3 (Three) Times a Day. 270 tablet 3    pravastatin (PRAVACHOL) 40 MG tablet TAKE 1 TABLET EVERY NIGHT  FOR HIGH CHOLESTEROL,      STROKE RISK REDUCTION 90 tablet 3    tamsulosin (FLOMAX) 0.4 MG capsule 24 hr capsule Take 1 capsule by mouth Every Night. 90 capsule 3    Tiotropium Bromide Monohydrate (Spiriva Respimat) 1.25 MCG/ACT aerosol solution inhaler Inhale 1-2 puffs Daily. 4 g 0    tiZANidine (ZANAFLEX) 4 MG tablet TAKE 1 TABLET EVERY NIGHT 90 tablet 3     Facility-Administered Medications Prior to Visit   Medication Dose Route Frequency Provider Last Rate Last Admin    cyanocobalamin injection 1,000 mcg  1,000 mcg Intramuscular Q28 Days Naty Lebron MD   1,000 mcg at 08/09/23 1352       No opioid medication identified on active medication list. I have reviewed chart for other potential  high risk medication/s and harmful drug interactions in the elderly.        Aspirin is on active medication list. Aspirin use is indicated based on review of current medical condition/s. Pros and cons of this therapy have been discussed today. Benefits of this medication outweigh potential harm.  Patient has been encouraged to continue taking this medication.  .      Patient Active Problem List   Diagnosis    Tubular adenoma of colon    Trigeminal neuralgia    Spinocerebellar ataxia    RA (rheumatoid arthritis)    Insomnia    Essential hypertension    Hypercholesteremia    Acid reflux    Anxiety    Vitamin B12 deficiency    Vitamin D deficiency    DENZEL (obstructive sleep apnea)    Atopic rhinitis    Ataxic gait    Shoulder pain    Ophthalmoplegia    Coronary artery disease involving native coronary  "artery of native heart without angina pectoris    Seasonal allergies    Benign non-nodular prostatic hyperplasia with lower urinary tract symptoms    Class 2 severe obesity due to excess calories with serious comorbidity and body mass index (BMI) of 36.0 to 36.9 in adult    Weakness    Tension headache    Limited response to serotonin reuptake inhibitors 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  ACP discussion was declined by the patient. Patient does not have an advance directive, declines further assistance.     Objective    Vitals:    23 1308   BP: 126/80   BP Location: Left arm   Patient Position: Sitting   Cuff Size: Adult   Pulse: 70   Resp: 16   Temp: 97.3 øF (36.3 øC)   TempSrc: Temporal   SpO2: 95%   Weight: 112 kg (247 lb)   Height: 170.2 cm (67\")   PainSc: 0-No pain     Estimated body mass index is 38.69 kg/mý as calculated from the following:    Height as of this encounter: 170.2 cm (67\").    Weight as of this encounter: 112 kg (247 lb).    Class 2 Severe Obesity (BMI >=35 and <=39.9). Obesity-related health conditions include the following: hypertension and coronary heart disease. Obesity is unchanged. BMI is is above average; BMI management plan is completed. We discussed  info via avs .      Does the patient have evidence of cognitive impairment? No          HEALTH RISK ASSESSMENT    Smoking Status:  Social History     Tobacco Use   Smoking Status Never   Smokeless Tobacco Never     Alcohol Consumption:  Social History     Substance and Sexual Activity   Alcohol Use No     Fall Risk Screen:    STEADI Fall Risk Assessment was completed, and patient is at HIGH risk for falls. Assessment completed on:2023    Depression Screenin/9/2023     1:06 PM   PHQ-2/PHQ-9 Depression Screening "   Little Interest or Pleasure in Doing Things 0-->not at all   Feeling Down, Depressed or Hopeless 0-->not at all   PHQ-9: Brief Depression Severity Measure Score 0       Health Habits and Functional and Cognitive Screenin/9/2023     1:04 PM   Functional & Cognitive Status   Do you have difficulty preparing food and eating? Yes   Do you have difficulty bathing yourself, getting dressed or grooming yourself? No   Do you have difficulty using the toilet? No   Do you have difficulty moving around from place to place? Yes   Do you have trouble with steps or getting out of a bed or a chair? Yes   Current Diet Well Balanced Diet   Dental Exam Not up to date   Eye Exam Not up to date   Exercise (times per week) 0 times per week   Current Exercises Include No Regular Exercise   Do you need help using the phone?  No   Are you deaf or do you have serious difficulty hearing?  No   Do you need help to go to places out of walking distance? Yes   Do you need help shopping? Yes   Do you need help preparing meals?  Yes   Do you need help with housework?  Yes   Do you need help with laundry? Yes   Do you need help taking your medications? No   Do you need help managing money? No   Do you ever drive or ride in a car without wearing a seat belt? No   Have you felt unusual stress, anger or loneliness in the last month? No   Who do you live with? Spouse   If you need help, do you have trouble finding someone available to you? No   Have you been bothered in the last four weeks by sexual problems? No   Do you have difficulty concentrating, remembering or making decisions? Yes       Age-appropriate Screening Schedule:  Refer to the list below for future screening recommendations based on patient's age, sex and/or medical conditions. Orders for these recommended tests are listed in the plan section. The patient has been provided with a written plan.    Health Maintenance   Topic Date Due    ZOSTER VACCINE ( of 2) 2023  "(Originally 1/25/2008)    COVID-19 Vaccine (5 - Moderna series) 01/01/2024 (Originally 3/4/2023)    TDAP/TD VACCINES (3 - Td or Tdap) 01/01/2024 (Originally 11/15/2022)    ANNUAL WELLNESS VISIT  08/17/2023    INFLUENZA VACCINE  10/01/2023    LIPID PANEL  02/24/2024    COLORECTAL CANCER SCREENING  09/13/2024    HEPATITIS C SCREENING  Completed    Pneumococcal Vaccine 65+  Completed                  CMS Preventative Services Quick Reference  Risk Factors Identified During Encounter  Fall Risk-High or Moderate: Information on Fall Prevention Shared in After Visit Summary and Sit to Stand Exercise Information Shared in After Visit Summary  Immunizations Discussed/Encouraged: Td and Shingrix  The above risks/problems have been discussed with the patient.  Pertinent information has been shared with the patient in the After Visit Summary.  An After Visit Summary and PPPS were made available to the patient.    Follow Up:   Next Medicare Wellness visit to be scheduled in 1 year.       Additional E&M Note during same encounter follows:  Patient has multiple medical problems which are significant and separately identifiable that require additional work above and beyond the Medicare Wellness Visit.      Chief Complaint  Medicare Wellness-subsequent (AWV and preventive care) and Foot Swelling (Bilateral, started about a month ago)    Subjective        Foot swelling despite compression. He worries about blood pressure going up and not being controlled if we were to lower amlodipine.    Chris Mejia is also being seen today for follow up of chronic conditions.         Objective   Vital Signs:  /80 (BP Location: Left arm, Patient Position: Sitting, Cuff Size: Adult)   Pulse 70   Temp 97.3 øF (36.3 øC) (Temporal)   Resp 16   Ht 170.2 cm (67\")   Wt 112 kg (247 lb)   SpO2 95%   BMI 38.69 kg/mý     Physical Exam  Vitals and nursing note reviewed.   Constitutional:       General: He is not in acute distress.     " Appearance: Normal appearance. He is well-developed and well-groomed. He is obese. He is not ill-appearing, toxic-appearing or diaphoretic.   HENT:      Head: Normocephalic and atraumatic.      Right Ear: Hearing, tympanic membrane, ear canal and external ear normal.      Left Ear: Hearing, tympanic membrane, ear canal and external ear normal.   Eyes:      General: Lids are normal. No scleral icterus.        Right eye: No discharge.         Left eye: No discharge.      Extraocular Movements: Extraocular movements intact.   Cardiovascular:      Rate and Rhythm: Normal rate and regular rhythm.   Pulmonary:      Effort: Pulmonary effort is normal.      Breath sounds: Normal breath sounds.   Musculoskeletal:      Cervical back: Neck supple.   Skin:     Coloration: Skin is not jaundiced or pale.   Neurological:      General: No focal deficit present.      Mental Status: He is alert and oriented to person, place, and time.      Gait: Gait abnormal.   Psychiatric:         Attention and Perception: Attention and perception normal.         Mood and Affect: Mood and affect normal.         Speech: Speech normal.         Behavior: Behavior normal. Behavior is cooperative.         Thought Content: Thought content normal.         Cognition and Memory: Cognition and memory normal.         Judgment: Judgment normal.                       Assessment and Plan   Diagnoses and all orders for this visit:    1. Medicare annual wellness visit, subsequent (Primary)    2. Essential hypertension  Assessment & Plan:  Hypertension is improving with treatment.  Discussed amlodipine side effect of edema, goal of blood pressure <130/80, continue current medicines and compression, discussed/encouraged elevation of lower extremities.  Blood pressure will be reassessed at the next regular appointment.      3. Vitamin B12 deficiency  Comments:  b12 shot given today    4. Class 2 severe obesity due to excess calories with serious comorbidity and body  mass index (BMI) of 38.0 to 38.9 in adult  Comments:  exercise limited due to ataxia; fall risks; info via avs    5. At high risk for falls  Comments:  info via avs      Counseling/anticipatory guidance/risk factor interventions for age provided. See AVS.       Follow Up   Return in about 4 months (around 11/27/2023) for follow up.  Patient was given instructions and counseling regarding his condition or for health maintenance advice. Please see specific information pulled into the AVS if appropriate.

## 2023-10-05 ENCOUNTER — CLINICAL SUPPORT (OUTPATIENT)
Dept: INTERNAL MEDICINE | Facility: CLINIC | Age: 63
End: 2023-10-05
Payer: MEDICARE

## 2023-10-05 RX ADMIN — CYANOCOBALAMIN 1000 MCG: 1000 INJECTION, SOLUTION INTRAMUSCULAR; SUBCUTANEOUS at 14:07

## 2023-11-07 ENCOUNTER — CLINICAL SUPPORT (OUTPATIENT)
Dept: INTERNAL MEDICINE | Facility: CLINIC | Age: 63
End: 2023-11-07
Payer: MEDICARE

## 2023-11-07 PROCEDURE — 96372 THER/PROPH/DIAG INJ SC/IM: CPT | Performed by: FAMILY MEDICINE

## 2023-11-07 RX ADMIN — CYANOCOBALAMIN 1000 MCG: 1000 INJECTION, SOLUTION INTRAMUSCULAR; SUBCUTANEOUS at 11:02

## 2023-11-28 ENCOUNTER — DOCUMENTATION (OUTPATIENT)
Dept: INTERNAL MEDICINE | Facility: CLINIC | Age: 63
End: 2023-11-28
Payer: MEDICARE

## 2023-11-28 LAB — HBA1C MFR BLD: 5.7 %

## 2023-12-08 ENCOUNTER — CLINICAL SUPPORT (OUTPATIENT)
Dept: INTERNAL MEDICINE | Facility: CLINIC | Age: 63
End: 2023-12-08
Payer: MEDICARE

## 2023-12-08 DIAGNOSIS — E53.8 VITAMIN B12 DEFICIENCY: Primary | ICD-10-CM

## 2023-12-08 PROCEDURE — 96372 THER/PROPH/DIAG INJ SC/IM: CPT | Performed by: FAMILY MEDICINE

## 2023-12-08 RX ADMIN — CYANOCOBALAMIN 1000 MCG: 1000 INJECTION, SOLUTION INTRAMUSCULAR; SUBCUTANEOUS at 13:44

## 2023-12-27 ENCOUNTER — OFFICE VISIT (OUTPATIENT)
Dept: INTERNAL MEDICINE | Facility: CLINIC | Age: 63
End: 2023-12-27
Payer: MEDICARE

## 2023-12-27 VITALS
WEIGHT: 254.2 LBS | BODY MASS INDEX: 39.9 KG/M2 | DIASTOLIC BLOOD PRESSURE: 72 MMHG | TEMPERATURE: 97.2 F | HEIGHT: 67 IN | OXYGEN SATURATION: 94 % | SYSTOLIC BLOOD PRESSURE: 116 MMHG | HEART RATE: 78 BPM | RESPIRATION RATE: 16 BRPM

## 2023-12-27 DIAGNOSIS — E55.9 VITAMIN D DEFICIENCY: ICD-10-CM

## 2023-12-27 DIAGNOSIS — E53.8 VITAMIN B12 DEFICIENCY: ICD-10-CM

## 2023-12-27 DIAGNOSIS — G31.9 CEREBELLAR ATROPHY: Chronic | ICD-10-CM

## 2023-12-27 DIAGNOSIS — Z29.11 NEED FOR PROPHYLACTIC VACCINATION AND INOCULATION AGAINST RESPIRATORY SYNCYTIAL VIRUS (RSV): ICD-10-CM

## 2023-12-27 DIAGNOSIS — Z12.5 PROSTATE CANCER SCREENING: ICD-10-CM

## 2023-12-27 DIAGNOSIS — G11.8 SPINOCEREBELLAR ATAXIA: Chronic | ICD-10-CM

## 2023-12-27 DIAGNOSIS — Z23 NEED FOR DIPHTHERIA-TETANUS-PERTUSSIS (TDAP) VACCINE: ICD-10-CM

## 2023-12-27 DIAGNOSIS — I65.22 CAROTID ATHEROSCLEROSIS, LEFT: ICD-10-CM

## 2023-12-27 DIAGNOSIS — I25.10 CORONARY ARTERY DISEASE INVOLVING NATIVE CORONARY ARTERY OF NATIVE HEART WITHOUT ANGINA PECTORIS: ICD-10-CM

## 2023-12-27 DIAGNOSIS — R60.0 BILATERAL LEG EDEMA: ICD-10-CM

## 2023-12-27 DIAGNOSIS — R73.9 HYPERGLYCEMIA: ICD-10-CM

## 2023-12-27 DIAGNOSIS — Z51.81 MEDICATION MONITORING ENCOUNTER: ICD-10-CM

## 2023-12-27 DIAGNOSIS — I10 ESSENTIAL HYPERTENSION: Primary | Chronic | ICD-10-CM

## 2023-12-27 DIAGNOSIS — R79.9 ABNORMAL BLOOD CHEMISTRY: ICD-10-CM

## 2023-12-27 NOTE — PROGRESS NOTES
"Chief Complaint  Hypertension (4 month follow up)    Subjective        Chris Mejia presents to [facility]  History of Present Illness  Here with wife. Continues to have swelling of both legs. Has compression socks.       Objective   Vital Signs:  /72 (BP Location: Left arm, Patient Position: Sitting, Cuff Size: Adult)   Pulse 78   Temp 97.2 °F (36.2 °C) (Temporal)   Resp 16   Ht 170.2 cm (67\")   Wt 115 kg (254 lb 3.2 oz)   SpO2 94%   BMI 39.81 kg/m²   Estimated body mass index is 39.81 kg/m² as calculated from the following:    Height as of this encounter: 170.2 cm (67\").    Weight as of this encounter: 115 kg (254 lb 3.2 oz).               Physical Exam  Vitals and nursing note reviewed.   Constitutional:       General: He is not in acute distress.     Appearance: Normal appearance. He is well-developed and well-groomed. He is obese. He is not ill-appearing, toxic-appearing or diaphoretic.      Comments: In wheelchair, baseline   HENT:      Head: Normocephalic and atraumatic.      Right Ear: Hearing normal.      Left Ear: Hearing normal.   Eyes:      General: Lids are normal. No scleral icterus.        Right eye: No discharge.         Left eye: No discharge.      Extraocular Movements: Extraocular movements intact.   Cardiovascular:      Rate and Rhythm: Normal rate and regular rhythm.   Pulmonary:      Effort: Pulmonary effort is normal.   Musculoskeletal:      Cervical back: Neck supple.   Skin:     Coloration: Skin is not jaundiced or pale.   Neurological:      Mental Status: He is alert and oriented to person, place, and time.   Psychiatric:         Attention and Perception: Attention and perception normal.         Mood and Affect: Mood and affect normal.         Speech: Speech normal.         Behavior: Behavior normal. Behavior is cooperative.         Thought Content: Thought content normal.         Cognition and Memory: Cognition and memory normal.         " Judgment: Judgment normal.        Result Review :  The following data was reviewed by: Naty Lebron MD on 12/27/2023:  CT Head Without Contrast (03/17/2023 12:20)   MRI Brain Without Contrast (03/02/2023 10:23)     Folate normal 2/11/22.               Assessment and Plan   Diagnoses and all orders for this visit:    1. Essential hypertension (Primary)  Assessment & Plan:  Hypertension is improving with treatment.  Continue current treatment regimen.  Blood pressure will be reassessed at the next regular appointment.    Orders:  -     Methylmalonic Acid, Serum; Future  -     CBC & Differential; Future  -     Comprehensive Metabolic Panel; Future  -     TSH+Free T4; Future    2. Bilateral leg edema  -     Comprehensive Metabolic Panel; Future  -     TSH+Free T4; Future    3. Spinocerebellar ataxia  Comments:  stable; monitor    4. Cerebellar atrophy  Comments:  stable; monitor    5. Need for diphtheria-tetanus-pertussis (Tdap) vaccine  Comments:  has grandchild on way needs pertussis booster  Orders:  -     Tdap (ADACEL) 5-2-15.5 LF-MCG/0.5 injection; Inject 0.5 mL into the appropriate muscle as directed by prescriber 1 (One) Time for 1 dose.  Dispense: 0.5 mL; Refill: 0    6. Need for prophylactic vaccination and inoculation against respiratory syncytial virus (RSV)  -     RSVPreF3 Vac Recomb Adjuvanted (AREXVY) 120 MCG/0.5ML reconstituted suspension injection; Inject 0.5 mL into the appropriate muscle as directed by prescriber 1 (One) Time for 1 dose.  Dispense: 1 each; Refill: 0    7. Hyperglycemia  Comments:  discussed a1c of 5.7 from home ins test.  Orders:  -     Hemoglobin A1c; Future  -     Comprehensive Metabolic Panel; Future    8. Vitamin D deficiency  -     Vitamin D,25-Hydroxy; Future  -     Comprehensive Metabolic Panel; Future    9. Vitamin B12 deficiency  -     Methylmalonic Acid, Serum; Future  -     CBC & Differential; Future    10. Prostate cancer screening  -     PSA Screen; Future    11.  Abnormal blood chemistry  -     Methylmalonic Acid, Serum; Future  -     Vitamin D,25-Hydroxy; Future  -     Hemoglobin A1c; Future  -     CBC & Differential; Future  -     Comprehensive Metabolic Panel; Future  -     Lipid Panel; Future  -     TSH+Free T4; Future    12. Medication monitoring encounter  -     Methylmalonic Acid, Serum; Future  -     Vitamin D,25-Hydroxy; Future  -     Hemoglobin A1c; Future  -     CBC & Differential; Future  -     Comprehensive Metabolic Panel; Future  -     Lipid Panel; Future  -     TSH+Free T4; Future    13. Carotid atherosclerosis, left  -     Lipid Panel; Future    14. Coronary artery disease involving native coronary artery of native heart without angina pectoris  -     Lipid Panel; Future      Edema--could be having paradoxical response with HCTZ 12.5 mg. Hold x 2 weeks to see if symptoms improve. If leg edema worsens or if blood pressure >140/90 resume HCTZ 12.5 mg qam. Continue to use compression socks. Amlodipine may also be culprit, consider lowering next if doesn't improve. Labs at [facility] prior to next visit, discussed.        Follow Up   Return in about 3 months (around 4/1/2024) for recheck bp and leg swelling, labs at least 1-2 days prior to visit.  Patient was given instructions and counseling regarding his condition or for health maintenance advice. Please see specific information pulled into the AVS if appropriate.

## 2024-01-05 ENCOUNTER — CLINICAL SUPPORT (OUTPATIENT)
Dept: INTERNAL MEDICINE | Facility: CLINIC | Age: 64
End: 2024-01-05
Payer: MEDICARE

## 2024-01-05 DIAGNOSIS — E53.8 VITAMIN B12 DEFICIENCY: Primary | ICD-10-CM

## 2024-01-05 PROCEDURE — 96372 THER/PROPH/DIAG INJ SC/IM: CPT | Performed by: FAMILY MEDICINE

## 2024-01-05 RX ADMIN — CYANOCOBALAMIN 1000 MCG: 1000 INJECTION, SOLUTION INTRAMUSCULAR; SUBCUTANEOUS at 13:38

## 2024-02-05 ENCOUNTER — TELEPHONE (OUTPATIENT)
Dept: INTERNAL MEDICINE | Facility: CLINIC | Age: 64
End: 2024-02-05
Payer: MEDICARE

## 2024-02-05 DIAGNOSIS — I10 ESSENTIAL HYPERTENSION: ICD-10-CM

## 2024-02-05 DIAGNOSIS — J30.2 SEASONAL ALLERGIES: ICD-10-CM

## 2024-02-05 DIAGNOSIS — I10 ESSENTIAL HYPERTENSION: Chronic | ICD-10-CM

## 2024-02-05 DIAGNOSIS — E78.00 HYPERCHOLESTEREMIA: ICD-10-CM

## 2024-02-05 DIAGNOSIS — K21.9 GASTROESOPHAGEAL REFLUX DISEASE WITHOUT ESOPHAGITIS: ICD-10-CM

## 2024-02-05 DIAGNOSIS — I25.10 CORONARY ARTERY DISEASE INVOLVING NATIVE CORONARY ARTERY OF NATIVE HEART WITHOUT ANGINA PECTORIS: ICD-10-CM

## 2024-02-05 RX ORDER — TIZANIDINE 4 MG/1
4 TABLET ORAL NIGHTLY
Qty: 90 TABLET | Refills: 3 | Status: SHIPPED | OUTPATIENT
Start: 2024-02-05

## 2024-02-05 RX ORDER — CARVEDILOL 12.5 MG/1
12.5 TABLET ORAL 2 TIMES DAILY WITH MEALS
Qty: 180 TABLET | Refills: 3 | Status: SHIPPED | OUTPATIENT
Start: 2024-02-05

## 2024-02-05 RX ORDER — AMLODIPINE BESYLATE 10 MG/1
10 TABLET ORAL DAILY
Qty: 90 TABLET | Refills: 3 | Status: SHIPPED | OUTPATIENT
Start: 2024-02-05

## 2024-02-05 RX ORDER — PANTOPRAZOLE SODIUM 40 MG/1
40 TABLET, DELAYED RELEASE ORAL DAILY
Qty: 90 TABLET | Refills: 3 | Status: SHIPPED | OUTPATIENT
Start: 2024-02-05

## 2024-02-05 RX ORDER — PRAVASTATIN SODIUM 40 MG
40 TABLET ORAL NIGHTLY
Qty: 90 TABLET | Refills: 3 | Status: SHIPPED | OUTPATIENT
Start: 2024-02-05

## 2024-02-05 RX ORDER — LEVOCETIRIZINE DIHYDROCHLORIDE 5 MG/1
5 TABLET, FILM COATED ORAL EVERY EVENING
Qty: 90 TABLET | Refills: 3 | Status: SHIPPED | OUTPATIENT
Start: 2024-02-05

## 2024-02-05 NOTE — TELEPHONE ENCOUNTER
Caller: Diana Mejia    Relationship: Emergency Contact    Best call back number: [phone]     Requested Prescriptions:   Requested Prescriptions     Pending Prescriptions Disp Refills    tiZANidine (ZANAFLEX) 4 MG tablet 90 tablet 3     Sig: Take 1 tablet by mouth Every Night.    amLODIPine (NORVASC) 10 MG tablet 90 tablet 3     Sig: Take 1 tablet by mouth Daily. Indications: High Blood Pressure Disorder    carvedilol (COREG) 12.5 MG tablet 180 tablet 3     Sig: Take 1 tablet by mouth 2 (Two) Times a Day With Meals.    levocetirizine (XYZAL) 5 MG tablet 90 tablet 3     Sig: Take 1 tablet by mouth Every Evening.    pantoprazole (PROTONIX) 40 MG EC tablet 90 tablet 3     Sig: Take 1 tablet by mouth Daily.    pravastatin (PRAVACHOL) 40 MG tablet 90 tablet 3        Pharmacy where request should be sent: [facility], [address] [phone] [place] [phone]      Last office visit with prescribing clinician: 12/27/2023   Last telemedicine visit with prescribing clinician: Visit date not found   Next office visit with prescribing clinician: 3/27/2024     Additional details provided by patient:     Does the patient have less than a 3 day supply:  [] Yes  [x] No    Would you like a call back once the refill request has been completed: [x] Yes [] No    If the office needs to give you a call back, can they leave a voicemail: [x] Yes [] No    Gumaro Hernandez   02/05/24 09:09 EST

## 2024-02-05 NOTE — TELEPHONE ENCOUNTER
Caller: Diana Mejia    Relationship: Emergency Contact    Best call back number: [phone]     What medication are you requesting: HYDROCHLOROTHIAZIDE    What are your current symptoms: THE PATIENT WAS TOLD TO STOP THIS FOR 2 WEEKS TO SEE THE SWELLING GOT BETTER. IT IS ONLY A LITTLE BETTER     How long have you been experiencing symptoms: MONTHS    Have you had these symptoms before:    [x] Yes  [] No    Have you been treated for these symptoms before:   [x] Yes  [] No    If a prescription is needed, what is your preferred pharmacy and phone number:    [facility]     Additional notes:

## 2024-02-06 ENCOUNTER — CLINICAL SUPPORT (OUTPATIENT)
Dept: INTERNAL MEDICINE | Facility: CLINIC | Age: 64
End: 2024-02-06
Payer: MEDICARE

## 2024-02-06 VITALS — SYSTOLIC BLOOD PRESSURE: 134 MMHG | DIASTOLIC BLOOD PRESSURE: 79 MMHG

## 2024-02-06 DIAGNOSIS — E53.8 VITAMIN B12 DEFICIENCY: Primary | ICD-10-CM

## 2024-02-06 PROCEDURE — 96372 THER/PROPH/DIAG INJ SC/IM: CPT | Performed by: FAMILY MEDICINE

## 2024-02-06 RX ADMIN — CYANOCOBALAMIN 1000 MCG: 1000 INJECTION, SOLUTION INTRAMUSCULAR; SUBCUTANEOUS at 11:54

## 2024-02-06 NOTE — TELEPHONE ENCOUNTER
Called Diana boggs, verbally informed, she stated the swelling has gotten better however he is still quite swollen even with the compression socks due to how much he is on his feet, I encouraged resting his legs as much as possible as well, she stated they are unsure of his bp at the moment however they are coming into the office for B12 shots and will have it checked while they are in.

## 2024-03-05 ENCOUNTER — CLINICAL SUPPORT (OUTPATIENT)
Dept: INTERNAL MEDICINE | Facility: CLINIC | Age: 64
End: 2024-03-05
Payer: MEDICARE

## 2024-03-05 PROCEDURE — 96372 THER/PROPH/DIAG INJ SC/IM: CPT | Performed by: FAMILY MEDICINE

## 2024-03-05 RX ADMIN — CYANOCOBALAMIN 1000 MCG: 1000 INJECTION, SOLUTION INTRAMUSCULAR; SUBCUTANEOUS at 11:01

## 2024-03-22 ENCOUNTER — LAB (OUTPATIENT)
Dept: LAB | Facility: HOSPITAL | Age: 64
End: 2024-03-22
Payer: MEDICARE

## 2024-03-22 DIAGNOSIS — I25.10 CORONARY ARTERY DISEASE INVOLVING NATIVE CORONARY ARTERY OF NATIVE HEART WITHOUT ANGINA PECTORIS: ICD-10-CM

## 2024-03-22 DIAGNOSIS — I65.22 CAROTID ATHEROSCLEROSIS, LEFT: ICD-10-CM

## 2024-03-22 DIAGNOSIS — Z12.5 PROSTATE CANCER SCREENING: ICD-10-CM

## 2024-03-22 DIAGNOSIS — R73.9 HYPERGLYCEMIA: ICD-10-CM

## 2024-03-22 DIAGNOSIS — Z51.81 MEDICATION MONITORING ENCOUNTER: ICD-10-CM

## 2024-03-22 DIAGNOSIS — E55.9 VITAMIN D DEFICIENCY: ICD-10-CM

## 2024-03-22 DIAGNOSIS — I10 ESSENTIAL HYPERTENSION: ICD-10-CM

## 2024-03-22 DIAGNOSIS — R60.0 BILATERAL LEG EDEMA: ICD-10-CM

## 2024-03-22 DIAGNOSIS — R79.9 ABNORMAL BLOOD CHEMISTRY: ICD-10-CM

## 2024-03-22 DIAGNOSIS — E53.8 VITAMIN B12 DEFICIENCY: ICD-10-CM

## 2024-03-22 LAB
25(OH)D3 SERPL-MCNC: 17.3 NG/ML (ref 30–100)
ALBUMIN SERPL-MCNC: 4.3 G/DL (ref 3.5–5.2)
ALBUMIN/GLOB SERPL: 1.3 G/DL
ALP SERPL-CCNC: 43 U/L (ref 39–117)
ALT SERPL W P-5'-P-CCNC: 44 U/L (ref 1–41)
ANION GAP SERPL CALCULATED.3IONS-SCNC: 11 MMOL/L (ref 5–15)
AST SERPL-CCNC: 32 U/L (ref 1–40)
BASOPHILS # BLD AUTO: 0.05 10*3/MM3 (ref 0–0.2)
BASOPHILS NFR BLD AUTO: 0.8 % (ref 0–1.5)
BILIRUB SERPL-MCNC: 0.6 MG/DL (ref 0–1.2)
BUN SERPL-MCNC: 15 MG/DL (ref 8–23)
BUN/CREAT SERPL: 14.3 (ref 7–25)
CALCIUM SPEC-SCNC: 9.3 MG/DL (ref 8.6–10.5)
CHLORIDE SERPL-SCNC: 103 MMOL/L (ref 98–107)
CHOLEST SERPL-MCNC: 107 MG/DL (ref 0–200)
CO2 SERPL-SCNC: 24 MMOL/L (ref 22–29)
CREAT SERPL-MCNC: 1.05 MG/DL (ref 0.76–1.27)
DEPRECATED RDW RBC AUTO: 45.5 FL (ref 37–54)
EGFRCR SERPLBLD CKD-EPI 2021: 78.3 ML/MIN/1.73
EOSINOPHIL # BLD AUTO: 0.46 10*3/MM3 (ref 0–0.4)
EOSINOPHIL NFR BLD AUTO: 7.6 % (ref 0.3–6.2)
ERYTHROCYTE [DISTWIDTH] IN BLOOD BY AUTOMATED COUNT: 12.8 % (ref 12.3–15.4)
GLOBULIN UR ELPH-MCNC: 3.2 GM/DL
GLUCOSE SERPL-MCNC: 98 MG/DL (ref 65–99)
HBA1C MFR BLD: 5.7 % (ref 4.8–5.6)
HCT VFR BLD AUTO: 48.4 % (ref 37.5–51)
HDLC SERPL-MCNC: 37 MG/DL (ref 40–60)
HGB BLD-MCNC: 16.6 G/DL (ref 13–17.7)
IMM GRANULOCYTES # BLD AUTO: 0.01 10*3/MM3 (ref 0–0.05)
IMM GRANULOCYTES NFR BLD AUTO: 0.2 % (ref 0–0.5)
LDLC SERPL CALC-MCNC: 50 MG/DL (ref 0–100)
LDLC/HDLC SERPL: 1.3 {RATIO}
LYMPHOCYTES # BLD AUTO: 1.61 10*3/MM3 (ref 0.7–3.1)
LYMPHOCYTES NFR BLD AUTO: 26.6 % (ref 19.6–45.3)
MCH RBC QN AUTO: 33 PG (ref 26.6–33)
MCHC RBC AUTO-ENTMCNC: 34.3 G/DL (ref 31.5–35.7)
MCV RBC AUTO: 96.2 FL (ref 79–97)
MONOCYTES # BLD AUTO: 0.72 10*3/MM3 (ref 0.1–0.9)
MONOCYTES NFR BLD AUTO: 11.9 % (ref 5–12)
NEUTROPHILS NFR BLD AUTO: 3.21 10*3/MM3 (ref 1.7–7)
NEUTROPHILS NFR BLD AUTO: 52.9 % (ref 42.7–76)
NRBC BLD AUTO-RTO: 0 /100 WBC (ref 0–0.2)
PLATELET # BLD AUTO: 179 10*3/MM3 (ref 140–450)
PMV BLD AUTO: 10.1 FL (ref 6–12)
POTASSIUM SERPL-SCNC: 3.9 MMOL/L (ref 3.5–5.2)
PROT SERPL-MCNC: 7.5 G/DL (ref 6–8.5)
PSA SERPL-MCNC: 2.87 NG/ML (ref 0–4)
RBC # BLD AUTO: 5.03 10*6/MM3 (ref 4.14–5.8)
SODIUM SERPL-SCNC: 138 MMOL/L (ref 136–145)
T4 FREE SERPL-MCNC: 1.38 NG/DL (ref 0.93–1.7)
TRIGL SERPL-MCNC: 110 MG/DL (ref 0–150)
TSH SERPL DL<=0.05 MIU/L-ACNC: 1.98 UIU/ML (ref 0.27–4.2)
VLDLC SERPL-MCNC: 20 MG/DL (ref 5–40)
WBC NRBC COR # BLD AUTO: 6.06 10*3/MM3 (ref 3.4–10.8)

## 2024-03-22 PROCEDURE — 82306 VITAMIN D 25 HYDROXY: CPT

## 2024-03-22 PROCEDURE — 83921 ORGANIC ACID SINGLE QUANT: CPT

## 2024-03-22 PROCEDURE — 83036 HEMOGLOBIN GLYCOSYLATED A1C: CPT

## 2024-03-22 PROCEDURE — G0103 PSA SCREENING: HCPCS

## 2024-03-22 PROCEDURE — 80053 COMPREHEN METABOLIC PANEL: CPT

## 2024-03-22 PROCEDURE — 85025 COMPLETE CBC W/AUTO DIFF WBC: CPT

## 2024-03-22 PROCEDURE — 36415 COLL VENOUS BLD VENIPUNCTURE: CPT

## 2024-03-22 PROCEDURE — 84443 ASSAY THYROID STIM HORMONE: CPT

## 2024-03-22 PROCEDURE — 84439 ASSAY OF FREE THYROXINE: CPT

## 2024-03-22 PROCEDURE — 80061 LIPID PANEL: CPT

## 2024-03-27 ENCOUNTER — OFFICE VISIT (OUTPATIENT)
Dept: INTERNAL MEDICINE | Facility: CLINIC | Age: 64
End: 2024-03-27
Payer: MEDICARE

## 2024-03-27 VITALS
WEIGHT: 253 LBS | OXYGEN SATURATION: 97 % | TEMPERATURE: 97.6 F | BODY MASS INDEX: 39.71 KG/M2 | SYSTOLIC BLOOD PRESSURE: 120 MMHG | HEART RATE: 76 BPM | DIASTOLIC BLOOD PRESSURE: 74 MMHG | HEIGHT: 67 IN | RESPIRATION RATE: 16 BRPM

## 2024-03-27 DIAGNOSIS — Z91.81 AT HIGH RISK FOR FALLS: ICD-10-CM

## 2024-03-27 DIAGNOSIS — R60.0 BILATERAL LEG EDEMA: Primary | ICD-10-CM

## 2024-03-27 DIAGNOSIS — I10 ESSENTIAL HYPERTENSION: ICD-10-CM

## 2024-03-27 DIAGNOSIS — G11.8 SPINOCEREBELLAR ATAXIA: ICD-10-CM

## 2024-03-27 DIAGNOSIS — E55.9 VITAMIN D DEFICIENCY: ICD-10-CM

## 2024-03-27 DIAGNOSIS — L89.309 PRESSURE INJURY OF SKIN OF BUTTOCK, UNSPECIFIED INJURY STAGE, UNSPECIFIED LATERALITY: ICD-10-CM

## 2024-03-27 DIAGNOSIS — E53.8 VITAMIN B12 DEFICIENCY: ICD-10-CM

## 2024-03-27 DIAGNOSIS — R73.03 PREDIABETES: ICD-10-CM

## 2024-03-27 NOTE — LETTER
March 27, 2024     Chris Mejia    Component      Latest Ref Rn 2/24/2023 3/22/2024   PSA      0.000 - 4.000 ng/mL 3.190  2.870      Component      Latest Ref Rn 3/22/2024   Glucose      65 - 99 mg/dL 98    BUN      8 - 23 mg/dL 15    Creatinine      0.76 - 1.27 mg/dL 1.05    BUN/Creatinine Ratio      7.0 - 25.0  14.3    Sodium      136 - 145 mmol/L 138    Potassium      3.5 - 5.2 mmol/L 3.9    Chloride      98 - 107 mmol/L 103    CO2      22.0 - 29.0 mmol/L 24.0    Calcium      8.6 - 10.5 mg/dL 9.3    Total Protein      6.0 - 8.5 g/dL 7.5    Albumin      3.5 - 5.2 g/dL 4.3    A/G Ratio      g/dL 1.3    Total Bilirubin      0.0 - 1.2 mg/dL 0.6    Alkaline Phosphatase      39 - 117 U/L 43    AST (SGOT)      1 - 40 U/L 32    ALT (SGPT)      1 - 41 U/L 44 (H)    Globulin      gm/dL 3.2    Anion Gap      5.0 - 15.0 mmol/L 11.0    eGFR      >60.0 mL/min/1.73 78.3       Component      Latest Ref [facility] 3/22/2024   WBC      3.40 - 10.80 10*3/mm3 6.06    RBC      4.14 - 5.80 10*6/mm3 5.03    Hemoglobin      13.0 - 17.7 g/dL 16.6    Hematocrit      37.5 - 51.0 % 48.4    MCV      79.0 - 97.0 fL 96.2    MCH      26.6 - 33.0 pg 33.0    MCHC      31.5 - 35.7 g/dL 34.3    RDW      12.3 - 15.4 % 12.8    Platelets      140 - 450 10*3/mm3 179    Neutrophil Rel %      42.7 - 76.0 % 52.9    Lymphocyte Rel %      19.6 - 45.3 % 26.6    Monocyte Rel %      5.0 - 12.0 % 11.9    Eosinophil Rel %      0.3 - 6.2 % 7.6 (H)    Basophil Rel %      0.0 - 1.5 % 0.8    Neutrophils Absolute      1.70 - 7.00 10*3/mm3 3.21    Lymphocytes Absolute      0.70 - 3.10 10*3/mm3 1.61    Monocytes Absolute      0.10 - 0.90 10*3/mm3 0.72    Eosinophils Absolute      0.00 - 0.40 10*3/mm3 0.46 (H)    Basophils Absolute      0.00 - 0.20 10*3/mm3 0.05    Immature Granulocyte Rel %      0.0 - 0.5 % 0.2    Immature Grans, Absolute      0.00 - 0.05 10*3/mm3 0.01    nRBC      0.0 - 0.2 /100 WBC 0.0    RDW-SD      37.0 - 54.0 fl 45.5    MPV      6.0 - 12.0 fL  10.1       Component      Latest Ref Rng 2/24/2023 3/22/2024   Total Cholesterol      0 - 200 mg/dL 123  107    Triglycerides      0 - 150 mg/dL 125  110    HDL Cholesterol      40 - 60 mg/dL 42  37 (L)    VLDL Cholesterol      5 - 40 mg/dL  20    LDL Cholesterol       0 - 100 mg/dL 59  50       Component      Latest Ref Rng 2/24/2023 3/22/2024   TSH Baseline      0.270 - 4.200 uIU/mL 3.680  1.980    Free T4      0.93 - 1.70 ng/dL 1.33  1.38      Component      Latest Ref Rng 2/10/2021 2/11/2022 3/22/2024   Hemoglobin A1C      4.80 - 5.60 % 5.60  5.60  5.70 (H)       Component      Latest Ref Rng 8/17/2022 2/24/2023 3/22/2024   25 Hydroxy, Vitamin D      30.0 - 100.0 ng/ml 31.1  23.2 (L)  17.3 (L)       Methylmalonic acid pending--if normal continue current dose of vitamin B12. If low we'll need to increase vitamin B12.

## 2024-03-27 NOTE — PROGRESS NOTES
"Chief Complaint  Hypertension (3 month follow up)    Subjective        Chris Mejia presents to [facility]  History of Present Illness    Per wife right between \"his butt cheeks\" has a sore that will not heal up despite medicines she's applied. Has been there a couple of months. Starting to have a smell.     Leg edema more bothersome. Both legs. On amlodipine 10 mg.      Objective   Vital Signs:  /74 (BP Location: Left arm, Patient Position: Sitting, Cuff Size: Adult)   Pulse 76   Temp 97.6 °F (36.4 °C) (Temporal)   Resp 16   Ht 170.2 cm (67\")   Wt 115 kg (253 lb)   SpO2 97%   BMI 39.63 kg/m²   Estimated body mass index is 39.63 kg/m² as calculated from the following:    Height as of this encounter: 170.2 cm (67\").    Weight as of this encounter: 115 kg (253 lb).               Physical Exam  Vitals and nursing note reviewed.   Constitutional:       General: He is not in acute distress.     Appearance: Normal appearance. He is well-developed and well-groomed. He is obese. He is not ill-appearing, toxic-appearing or diaphoretic.      Comments: In wheelchair   HENT:      Head: Normocephalic and atraumatic.      Right Ear: Hearing normal.      Left Ear: Hearing normal.   Eyes:      General: Lids are normal. No scleral icterus.        Right eye: No discharge.         Left eye: No discharge.      Extraocular Movements: Extraocular movements intact.   Cardiovascular:      Rate and Rhythm: Normal rate and regular rhythm.   Pulmonary:      Effort: Pulmonary effort is normal.   Musculoskeletal:      Cervical back: Neck supple.      Right lower leg: Edema present.      Left lower leg: Edema present.   Skin:     Coloration: Skin is not jaundiced or pale.   Neurological:      General: No focal deficit present.      Mental Status: He is alert and oriented to person, place, and time.   Psychiatric:         Attention and Perception: Attention and perception normal.         Mood and " Affect: Mood and affect normal.         Speech: Speech normal.         Behavior: Behavior normal. Behavior is cooperative.         Thought Content: Thought content normal.         Cognition and Memory: Cognition and memory normal.         Judgment: Judgment normal.        Result Review :    The following data was reviewed by: Naty Lebron MD on 03/27/2024:  Letter from Naty Lebron MD (03/27/2024)              Assessment and Plan     Diagnoses and all orders for this visit:    1. Bilateral leg edema (Primary)  -     Ambulatory Referral to Home Health    2. Essential hypertension    3. Pressure injury of skin of buttock, unspecified injury stage, unspecified laterality  -     Ambulatory Referral to Home Health    4. Prediabetes    5. Vitamin D deficiency    6. Vitamin B12 deficiency    7. Spinocerebellar ataxia  Assessment & Plan:  Complicates all aspects of care, increased risk of falls      8. At high risk for falls  Comments:  info via avs      Decrease amlodipine from 10 mg to 5 mg daily. Monitor blood pressure, goal ideally <130/80, though max blood pressure <140/90. Due to his baseline increased fall risk do not want to be too aggressive with his blood pressure control. If blood pressure is >140/90 frequently on lowered amlodipine may need to increase dose back to 10 mg.  Home health ordered to help with buttock lesion  Labs discussed. Prediabetic by 0.1, cut back on sugars/carbs. Recommend max vitamin D 1000 IU daily as higher doses associated with increased fall risk, daily dosing safer than intermittent dosing. Methylmalonic acid pending, if elevated will need to increase vitamin B12 supplementation.          Follow Up     Return in about 3 months (around 7/1/2024) for Blood Pressure follow up.  Patient was given instructions and counseling regarding his condition or for health maintenance advice. Please see specific information pulled into the AVS if appropriate.

## 2024-03-27 NOTE — PATIENT INSTRUCTIONS
Fall Prevention in the Home, Adult  Falls can cause injuries and affect people of all ages. There are many simple things that you can do to make your home safe and to help prevent falls.  If you need it, ask for help making these changes.  What actions can I take to prevent falls?  General information  Use good lighting in all rooms. Make sure to:  Replace any light bulbs that burn out.  Turn on lights if it is dark and use night-lights.  Keep items that you use often in easy-to-reach places. Lower the shelves around your home if needed.  Move furniture so that there are clear paths around it.  Do not keep throw rugs or other things on the floor that can make you trip.  If any of your floors are uneven, fix them.  Add color or contrast paint or tape to clearly trina and help you see:  Grab bars or handrails.  First and last steps of staircases.  Where the edge of each step is.  If you use a ladder or stepladder:  Make sure that it is fully opened. Do not climb a closed ladder.  Make sure the sides of the ladder are locked in place.  Have someone hold the ladder while you use it.  Know where your pets are as you move through your home.  What can I do in the bathroom?         Keep the floor dry. Clean up any water that is on the floor right away.  Remove soap buildup in the bathtub or shower. Buildup makes bathtubs and showers slippery.  Use non-skid mats or decals on the floor of the bathtub or shower.  Attach bath mats securely with double-sided, non-slip rug tape.  If you need to sit down while you are in the shower, use a non-slip stool.  Install grab bars by the toilet and in the bathtub and shower. Do not use towel bars as grab bars.  What can I do in the bedroom?  Make sure that you have a light by your bed that is easy to reach.  Do not use any sheets or blankets on your bed that hang to the floor.  Have a firm bench or chair with side arms that you can use for support when you get dressed.  What can I do in  the kitchen?  Clean up any spills right away.  If you need to reach something above you, use a sturdy step stool that has a grab bar.  Keep electrical cables out of the way.  Do not use floor polish or wax that makes floors slippery.  What can I do with my stairs?  Do not leave anything on the stairs.  Make sure that you have a light switch at the top and the bottom of the stairs. Have them installed if you do not have them.  Make sure that there are handrails on both sides of the stairs. Fix handrails that are broken or loose. Make sure that handrails are as long as the staircases.  Install non-slip stair treads on all stairs in your home if they do not have carpet.  Avoid having throw rugs at the top or bottom of stairs, or secure the rugs with carpet tape to prevent them from moving.  Choose a carpet design that does not hide the edge of steps on the stairs. Make sure that carpet is firmly attached to the stairs. Fix any carpet that is loose or worn.  What can I do on the outside of my home?  Use bright outdoor lighting.  Repair the edges of walkways and driveways and fix any cracks. Clear paths of anything that can make you trip, such as tools or rocks.  Add color or contrast paint or tape to clearly trina and help you see high doorway thresholds.  Trim any bushes or trees on the main path into your home.  Check that handrails are securely fastened and in good repair. Both sides of all steps should have handrails.  Install guardrails along the edges of any raised decks or porches.  Have leaves, snow, and ice cleared regularly. Use sand, salt, or ice melt on walkways during winter months if you live where there is ice and snow.  In the garage, clean up any spills right away, including grease or oil spills.  What other actions can I take?  Review your medicines with your health care provider. Some medicines can make you confused or feel dizzy. This can increase your chance of falling.  Wear closed-toe shoes that  fit well and support your feet. Wear shoes that have rubber soles and low heels.  Use a cane, walker, scooter, or crutches that help you move around if needed.  Talk with your provider about other ways that you can decrease your risk of falls. This may include seeing a physical therapist to learn to do exercises to improve movement and strength.  Where to find more information  Centers for Disease Control and Prevention, [place]: cdc.gov  [place] on Aging: wil[place].nih.gov  [place] on Aging: wil[place].nih.gov  Contact a health care provider if:  You are afraid of falling at home.  You feel weak, drowsy, or dizzy at home.  You fall at home.  Get help right away if you:  Lose consciousness or have trouble moving after a fall.  Have a fall that causes a head injury.  These symptoms may be an emergency. Get help right away. Call 911.  Do not wait to see if the symptoms will go away.  Do not drive yourself to the hospital.  This information is not intended to replace advice given to you by your health care provider. Make sure you discuss any questions you have with your health care provider.  Document Revised: 08/21/2023 Document Reviewed: 08/21/2023  [employer] Patient Education © 2023 [employer]    Sit-to-Stand Exercise    The sit-to-stand exercise (also known as the chair stand or chair rise exercise) strengthens your lower body and helps you maintain or improve your mobility and independence. The end goal is to do the sit-to-stand exercise without using your hands. This will be easier as you become stronger. You should always talk with your health care provider before starting any exercise program, especially if you have had recent surgery.  Do the exercise exactly as told by your health care provider and adjust it as directed. It is normal to feel mild stretching, pulling, tightness, or discomfort as you do this exercise, but you should stop right away if you feel sudden pain or your pain gets worse. Do  not begin doing this exercise until told by your health care provider.  What the sit-to-stand exercise does  The sit-to-stand exercise helps to strengthen the muscles in your thighs and the muscles in the center of your body that give you stability (core muscles). This exercise is especially helpful if:  You have had knee or hip surgery.  You have trouble getting up from a chair, out of a car, or off the toilet due to muscle weakness.  How to do the sit-to-stand exercise  Sit toward the front edge of a sturdy chair without armrests. Your knees should be bent and your feet should be flat on the floor and shoulder-width apart and underneath your hips.  Place your hands lightly on each side of the seat. Keep your back and neck as straight as possible, with your chest slightly forward.  Breathe in slowly. Lean forward and slightly shift your weight to the front of your feet.  Breathe out as you slowly stand up. Try not to support any weight with your hands.  Stand and pause for a full breath in and out.  Breathe in as you sit down slowly. Tighten your core and abdominal muscles to control your lowering as much as possible. You should lower yourself back to the chair slowly, not just drop back into the seat.  Breathe out slowly.  Do this exercise 10-15 times. If needed, do it fewer times until you build up strength.  Rest for 1 minute, then do another set of 10-15 repetitions.  To change the difficulty of the sit-to-stand exercise  If the exercise is too difficult, use a chair with sturdy armrests, and push off the armrests to help you come to the standing position. You can also use the armrests to help slowly lower yourself back to sitting. As this gets easier, try to use your arms less. You can also place a firm cushion or pillow on the chair to make the surface higher.  If this exercise is too easy, do not use your arms to help raise or lower yourself. You can also wear a weighted vest, use hand weights, increase your  repetitions, or try a lower chair.  General tips  You may feel tired when starting an exercise routine. This is normal.  You may have muscle soreness that lasts a few days. This is normal. As you get stronger, you may not feel muscle soreness.  Use smooth, steady movements.  Do not  hold your breath during strength exercises. This can cause unsafe changes in your blood pressure.  Breathe in slowly through your nose, and breathe out slowly through your mouth.  Summary  Strengthening your lower body is an important step to help you move safely and independently.  The sit-to-stand exercise helps strengthen the muscles in your thighs and core.  You should always talk with your health care provider before starting any exercise program, especially if you have had recent surgery.  This information is not intended to replace advice given to you by your health care provider. Make sure you discuss any questions you have with your health care provider.  Document Revised: 04/10/2022 Document Reviewed: 04/10/2022  [employer] Patient Education © 2023 [employer]    Exercising to Stay Healthy  To become healthy and stay healthy, it is recommended that you do moderate-intensity and vigorous-intensity exercise. You can tell that you are exercising at a moderate intensity if your heart starts beating faster and you start breathing faster but can still hold a conversation. You can tell that you are exercising at a vigorous intensity if you are breathing much harder and faster and cannot hold a conversation while exercising.  How can exercise benefit me?  Exercising regularly is important. It has many health benefits, such as:  Improving overall fitness, flexibility, and endurance.  Increasing bone density.  Helping with weight control.  Decreasing body fat.  Increasing muscle strength and endurance.  Reducing stress and tension, anxiety, depression, or anger.  Improving overall health.  What guidelines should I follow while  exercising?  Before you start a new exercise program, talk with your health care provider.  Do not exercise so much that you hurt yourself, feel dizzy, or get very short of breath.  Wear comfortable clothes and wear shoes with good support.  Drink plenty of water while you exercise to prevent dehydration or heat stroke.  Work out until your breathing and your heartbeat get faster (moderate intensity).  How often should I exercise?  Choose an activity that you enjoy, and set realistic goals. Your health care provider can help you make an activity plan that is individually designed and works best for you.  Exercise regularly as told by your health care provider. This may include:  Doing strength training two times a week, such as:  Lifting weights.  Using resistance bands.  Push-ups.  Sit-ups.  Yoga.  Doing a certain intensity of exercise for a given amount of time. Choose from these options:  A total of 150 minutes of moderate-intensity exercise every week.  A total of 75 minutes of vigorous-intensity exercise every week.  A mix of moderate-intensity and vigorous-intensity exercise every week.  Children, pregnant women, people who have not exercised regularly, people who are overweight, and older adults may need to talk with a health care provider about what activities are safe to perform. If you have a medical condition, be sure to talk with your health care provider before you start a new exercise program.  What are some exercise ideas?  Moderate-intensity exercise ideas include:  Walking 1 mile (1.6 km) in about 15 minutes.  Biking.  Hiking.  Golfing.  Dancing.  Water aerobics.  Vigorous-intensity exercise ideas include:  Walking 4.5 miles (7.2 km) or more in about 1 hour.  Jogging or running 5 miles (8 km) in about 1 hour.  Biking 10 miles (16.1 km) or more in about 1 hour.  Lap swimming.  Roller-skating or in-line skating.  Cross-country skiing.  Vigorous competitive sports, such as football, basketball, and  soccer.  Jumping rope.  Aerobic dancing.  What are some everyday activities that can help me get exercise?  Yard work, such as:  Pushing a .  Raking and bagging leaves.  Washing your car.  Pushing a stroller.  Shoveling snow.  Gardening.  Washing windows or floors.  How can I be more active in my day-to-day activities?  Use stairs instead of an elevator.  Take a walk during your lunch break.  If you drive, park your car farther away from your work or school.  If you take public transportation, get off one stop early and walk the rest of the way.  Stand up or walk around during all of your indoor phone calls.  Get up, stretch, and walk around every 30 minutes throughout the day.  Enjoy exercise with a friend. Support to continue exercising will help you keep a regular routine of activity.  Where to find more information  You can find more information about exercising to stay healthy from:  U.S. Department of Health and Human Services: www.hhs.gov  Centers for Disease Control and Prevention (CDC): www.cdc.gov  Summary  Exercising regularly is important. It will improve your overall fitness, flexibility, and endurance.  Regular exercise will also improve your overall health. It can help you control your weight, reduce stress, and improve your bone density.  Do not exercise so much that you hurt yourself, feel dizzy, or get very short of breath.  Before you start a new exercise program, talk with your health care provider.  This information is not intended to replace advice given to you by your health care provider. Make sure you discuss any questions you have with your health care provider.  Document Revised: 04/15/2022 Document Reviewed: 04/15/2022  Elsevier Patient Education © 2023 [employer].

## 2024-03-28 ENCOUNTER — HOME HEALTH ADMISSION (OUTPATIENT)
Dept: HOME HEALTH SERVICES | Facility: HOME HEALTHCARE | Age: 64
End: 2024-03-28
Payer: MEDICARE

## 2024-03-29 LAB — METHYLMALONATE SERPL-SCNC: 163 NMOL/L (ref 0–378)

## 2024-04-09 ENCOUNTER — CLINICAL SUPPORT (OUTPATIENT)
Dept: INTERNAL MEDICINE | Facility: CLINIC | Age: 64
End: 2024-04-09
Payer: MEDICARE

## 2024-04-09 DIAGNOSIS — E53.8 B12 DEFICIENCY: Primary | ICD-10-CM

## 2024-04-09 PROCEDURE — 96372 THER/PROPH/DIAG INJ SC/IM: CPT | Performed by: FAMILY MEDICINE

## 2024-04-09 RX ADMIN — CYANOCOBALAMIN 1000 MCG: 1000 INJECTION, SOLUTION INTRAMUSCULAR; SUBCUTANEOUS at 10:04

## 2024-05-06 ENCOUNTER — CLINICAL SUPPORT (OUTPATIENT)
Dept: INTERNAL MEDICINE | Facility: CLINIC | Age: 64
End: 2024-05-06
Payer: MEDICARE

## 2024-05-06 DIAGNOSIS — E53.8 VITAMIN B12 DEFICIENCY: Primary | ICD-10-CM

## 2024-05-06 PROCEDURE — 96372 THER/PROPH/DIAG INJ SC/IM: CPT | Performed by: FAMILY MEDICINE

## 2024-05-06 RX ADMIN — CYANOCOBALAMIN 1000 MCG: 1000 INJECTION, SOLUTION INTRAMUSCULAR; SUBCUTANEOUS at 11:31

## 2024-06-04 ENCOUNTER — CLINICAL SUPPORT (OUTPATIENT)
Dept: INTERNAL MEDICINE | Facility: CLINIC | Age: 64
End: 2024-06-04
Payer: MEDICARE

## 2024-06-04 DIAGNOSIS — E53.8 VITAMIN B12 DEFICIENCY: Primary | ICD-10-CM

## 2024-06-04 PROCEDURE — 96372 THER/PROPH/DIAG INJ SC/IM: CPT | Performed by: FAMILY MEDICINE

## 2024-06-04 RX ADMIN — CYANOCOBALAMIN 1000 MCG: 1000 INJECTION, SOLUTION INTRAMUSCULAR; SUBCUTANEOUS at 13:38

## 2024-06-16 DIAGNOSIS — R33.9 URINARY RETENTION: ICD-10-CM

## 2024-06-16 DIAGNOSIS — N40.1 BENIGN NON-NODULAR PROSTATIC HYPERPLASIA WITH LOWER URINARY TRACT SYMPTOMS: ICD-10-CM

## 2024-06-16 DIAGNOSIS — F41.9 ANXIETY: ICD-10-CM

## 2024-06-17 RX ORDER — TAMSULOSIN HYDROCHLORIDE 0.4 MG/1
CAPSULE ORAL
Qty: 90 CAPSULE | Refills: 3 | Status: SHIPPED | OUTPATIENT
Start: 2024-06-17

## 2024-06-17 RX ORDER — BUSPIRONE HYDROCHLORIDE 15 MG/1
15 TABLET ORAL 2 TIMES DAILY PRN
Qty: 180 TABLET | Refills: 1 | Status: SHIPPED | OUTPATIENT
Start: 2024-06-17

## 2024-06-27 ENCOUNTER — OFFICE VISIT (OUTPATIENT)
Dept: INTERNAL MEDICINE | Facility: CLINIC | Age: 64
End: 2024-06-27
Payer: MEDICARE

## 2024-06-27 ENCOUNTER — TELEPHONE (OUTPATIENT)
Dept: INTERNAL MEDICINE | Facility: CLINIC | Age: 64
End: 2024-06-27

## 2024-06-27 VITALS
BODY MASS INDEX: 39.33 KG/M2 | OXYGEN SATURATION: 96 % | DIASTOLIC BLOOD PRESSURE: 82 MMHG | RESPIRATION RATE: 16 BRPM | HEIGHT: 67 IN | HEART RATE: 76 BPM | TEMPERATURE: 97 F | SYSTOLIC BLOOD PRESSURE: 126 MMHG | WEIGHT: 250.6 LBS

## 2024-06-27 DIAGNOSIS — I25.119 CORONARY ARTERY DISEASE INVOLVING NATIVE CORONARY ARTERY OF NATIVE HEART WITH ANGINA PECTORIS: Primary | ICD-10-CM

## 2024-06-27 DIAGNOSIS — R06.09 DYSPNEA ON EXERTION: ICD-10-CM

## 2024-06-27 DIAGNOSIS — E53.8 VITAMIN B12 DEFICIENCY: ICD-10-CM

## 2024-06-27 DIAGNOSIS — M79.89 LEG SWELLING: ICD-10-CM

## 2024-06-27 DIAGNOSIS — I10 ESSENTIAL HYPERTENSION: ICD-10-CM

## 2024-06-27 PROCEDURE — 96372 THER/PROPH/DIAG INJ SC/IM: CPT | Performed by: FAMILY MEDICINE

## 2024-06-27 PROCEDURE — 3079F DIAST BP 80-89 MM HG: CPT | Performed by: FAMILY MEDICINE

## 2024-06-27 PROCEDURE — 1160F RVW MEDS BY RX/DR IN RCRD: CPT | Performed by: FAMILY MEDICINE

## 2024-06-27 PROCEDURE — 1126F AMNT PAIN NOTED NONE PRSNT: CPT | Performed by: FAMILY MEDICINE

## 2024-06-27 PROCEDURE — 1159F MED LIST DOCD IN RCRD: CPT | Performed by: FAMILY MEDICINE

## 2024-06-27 PROCEDURE — 3074F SYST BP LT 130 MM HG: CPT | Performed by: FAMILY MEDICINE

## 2024-06-27 PROCEDURE — 99214 OFFICE O/P EST MOD 30 MIN: CPT | Performed by: FAMILY MEDICINE

## 2024-06-27 RX ORDER — NITROGLYCERIN 0.4 MG/1
0.4 TABLET SUBLINGUAL
Qty: 30 TABLET | Refills: 1 | Status: SHIPPED | OUTPATIENT
Start: 2024-06-27

## 2024-06-27 RX ORDER — AMLODIPINE BESYLATE 5 MG/1
5 TABLET ORAL DAILY
Qty: 90 TABLET | Refills: 3 | Status: SHIPPED | OUTPATIENT
Start: 2024-06-27

## 2024-06-27 RX ADMIN — CYANOCOBALAMIN 1000 MCG: 1000 INJECTION, SOLUTION INTRAMUSCULAR; SUBCUTANEOUS at 10:28

## 2024-06-27 NOTE — PROGRESS NOTES
"Chief Complaint  Hypertension (3 month follow up) and Shortness of Breath (Pt states this has been going on for a few months to a year but does feel it is getting worse and would like to discuss)    Subjective        Chris Mejia presents to [facility]  History of Present Illness  Getting shortness of breath with minimal exertion--getting up going to bathroom  Staying about the same  Has had for a couple of months.   Chest pain at times goes toward back  Used to see cardiology ([place]) but hasn't now x years    Leg swelling continues though improved with lowering amlodipine down from 10 mg to 5 mg  Wife reports pills hard to split. New rx sent.      Objective   Vital Signs:  /82 (BP Location: Left arm, Patient Position: Sitting, Cuff Size: Adult)   Pulse 76   Temp 97 °F (36.1 °C) (Temporal)   Resp 16   Ht 170.2 cm (67\")   Wt 114 kg (250 lb 9.6 oz)   SpO2 96%   BMI 39.25 kg/m²   Estimated body mass index is 39.25 kg/m² as calculated from the following:    Height as of this encounter: 170.2 cm (67\").    Weight as of this encounter: 114 kg (250 lb 9.6 oz).               Physical Exam  Vitals and nursing note reviewed.   Constitutional:       General: He is not in acute distress.     Appearance: Normal appearance. He is well-developed and well-groomed. He is obese. He is not toxic-appearing or diaphoretic.      Comments: In wheelchair, wife pushes   HENT:      Head: Normocephalic and atraumatic.      Right Ear: Hearing normal.      Left Ear: Hearing normal.   Eyes:      General: Lids are normal. No scleral icterus.        Right eye: No discharge.         Left eye: No discharge.      Extraocular Movements: Extraocular movements intact.   Cardiovascular:      Rate and Rhythm: Normal rate and regular rhythm.   Pulmonary:      Effort: Pulmonary effort is normal.      Breath sounds: Normal breath sounds.   Musculoskeletal:      Cervical back: Neck supple.      Right lower leg: " Edema present.      Left lower leg: Edema present.   Skin:     Coloration: Skin is not jaundiced or pale.   Neurological:      General: No focal deficit present.      Mental Status: He is alert and oriented to person, place, and time.   Psychiatric:         Attention and Perception: Attention and perception normal.         Mood and Affect: Mood and affect normal.         Speech: Speech normal.         Behavior: Behavior normal. Behavior is cooperative.         Thought Content: Thought content normal.         Cognition and Memory: Cognition and memory normal.         Judgment: Judgment normal.        Result Review :    The following data was reviewed by: Naty Lebron MD on 06/27/2024:  Component      Latest Ref Rng 3/22/2024   Methylmalonic Acid      0 - 378 nmol/L 163      CBC & Differential (03/22/2024 10:56)  --Hgb 16.6 no anemia.    CT Angiogram Chest Pulmonary Embolism (03/17/2023 12:21) : The heart is mildly enlarged. There is mild coronary artery disease.    Adult Transthoracic Echo Complete W/ Cont if Necessary Per Protocol (04/05/2018 10:36)   Cardiac Catheterization/Vascular Study (03/14/2018 16:06) :  IMPRESSION:  There was mild to moderate CAD.  There is a 40% eccentric, discrete stenosis in the second obtuse marginal branch that was not found to be functionally significant by iFR with a ratio of 1.0.  There is mild coronary artery disease involving the proximal LAD.  Normal left ventricular ejection fraction 65% with no regional wall motion abnormalities     RECOMMENDATIONS:  Continue risk factor modification for CAD  Evaluation for alternative etiologies the patient's dyspnea and chest pain.  We'll order an outpatient echocardiogram and pulmonary function test.  Follow-up in the cardiology clinic in 4-6 weeks  Follow-up in the patient's primary care clinic in the next 4 weeks.     Full Pulmonary Function Test & ABG With Bronchodilator (04/05/2018 09:43) : Normal spirometry, lung volumes, and  diffusing capacity.          Assessment and Plan     Diagnoses and all orders for this visit:    1. Coronary artery disease involving native coronary artery of native heart with angina pectoris (Primary)  -     Ambulatory Referral to Cardiology  -     nitroglycerin (Nitrostat) 0.4 MG SL tablet; Place 1 tablet under the tongue Every 5 (Five) Minutes As Needed for Chest Pain. Take no more than 3 doses in 15 minutes.  Dispense: 30 tablet; Refill: 1    2. Dyspnea on exertion  -     Ambulatory Referral to Cardiology    3. Leg swelling  -     Ambulatory Referral to Cardiology    4. Essential hypertension  Assessment & Plan:  Hypertension is stable and controlled  Continue current treatment regimen.  Blood pressure will be reassessed in 3 months.    Orders:  -     amLODIPine (NORVASC) 5 MG tablet; Take 1 tablet by mouth Daily. Indications: High Blood Pressure Disorder  Dispense: 90 tablet; Refill: 3    5. Vitamin B12 deficiency  Assessment & Plan:  Vitamin B12 improved with injections.  Methylmalonic acid normal, continue current frequency  Failed oral replacement               Follow Up     Return in about 3 months (around 10/1/2024) for Medicare Wellness.  Patient was given instructions and counseling regarding his condition or for health maintenance advice. Please see specific information pulled into the AVS if appropriate.

## 2024-06-27 NOTE — TELEPHONE ENCOUNTER
Caller: Kaela Mejia    Relationship: Emergency Contact    Best call back number: [phone]     What is the best time to reach you: ANYTIME, OK TO LEAVE VOICEMAIL.    Who are you requesting to speak with (clinical staff, provider,  specific staff member): CLINICAL STAFF    Do you know the name of the person who called: PATIENT'S WIFE KAELA    What was the call regarding: KAELA IS CALLING TO NOTIFY THAT THE REFERRAL FOR CARDIOLOGY THAT WAS SENT FOR PATIENT AND HERSELF WAS SENT TO THE WRONG CARDIOLOGY CLINIC AND THAT THEY NEED A CARDIOLOGIST IN [place]. PLEASE CALL BACK TO DISCUSS.    Is it okay if the provider responds through MyChart: NO CALL ONLY

## 2024-06-27 NOTE — ASSESSMENT & PLAN NOTE
Vitamin B12 improved with injections.  Methylmalonic acid normal, continue current frequency  Failed oral replacement

## 2024-06-28 ENCOUNTER — TELEPHONE (OUTPATIENT)
Dept: INTERNAL MEDICINE | Facility: CLINIC | Age: 64
End: 2024-06-28
Payer: MEDICARE

## 2024-06-28 NOTE — TELEPHONE ENCOUNTER
Spoke with Diana. She wants to try group at [facility] for him and for her referral as well. She wants them both to stay in [place] and with same group.

## 2024-06-28 NOTE — TELEPHONE ENCOUNTER
Hub staff attempted to follow warm transfer process and was unsuccessful     Caller: Chris Mejia    Relationship to patient: Self    Best call back number: [phone]    Patient is needing: AN UPDATE ON THE REFERRAL FOR CARDIOLOGIST IN [place]

## 2024-07-10 ENCOUNTER — APPOINTMENT (OUTPATIENT)
Dept: GENERAL RADIOLOGY | Facility: HOSPITAL | Age: 64
End: 2024-07-10
Payer: MEDICARE

## 2024-07-10 ENCOUNTER — HOSPITAL ENCOUNTER (OUTPATIENT)
Facility: HOSPITAL | Age: 64
Setting detail: OBSERVATION
Discharge: HOME | End: 2024-07-12
Attending: STUDENT IN AN ORGANIZED HEALTH CARE EDUCATION/TRAINING PROGRAM | Admitting: STUDENT IN AN ORGANIZED HEALTH CARE EDUCATION/TRAINING PROGRAM
Payer: MEDICARE

## 2024-07-10 ENCOUNTER — APPOINTMENT (OUTPATIENT)
Dept: CARDIOLOGY | Facility: HOSPITAL | Age: 64
End: 2024-07-10
Payer: MEDICARE

## 2024-07-10 DIAGNOSIS — I25.119 CORONARY ARTERY DISEASE INVOLVING NATIVE HEART WITH ANGINA PECTORIS, UNSPECIFIED VESSEL OR LESION TYPE: ICD-10-CM

## 2024-07-10 DIAGNOSIS — R07.9 ACUTE CHEST PAIN: Primary | ICD-10-CM

## 2024-07-10 LAB
ALBUMIN SERPL-MCNC: 4.1 G/DL (ref 3.5–5.2)
ALBUMIN/GLOB SERPL: 1.1 G/DL
ALP SERPL-CCNC: 47 U/L (ref 39–117)
ALT SERPL W P-5'-P-CCNC: 34 U/L (ref 1–41)
ANION GAP SERPL CALCULATED.3IONS-SCNC: 12.8 MMOL/L (ref 5–15)
AST SERPL-CCNC: 27 U/L (ref 1–40)
BASOPHILS # BLD AUTO: 0.07 10*3/MM3 (ref 0–0.2)
BASOPHILS NFR BLD AUTO: 0.8 % (ref 0–1.5)
BH CV ECHO MEAS - AO MAX PG: 4.9 MMHG
BH CV ECHO MEAS - AO MEAN PG: 3 MMHG
BH CV ECHO MEAS - AO ROOT DIAM: 3.8 CM
BH CV ECHO MEAS - AO V2 MAX: 111 CM/SEC
BH CV ECHO MEAS - AO V2 VTI: 22.6 CM
BH CV ECHO MEAS - AVA(I,D): 3 CM2
BH CV ECHO MEAS - EDV(CUBED): 75.2 ML
BH CV ECHO MEAS - EDV(MOD-SP2): 115 ML
BH CV ECHO MEAS - EDV(MOD-SP4): 109 ML
BH CV ECHO MEAS - EF(MOD-BP): 57.7 %
BH CV ECHO MEAS - EF(MOD-SP2): 59.5 %
BH CV ECHO MEAS - EF(MOD-SP4): 62.8 %
BH CV ECHO MEAS - ESV(CUBED): 17 ML
BH CV ECHO MEAS - ESV(MOD-SP2): 46.6 ML
BH CV ECHO MEAS - ESV(MOD-SP4): 40.5 ML
BH CV ECHO MEAS - FS: 39.1 %
BH CV ECHO MEAS - IVS/LVPW: 0.84 CM
BH CV ECHO MEAS - IVSD: 0.89 CM
BH CV ECHO MEAS - LA DIMENSION: 4.4 CM
BH CV ECHO MEAS - LAT PEAK E' VEL: 8.1 CM/SEC
BH CV ECHO MEAS - LV DIASTOLIC VOL/BSA (35-75): 48.8 CM2
BH CV ECHO MEAS - LV MASS(C)D: 133.5 GRAMS
BH CV ECHO MEAS - LV MAX PG: 4 MMHG
BH CV ECHO MEAS - LV MEAN PG: 2 MMHG
BH CV ECHO MEAS - LV SYSTOLIC VOL/BSA (12-30): 18.1 CM2
BH CV ECHO MEAS - LV V1 MAX: 100 CM/SEC
BH CV ECHO MEAS - LV V1 VTI: 21.6 CM
BH CV ECHO MEAS - LVIDD: 4.2 CM
BH CV ECHO MEAS - LVIDS: 2.6 CM
BH CV ECHO MEAS - LVOT AREA: 3.2 CM2
BH CV ECHO MEAS - LVOT DIAM: 2.01 CM
BH CV ECHO MEAS - LVPWD: 1.06 CM
BH CV ECHO MEAS - MED PEAK E' VEL: 8.1 CM/SEC
BH CV ECHO MEAS - MV A MAX VEL: 68 CM/SEC
BH CV ECHO MEAS - MV DEC SLOPE: 231 CM/SEC2
BH CV ECHO MEAS - MV DEC TIME: 0.26 SEC
BH CV ECHO MEAS - MV E MAX VEL: 61 CM/SEC
BH CV ECHO MEAS - MV E/A: 0.9
BH CV ECHO MEAS - MV MAX PG: 1.96 MMHG
BH CV ECHO MEAS - MV MEAN PG: 1 MMHG
BH CV ECHO MEAS - MV V2 VTI: 16 CM
BH CV ECHO MEAS - MVA(VTI): 4.3 CM2
BH CV ECHO MEAS - PA ACC TIME: 0.1 SEC
BH CV ECHO MEAS - PA V2 MAX: 89.1 CM/SEC
BH CV ECHO MEAS - RV MAX PG: 1.15 MMHG
BH CV ECHO MEAS - RV V1 MAX: 53.6 CM/SEC
BH CV ECHO MEAS - RV V1 VTI: 9.5 CM
BH CV ECHO MEAS - SV(LVOT): 68.5 ML
BH CV ECHO MEAS - SV(MOD-SP2): 68.4 ML
BH CV ECHO MEAS - SV(MOD-SP4): 68.5 ML
BH CV ECHO MEAS - SVI(LVOT): 30.7 ML/M2
BH CV ECHO MEAS - SVI(MOD-SP2): 30.6 ML/M2
BH CV ECHO MEAS - SVI(MOD-SP4): 30.7 ML/M2
BH CV ECHO MEAS - TAPSE (>1.6): 2.5 CM
BH CV ECHO MEASUREMENTS AVERAGE E/E' RATIO: 7.53
BH CV XLRA - TDI S': 12.7 CM/SEC
BILIRUB SERPL-MCNC: 0.4 MG/DL (ref 0–1.2)
BUN SERPL-MCNC: 16 MG/DL (ref 8–23)
BUN/CREAT SERPL: 17 (ref 7–25)
CALCIUM SPEC-SCNC: 8.9 MG/DL (ref 8.6–10.5)
CHLORIDE SERPL-SCNC: 101 MMOL/L (ref 98–107)
CHOLEST SERPL-MCNC: 112 MG/DL (ref 0–200)
CO2 SERPL-SCNC: 23.2 MMOL/L (ref 22–29)
CREAT SERPL-MCNC: 0.94 MG/DL (ref 0.76–1.27)
DEPRECATED RDW RBC AUTO: 43.7 FL (ref 37–54)
EGFRCR SERPLBLD CKD-EPI 2021: 89.4 ML/MIN/1.73
EOSINOPHIL # BLD AUTO: 0.52 10*3/MM3 (ref 0–0.4)
EOSINOPHIL NFR BLD AUTO: 6.1 % (ref 0.3–6.2)
ERYTHROCYTE [DISTWIDTH] IN BLOOD BY AUTOMATED COUNT: 12.5 % (ref 12.3–15.4)
GEN 5 2HR TROPONIN T REFLEX: 16 NG/L
GLOBULIN UR ELPH-MCNC: 3.6 GM/DL
GLUCOSE SERPL-MCNC: 124 MG/DL (ref 65–99)
HBA1C MFR BLD: 5.7 % (ref 4.8–5.6)
HCT VFR BLD AUTO: 48.9 % (ref 37.5–51)
HDLC SERPL-MCNC: 34 MG/DL (ref 40–60)
HGB BLD-MCNC: 17.4 G/DL (ref 13–17.7)
HOLD SPECIMEN: NORMAL
HOLD SPECIMEN: NORMAL
IMM GRANULOCYTES # BLD AUTO: 0.03 10*3/MM3 (ref 0–0.05)
IMM GRANULOCYTES NFR BLD AUTO: 0.4 % (ref 0–0.5)
LDLC SERPL CALC-MCNC: 62 MG/DL (ref 0–100)
LDLC/HDLC SERPL: 1.81 {RATIO}
LYMPHOCYTES # BLD AUTO: 2.44 10*3/MM3 (ref 0.7–3.1)
LYMPHOCYTES NFR BLD AUTO: 28.5 % (ref 19.6–45.3)
MCH RBC QN AUTO: 33.7 PG (ref 26.6–33)
MCHC RBC AUTO-ENTMCNC: 35.6 G/DL (ref 31.5–35.7)
MCV RBC AUTO: 94.6 FL (ref 79–97)
MONOCYTES # BLD AUTO: 1.01 10*3/MM3 (ref 0.1–0.9)
MONOCYTES NFR BLD AUTO: 11.8 % (ref 5–12)
NEUTROPHILS NFR BLD AUTO: 4.49 10*3/MM3 (ref 1.7–7)
NEUTROPHILS NFR BLD AUTO: 52.4 % (ref 42.7–76)
NRBC BLD AUTO-RTO: 0 /100 WBC (ref 0–0.2)
PLATELET # BLD AUTO: 198 10*3/MM3 (ref 140–450)
PMV BLD AUTO: 9.9 FL (ref 6–12)
POTASSIUM SERPL-SCNC: 3.9 MMOL/L (ref 3.5–5.2)
PROT SERPL-MCNC: 7.7 G/DL (ref 6–8.5)
RBC # BLD AUTO: 5.17 10*6/MM3 (ref 4.14–5.8)
SODIUM SERPL-SCNC: 137 MMOL/L (ref 136–145)
TRIGL SERPL-MCNC: 82 MG/DL (ref 0–150)
TROPONIN T DELTA: -1 NG/L
TROPONIN T SERPL HS-MCNC: 17 NG/L
VLDLC SERPL-MCNC: 16 MG/DL (ref 5–40)
WBC NRBC COR # BLD AUTO: 8.56 10*3/MM3 (ref 3.4–10.8)
WHOLE BLOOD HOLD COAG: NORMAL
WHOLE BLOOD HOLD SPECIMEN: NORMAL

## 2024-07-10 PROCEDURE — G0378 HOSPITAL OBSERVATION PER HR: HCPCS

## 2024-07-10 PROCEDURE — 84484 ASSAY OF TROPONIN QUANT: CPT | Performed by: STUDENT IN AN ORGANIZED HEALTH CARE EDUCATION/TRAINING PROGRAM

## 2024-07-10 PROCEDURE — 99204 OFFICE O/P NEW MOD 45 MIN: CPT | Performed by: INTERNAL MEDICINE

## 2024-07-10 PROCEDURE — 25010000002 SULFUR HEXAFLUORIDE MICROSPH 60.7-25 MG RECONSTITUTED SUSPENSION: Performed by: STUDENT IN AN ORGANIZED HEALTH CARE EDUCATION/TRAINING PROGRAM

## 2024-07-10 PROCEDURE — 93306 TTE W/DOPPLER COMPLETE: CPT | Performed by: INTERNAL MEDICINE

## 2024-07-10 PROCEDURE — 93306 TTE W/DOPPLER COMPLETE: CPT

## 2024-07-10 PROCEDURE — 80053 COMPREHEN METABOLIC PANEL: CPT | Performed by: STUDENT IN AN ORGANIZED HEALTH CARE EDUCATION/TRAINING PROGRAM

## 2024-07-10 PROCEDURE — 93005 ELECTROCARDIOGRAM TRACING: CPT | Performed by: STUDENT IN AN ORGANIZED HEALTH CARE EDUCATION/TRAINING PROGRAM

## 2024-07-10 PROCEDURE — 80061 LIPID PANEL: CPT | Performed by: NURSE PRACTITIONER

## 2024-07-10 PROCEDURE — 71045 X-RAY EXAM CHEST 1 VIEW: CPT

## 2024-07-10 PROCEDURE — 85025 COMPLETE CBC W/AUTO DIFF WBC: CPT | Performed by: STUDENT IN AN ORGANIZED HEALTH CARE EDUCATION/TRAINING PROGRAM

## 2024-07-10 PROCEDURE — 99222 1ST HOSP IP/OBS MODERATE 55: CPT | Performed by: NURSE PRACTITIONER

## 2024-07-10 PROCEDURE — 83036 HEMOGLOBIN GLYCOSYLATED A1C: CPT | Performed by: NURSE PRACTITIONER

## 2024-07-10 PROCEDURE — 36415 COLL VENOUS BLD VENIPUNCTURE: CPT

## 2024-07-10 PROCEDURE — 99285 EMERGENCY DEPT VISIT HI MDM: CPT

## 2024-07-10 RX ORDER — ONDANSETRON 4 MG/1
4 TABLET, ORALLY DISINTEGRATING ORAL EVERY 6 HOURS PRN
Status: DISCONTINUED | OUTPATIENT
Start: 2024-07-10 | End: 2024-07-12 | Stop reason: HOSPADM

## 2024-07-10 RX ORDER — ACETAMINOPHEN 160 MG/5ML
650 SOLUTION ORAL EVERY 4 HOURS PRN
Status: DISCONTINUED | OUTPATIENT
Start: 2024-07-10 | End: 2024-07-12 | Stop reason: HOSPADM

## 2024-07-10 RX ORDER — SODIUM CHLORIDE 0.9 % (FLUSH) 0.9 %
10 SYRINGE (ML) INJECTION AS NEEDED
Status: DISCONTINUED | OUTPATIENT
Start: 2024-07-10 | End: 2024-07-12 | Stop reason: HOSPADM

## 2024-07-10 RX ORDER — BISACODYL 5 MG/1
5 TABLET, DELAYED RELEASE ORAL DAILY PRN
Status: DISCONTINUED | OUTPATIENT
Start: 2024-07-10 | End: 2024-07-12 | Stop reason: HOSPADM

## 2024-07-10 RX ORDER — PANTOPRAZOLE SODIUM 40 MG/1
40 TABLET, DELAYED RELEASE ORAL DAILY
Status: DISCONTINUED | OUTPATIENT
Start: 2024-07-10 | End: 2024-07-12 | Stop reason: HOSPADM

## 2024-07-10 RX ORDER — ASPIRIN 325 MG
325 TABLET ORAL ONCE
Status: COMPLETED | OUTPATIENT
Start: 2024-07-10 | End: 2024-07-10

## 2024-07-10 RX ORDER — ACETAMINOPHEN 325 MG/1
650 TABLET ORAL EVERY 4 HOURS PRN
Status: DISCONTINUED | OUTPATIENT
Start: 2024-07-10 | End: 2024-07-12 | Stop reason: HOSPADM

## 2024-07-10 RX ORDER — BISACODYL 10 MG
10 SUPPOSITORY, RECTAL RECTAL DAILY PRN
Status: DISCONTINUED | OUTPATIENT
Start: 2024-07-10 | End: 2024-07-12 | Stop reason: HOSPADM

## 2024-07-10 RX ORDER — AMOXICILLIN 250 MG
2 CAPSULE ORAL 2 TIMES DAILY PRN
Status: DISCONTINUED | OUTPATIENT
Start: 2024-07-10 | End: 2024-07-12 | Stop reason: HOSPADM

## 2024-07-10 RX ORDER — NITROGLYCERIN 0.4 MG/1
0.4 TABLET SUBLINGUAL
Status: DISCONTINUED | OUTPATIENT
Start: 2024-07-10 | End: 2024-07-12 | Stop reason: HOSPADM

## 2024-07-10 RX ORDER — POLYETHYLENE GLYCOL 3350 17 G/17G
17 POWDER, FOR SOLUTION ORAL DAILY PRN
Status: DISCONTINUED | OUTPATIENT
Start: 2024-07-10 | End: 2024-07-12 | Stop reason: HOSPADM

## 2024-07-10 RX ORDER — ASPIRIN 81 MG/1
81 TABLET ORAL DAILY
Status: DISCONTINUED | OUTPATIENT
Start: 2024-07-10 | End: 2024-07-12 | Stop reason: HOSPADM

## 2024-07-10 RX ORDER — ONDANSETRON 2 MG/ML
4 INJECTION INTRAMUSCULAR; INTRAVENOUS EVERY 6 HOURS PRN
Status: DISCONTINUED | OUTPATIENT
Start: 2024-07-10 | End: 2024-07-12 | Stop reason: HOSPADM

## 2024-07-10 RX ORDER — SODIUM CHLORIDE 9 MG/ML
40 INJECTION, SOLUTION INTRAVENOUS AS NEEDED
Status: DISCONTINUED | OUTPATIENT
Start: 2024-07-10 | End: 2024-07-12 | Stop reason: HOSPADM

## 2024-07-10 RX ORDER — ACETAMINOPHEN 650 MG/1
650 SUPPOSITORY RECTAL EVERY 4 HOURS PRN
Status: DISCONTINUED | OUTPATIENT
Start: 2024-07-10 | End: 2024-07-12 | Stop reason: HOSPADM

## 2024-07-10 RX ORDER — AMLODIPINE BESYLATE 5 MG/1
10 TABLET ORAL DAILY
Status: DISCONTINUED | OUTPATIENT
Start: 2024-07-11 | End: 2024-07-12 | Stop reason: HOSPADM

## 2024-07-10 RX ORDER — TAMSULOSIN HYDROCHLORIDE 0.4 MG/1
0.4 CAPSULE ORAL DAILY
Status: DISCONTINUED | OUTPATIENT
Start: 2024-07-10 | End: 2024-07-12 | Stop reason: HOSPADM

## 2024-07-10 RX ORDER — PRAVASTATIN SODIUM 20 MG
40 TABLET ORAL NIGHTLY
Status: DISCONTINUED | OUTPATIENT
Start: 2024-07-10 | End: 2024-07-12 | Stop reason: HOSPADM

## 2024-07-10 RX ORDER — SODIUM CHLORIDE 0.9 % (FLUSH) 0.9 %
10 SYRINGE (ML) INJECTION EVERY 12 HOURS SCHEDULED
Status: DISCONTINUED | OUTPATIENT
Start: 2024-07-10 | End: 2024-07-12 | Stop reason: HOSPADM

## 2024-07-10 RX ORDER — PRAMIPEXOLE DIHYDROCHLORIDE 0.25 MG/1
0.25 TABLET ORAL NIGHTLY
Status: DISCONTINUED | OUTPATIENT
Start: 2024-07-10 | End: 2024-07-12 | Stop reason: HOSPADM

## 2024-07-10 RX ORDER — CARVEDILOL 25 MG/1
25 TABLET ORAL 2 TIMES DAILY WITH MEALS
Status: DISCONTINUED | OUTPATIENT
Start: 2024-07-10 | End: 2024-07-12 | Stop reason: HOSPADM

## 2024-07-10 RX ORDER — BUSPIRONE HYDROCHLORIDE 15 MG/1
15 TABLET ORAL DAILY
Status: DISCONTINUED | OUTPATIENT
Start: 2024-07-10 | End: 2024-07-12 | Stop reason: HOSPADM

## 2024-07-10 RX ORDER — CARVEDILOL 12.5 MG/1
12.5 TABLET ORAL 2 TIMES DAILY WITH MEALS
Status: DISCONTINUED | OUTPATIENT
Start: 2024-07-10 | End: 2024-07-10

## 2024-07-10 RX ORDER — AMLODIPINE BESYLATE 5 MG/1
5 TABLET ORAL DAILY
Status: DISCONTINUED | OUTPATIENT
Start: 2024-07-10 | End: 2024-07-10

## 2024-07-10 RX ADMIN — ASPIRIN 325 MG: 325 TABLET ORAL at 03:25

## 2024-07-10 RX ADMIN — PRAMIPEXOLE DIHYDROCHLORIDE 0.25 MG: 0.25 TABLET ORAL at 20:16

## 2024-07-10 RX ADMIN — CARVEDILOL 12.5 MG: 12.5 TABLET, FILM COATED ORAL at 10:56

## 2024-07-10 RX ADMIN — SULFUR HEXAFLUORIDE 4 ML: KIT at 13:08

## 2024-07-10 RX ADMIN — PRAVASTATIN SODIUM 40 MG: 20 TABLET ORAL at 20:16

## 2024-07-10 RX ADMIN — Medication 10 ML: at 20:16

## 2024-07-10 RX ADMIN — AMLODIPINE BESYLATE 5 MG: 5 TABLET ORAL at 10:56

## 2024-07-10 RX ADMIN — Medication 10 ML: at 10:56

## 2024-07-10 RX ADMIN — CARVEDILOL 25 MG: 25 TABLET, FILM COATED ORAL at 17:45

## 2024-07-10 NOTE — PAYOR COMM NOTE
"TO:BC  FROM:CLARK LOMBARDI RN PHONE [phone]   OBSERVATION NOTIFICATION  TAX ID [id number] NPI [id number] DX CODE:[id number]  Hilton Mejia (64 y.o. Male)       Date of Birth   1960    Social Security Number       Address   [address] [place], [place] [id number]    Home Phone   [phone]    MRN   [id number]       Jewish   None    Marital Status                               Admission Date   7/10/24    Admission Type   Emergency    Admitting Provider   Kerley, Brian Joseph, DO    Attending Provider   Kerley, Brian Joseph, DO    [facility]       Discharge Date       Discharge Disposition       Discharge Destination                                 Attending Provider: Kerley, Brian Joseph, DO    Allergies: Topamax [Topiramate], Oxycodone    Isolation: None   Infection: None   Code Status: Prior    Ht: 170.2 cm (67\")   Wt: 115 kg (253 lb 8.5 oz)    Admission Cmt: None   Principal Problem: None                  Active Insurance as of 7/10/2024       Primary Coverage       Payor Plan Insurance Group Employer/Plan Group    ANTHEM MEDICARE REPLACEMENT ANTHEM MEDICARE ADVANTAGE [id number]       Payor Plan Address Payor Plan Phone Number Payor Plan Fax Number Effective Dates     [id number] [phone]  3/1/2022 - None Entered    [facility] [address]         Subscriber Name Subscriber Birth Date Member ID       HILTON MEJIA 1960 [id number]                     Emergency Contacts        (Rel.) Home Phone Work Phone Mobile Phone    Diana Mejia (Spouse) [phone] -- --              History & Physical    No notes of this type exist for this encounter.          Emergency Department Notes        Isaiah Zhu MD at 07/10/24 0412           EMERGENCY DEPARTMENT ENCOUNTER    Pt Name: Hilton Mejia  MRN: [id number]  Pt :   1960  Room Number:  ERUM/ERUM  Date of encounter:  7/10/2024  PCP: Bernardino" "Naty Estrada MD  ED Provider: Isaiah Zhu MD    Historian: Patient and wife at bedside      HPI:  Chief Complaint: Chest pain        Context: Chris Mejia is a 64 y.o. male who presents to the ED c/o pain.  Patient had sudden onset of chest pain and pressure around 6 PM.  Was given sublingual nitroglycerin by wife and pain resolved.  Pain then happened again at around 2 AM, again resolving following nitroglycerin approximately 1 hour later.  Denies any recent illness or trauma.  States that several years ago he was told that he had a \"75% blockage in a heart artery\" but no stents were placed at that time.      PAST MEDICAL HISTORY  Past Medical History:   Diagnosis Date    Allergic rhinitis     Anxiety     Arthritis     Balance problem     Carpal tunnel syndrome     Cluster headache     Coronary artery disease     Developmental delay     Diabetes mellitus     Noted by PCP    Difficulty walking     Dizziness     Dysphagia     Enlarged prostate     GERD (gastroesophageal reflux disease)     Gout     Hip fracture, left     History of cardiovascular stress test 2018    positive for inferior and lateral ischemia     History of echocardiogram     Hyperlipidemia     Hypertension     Impaired mobility     Lymphadenitis     Memory loss     Migraine     Myocardial infarction 2000    Obesity     DENZEL (obstructive sleep apnea)     Peptic ulcer     Peptic ulceration     Poor historian     Rheumatoid arthritis     Right shoulder pain     SOB (shortness of breath) on exertion     Spinocerebellar ataxia     Tension headache     Trigeminal neuralgia     Vitamin B 12 deficiency     Vitamin D deficiency          PAST SURGICAL HISTORY  Past Surgical History:   Procedure Laterality Date    CARDIAC CATHETERIZATION      CARDIAC CATHETERIZATION Left 3/14/2018    Procedure: Cardiac Catheterization/Vascular Study;  Surgeon: Adam Perkins MD;  Location: [facility] CATH INVASIVE LOCATION;  Service: Cardiovascular    COLONOSCOPY      " COLONOSCOPY N/A 9/13/2019    Procedure: COLONOSCOPY W/ COLD SNARE POLYPECTOMY;  Surgeon: Melba Lopez MD;  Location: [facility] ENDOSCOPY;  Service: Gastroenterology    ELBOW ARTHROPLASTY Bilateral     ENDOSCOPY      HERNIA REPAIR      x3    HIP HEMIARTHROPLASTY Left     NEUROPLASTY Bilateral     decompression median nerve at carpal tunnel    TOTAL KNEE ARTHROPLASTY Bilateral     2005, 2012         FAMILY HISTORY  Family History   Problem Relation Age of Onset    Deep vein thrombosis Mother     Diabetes Mother     Hyperlipidemia Mother     Hypertension Mother     Heart attack Mother     Cancer Father     Kidney disease Father     Deep vein thrombosis Daughter     Arthritis Sister     Diabetes Sister     Hyperlipidemia Sister     Hypertension Sister     Osteoporosis Sister     Thyroid disease Sister     Liver disease Sister     Arthritis Brother     Diabetes Brother     Hyperlipidemia Brother     Hypertension Brother     Stroke Brother     Cancer Brother     Heart attack Brother     Mental illness Brother          SOCIAL HISTORY  Social History     Socioeconomic History    Marital status:    Tobacco Use    Smoking status: Never    Smokeless tobacco: Never   Vaping Use    Vaping status: Never Used   Substance and Sexual Activity    Alcohol use: No    Drug use: No    Sexual activity: Defer         ALLERGIES  Topamax [topiramate] and Oxycodone        REVIEW OF SYSTEMS  Review of Systems     All systems reviewed and negative except for those discussed in HPI.       PHYSICAL EXAM    I have reviewed the triage vital signs and nursing notes.    ED Triage Vitals [07/10/24 0311]   Temp Heart Rate Resp BP SpO2   98.7 °F (37.1 °C) 75 20 (!) 140/103 95 %      Temp src Heart Rate Source Patient Position BP Location FiO2 (%)   Oral Monitor Sitting Right arm --       Physical Exam    General:  Awake, alert, no acute distress  HEENT: Atraumatic, normocephalic, EOMI, PERRLA, mucous membranes moist  NECK:  Supple,  atraumatic  Cardiovascular:  Regular rate, regular rhythm, no murmurs, rubs, or gallops.  Extremities well perfused   Respiratory:  Regular rate, clear lungs to auscultation bilaterally.  No rhonchi, rales, wheezing  Abdominal:  Soft, nondistended, nontender.  No guarding or rebound.  No palpable masses  Extremity:  No visible bony abnormalities in all 4 extremities.  Full range of motion of all extremities.  Skin:  Warm and dry.  No rashes  Neuro:  AAOx3, GCS 15. Cranial nerves 2-12 grossly intact.  No focal strength or sensation deficits.  Psych:  Mood and affect appropriate.        LAB RESULTS  Recent Results (from the past 24 hour(s))   Comprehensive Metabolic Panel    Collection Time: 07/10/24  3:25 AM    Specimen: Blood   Result Value Ref Range    Glucose 124 (H) 65 - 99 mg/dL    BUN 16 8 - 23 mg/dL    Creatinine 0.94 0.76 - 1.27 mg/dL    Sodium 137 136 - 145 mmol/L    Potassium 3.9 3.5 - 5.2 mmol/L    Chloride 101 98 - 107 mmol/L    CO2 23.2 22.0 - 29.0 mmol/L    Calcium 8.9 8.6 - 10.5 mg/dL    Total Protein 7.7 6.0 - 8.5 g/dL    Albumin 4.1 3.5 - 5.2 g/dL    ALT (SGPT) 34 1 - 41 U/L    AST (SGOT) 27 1 - 40 U/L    Alkaline Phosphatase 47 39 - 117 U/L    Total Bilirubin 0.4 0.0 - 1.2 mg/dL    Globulin 3.6 gm/dL    A/G Ratio 1.1 g/dL    BUN/Creatinine Ratio 17.0 7.0 - 25.0    Anion Gap 12.8 5.0 - 15.0 mmol/L    eGFR 89.4 >60.0 mL/min/1.73   High Sensitivity Troponin T    Collection Time: 07/10/24  3:25 AM    Specimen: Blood   Result Value Ref Range    HS Troponin T 17 <22 ng/L   Green Top (Gel)    Collection Time: 07/10/24  3:25 AM   Result Value Ref Range    Extra Tube Hold for add-ons.    Lavender Top    Collection Time: 07/10/24  3:25 AM   Result Value Ref Range    Extra Tube hold for add-on    Gold Top - SST    Collection Time: 07/10/24  3:25 AM   Result Value Ref Range    Extra Tube Hold for add-ons.    Light Blue Top    Collection Time: 07/10/24  3:25 AM   Result Value Ref Range    Extra Tube Hold for  add-ons.    CBC Auto Differential    Collection Time: 07/10/24  3:25 AM    Specimen: Blood   Result Value Ref Range    WBC 8.56 3.40 - 10.80 10*3/mm3    RBC 5.17 4.14 - 5.80 10*6/mm3    Hemoglobin 17.4 13.0 - 17.7 g/dL    Hematocrit 48.9 37.5 - 51.0 %    MCV 94.6 79.0 - 97.0 fL    MCH 33.7 (H) 26.6 - 33.0 pg    MCHC 35.6 31.5 - 35.7 g/dL    RDW 12.5 12.3 - 15.4 %    RDW-SD 43.7 37.0 - 54.0 fl    MPV 9.9 6.0 - 12.0 fL    Platelets 198 140 - 450 10*3/mm3    Neutrophil % 52.4 42.7 - 76.0 %    Lymphocyte % 28.5 19.6 - 45.3 %    Monocyte % 11.8 5.0 - 12.0 %    Eosinophil % 6.1 0.3 - 6.2 %    Basophil % 0.8 0.0 - 1.5 %    Immature Grans % 0.4 0.0 - 0.5 %    Neutrophils, Absolute 4.49 1.70 - 7.00 10*3/mm3    Lymphocytes, Absolute 2.44 0.70 - 3.10 10*3/mm3    Monocytes, Absolute 1.01 (H) 0.10 - 0.90 10*3/mm3    Eosinophils, Absolute 0.52 (H) 0.00 - 0.40 10*3/mm3    Basophils, Absolute 0.07 0.00 - 0.20 10*3/mm3    Immature Grans, Absolute 0.03 0.00 - 0.05 10*3/mm3    nRBC 0.0 0.0 - 0.2 /100 WBC   High Sensitivity Troponin T 2Hr    Collection Time: 07/10/24  5:19 AM    Specimen: Blood   Result Value Ref Range    HS Troponin T 16 <22 ng/L    Troponin T Delta -1 >=-4 - <+4 ng/L       If labs were ordered, I independently reviewed the results and considered them in treating the patient.        RADIOLOGY  No Radiology Exams Resulted Within Past 24 Hours        PROCEDURES    Procedures    ECG 12 Lead ED Triage Standing Order; Chest Pain   Final Result          MEDICATIONS GIVEN IN ER    Medications   sodium chloride 0.9 % flush 10 mL (has no administration in time range)   aspirin tablet 325 mg (325 mg Oral Given 7/10/24 2530)         MEDICAL DECISION MAKING, PROGRESS, and CONSULTS    All labs, if obtained, have been independently reviewed by me.  All radiology studies, if obtained, have been reviewed by me and the radiologist dictating the report.  All EKG's, if obtained, have been independently viewed and interpreted by me.       Discussion below represents my analysis of pertinent findings related to patient's condition, differential diagnosis, treatment plan and final disposition.    Chris Mejia is a 64 y.o. male who presents to the ED c/o chest pain.  Hemodynamically stable nontoxic) upon arrival.  Symptoms resolved prior to onset after being given nitro at home.  Differential diagnosis includes but is not limited to MI, musculoskeletal chest pain, GERD, stable angina, unstable angina, pneumothorax, and less likely rib fracture. Labs obtained including CBC, CMP, Troponin profile. Chest xray and EKG obtained. Patient received total of 324mg ASA.     EKG independently interpretted by myself and shows normal sinus rhythm with rate of 88 and no evidence of acute ischemic changes/STEMI along with no significant interval abnormalities.    Chest x-ray obtained which shows cardiomegaly consistent with previous x-rays but no evidence of large focal pneumonia or pneumothorax.    Labs show no critical electrolyte imbalances.  Normal white blood cell count and no anemia.  Initial high-sensitivity troponin of 17.  Repeat troponin of 16.  Remained stable emergency department.  Discussed results with patient.  Patient has elevated heart score 6 with high risk chest pain, discussed option of outpatient follow-up with cardiology versus observation admission.  Patient and wife would prefer admission for further observation at this time.  Patient to be admitted by hospitalist.        HEART Score: 6                      Orders placed during this visit:  Orders Placed This Encounter   Procedures    XR Chest 1 View    Cross Draw    Comprehensive Metabolic Panel    High Sensitivity Troponin T    CBC Auto Differential    High Sensitivity Troponin T 2Hr    NPO Diet NPO Type: Strict NPO    Undress & Gown    Continuous Pulse Oximetry    Oxygen Therapy- Nasal Cannula; Titrate 1-6 LPM Per SpO2; 90 - 95%    ECG 12 Lead ED Triage Standing Order; Chest Pain     ECG 12 Lead ED Triage Standing Order; Chest Pain    Insert Peripheral IV    Initiate Observation Status    CBC & Differential    Green Top (Gel)    Lavender Top    Gold Top - SST    Light Blue Top         ED Course:    Consultants:                  Shared Decision Making:  After my consideration of clinical presentation and any laboratory/radiology studies obtained, I discussed the findings with the patient/patient representative who is in agreement with the treatment plan and the final disposition.   Risks and benefits of discharge and/or observation/admission were discussed.      AS OF 06:58 EDT VITALS:    BP - 149/99  HR - 68  TEMP - 98.7 °F (37.1 °C) (Oral)  O2 SATS - 94%                  DIAGNOSIS  Final diagnoses:   Acute chest pain   Coronary artery disease involving native heart with angina pectoris, unspecified vessel or lesion type         DISPOSITION  Admit      Please note that portions of this document were completed with voice recognition software.        Isaiah Zhu MD  07/10/24 0700      Electronically signed by Isaiah Zhu MD at 07/10/24 0700       Vital Signs (last day)       Date/Time Temp Temp src Pulse Resp BP Patient Position SpO2    07/10/24 0705 97.7 (36.5) Oral 77 16 162/110 Lying 96    07/10/24 0602 -- -- 68 16 149/99 Lying 94    07/10/24 0530 -- -- 68 -- 140/98 -- 93    07/10/24 0502 -- -- 68 -- 131/85 -- 92    07/10/24 0432 -- -- 73 -- 131/92 -- 92    07/10/24 0402 -- -- 71 -- 128/89 -- 92    07/10/24 0328 -- -- 77 -- 135/92 -- 97    07/10/24 0311 98.7 (37.1) Oral 75 20 140/103 Sitting 95          Current Facility-Administered Medications   Medication Dose Route Frequency Provider Last Rate Last Admin    sodium chloride 0.9 % flush 10 mL  10 mL Intravenous PRN Isaiah Zhu MD         Lab Results (last 24 hours)       Procedure Component Value Units Date/Time    High Sensitivity Troponin T 2Hr [[id number]]  (Normal) Collected: 07/10/24 0519    Specimen: Blood Updated:  07/10/24 0544     HS Troponin T 16 ng/L      Troponin T Delta -1 ng/L     Narrative:      High Sensitive Troponin T Reference Range:  <14.0 ng/L- Negative Female for AMI  <22.0 ng/L- Negative Male for AMI  >=14 - Abnormal Female indicating possible myocardial injury.  >=22 - Abnormal Male indicating possible myocardial injury.   Clinicians would have to utilize clinical acumen, EKG, Troponin, and serial changes to determine if it is an Acute Myocardial Infarction or myocardial injury due to an underlying chronic condition.         High Sensitivity Troponin T [[id number]]  (Normal) Collected: 07/10/24 0325    Specimen: Blood Updated: 07/10/24 0348     HS Troponin T 17 ng/L     Narrative:      High Sensitive Troponin T Reference Range:  <14.0 ng/L- Negative Female for AMI  <22.0 ng/L- Negative Male for AMI  >=14 - Abnormal Female indicating possible myocardial injury.  >=22 - Abnormal Male indicating possible myocardial injury.   Clinicians would have to utilize clinical acumen, EKG, Troponin, and serial changes to determine if it is an Acute Myocardial Infarction or myocardial injury due to an underlying chronic condition.         Comprehensive Metabolic Panel [[id number]]  (Abnormal) Collected: 07/10/24 0325    Specimen: Blood Updated: 07/10/24 0346     Glucose 124 mg/dL      BUN 16 mg/dL      Creatinine 0.94 mg/dL      Sodium 137 mmol/L      Potassium 3.9 mmol/L      Chloride 101 mmol/L      CO2 23.2 mmol/L      Calcium 8.9 mg/dL      Total Protein 7.7 g/dL      Albumin 4.1 g/dL      ALT (SGPT) 34 U/L      AST (SGOT) 27 U/L      Alkaline Phosphatase 47 U/L      Total Bilirubin 0.4 mg/dL      Globulin 3.6 gm/dL      A/G Ratio 1.1 g/dL      BUN/Creatinine Ratio 17.0     Anion Gap 12.8 mmol/L      eGFR 89.4 mL/min/1.73     Narrative:      GFR Normal >60  Chronic Kidney Disease <60  Kidney Failure <15      CBC & Differential [[id number]]  (Abnormal) Collected: 07/10/24 0325    Specimen: Blood Updated: 07/10/24 0335     Narrative:      The following orders were created for panel order CBC & Differential.  Procedure                               Abnormality         Status                     ---------                               -----------         ------                     CBC Auto Differential[[id number]]        Abnormal            Final result                 Please view results for these tests on the individual orders.    CBC Auto Differential [[id number]]  (Abnormal) Collected: 07/10/24 0325    Specimen: Blood Updated: 07/10/24 0335     WBC 8.56 10*3/mm3      RBC 5.17 10*6/mm3      Hemoglobin 17.4 g/dL      Hematocrit 48.9 %      MCV 94.6 fL      MCH 33.7 pg      MCHC 35.6 g/dL      RDW 12.5 %      RDW-SD 43.7 fl      MPV 9.9 fL      Platelets 198 10*3/mm3      Neutrophil % 52.4 %      Lymphocyte % 28.5 %      Monocyte % 11.8 %      Eosinophil % 6.1 %      Basophil % 0.8 %      Immature Grans % 0.4 %      Neutrophils, Absolute 4.49 10*3/mm3      Lymphocytes, Absolute 2.44 10*3/mm3      Monocytes, Absolute 1.01 10*3/mm3      Eosinophils, Absolute 0.52 10*3/mm3      Basophils, Absolute 0.07 10*3/mm3      Immature Grans, Absolute 0.03 10*3/mm3      nRBC 0.0 /100 WBC     [place] Draw [[id number]] Collected: 07/10/24 0325    Specimen: Blood Updated: 07/10/24 0331    Narrative:      The following orders were created for panel order [place] Draw.  Procedure                               Abnormality         Status                     ---------                               -----------         ------                     Green Top (Gel)[[id number]]                                  Final result               Lavender Top[[id number]]                                     Final result               Gold Top - SST[[id number]]                                   Final result               Light Blue Top[[id number]]                                   Final result                 Please view results for these tests on the individual orders.    Green Top  (Gel) [[id number]] Collected: 07/10/24 0325    Specimen: Blood Updated: 07/10/24 0331     Extra Tube Hold for add-ons.     Comment: Auto resulted.       Lavender Top [[id number]] Collected: 07/10/24 0325    Specimen: Blood Updated: 07/10/24 0331     Extra Tube hold for add-on     Comment: Auto resulted       Gold Top - SST [[id number]] Collected: 07/10/24 0325    Specimen: Blood Updated: 07/10/24 0331     Extra Tube Hold for add-ons.     Comment: Auto resulted.       Light Blue Top [[id number]] Collected: 07/10/24 0325    Specimen: Blood Updated: 07/10/24 0331     Extra Tube Hold for add-ons.     Comment: Auto resulted             Imaging Results (Last 24 Hours)       Procedure Component Value Units Date/Time    XR Chest 1 View [[id number]] Collected: 07/10/24 0658     Updated: 07/10/24 0700    Narrative:      FINAL REPORT    TECHNIQUE:  null    CLINICAL HISTORY:  Chest Pain  center and left side pain    COMPARISON:  null    FINDINGS:  1 view chest x-ray    Comparison: None    Findings:    Cardiomegaly.    Low lung volumes with elevated hemidiaphragms. Probable linear atelectasis within the right lung base.    Interstitial thickening diffusely throughout the bilateral lungs this could reflect interstitial edema or pneumonitis. No focal consolidation. no large pleural effusion and no pneumothorax. No fracture.      Impression:      IMPRESSION:    1. Low lung volumes contributing to bronchovascular crowding and may accentuate the heart size.    2. Hazy interstitial throughout the bilateral lungs could reflect interstitial edema or pneumonitis.    3. Probable atelectasis of the right lung base.    Authenticated and Electronically Signed by Suleman Christina DO on  07/10/2024 06:58:32 AM          ECG/EMG Results (last 24 hours)       Procedure Component Value Units Date/Time    ECG 12 Lead ED Triage Standing Order; Chest Pain [[id number]] Resulted: 07/10/24 0324     Updated: 07/10/24 0325          Physician Progress Notes (last  24 hours)  Notes from 07/09/24 0940 through 07/10/24 0940   No notes of this type exist for this encounter.

## 2024-07-10 NOTE — PLAN OF CARE
Goal Outcome Evaluation:  Plan of Care Reviewed With: patient, spouse  Pt to have have stress test in am. Pt and spouse states understanding of plan of care.

## 2024-07-10 NOTE — H&P
[facility]      Name:  Chris Mejia   Age:  64 y.o.  Sex:  male  :  1960  MRN:  [id number]   Visit Number:  [id number]  Admission Date:  7/10/2024  Date Of Service:  07/10/24  Primary Care Physician:  Naty Lebron MD    Chief Complaint:     Chest pain    History Of Presenting Illness:      Mr. Mejia is a 64-year-old male who presented to emergency department due to chest pain for the past several days.  Pertinent past medical history of coronary artery disease, ataxia, hypertension, hyperlipidemia, anxiety, prior stress test in 2019 per Dr. Beverly.  Patient also experiencing associated shortness of air, dry cough, and worsening lower extremity edema/abdominal distention.  Was told in the past that his heart was enlarged.  Has not followed with cardiology in several years.  Does have an appointment tomorrow.  Does also endorse associated GERD.    Upon ED presentation patient hypertensive with blood pressure 140/103, otherwise hemodynamically stable.  Pertinent imaging and labs included troponin negative x 2, WBC 8.5, A1c 5.7, lipid panel reviewed and within normal limits, chest x-ray noted low lung volumes contributing to bronchovascular crowding and make stimulate the heart size with underlying hazy interstitial throughout the bilateral lungs that could reflect interstitial edema or pneumonitis.  Given chest pain with atypical and typical features cardiology consulted.  Hospitalist consulted for further medical management.  Planned stress test.  Aspirin and statin continued.    Review Of Systems:    All systems were reviewed and negative except as mentioned in history of presenting illness, assessment and plan.    Past Medical History: Patient  has a past medical history of Allergic rhinitis, Anxiety, Arthritis, Balance problem, Carpal tunnel syndrome, Cluster headache, Coronary artery disease, Developmental delay, Diabetes mellitus,  Difficulty walking, Dizziness, Dysphagia, Enlarged prostate, GERD (gastroesophageal reflux disease), Gout, Hip fracture, left, History of cardiovascular stress test (2018), History of echocardiogram, Hyperlipidemia, Hypertension, Impaired mobility, Lymphadenitis, Memory loss, Migraine, Myocardial infarction (2000), Obesity, DENZEL (obstructive sleep apnea), Peptic ulcer, Peptic ulceration, Poor historian, Rheumatoid arthritis, Right shoulder pain, SOB (shortness of breath) on exertion, Spinocerebellar ataxia, Tension headache, Trigeminal neuralgia, Vitamin B 12 deficiency, and Vitamin D deficiency.    Past Surgical History: Patient  has a past surgical history that includes Elbow Arthroplasty (Bilateral); Hernia repair; Total knee arthroplasty (Bilateral); Neuroplasty (Bilateral); Cardiac catheterization; Cardiac catheterization (Left, 3/14/2018); Hip hemiArthroplasty (Left); Colonoscopy; Esophagogastroduodenoscopy; and Colonoscopy (N/A, 9/13/2019).    Social History: Patient  reports that he has never smoked. He has never used smokeless tobacco. He reports current alcohol use. He reports that he does not use drugs.    Family History:  Patient's family history has been reviewed and found to be noncontributory.     Allergies:      Topamax [topiramate] and Oxycodone    Home Medications:    Prior to Admission Medications       Prescriptions Last Dose Informant Patient Reported? Taking?    busPIRone (BUSPAR) 15 MG tablet Patient Taking Differently Self, Spouse/Significant Other No Yes    TAKE 1 TABLET TWICE A DAY  AS NEEDED FOR ANXIETY    Patient taking differently:  Take 1 tablet by mouth Daily.    nitroglycerin (Nitrostat) 0.4 MG SL tablet 7/9/2024  No Yes    Place 1 tablet under the tongue Every 5 (Five) Minutes As Needed for Chest Pain. Take no more than 3 doses in 15 minutes.    pramipexole (Mirapex) 0.25 MG tablet Patient Taking Differently Self, Spouse/Significant Other No Yes    Take 1 tablet by mouth 3 (Three)  Times a Day.    Patient taking differently:  Take 1 tablet by mouth every night at bedtime.    amLODIPine (NORVASC) 5 MG tablet   No No    Take 1 tablet by mouth Daily. Indications: High Blood Pressure Disorder    aspirin 81 MG tablet   Yes No    Take 1 tablet by mouth Daily.    carvedilol (COREG) 12.5 MG tablet   No No    Take 1 tablet by mouth 2 (Two) Times a Day With Meals.    cyanocobalamin injection 1,000 mcg   No No    galcanezumab-gnlm (Emgality) 120 MG/ML auto-injector pen Not Taking Self No No    Inject 1 mL under the skin into the appropriate area as directed Every 30 (Thirty) Days.    Patient not taking:  Reported on 7/10/2024    levocetirizine (XYZAL) 5 MG tablet   No No    Take 1 tablet by mouth Every Evening.    meclizine (ANTIVERT) 25 MG tablet Not Taking Self, Spouse/Significant Other No No    Take 1 tablet by mouth 3 (Three) Times a Day As Needed for Dizziness.    Patient not taking:  Reported on 7/10/2024    pantoprazole (PROTONIX) 40 MG EC tablet   No No    Take 1 tablet by mouth Daily.    pravastatin (PRAVACHOL) 40 MG tablet   No No    Take 1 tablet by mouth Every Night.    tamsulosin (FLOMAX) 0.4 MG capsule 24 hr capsule   No No    TAKE 1 CAPSULE EVERY NIGHT (DOSE DECREASE DUE TO      VISION DISTURBANCE)    Tiotropium Bromide Monohydrate (Spiriva Respimat) 1.25 MCG/ACT aerosol solution inhaler Not Taking  No No    Inhale 1-2 puffs Daily.    Patient not taking:  Reported on 7/10/2024    tiZANidine (ZANAFLEX) 4 MG tablet   No No    Take 1 tablet by mouth Every Night.          ED Medications:    Medications   sodium chloride 0.9 % flush 10 mL (has no administration in time range)   aspirin tablet 325 mg (325 mg Oral Given 7/10/24 0325)     Vital Signs:  Temp:  [97.7 °F (36.5 °C)-98.7 °F (37.1 °C)] 97.7 °F (36.5 °C)  Heart Rate:  [68-77] 77  Resp:  [16-20] 16  BP: (128-162)/() 162/110        07/10/24  0311 07/10/24  0705   Weight: 114 kg (251 lb) 115 kg (253 lb 8.5 oz)     Body mass index is  "39.71 kg/m².    Physical Exam:     Most recent vital Signs: BP (!) 162/110 (BP Location: Right arm, Patient Position: Lying)   Pulse 77   Temp 97.7 °F (36.5 °C) (Oral)   Resp 16   Ht 170.2 cm (67\")   Wt 115 kg (253 lb 8.5 oz)   SpO2 96%   BMI 39.71 kg/m²     Physical Exam  Vitals and nursing note reviewed.   Constitutional:       Appearance: He is obese.      Comments: Chronically ill middle-age male.   HENT:      Nose: Nose normal.      Mouth/Throat:      Mouth: Mucous membranes are moist.   Eyes:      Pupils: Pupils are equal, round, and reactive to light.   Cardiovascular:      Rate and Rhythm: Normal rate and regular rhythm.      Pulses: Normal pulses.      Heart sounds: Normal heart sounds.   Pulmonary:      Effort: Pulmonary effort is normal.      Breath sounds: Normal breath sounds.   Abdominal:      General: Bowel sounds are normal. There is distension.      Palpations: Abdomen is soft.   Musculoskeletal:      Cervical back: Normal range of motion.      Right lower leg: Edema present.      Left lower leg: Edema present.   Skin:     General: Skin is warm.      Capillary Refill: Capillary refill takes less than 2 seconds.   Neurological:      General: No focal deficit present.      Mental Status: He is alert and oriented to person, place, and time.   Psychiatric:         Mood and Affect: Mood normal.         Laboratory data:    I have reviewed the labs done in the emergency room.    Results from last 7 days   Lab Units 07/10/24  0325   SODIUM mmol/L 137   POTASSIUM mmol/L 3.9   CHLORIDE mmol/L 101   CO2 mmol/L 23.2   BUN mg/dL 16   CREATININE mg/dL 0.94   CALCIUM mg/dL 8.9   BILIRUBIN mg/dL 0.4   ALK PHOS U/L 47   ALT (SGPT) U/L 34   AST (SGOT) U/L 27   GLUCOSE mg/dL 124*     Results from last 7 days   Lab Units 07/10/24  0325   WBC 10*3/mm3 8.56   HEMOGLOBIN g/dL 17.4   HEMATOCRIT % 48.9   PLATELETS 10*3/mm3 198         Results from last 7 days   Lab Units 07/10/24  0519 07/10/24  0325   HSTROP T ng/L " 16 17         Pain Management Panel          Latest Ref Rng & Units 3/2/2023   Pain Management Panel   Amphetamine, Urine Qual Negative Negative    Barbiturates Screen, Urine Negative Negative    Benzodiazepine Screen, Urine Negative Negative    Buprenorphine, Screen, Urine Negative Negative    Cocaine Screen, Urine Negative Negative    Methadone Screen , Urine Negative Negative    Methamphetamine, Ur Negative Negative        EKG:      Sinus rhythm bpm 88    Radiology:    XR Chest 1 View    Result Date: 7/10/2024  FINAL REPORT TECHNIQUE: null CLINICAL HISTORY: Chest Pain  center and left side pain COMPARISON: null FINDINGS: 1 view chest x-ray Comparison: None Findings: Cardiomegaly. Low lung volumes with elevated hemidiaphragms. Probable linear atelectasis within the right lung base. Interstitial thickening diffusely throughout the bilateral lungs this could reflect interstitial edema or pneumonitis. No focal consolidation. no large pleural effusion and no pneumothorax. No fracture.     IMPRESSION: 1. Low lung volumes contributing to bronchovascular crowding and may accentuate the heart size. 2. Hazy interstitial throughout the bilateral lungs could reflect interstitial edema or pneumonitis. 3. Probable atelectasis of the right lung base. Authenticated and Electronically Signed by Suleman Christina DO on 07/10/2024 06:58:32 AM     Assessment:    Chest pain, POA  Uncontrolled hypertension, POA  Coronary artery disease  Hyperlipidemia  Anxiety  BPH  Spinocerebellar ataxia    Plan:    -Cardiology consulted.  -No current chest pain.  -2-day stress test ordered per cardiology.  -A1c 5.7.  Lipid panel reviewed.  -Uncontrolled hypertension could also be contributing to chest pain.  Suspect underlying heart failure with echo currently pending.  -N.p.o. after midnight.  -Home medications reordered and uptitrated due to uncontrolled hypertension.  -Further orders pending clinical course.    Risk Assessment: High  DVT Prophylaxis:  SCDs  Code Status: Full code  Diet: Cardiac/n.p.o. after midnight    Advance Care Planning   ACP discussion was declined by the patient. Patient does not have an advance directive, declines further assistance.           Essence Santiago, APRN  07/10/24  10:26 EDT    Dictated utilizing Dragon dictation.

## 2024-07-10 NOTE — CASE MANAGEMENT/SOCIAL WORK
Discharge Planning Assessment  [facility]     Patient Name: Chris Mejia  MRN: [id number]  Today's Date: 7/10/2024    Admit Date: 7/10/2024    Plan: Pt awake and alert at time of visit.  Spouse at bedside and assisted with answers with pt's agreement. Confirmed pt's address, PCP, telephone number and :  Diana Mejia/spouse/[phone].  Pt does not have a POA or Living Will.  Pt reports he and his wife live in a mobile home and have lived there for 9 years.  There are 3 steps to entry.  Pt has a rollator walker, walker with wheels, WC, cane, gait belt and Hospital bed provided by [employer].  Pt is usually able to perform personal care, his spouse manages his medication and he and his wife share cooking and cleaning.  Spouse reports they use [facility] and [facility].  Meds to Beds explained. Spouse reported they prefer to just use [facility] for new meds.  Pt denies DC needs at this time.  He plans to return home at [place].  Informed should any DC services or equipment needs arise, CM will re-visit in attempt to assist.  SDOH completed.  JE explained and signed.   Discharge Needs Assessment       Row Name 07/10/24 1501       Living Environment    People in Home alone    Current Living Arrangements other (see comments)  Mobile Home    Duration at Residence 9 years    Potentially Unsafe Housing Conditions none    In the past 12 months has the electric, gas, oil, or water company threatened to shut off services in your home? No    Primary Care Provided by self    Provides Primary Care For no one, unable/limited ability to care for self    Family Caregiver if Needed spouse    Family Caregiver Names Diana Mejia/spouse    Quality of Family Relationships supportive    Able to Return to Prior Arrangements yes       Resource/Environmental Concerns    Resource/Environmental Concerns none    Transportation Concerns none       Transportation Needs    In the past 12 months, has lack of  transportation kept you from medical appointments or from getting medications? no    In the past 12 months, has lack of transportation kept you from meetings, work, or from getting things needed for daily living? No       Food Insecurity    Within the past 12 months, you worried that your food would run out before you got the money to buy more. Sometimes    Within the past 12 months, the food you bought just didn't last and you didn't have money to get more. Sometimes       Transition Planning    Patient/Family Anticipates Transition to home with family    Patient/Family Anticipated Services at Transition none    Transportation Anticipated family or friend will provide       Discharge Needs Assessment    Readmission Within the Last 30 Days no previous admission in last 30 days    Equipment Currently Used at Home rollator;walker, rolling;cane, quad;wheelchair;hospital bed;other (see comments)  [employer] provided hospital bed.  Pt has gait belt    Concerns to be Addressed adjustment to diagnosis/illness    Anticipated Changes Related to Illness none    Equipment Needed After Discharge none    Provided Post Acute Provider List? N/A    N/A Provider List Comment No needs identified at this time.  CM will FU as needed    Current Discharge Risk chronically ill                   Discharge Plan       Row Name 07/10/24 2138       Plan    Plan Pt awake and alert at time of visit.  Spouse at bedside and assisted with answers with pt's agreement. Confirmed pt's address, PCP, telephone number and :  Diana Mejia/spouse/[phone].  Pt does not have a POA or Living Will.  Pt reports he and his wife live in a mobile home and have lived there for 9 years.  There are 3 steps to entry.  Pt has a rollator walker, walker with wheels, WC, cane, gait belt and Hospital bed provided by [employer].  Pt is usually able to perform personal care, his spouse manages his medication and he and his wife share cooking and  cleaning.  Spouse reports they use [facility] and [facility].  Meds to Beds explained. Spouse reported they prefer to just use [facility] for new meds.  Pt denies DC needs at this time.  He plans to return home at [place].  Informed should any DC services or equipment needs arise, CM will re-visit in attempt to assist.  SDOH completed.  WOOTEN explained and signed.                  Continued Care and Services - Admitted Since 7/10/2024    No active coordination exists for this encounter.          Demographic Summary       Row Name 07/10/24 1453       General Information    Admission Type observation    Arrived From home    Required Notices Provided Observation Status Notice    Expected Length of Stay (LOS) 48hr    Referral Source admission list    Reason for Consult discharge planning    Preferred Language English       Contact Information    Permission Granted to Share Info With ;family/designee    Contact Information Obtained for     Contact Information Comments Diana Mejia/spouse/[phone]                   Functional Status       Row Name 07/10/24 1458       Functional Status    Usual Activity Tolerance fair    Current Activity Tolerance fair    Functional Status Comments Spouse reported pt activity limited       Physical Activity    On average, how many days per week do you engage in moderate to strenuous exercise (like a brisk walk)? 0 days    On average, how many minutes do you engage in exercise at this level? 0 min    Number of minutes of exercise per week 0       Functional Status, IADL    Medications assistive person    Meal Preparation assistive person    Housekeeping assistive person    Laundry assistive person    Shopping assistive person    IADL Comments Spouse manages medications for pt.  Pt reports they share cooking and cleaning.       Mental Status    General Appearance WDL WDL       Employment/    Employment Status disabled                    Psychosocial    No documentation.                  Abuse/Neglect    No documentation.                  Legal    No documentation.                  Substance Abuse    No documentation.                  Patient Forms    No documentation.                     June Ordaz RN

## 2024-07-10 NOTE — ED PROVIDER NOTES
" EMERGENCY DEPARTMENT ENCOUNTER    Pt Name: Chris Mejia  MRN: [id number]  Pt :   1960  Room Number:  ERUM/ERUM  Date of encounter:  7/10/2024  PCP: Naty Lebron MD  ED Provider: Isaiah Zhu MD    Historian: Patient and wife at bedside      HPI:  Chief Complaint: Chest pain        Context: Chris Mejia is a 64 y.o. male who presents to the ED c/o pain.  Patient had sudden onset of chest pain and pressure around 6 PM.  Was given sublingual nitroglycerin by wife and pain resolved.  Pain then happened again at around 2 AM, again resolving following nitroglycerin approximately 1 hour later.  Denies any recent illness or trauma.  States that several years ago he was told that he had a \"75% blockage in a heart artery\" but no stents were placed at that time.      PAST MEDICAL HISTORY  Past Medical History:   Diagnosis Date    Allergic rhinitis     Anxiety     Arthritis     Balance problem     Carpal tunnel syndrome     Cluster headache     Coronary artery disease     Developmental delay     Diabetes mellitus     Noted by PCP    Difficulty walking     Dizziness     Dysphagia     Enlarged prostate     GERD (gastroesophageal reflux disease)     Gout     Hip fracture, left     History of cardiovascular stress test 2018    positive for inferior and lateral ischemia     History of echocardiogram     Hyperlipidemia     Hypertension     Impaired mobility     Lymphadenitis     Memory loss     Migraine     Myocardial infarction     Obesity     DENZEL (obstructive sleep apnea)     Peptic ulcer     Peptic ulceration     Poor historian     Rheumatoid arthritis     Right shoulder pain     SOB (shortness of breath) on exertion     Spinocerebellar ataxia     Tension headache     Trigeminal neuralgia     Vitamin B 12 deficiency     Vitamin D deficiency          PAST SURGICAL HISTORY  Past Surgical History:   Procedure Laterality Date    CARDIAC CATHETERIZATION      CARDIAC CATHETERIZATION Left 3/14/2018    " Procedure: Cardiac Catheterization/Vascular Study;  Surgeon: Adam Perkins MD;  Location:  [facility];  Service: Cardiovascular    COLONOSCOPY      COLONOSCOPY N/A 9/13/2019    Procedure: COLONOSCOPY W/ COLD SNARE POLYPECTOMY;  Surgeon: Melba Lopez MD;  Location: [facility] ENDOSCOPY;  Service: Gastroenterology    ELBOW ARTHROPLASTY Bilateral     ENDOSCOPY      HERNIA REPAIR      x3    HIP HEMIARTHROPLASTY Left     NEUROPLASTY Bilateral     decompression median nerve at carpal tunnel    TOTAL KNEE ARTHROPLASTY Bilateral     2005, 2012         FAMILY HISTORY  Family History   Problem Relation Age of Onset    Deep vein thrombosis Mother     Diabetes Mother     Hyperlipidemia Mother     Hypertension Mother     Heart attack Mother     Cancer Father     Kidney disease Father     Deep vein thrombosis Daughter     Arthritis Sister     Diabetes Sister     Hyperlipidemia Sister     Hypertension Sister     Osteoporosis Sister     Thyroid disease Sister     Liver disease Sister     Arthritis Brother     Diabetes Brother     Hyperlipidemia Brother     Hypertension Brother     Stroke Brother     Cancer Brother     Heart attack Brother     Mental illness Brother          SOCIAL HISTORY  Social History     Socioeconomic History    Marital status:    Tobacco Use    Smoking status: Never    Smokeless tobacco: Never   Vaping Use    Vaping status: Never Used   Substance and Sexual Activity    Alcohol use: No    Drug use: No    Sexual activity: Defer         ALLERGIES  Topamax [topiramate] and Oxycodone        REVIEW OF SYSTEMS  Review of Systems     All systems reviewed and negative except for those discussed in HPI.       PHYSICAL EXAM    I have reviewed the triage vital signs and nursing notes.    ED Triage Vitals [07/10/24 0311]   Temp Heart Rate Resp BP SpO2   98.7 °F (37.1 °C) 75 20 (!) 140/103 95 %      Temp src Heart Rate Source Patient Position BP Location FiO2 (%)   Oral Monitor Sitting Right arm --        Physical Exam    General:  Awake, alert, no acute distress  HEENT: Atraumatic, normocephalic, EOMI, PERRLA, mucous membranes moist  NECK:  Supple, atraumatic  Cardiovascular:  Regular rate, regular rhythm, no murmurs, rubs, or gallops.  Extremities well perfused   Respiratory:  Regular rate, clear lungs to auscultation bilaterally.  No rhonchi, rales, wheezing  Abdominal:  Soft, nondistended, nontender.  No guarding or rebound.  No palpable masses  Extremity:  No visible bony abnormalities in all 4 extremities.  Full range of motion of all extremities.  Skin:  Warm and dry.  No rashes  Neuro:  AAOx3, GCS 15. Cranial nerves 2-12 grossly intact.  No focal strength or sensation deficits.  Psych:  Mood and affect appropriate.        LAB RESULTS  Recent Results (from the past 24 hour(s))   Comprehensive Metabolic Panel    Collection Time: 07/10/24  3:25 AM    Specimen: Blood   Result Value Ref Range    Glucose 124 (H) 65 - 99 mg/dL    BUN 16 8 - 23 mg/dL    Creatinine 0.94 0.76 - 1.27 mg/dL    Sodium 137 136 - 145 mmol/L    Potassium 3.9 3.5 - 5.2 mmol/L    Chloride 101 98 - 107 mmol/L    CO2 23.2 22.0 - 29.0 mmol/L    Calcium 8.9 8.6 - 10.5 mg/dL    Total Protein 7.7 6.0 - 8.5 g/dL    Albumin 4.1 3.5 - 5.2 g/dL    ALT (SGPT) 34 1 - 41 U/L    AST (SGOT) 27 1 - 40 U/L    Alkaline Phosphatase 47 39 - 117 U/L    Total Bilirubin 0.4 0.0 - 1.2 mg/dL    Globulin 3.6 gm/dL    A/G Ratio 1.1 g/dL    BUN/Creatinine Ratio 17.0 7.0 - 25.0    Anion Gap 12.8 5.0 - 15.0 mmol/L    eGFR 89.4 >60.0 mL/min/1.73   High Sensitivity Troponin T    Collection Time: 07/10/24  3:25 AM    Specimen: Blood   Result Value Ref Range    HS Troponin T 17 <22 ng/L   Green Top (Gel)    Collection Time: 07/10/24  3:25 AM   Result Value Ref Range    Extra Tube Hold for add-ons.    Lavender Top    Collection Time: 07/10/24  3:25 AM   Result Value Ref Range    Extra Tube hold for add-on    Gold Top - SST    Collection Time: 07/10/24  3:25 AM   Result  Value Ref Range    Extra Tube Hold for add-ons.    Light Blue Top    Collection Time: 07/10/24  3:25 AM   Result Value Ref Range    Extra Tube Hold for add-ons.    CBC Auto Differential    Collection Time: 07/10/24  3:25 AM    Specimen: Blood   Result Value Ref Range    WBC 8.56 3.40 - 10.80 10*3/mm3    RBC 5.17 4.14 - 5.80 10*6/mm3    Hemoglobin 17.4 13.0 - 17.7 g/dL    Hematocrit 48.9 37.5 - 51.0 %    MCV 94.6 79.0 - 97.0 fL    MCH 33.7 (H) 26.6 - 33.0 pg    MCHC 35.6 31.5 - 35.7 g/dL    RDW 12.5 12.3 - 15.4 %    RDW-SD 43.7 37.0 - 54.0 fl    MPV 9.9 6.0 - 12.0 fL    Platelets 198 140 - 450 10*3/mm3    Neutrophil % 52.4 42.7 - 76.0 %    Lymphocyte % 28.5 19.6 - 45.3 %    Monocyte % 11.8 5.0 - 12.0 %    Eosinophil % 6.1 0.3 - 6.2 %    Basophil % 0.8 0.0 - 1.5 %    Immature Grans % 0.4 0.0 - 0.5 %    Neutrophils, Absolute 4.49 1.70 - 7.00 10*3/mm3    Lymphocytes, Absolute 2.44 0.70 - 3.10 10*3/mm3    Monocytes, Absolute 1.01 (H) 0.10 - 0.90 10*3/mm3    Eosinophils, Absolute 0.52 (H) 0.00 - 0.40 10*3/mm3    Basophils, Absolute 0.07 0.00 - 0.20 10*3/mm3    Immature Grans, Absolute 0.03 0.00 - 0.05 10*3/mm3    nRBC 0.0 0.0 - 0.2 /100 WBC   High Sensitivity Troponin T 2Hr    Collection Time: 07/10/24  5:19 AM    Specimen: Blood   Result Value Ref Range    HS Troponin T 16 <22 ng/L    Troponin T Delta -1 >=-4 - <+4 ng/L       If labs were ordered, I independently reviewed the results and considered them in treating the patient.        RADIOLOGY  No Radiology Exams Resulted Within Past 24 Hours        PROCEDURES    Procedures    ECG 12 Lead ED Triage Standing Order; Chest Pain   Final Result          MEDICATIONS GIVEN IN ER    Medications   sodium chloride 0.9 % flush 10 mL (has no administration in time range)   aspirin tablet 325 mg (325 mg Oral Given 7/10/24 0325)         MEDICAL DECISION MAKING, PROGRESS, and CONSULTS    All labs, if obtained, have been independently reviewed by me.  All radiology studies, if  obtained, have been reviewed by me and the radiologist dictating the report.  All EKG's, if obtained, have been independently viewed and interpreted by me.      Discussion below represents my analysis of pertinent findings related to patient's condition, differential diagnosis, treatment plan and final disposition.    Chris Mejia is a 64 y.o. male who presents to the ED c/o chest pain.  Hemodynamically stable nontoxic) upon arrival.  Symptoms resolved prior to onset after being given nitro at home.  Differential diagnosis includes but is not limited to MI, musculoskeletal chest pain, GERD, stable angina, unstable angina, pneumothorax, and less likely rib fracture. Labs obtained including CBC, CMP, Troponin profile. Chest xray and EKG obtained. Patient received total of 324mg ASA.     EKG independently interpretted by myself and shows normal sinus rhythm with rate of 88 and no evidence of acute ischemic changes/STEMI along with no significant interval abnormalities.    Chest x-ray obtained which shows cardiomegaly consistent with previous x-rays but no evidence of large focal pneumonia or pneumothorax.    Labs show no critical electrolyte imbalances.  Normal white blood cell count and no anemia.  Initial high-sensitivity troponin of 17.  Repeat troponin of 16.  Remained stable emergency department.  Discussed results with patient.  Patient has elevated heart score 6 with high risk chest pain, discussed option of outpatient follow-up with cardiology versus observation admission.  Patient and wife would prefer admission for further observation at this time.  Patient to be admitted by hospitalist.        HEART Score: 6                      Orders placed during this visit:  Orders Placed This Encounter   Procedures    XR Chest 1 View    [place] Draw    Comprehensive Metabolic Panel    High Sensitivity Troponin T    CBC Auto Differential    High Sensitivity Troponin T 2Hr    NPO Diet NPO Type: Strict NPO    Undress  & Gown    Continuous Pulse Oximetry    Oxygen Therapy- Nasal Cannula; Titrate 1-6 LPM Per SpO2; 90 - 95%    ECG 12 Lead ED Triage Standing Order; Chest Pain    ECG 12 Lead ED Triage Standing Order; Chest Pain    Insert Peripheral IV    Initiate Observation Status    CBC & Differential    Green Top (Gel)    Lavender Top    Gold Top - SST    Light Blue Top         ED Course:    Consultants:                  Shared Decision Making:  After my consideration of clinical presentation and any laboratory/radiology studies obtained, I discussed the findings with the patient/patient representative who is in agreement with the treatment plan and the final disposition.   Risks and benefits of discharge and/or observation/admission were discussed.      AS OF 06:58 EDT VITALS:    BP - 149/99  HR - 68  TEMP - 98.7 °F (37.1 °C) (Oral)  O2 SATS - 94%                  DIAGNOSIS  Final diagnoses:   Acute chest pain   Coronary artery disease involving native heart with angina pectoris, unspecified vessel or lesion type         DISPOSITION  Admit      Please note that portions of this document were completed with voice recognition software.        Isaiah Zhu MD  07/10/24 0700

## 2024-07-10 NOTE — CONSULTS
[facility]-Cardiology Consult Note    Referring Provider: Kerley  Reason for Consultation: Chest pain    Patient Care Team:  Naty Lebron MD as PCP - General (Family Medicine)  Adam Perkins MD as Consulting Physician (Cardiology)  Melba Lopez MD as Surgeon (General Surgery)  Nils Salinas (Optometry)    Chief complaint : Chest pain    Subjective:    History of present illness: This is a 64-year-old male patient with known coronary artery disease who presents to the emergency room with chest discomfort.  The patient reports having 2 episodes of chest discomfort over the last 48 hours both relieved with sublingual nitroglycerin.  He describes a diffuse anterior chest pressure or heaviness without radiation.  It is associated with shortness of breath.  In the emergency room the patient's twelve-lead electrocardiogram showed sinus rhythm with no ischemic ST-T wave changes or injury current.  Cardiac troponins were normal x 2 sets.  The patient underwent invasive coronary angiography in 2018 showing nonobstructive single-vessel coronary artery disease involving the mid LAD confirmed to be nonobstructive with fractional flow reserve calculation of 1.0.  He has had no ischemic evaluation since that time.  The patient indicates he has been experiencing intermittent chest discomfort of the quality described above over the last several years but has not sought medical attention.  He has not seen a cardiologist since his last heart catheterization in 2018.  He is a non-smoker.  He has no personal history of diabetes although he does have morbid obesity and both insulin resistance and the metabolic syndrome are suspected.  He has treated hypertension and treated dyslipidemia.    Review of Systems   Review of Systems   Constitutional: Negative for chills, diaphoresis, fever, malaise/fatigue, weight gain and weight loss.   HENT:  Negative for ear discharge, hearing loss, hoarse voice and nosebleeds.    Eyes:  Negative for  discharge, double vision, pain and photophobia.   Cardiovascular:  Positive for chest pain. Negative for claudication, cyanosis, dyspnea on exertion, irregular heartbeat, leg swelling, near-syncope, orthopnea, palpitations, paroxysmal nocturnal dyspnea and syncope.   Respiratory:  Positive for shortness of breath. Negative for cough, hemoptysis, sputum production and wheezing.    Endocrine: Negative for cold intolerance, heat intolerance, polydipsia, polyphagia and polyuria.   Hematologic/Lymphatic: Negative for adenopathy and bleeding problem. Does not bruise/bleed easily.   Skin:  Negative for color change, flushing, itching and rash.   Musculoskeletal:  Negative for muscle cramps, muscle weakness, myalgias and stiffness.   Gastrointestinal:  Negative for abdominal pain, diarrhea, hematemesis, hematochezia, nausea and vomiting.   Genitourinary:  Negative for dysuria, frequency and nocturia.   Neurological:  Negative for focal weakness, loss of balance, numbness, paresthesias and seizures.   Psychiatric/Behavioral:  Negative for altered mental status, hallucinations and suicidal ideas.    Allergic/Immunologic: Negative for HIV exposure, hives and persistent infections.       History  Past Medical History:   Diagnosis Date    Allergic rhinitis     Anxiety     Arthritis     Balance problem     Carpal tunnel syndrome     Cluster headache     Coronary artery disease     Developmental delay     Diabetes mellitus     Noted by PCP    Difficulty walking     Dizziness     Dysphagia     Enlarged prostate     GERD (gastroesophageal reflux disease)     Gout     Hip fracture, left     History of cardiovascular stress test 2018    positive for inferior and lateral ischemia     History of echocardiogram     Hyperlipidemia     Hypertension     Impaired mobility     Lymphadenitis     Memory loss     Migraine     Myocardial infarction 2000    Obesity     DENZEL (obstructive sleep apnea)     Peptic ulcer     Peptic ulceration     Poor  historian     Rheumatoid arthritis     Right shoulder pain     SOB (shortness of breath) on exertion     Spinocerebellar ataxia     Tension headache     Trigeminal neuralgia     Vitamin B 12 deficiency     Vitamin D deficiency    ,   Past Surgical History:   Procedure Laterality Date    CARDIAC CATHETERIZATION      CARDIAC CATHETERIZATION Left 3/14/2018    Procedure: Cardiac Catheterization/Vascular Study;  Surgeon: Adam Perkins MD;  Location: [facility] CATH INVASIVE LOC[facility];  Service: Cardiovascular    COLONOSCOPY      COLONOSCOPY N/A 9/13/2019    Procedure: COLONOSCOPY W/ COLD SNARE POLYPECTOMY;  Surgeon: Melba Lopez MD;  Location: [facility] ENDOSCOPY;  Service: Gastroenterology    ELBOW ARTHROPLASTY Bilateral     ENDOSCOPY      HERNIA REPAIR      x3    HIP HEMIARTHROPLASTY Left     NEUROPLASTY Bilateral     decompression median nerve at carpal tunnel    TOTAL KNEE ARTHROPLASTY Bilateral     2005, 2012   ,   Family History   Problem Relation Age of Onset    Deep vein thrombosis Mother     Diabetes Mother     Hyperlipidemia Mother     Hypertension Mother     Heart attack Mother     Cancer Father     Kidney disease Father     Deep vein thrombosis Daughter     Arthritis Sister     Diabetes Sister     Hyperlipidemia Sister     Hypertension Sister     Osteoporosis Sister     Thyroid disease Sister     Liver disease Sister     Arthritis Brother     Diabetes Brother     Hyperlipidemia Brother     Hypertension Brother     Stroke Brother     Cancer Brother     Heart attack Brother     Mental illness Brother    ,   Social History     Tobacco Use    Smoking status: Never    Smokeless tobacco: Never   Vaping Use    Vaping status: Never Used   Substance Use Topics    Alcohol use: Yes     Comment: Rarely    Drug use: No   ,   Facility-Administered Medications Prior to Admission   Medication Dose Route Frequency Provider Last Rate Last Admin    cyanocobalamin injection 1,000 mcg  1,000 mcg Intramuscular Q28 Days Bernardino  Naty Estrada MD   1,000 mcg at 06/27/24 1028     Medications Prior to Admission   Medication Sig Dispense Refill Last Dose    busPIRone (BUSPAR) 15 MG tablet TAKE 1 TABLET TWICE A DAY  AS NEEDED FOR ANXIETY (Patient taking differently: Take 1 tablet by mouth Daily.) 180 tablet 1 Patient Taking Differently    nitroglycerin (Nitrostat) 0.4 MG SL tablet Place 1 tablet under the tongue Every 5 (Five) Minutes As Needed for Chest Pain. Take no more than 3 doses in 15 minutes. 30 tablet 1 7/9/2024    pramipexole (Mirapex) 0.25 MG tablet Take 1 tablet by mouth 3 (Three) Times a Day. (Patient taking differently: Take 1 tablet by mouth every night at bedtime.) 270 tablet 3 Patient Taking Differently    amLODIPine (NORVASC) 5 MG tablet Take 1 tablet by mouth Daily. Indications: High Blood Pressure Disorder 90 tablet 3     aspirin 81 MG tablet Take 1 tablet by mouth Daily. 30 tablet 11     carvedilol (COREG) 12.5 MG tablet Take 1 tablet by mouth 2 (Two) Times a Day With Meals. 180 tablet 3     galcanezumab-gnlm (Emgality) 120 MG/ML auto-injector pen Inject 1 mL under the skin into the appropriate area as directed Every 30 (Thirty) Days. (Patient not taking: Reported on 7/10/2024) 1 mL 11 Not Taking    levocetirizine (XYZAL) 5 MG tablet Take 1 tablet by mouth Every Evening. 90 tablet 3     meclizine (ANTIVERT) 25 MG tablet Take 1 tablet by mouth 3 (Three) Times a Day As Needed for Dizziness. (Patient not taking: Reported on 7/10/2024) 90 tablet 2 Not Taking    pantoprazole (PROTONIX) 40 MG EC tablet Take 1 tablet by mouth Daily. 90 tablet 3     pravastatin (PRAVACHOL) 40 MG tablet Take 1 tablet by mouth Every Night. 90 tablet 3     tamsulosin (FLOMAX) 0.4 MG capsule 24 hr capsule TAKE 1 CAPSULE EVERY NIGHT (DOSE DECREASE DUE TO      VISION DISTURBANCE) 90 capsule 3     Tiotropium Bromide Monohydrate (Spiriva Respimat) 1.25 MCG/ACT aerosol solution inhaler Inhale 1-2 puffs Daily. (Patient not taking: Reported on 7/10/2024) 4 g 0  "Not Taking    tiZANidine (ZANAFLEX) 4 MG tablet Take 1 tablet by mouth Every Night. 90 tablet 3     and Allergies:  Topamax [topiramate] and Oxycodone    Objective:    Vital Sign Min/Max for last 24 hours  Temp  Min: 97.7 °F (36.5 °C)  Max: 98.7 °F (37.1 °C)   BP  Min: 128/89  Max: 162/110   Pulse  Min: 68  Max: 77   Resp  Min: 16  Max: 20   SpO2  Min: 92 %  Max: 97 %   No data recorded   Weight  Min: 114 kg (251 lb)  Max: 115 kg (253 lb 8.5 oz)     Flowsheet Rows      Flowsheet Row First Filed Value   Admission Height 170.2 cm (67\") Documented at 07/10/2024 0311   Admission Weight 114 kg (251 lb) Documented at 07/10/2024 0311               Ejection Fraction  Lab Results   Component Value Date    EF 53 03/09/2018       Echo EF Estimated  Lab Results   Component Value Date    ECHOEFEST 55 04/05/2018         Physical Exam:   Vitals and nursing note reviewed.   Constitutional:       Appearance: Healthy appearance. Not in distress.   Neck:      Vascular: No JVR. JVD normal.   Pulmonary:      Effort: Pulmonary effort is normal.      Breath sounds: Normal breath sounds. No wheezing. No rhonchi. No rales.   Chest:      Chest wall: Not tender to palpatation.   Cardiovascular:      PMI at left midclavicular line. Normal rate. Regular rhythm. Normal S1. Normal S2.       Murmurs: There is no murmur.      No gallop.  No click. No rub.   Pulses:     Intact distal pulses.   Edema:     Peripheral edema absent.   Abdominal:      General: Bowel sounds are normal.      Palpations: Abdomen is soft.      Tenderness: There is no abdominal tenderness.   Musculoskeletal: Normal range of motion.         General: No tenderness. Skin:     General: Skin is warm and dry.   Neurological:      General: No focal deficit present.      Mental Status: Alert and oriented to person, place and time.         Results Review:   I reviewed the patient's new clinical results.  Results from last 7 days   Lab Units 07/10/24  0325   WBC 10*3/mm3 8.56 "   HEMOGLOBIN g/dL 17.4   HEMATOCRIT % 48.9   PLATELETS 10*3/mm3 198     Results from last 7 days   Lab Units 07/10/24  0325   SODIUM mmol/L 137   POTASSIUM mmol/L 3.9   CHLORIDE mmol/L 101   CO2 mmol/L 23.2   BUN mg/dL 16   CREATININE mg/dL 0.94   GLUCOSE mg/dL 124*   CALCIUM mg/dL 8.9     Lab Results   Lab Value Date/Time    TROPONINT 16 07/10/2024 0519    TROPONINT 17 07/10/2024 0325    TROPONINT 18 (H) 03/01/2023 2035     Results from last 7 days   Lab Units 07/10/24  0519   CHOLESTEROL mg/dL 112   TRIGLYCERIDES mg/dL 82   HDL CHOL mg/dL 34*   LDL CHOL mg/dL 62         Assessment/Plan:      Chest pain      Echocardiogram will be reviewed when available.  I have recommended a vasodilator nuclear stress test utilizing a 2-day imaging protocol with both supine and prone stress images in order to help mitigate for attenuation artifact which is anticipated given his body habitus.    N.P.O. after midnight.  No caffeine over the next 24 hours.  Up titration of beta-blocker therapy and amlodipine.    I discussed the patient's findings and my recommendations with patient and family    Peter Dawkins MD  07/10/24  11:57 EDT

## 2024-07-11 ENCOUNTER — APPOINTMENT (OUTPATIENT)
Dept: NUCLEAR MEDICINE | Facility: HOSPITAL | Age: 64
End: 2024-07-11
Payer: MEDICARE

## 2024-07-11 LAB
ANION GAP SERPL CALCULATED.3IONS-SCNC: 10.2 MMOL/L (ref 5–15)
BASOPHILS # BLD AUTO: 0.07 10*3/MM3 (ref 0–0.2)
BASOPHILS NFR BLD AUTO: 1 % (ref 0–1.5)
BUN SERPL-MCNC: 15 MG/DL (ref 8–23)
BUN/CREAT SERPL: 16 (ref 7–25)
CALCIUM SPEC-SCNC: 8.9 MG/DL (ref 8.6–10.5)
CHLORIDE SERPL-SCNC: 103 MMOL/L (ref 98–107)
CO2 SERPL-SCNC: 24.8 MMOL/L (ref 22–29)
CREAT SERPL-MCNC: 0.94 MG/DL (ref 0.76–1.27)
DEPRECATED RDW RBC AUTO: 44.2 FL (ref 37–54)
EGFRCR SERPLBLD CKD-EPI 2021: 89.4 ML/MIN/1.73
EOSINOPHIL # BLD AUTO: 0.37 10*3/MM3 (ref 0–0.4)
EOSINOPHIL NFR BLD AUTO: 5.1 % (ref 0.3–6.2)
ERYTHROCYTE [DISTWIDTH] IN BLOOD BY AUTOMATED COUNT: 12.6 % (ref 12.3–15.4)
GLUCOSE SERPL-MCNC: 121 MG/DL (ref 65–99)
HCT VFR BLD AUTO: 46.8 % (ref 37.5–51)
HGB BLD-MCNC: 16.2 G/DL (ref 13–17.7)
IMM GRANULOCYTES # BLD AUTO: 0.02 10*3/MM3 (ref 0–0.05)
IMM GRANULOCYTES NFR BLD AUTO: 0.3 % (ref 0–0.5)
LYMPHOCYTES # BLD AUTO: 1.89 10*3/MM3 (ref 0.7–3.1)
LYMPHOCYTES NFR BLD AUTO: 26 % (ref 19.6–45.3)
MCH RBC QN AUTO: 32.9 PG (ref 26.6–33)
MCHC RBC AUTO-ENTMCNC: 34.6 G/DL (ref 31.5–35.7)
MCV RBC AUTO: 94.9 FL (ref 79–97)
MONOCYTES # BLD AUTO: 0.89 10*3/MM3 (ref 0.1–0.9)
MONOCYTES NFR BLD AUTO: 12.2 % (ref 5–12)
NEUTROPHILS NFR BLD AUTO: 4.04 10*3/MM3 (ref 1.7–7)
NEUTROPHILS NFR BLD AUTO: 55.4 % (ref 42.7–76)
NRBC BLD AUTO-RTO: 0 /100 WBC (ref 0–0.2)
PLATELET # BLD AUTO: 177 10*3/MM3 (ref 140–450)
PMV BLD AUTO: 9.8 FL (ref 6–12)
POTASSIUM SERPL-SCNC: 3.9 MMOL/L (ref 3.5–5.2)
RBC # BLD AUTO: 4.93 10*6/MM3 (ref 4.14–5.8)
SODIUM SERPL-SCNC: 138 MMOL/L (ref 136–145)
WBC NRBC COR # BLD AUTO: 7.28 10*3/MM3 (ref 3.4–10.8)

## 2024-07-11 PROCEDURE — 0 TECHNETIUM SESTAMIBI: Performed by: STUDENT IN AN ORGANIZED HEALTH CARE EDUCATION/TRAINING PROGRAM

## 2024-07-11 PROCEDURE — G0378 HOSPITAL OBSERVATION PER HR: HCPCS

## 2024-07-11 PROCEDURE — 80048 BASIC METABOLIC PNL TOTAL CA: CPT | Performed by: NURSE PRACTITIONER

## 2024-07-11 PROCEDURE — 85025 COMPLETE CBC W/AUTO DIFF WBC: CPT | Performed by: NURSE PRACTITIONER

## 2024-07-11 PROCEDURE — 78452 HT MUSCLE IMAGE SPECT MULT: CPT | Performed by: INTERNAL MEDICINE

## 2024-07-11 PROCEDURE — 93018 CV STRESS TEST I&R ONLY: CPT | Performed by: INTERNAL MEDICINE

## 2024-07-11 PROCEDURE — A9500 TC99M SESTAMIBI: HCPCS | Performed by: STUDENT IN AN ORGANIZED HEALTH CARE EDUCATION/TRAINING PROGRAM

## 2024-07-11 PROCEDURE — 99232 SBSQ HOSP IP/OBS MODERATE 35: CPT | Performed by: NURSE PRACTITIONER

## 2024-07-11 PROCEDURE — 25010000002 REGADENOSON 0.4 MG/5ML SOLUTION: Performed by: STUDENT IN AN ORGANIZED HEALTH CARE EDUCATION/TRAINING PROGRAM

## 2024-07-11 PROCEDURE — 93017 CV STRESS TEST TRACING ONLY: CPT

## 2024-07-11 PROCEDURE — 78452 HT MUSCLE IMAGE SPECT MULT: CPT

## 2024-07-11 RX ORDER — REGADENOSON 0.08 MG/ML
0.4 INJECTION, SOLUTION INTRAVENOUS
Status: COMPLETED | OUTPATIENT
Start: 2024-07-11 | End: 2024-07-11

## 2024-07-11 RX ADMIN — PANTOPRAZOLE SODIUM 40 MG: 40 TABLET, DELAYED RELEASE ORAL at 08:25

## 2024-07-11 RX ADMIN — Medication 10 ML: at 08:25

## 2024-07-11 RX ADMIN — Medication 10 ML: at 19:45

## 2024-07-11 RX ADMIN — AMLODIPINE BESYLATE 10 MG: 5 TABLET ORAL at 08:25

## 2024-07-11 RX ADMIN — TECHNETIUM TC 99M SESTAMIBI 1 DOSE: 1 INJECTION INTRAVENOUS at 09:00

## 2024-07-11 RX ADMIN — BUSPIRONE HYDROCHLORIDE 15 MG: 15 TABLET ORAL at 08:25

## 2024-07-11 RX ADMIN — ASPIRIN 81 MG: 81 TABLET, COATED ORAL at 08:25

## 2024-07-11 RX ADMIN — PRAMIPEXOLE DIHYDROCHLORIDE 0.25 MG: 0.25 TABLET ORAL at 19:44

## 2024-07-11 RX ADMIN — CARVEDILOL 25 MG: 25 TABLET, FILM COATED ORAL at 08:25

## 2024-07-11 RX ADMIN — REGADENOSON 0.4 MG: 0.08 INJECTION, SOLUTION INTRAVENOUS at 09:00

## 2024-07-11 RX ADMIN — CARVEDILOL 25 MG: 25 TABLET, FILM COATED ORAL at 17:20

## 2024-07-11 RX ADMIN — TAMSULOSIN HYDROCHLORIDE 0.4 MG: 0.4 CAPSULE ORAL at 08:25

## 2024-07-11 RX ADMIN — PRAVASTATIN SODIUM 40 MG: 20 TABLET ORAL at 19:44

## 2024-07-11 NOTE — PLAN OF CARE
Problem: Adult Inpatient Plan of Care  Goal: Plan of Care Review  7/11/2024 0554 by Kye Aleman RN  Outcome: Ongoing, Progressing  Flowsheets (Taken 7/11/2024 0554)  Progress: improving  Plan of Care Reviewed With:   patient   spouse  Outcome Evaluation: pt VSS. pt placed on 2L NC while sleeping due to desatting to 88%. no complaints from pt. pt remained NPO after midnight. no acute events.  7/11/2024 0554 by Kye Aleman RN  Outcome: Ongoing, Progressing  Flowsheets (Taken 7/11/2024 0554)  Progress: improving  Plan of Care Reviewed With:   patient   spouse  Outcome Evaluation: pt VSS. pt placed on 2L NC while sleeping due to desatting to 88%. no complaints from pt. pt remained NPO after midnight. no acute events.  Goal: Patient-Specific Goal (Individualized)  7/11/2024 0554 by Kye Aleman RN  Outcome: Ongoing, Progressing  7/11/2024 0554 by Kye Aleman RN  Outcome: Ongoing, Progressing  Goal: Absence of Hospital-Acquired Illness or Injury  7/11/2024 0554 by Kye Aleman RN  Outcome: Ongoing, Progressing  7/11/2024 0554 by Kye Aleman RN  Outcome: Ongoing, Progressing  Intervention: Identify and Manage Fall Risk  Recent Flowsheet Documentation  Taken 7/11/2024 0400 by Kye Aleman RN  Safety Promotion/Fall Prevention: safety round/check completed  Taken 7/11/2024 0200 by Kye Aleman RN  Safety Promotion/Fall Prevention: safety round/check completed  Taken 7/11/2024 0000 by Kye Aleman RN  Safety Promotion/Fall Prevention: safety round/check completed  Taken 7/10/2024 2203 by Kye Aleman RN  Safety Promotion/Fall Prevention: safety round/check completed  Taken 7/10/2024 2000 by Kye Aleman RN  Safety Promotion/Fall Prevention: safety round/check completed  Intervention: Prevent Skin Injury  Recent Flowsheet Documentation  Taken 7/11/2024 0400 by Kye Aleman RN  Body Position: position changed independently  Taken 7/11/2024 0200 by Kye Aleman RN  Body Position: position changed  independently  Taken 7/11/2024 0000 by Kye Aleman RN  Body Position: position changed independently  Taken 7/10/2024 2203 by Kye Aleman RN  Body Position: position changed independently  Taken 7/10/2024 2000 by Kye Aleman RN  Body Position: position changed independently  Skin Protection: adhesive use limited  Intervention: Prevent and Manage VTE (Venous Thromboembolism) Risk  Recent Flowsheet Documentation  Taken 7/11/2024 0400 by Kye Aleman RN  Activity Management: activity minimized  Taken 7/11/2024 0200 by Kye Aleman RN  Activity Management: activity minimized  Taken 7/11/2024 0000 by Kye Aleman RN  Activity Management: activity minimized  Taken 7/10/2024 2203 by Kye Aleman RN  Activity Management: activity minimized  Taken 7/10/2024 2000 by Kye Aleman RN  Activity Management: activity encouraged  Range of Motion: active ROM (range of motion) encouraged  Intervention: Prevent Infection  Recent Flowsheet Documentation  Taken 7/11/2024 0400 by Kye Aleman RN  Infection Prevention:   rest/sleep promoted   hand hygiene promoted   single patient room provided  Taken 7/11/2024 0200 by Kye Aleman RN  Infection Prevention:   hand hygiene promoted   rest/sleep promoted   single patient room provided  Taken 7/11/2024 0000 by Kye Aleman RN  Infection Prevention:   hand hygiene promoted   rest/sleep promoted   single patient room provided  Taken 7/10/2024 2203 by Kye Aleman RN  Infection Prevention:   rest/sleep promoted   single patient room provided   hand hygiene promoted  Taken 7/10/2024 2000 by Kye Aleman RN  Infection Prevention:   rest/sleep promoted   single patient room provided   hand hygiene promoted  Goal: Optimal Comfort and Wellbeing  7/11/2024 0554 by Kye Aleman RN  Outcome: Ongoing, Progressing  7/11/2024 0554 by Kye Aleman RN  Outcome: Ongoing, Progressing  Intervention: Provide Person-Centered Care  Recent Flowsheet Documentation  Taken 7/10/2024  2000 by Kye Aleman RN  Trust Relationship/Rapport:   care explained   questions answered   reassurance provided   thoughts/feelings acknowledged  Goal: Readiness for Transition of Care  7/11/2024 0554 by Kye Aleman RN  Outcome: Ongoing, Progressing  7/11/2024 0554 by Kye Aleman RN  Outcome: Ongoing, Progressing     Problem: Hypertension Comorbidity  Goal: Blood Pressure in Desired Range  7/11/2024 0554 by Kye Aleman RN  Outcome: Ongoing, Progressing  7/11/2024 0554 by Kye Aleman RN  Outcome: Ongoing, Progressing     Problem: Fall Injury Risk  Goal: Absence of Fall and Fall-Related Injury  7/11/2024 0554 by Kye Aleman RN  Outcome: Ongoing, Progressing  7/11/2024 0554 by Kye Aleman RN  Outcome: Ongoing, Progressing  Intervention: Promote Injury-Free Environment  Recent Flowsheet Documentation  Taken 7/11/2024 0400 by Kye Aleman RN  Safety Promotion/Fall Prevention: safety round/check completed  Taken 7/11/2024 0200 by Kye Aleman RN  Safety Promotion/Fall Prevention: safety round/check completed  Taken 7/11/2024 0000 by Kye Aleman RN  Safety Promotion/Fall Prevention: safety round/check completed  Taken 7/10/2024 2203 by Kye Aleman RN  Safety Promotion/Fall Prevention: safety round/check completed  Taken 7/10/2024 2000 by Kye Aleman RN  Safety Promotion/Fall Prevention: safety round/check completed   Goal Outcome Evaluation:

## 2024-07-11 NOTE — PLAN OF CARE
Goal Outcome Evaluation:           Progress: improving               VSS, room air. Alert and Oriented. Patient to be NPO after midnight for day 2 of stress test. No acute events to report, will continue to monitor.

## 2024-07-11 NOTE — CASE MANAGEMENT/SOCIAL WORK
1200: CM spoke with pt at bedside. Alert and answers questions coherently. DCP remains Home with wife.

## 2024-07-11 NOTE — PROGRESS NOTES
[facility] HOSPITALIST    PROGRESS NOTE    Name:  Chris Mejia   Age:  64 y.o.  Sex:  male  :  1960  MRN:  [id number]   Visit Number:  [id number]  Admission Date:  7/10/2024  Date Of Service:  24  Primary Care Physician:  Naty Lebron MD     LOS: 0 days :    Chief Complaint:      Chest pain    Subjective:    Patient seen and examined with wife at bedside.  No further chest pain noted.  Updated would be getting stress test for the next 2 days.  Blood pressure medications uptitrated.  Denies any acute concerns.    Hospital Course:    Mr. Mejia is a 64-year-old male who presented to emergency department due to chest pain for the past several days.  Pertinent past medical history of coronary artery disease, ataxia, hypertension, hyperlipidemia, anxiety, prior stress test in 2019 per Dr. Beverly.  Patient also experiencing associated shortness of air, dry cough, and worsening lower extremity edema/abdominal distention.  Was told in the past that his heart was enlarged.  Has not followed with cardiology in several years.  Does have an appointment tomorrow.  Does also endorse associated GERD.     Upon ED presentation patient hypertensive with blood pressure 140/103, otherwise hemodynamically stable.  Pertinent imaging and labs included troponin negative x 2, WBC 8.5, A1c 5.7, lipid panel reviewed and within normal limits, chest x-ray noted low lung volumes contributing to bronchovascular crowding and make stimulate the heart size with underlying hazy interstitial throughout the bilateral lungs that could reflect interstitial edema or pneumonitis.  Given chest pain with atypical and typical features cardiology consulted.  Hospitalist consulted for further medical management.  Planned stress test.  Aspirin and statin continued.  Antihypertensive medications uptitrated due to continued uncontrolled hypertension.    Review of Systems:     All systems were reviewed and negative  "except as mentioned in subjective, assessment and plan.    Vital Signs:    Temp:  [97.7 °F (36.5 °C)-98.6 °F (37 °C)] 98.2 °F (36.8 °C)  Heart Rate:  [64-73] 70  Resp:  [16-18] 18  BP: (137-164)/() 143/93    Intake and output:    I/O last 3 completed shifts:  In: 270 [P.O.:270]  Out: -   No intake/output data recorded.    Physical Examination:    General Appearance:  Alert and cooperative.  Chronically ill middle-age male.   Head:  Atraumatic and normocephalic.   Eyes: Conjunctivae and sclerae normal, no icterus. No pallor.   Throat: No oral lesions, no thrush, oral mucosa moist.   Neck: Supple, trachea midline, no thyromegaly.   Lungs:   Breath sounds heard bilaterally equally.  No wheezing or crackles. No Pleural rub or bronchial breathing.  On room air unlabored.   Heart:  Normal S1 and S2, no murmur, no gallop, no rub. No JVD.   Abdomen:   Normal bowel sounds, no masses, no organomegaly. Soft, nontender, distended, no rebound tenderness.   Extremities: Supple, no edema, no cyanosis, no clubbing.   Skin: No bleeding or rash.   Neurologic: Alert and oriented x 3. No facial asymmetry. Moves all four limbs. No tremors.  Generalized weakness.     Laboratory results:    Results from last 7 days   Lab Units 07/11/24  0512 07/10/24  0325   SODIUM mmol/L 138 137   POTASSIUM mmol/L 3.9 3.9   CHLORIDE mmol/L 103 101   CO2 mmol/L 24.8 23.2   BUN mg/dL 15 16   CREATININE mg/dL 0.94 0.94   CALCIUM mg/dL 8.9 8.9   BILIRUBIN mg/dL  --  0.4   ALK PHOS U/L  --  47   ALT (SGPT) U/L  --  34   AST (SGOT) U/L  --  27   GLUCOSE mg/dL 121* 124*     Results from last 7 days   Lab Units 07/11/24  0512 07/10/24  0325   WBC 10*3/mm3 7.28 8.56   HEMOGLOBIN g/dL 16.2 17.4   HEMATOCRIT % 46.8 48.9   PLATELETS 10*3/mm3 177 198         Results from last 7 days   Lab Units 07/10/24  0519 07/10/24  0325   HSTROP T ng/L 16 17         No results for input(s): \"PHART\", \"MTT3WRY\", \"PO2ART\", \"AAH2IWF\", \"BASEEXCESS\" in the last 8760 hours.   I " have reviewed the patient's laboratory results.    Radiology results:    Adult Transthoracic Echo Complete W/ Cont if Necessary Per Protocol    Result Date: 7/10/2024    Left ventricular systolic function is normal. Calculated left ventricular EF = 57.7% Left ventricular ejection fraction appears to be 56 - 60%. Left ventricular diastolic function was normal.   Normal right ventricular size and function.   Mild aortic valve regurgitation is present.   Mild dilation of the aortic root is present. The aortic root measures 3.8 cm.     XR Chest 1 View    Result Date: 7/10/2024  FINAL REPORT TECHNIQUE: null CLINICAL HISTORY: Chest Pain  center and left side pain COMPARISON: null FINDINGS: 1 view chest x-ray Comparison: None Findings: Cardiomegaly. Low lung volumes with elevated hemidiaphragms. Probable linear atelectasis within the right lung base. Interstitial thickening diffusely throughout the bilateral lungs this could reflect interstitial edema or pneumonitis. No focal consolidation. no large pleural effusion and no pneumothorax. No fracture.     Impression: IMPRESSION: 1. Low lung volumes contributing to bronchovascular crowding and may accentuate the heart size. 2. Hazy interstitial throughout the bilateral lungs could reflect interstitial edema or pneumonitis. 3. Probable atelectasis of the right lung base. Authenticated and Electronically Signed by Suleman Christina DO on 07/10/2024 06:58:32 AM   I have reviewed the patient's radiology reports.    Medication Review:     I have reviewed the patient's active and prn medications.     Problem List:      Chest pain      Assessment:    Chest pain, POA  Uncontrolled hypertension, POA  Coronary artery disease  Hyperlipidemia  Anxiety  BPH  Spinocerebellar ataxia    Plan:    -Cardiology consulted.  -No current chest pain.  -2-day stress test ordered per cardiology.  -A1c 5.7.  Lipid panel reviewed.  -Uncontrolled hypertension could also be contributing to chest pain.  Coreg  and Norvasc uptitrated.  -Echo noted EF 56 to 60% with no valvular abnormalities.  -Home medications reordered and uptitrated due to uncontrolled hypertension.    I have reviewed the copied text and it is accurate as of 7/11/2024        DVT Prophylaxis: SCDs  Code Status: Full code  Diet: Cardiac/n.p.o. after midnight  Discharge Plan: Likely tomorrow    Essence Santiago, APRN  07/11/24  10:19 EDT    Dictated utilizing Dragon dictation.

## 2024-07-12 ENCOUNTER — TELEPHONE (OUTPATIENT)
Dept: INTERNAL MEDICINE | Facility: CLINIC | Age: 64
End: 2024-07-12

## 2024-07-12 ENCOUNTER — APPOINTMENT (OUTPATIENT)
Dept: NUCLEAR MEDICINE | Facility: HOSPITAL | Age: 64
End: 2024-07-12
Payer: MEDICARE

## 2024-07-12 ENCOUNTER — READMISSION MANAGEMENT (OUTPATIENT)
Dept: CALL CENTER | Facility: HOSPITAL | Age: 64
End: 2024-07-12
Payer: MEDICARE

## 2024-07-12 VITALS
HEART RATE: 85 BPM | WEIGHT: 250.66 LBS | TEMPERATURE: 97.9 F | BODY MASS INDEX: 39.34 KG/M2 | OXYGEN SATURATION: 92 % | RESPIRATION RATE: 20 BRPM | SYSTOLIC BLOOD PRESSURE: 144 MMHG | HEIGHT: 67 IN | DIASTOLIC BLOOD PRESSURE: 96 MMHG

## 2024-07-12 LAB
BH CV REST NUCLEAR ISOTOPE DOSE: 27 MCI
BH CV STRESS COMMENTS STAGE 1: NORMAL
BH CV STRESS DOSE REGADENOSON STAGE 1: 0.4
BH CV STRESS DURATION MIN STAGE 1: 0
BH CV STRESS DURATION SEC STAGE 1: 10
BH CV STRESS NUCLEAR ISOTOPE DOSE: 34 MCI
BH CV STRESS PROTOCOL 1: NORMAL
BH CV STRESS RECOVERY BP: NORMAL MMHG
BH CV STRESS RECOVERY HR: 85 BPM
BH CV STRESS STAGE 1: 1
LV EF NUC BP: 56 %
MAXIMAL PREDICTED HEART RATE: 154 BPM
PERCENT MAX PREDICTED HR: 65.58 %
STRESS BASELINE BP: NORMAL MMHG
STRESS BASELINE HR: 75 BPM
STRESS PERCENT HR: 77 %
STRESS POST PEAK BP: NORMAL MMHG
STRESS POST PEAK HR: 101 BPM
STRESS TARGET HR: 131 BPM

## 2024-07-12 PROCEDURE — A9500 TC99M SESTAMIBI: HCPCS | Performed by: STUDENT IN AN ORGANIZED HEALTH CARE EDUCATION/TRAINING PROGRAM

## 2024-07-12 PROCEDURE — 0 TECHNETIUM SESTAMIBI: Performed by: STUDENT IN AN ORGANIZED HEALTH CARE EDUCATION/TRAINING PROGRAM

## 2024-07-12 PROCEDURE — G0378 HOSPITAL OBSERVATION PER HR: HCPCS

## 2024-07-12 PROCEDURE — 99239 HOSP IP/OBS DSCHRG MGMT >30: CPT | Performed by: NURSE PRACTITIONER

## 2024-07-12 RX ORDER — AMLODIPINE BESYLATE 10 MG/1
10 TABLET ORAL DAILY
Qty: 30 TABLET | Refills: 0 | Status: SHIPPED | OUTPATIENT
Start: 2024-07-13

## 2024-07-12 RX ORDER — CARVEDILOL 25 MG/1
25 TABLET ORAL 2 TIMES DAILY WITH MEALS
Qty: 60 TABLET | Refills: 0 | Status: SHIPPED | OUTPATIENT
Start: 2024-07-12

## 2024-07-12 RX ADMIN — PANTOPRAZOLE SODIUM 40 MG: 40 TABLET, DELAYED RELEASE ORAL at 09:18

## 2024-07-12 RX ADMIN — AMLODIPINE BESYLATE 10 MG: 5 TABLET ORAL at 09:18

## 2024-07-12 RX ADMIN — CARVEDILOL 25 MG: 25 TABLET, FILM COATED ORAL at 09:17

## 2024-07-12 RX ADMIN — ASPIRIN 81 MG: 81 TABLET, COATED ORAL at 09:17

## 2024-07-12 RX ADMIN — BUSPIRONE HYDROCHLORIDE 15 MG: 15 TABLET ORAL at 09:18

## 2024-07-12 RX ADMIN — TECHNETIUM TC 99M SESTAMIBI 1 DOSE: 1 INJECTION INTRAVENOUS at 05:32

## 2024-07-12 RX ADMIN — Medication 10 ML: at 09:18

## 2024-07-12 RX ADMIN — TAMSULOSIN HYDROCHLORIDE 0.4 MG: 0.4 CAPSULE ORAL at 09:17

## 2024-07-12 NOTE — DISCHARGE INSTRUCTIONS
Patient to be discharged home.  Continue antihypertensive medications as scheduled.  Follow-up with cardiology as scheduled.  Follow with PCP as scheduled.  Return to emergency department for any worsening symptoms.

## 2024-07-12 NOTE — OUTREACH NOTE
Prep Survey      Flowsheet Row Responses   [facility] [place] patient discharged from? [place]   Is LACE score < 7 ? Yes   Eligibility [facility]   Date of Admission 07/10/24   Date of Discharge 07/12/24   Discharge Disposition Home or Self Care   Discharge diagnosis Chest pain   Does the patient have one of the following disease processes/diagnoses(primary or secondary)? Other   Does the patient have Home health ordered? No   Is there a DME ordered? No   Prep survey completed? Yes            Jasmin HURT - Registered Nurse

## 2024-07-12 NOTE — CASE MANAGEMENT/SOCIAL WORK
Case Management Discharge Note      Final Note: DC Home with family to transport. Denies any needs.    Provided Post Acute Provider List?: N/A  N/A Provider List Comment: No needs identified at this time.  CM will FU as needed    Selected Continued Care - Discharged on 7/12/2024 Admission date: 7/10/2024 - Discharge disposition: Home or Self Care      Destination    No services have been selected for the patient.                Durable Medical Equipment    No services have been selected for the patient.                Dialysis/Infusion    No services have been selected for the patient.                Home Medical Care    No services have been selected for the patient.                Therapy    No services have been selected for the patient.                Community Resources    No services have been selected for the patient.                Community & DME    No services have been selected for the patient.                    Transportation Services  Private: Car    Final Discharge Disposition Code: 01 - home or self-care

## 2024-07-12 NOTE — DISCHARGE SUMMARY
[facility]   DISCHARGE SUMMARY      Name:  Chris Mejia   Age:  64 y.o.  Sex:  male  :  1960  MRN:  [id number]   Visit Number:  [id number]    Admission Date:  7/10/2024  Date of Discharge:  2024  Primary Care Physician:  Naty Lebron MD    Important issues to note:    -Patient admitted for chest pain with 2-day Lexiscan normal for acute ischemia.  -Antihypertensive medications uptitrated with Norvasc 10 mg and Coreg 25 mg twice daily.  -Patient follow-up with PCP and cardiology as scheduled.  -Strict return precautions given.    Discharge Diagnoses:     Chest pain, POA  Uncontrolled hypertension, POA  Coronary artery disease  Hyperlipidemia  Anxiety  BPH  Spinocerebellar ataxia    Problem List:     Active Hospital Problems    Diagnosis  POA    **Chest pain [R07.9]  Yes      Resolved Hospital Problems   No resolved problems to display.     Presenting Problem:    Chief Complaint   Patient presents with    Chest Pain    Shortness of Breath      Consults:     Consulting Physician(s)         Provider   Role Specialty     Peter Dawkins MD      Consulting Physician Cardiology          Procedures Performed:        History of presenting illness/Hospital Course:    Mr. Mejia is a 64-year-old male who presented to emergency department due to chest pain for the past several days.  Pertinent past medical history of coronary artery disease, ataxia, hypertension, hyperlipidemia, anxiety, prior stress test in 2019 per Dr. Beverly.  Patient also experiencing associated shortness of air, dry cough, and worsening lower extremity edema/abdominal distention.  Was told in the past that his heart was enlarged.  Has not followed with cardiology in several years. Does also endorse associated GERD.     Upon ED presentation patient hypertensive with blood pressure 140/103, otherwise hemodynamically stable.  Pertinent imaging and labs included troponin negative x 2, WBC 8.5, A1c  5.7, lipid panel reviewed and within normal limits, chest x-ray noted low lung volumes contributing to bronchovascular crowding and make stimulate the heart size with underlying hazy interstitial throughout the bilateral lungs that could reflect interstitial edema or pneumonitis.  Given chest pain with atypical and typical features cardiology consulted.  Hospitalist consulted for further medical management.  Planned stress test.  Aspirin and statin continued.  Antihypertensive medications uptitrated given continued uncontrolled hypertension.  Fortunately 2-day Lexiscan was negative for evidence of ischemia with a EF noted to be 56%.  Patient will follow-up with cardiology in [place] in 2 weeks.  Strict return precautions given.    Vital Signs:    Temp:  [97.7 °F (36.5 °C)-98.4 °F (36.9 °C)] 97.9 °F (36.6 °C)  Heart Rate:  [72-86] 85  Resp:  [18-20] 20  BP: (115-156)/(78-97) 144/96    Physical Exam:    General Appearance:  Alert and cooperative.  Chronically ill middle-age male.   Head:  Atraumatic and normocephalic.   Eyes: Conjunctivae and sclerae normal, no icterus. No pallor.   Ears:  Ears with no abnormalities noted.   Throat: No oral lesions, no thrush, oral mucosa moist.   Neck: Supple, trachea midline, no thyromegaly.   Back:   No kyphoscoliosis present. No tenderness to palpation.   Lungs:   Breath sounds heard bilaterally equally.  No crackles or wheezing. No Pleural rub or bronchial breathing.  On room air unlabored.   Heart:  Normal S1 and S2, no murmur, no gallop, no rub. No JVD.   Abdomen:   Normal bowel sounds, no masses, no organomegaly. Soft, nontender, nondistended, no rebound tenderness.   Extremities: Supple, no edema, no cyanosis, no clubbing.   Pulses: Pulses palpable bilaterally.   Skin: No bleeding or rash.   Neurologic: Alert and oriented x 3. No facial asymmetry. Moves all four limbs. No tremors.  Chronic generalized weakness.     Pertinent Lab Results:     Results from last 7 days   Lab  Units 07/11/24  0512 07/10/24  0325   SODIUM mmol/L 138 137   POTASSIUM mmol/L 3.9 3.9   CHLORIDE mmol/L 103 101   CO2 mmol/L 24.8 23.2   BUN mg/dL 15 16   CREATININE mg/dL 0.94 0.94   CALCIUM mg/dL 8.9 8.9   BILIRUBIN mg/dL  --  0.4   ALK PHOS U/L  --  47   ALT (SGPT) U/L  --  34   AST (SGOT) U/L  --  27   GLUCOSE mg/dL 121* 124*     Results from last 7 days   Lab Units 07/11/24  0512 07/10/24  0325   WBC 10*3/mm3 7.28 8.56   HEMOGLOBIN g/dL 16.2 17.4   HEMATOCRIT % 46.8 48.9   PLATELETS 10*3/mm3 177 198         Results from last 7 days   Lab Units 07/10/24  0519 07/10/24  0325   HSTROP T ng/L 16 17                           Pertinent Radiology Results:    Imaging Results (All)       Procedure Component Value Units Date/Time    XR Chest 1 View [[id number]] Collected: 07/10/24 0658     Updated: 07/10/24 0700    Narrative:      FINAL REPORT    TECHNIQUE:  null    CLINICAL HISTORY:  Chest Pain  center and left side pain    COMPARISON:  null    FINDINGS:  1 view chest x-ray    Comparison: None    Findings:    Cardiomegaly.    Low lung volumes with elevated hemidiaphragms. Probable linear atelectasis within the right lung base.    Interstitial thickening diffusely throughout the bilateral lungs this could reflect interstitial edema or pneumonitis. No focal consolidation. no large pleural effusion and no pneumothorax. No fracture.      Impression:      IMPRESSION:    1. Low lung volumes contributing to bronchovascular crowding and may accentuate the heart size.    2. Hazy interstitial throughout the bilateral lungs could reflect interstitial edema or pneumonitis.    3. Probable atelectasis of the right lung base.    Authenticated and Electronically Signed by Suleman Christina DO on  07/10/2024 06:58:32 AM            Echo:    Results for orders placed during the hospital encounter of 07/10/24    Adult Transthoracic Echo Complete W/ Cont if Necessary Per Protocol    Interpretation Summary    Left ventricular systolic function  is normal. Calculated left ventricular EF = 57.7% Left ventricular ejection fraction appears to be 56 - 60%. Left ventricular diastolic function was normal.    Normal right ventricular size and function.    Mild aortic valve regurgitation is present.    Mild dilation of the aortic root is present. The aortic root measures 3.8 cm.    Condition on Discharge:      Stable.    Code status during the hospital stay:    Code Status and Medical Interventions:   Ordered at: 07/10/24 1031     Level Of Support Discussed With:    Patient     Code Status (Patient has no pulse and is not breathing):    CPR (Attempt to Resuscitate)     Medical Interventions (Patient has pulse or is breathing):    Full Support     Discharge Disposition:    Home or Self Care    Discharge Medications:       Discharge Medications        Changes to Medications        Instructions Start Date   amLODIPine 10 MG tablet  Commonly known as: NORVASC  What changed:   medication strength  how much to take   10 mg, Oral, Daily   Start Date: July 13, 2024     busPIRone 15 MG tablet  Commonly known as: BUSPAR  What changed: when to take this   15 mg, Oral, 2 Times Daily PRN      carvedilol 25 MG tablet  Commonly known as: COREG  What changed:   medication strength  how much to take   25 mg, Oral, 2 Times Daily With Meals      pramipexole 0.25 MG tablet  Commonly known as: Mirapex  What changed: when to take this   0.25 mg, Oral, 3 Times Daily             Continue These Medications        Instructions Start Date   aspirin 81 MG tablet   81 mg, Oral, Daily      levocetirizine 5 MG tablet  Commonly known as: XYZAL   5 mg, Oral, Every Evening      nitroglycerin 0.4 MG SL tablet  Commonly known as: Nitrostat   0.4 mg, Sublingual, Every 5 Minutes PRN, Take no more than 3 doses in 15 minutes.      pantoprazole 40 MG EC tablet  Commonly known as: PROTONIX   40 mg, Oral, Daily      pravastatin 40 MG tablet  Commonly known as: PRAVACHOL   40 mg, Oral, Nightly       tamsulosin 0.4 MG capsule 24 hr capsule  Commonly known as: FLOMAX   TAKE 1 CAPSULE EVERY NIGHT (DOSE DECREASE DUE TO      VISION DISTURBANCE)      tiZANidine 4 MG tablet  Commonly known as: ZANAFLEX   4 mg, Oral, Nightly             Stop These Medications      Emgality 120 MG/ML auto-injector pen  Generic drug: galcanezumab-gnlm     meclizine 25 MG tablet  Commonly known as: ANTIVERT     Spiriva Respimat 1.25 MCG/ACT aerosol solution inhaler  Generic drug: Tiotropium Bromide Monohydrate            Discharge Diet:     Diet Instructions       Diet: Cardiac Diets; Healthy Heart (2-3 Na+); Thin (IDDSI 0)      Discharge Diet: Cardiac Diets    Cardiac Diet: Healthy Heart (2-3 Na+)    Fluid Consistency: Thin (IDDSI 0)          Activity at Discharge:     Activity Instructions       Activity as Tolerated            Follow-up Appointments:     Follow-up Information       Naty Lebron MD Follow up in 1 week(s).    Specialty: Family Medicine  Contact information:  [address]  [address]  [facility] [address]  [phone]               Stoney Fowler MD Follow up in 2 week(s).    Specialty: Cardiology  Contact information:  [address]  [facility] [address]  [phone]                           Future Appointments   Date Time Provider Department Center   9/6/2024 10:30 AM Naty Lebron MD [facility]     Test Results Pending at Discharge:           Essence Santiago, APRN  07/12/24  09:48 EDT    Time: I spent 40 minutes on this discharge activity which included: face-to-face encounter with the patient, reviewing the data in the system, coordination of the care with the nursing staff as well as consultants, documentation, and entering orders.     Dictated utilizing Dragon dictation.

## 2024-07-12 NOTE — TELEPHONE ENCOUNTER
Hub staff attempted to follow warm transfer process and was unsuccessful     Caller: GARY DUMONT    Relationship to patient:     Best call back number: [phone]     Patient is needing: HOSPITAL F/U, [facility], DC 07.12.24, DX CHEST PAIN

## 2024-07-15 ENCOUNTER — TRANSITIONAL CARE MANAGEMENT TELEPHONE ENCOUNTER (OUTPATIENT)
Dept: CALL CENTER | Facility: HOSPITAL | Age: 64
End: 2024-07-15
Payer: MEDICARE

## 2024-07-15 NOTE — OUTREACH NOTE
Call Center TCM Note      Flowsheet Row Responses   [facility] patient discharged from? [facility]   Does the patient have one of the following disease processes/diagnoses(primary or secondary)? Other   TCM attempt successful? No   Unsuccessful attempts Attempt 2  [Attempted to reach patient/wife. No answer]            Diana Mejia RN    7/15/2024, 16:29 EDT

## 2024-07-15 NOTE — OUTREACH NOTE
Call Center TCM Note      Flowsheet Row Responses   [facility] patient discharged from? [facility]   Does the patient have one of the following disease processes/diagnoses(primary or secondary)? Other   TCM attempt successful? No   Unsuccessful attempts Attempt 1  [Attempted to reach patient/wife. No answer]   Call Status Left message            Diana Mejia RN    7/15/2024, 14:20 EDT

## 2024-07-16 ENCOUNTER — TRANSITIONAL CARE MANAGEMENT TELEPHONE ENCOUNTER (OUTPATIENT)
Dept: CALL CENTER | Facility: HOSPITAL | Age: 64
End: 2024-07-16
Payer: MEDICARE

## 2024-07-16 NOTE — OUTREACH NOTE
Call Center TCM Note      Flowsheet Row Responses   [facility] patient discharged from? [facility]   Does the patient have one of the following disease processes/diagnoses(primary or secondary)? Other   TCM attempt successful? No   Unsuccessful attempts Attempt 3   Call Status Left message   Comments Cardiology 7/26/24   Does the patient have an appointment with their PCP within 7-14 days of discharge? Yes  [7/23/2024 at 2:00 PM]            Marie Lee RN    7/16/2024, 10:10 EDT

## 2024-07-23 ENCOUNTER — OFFICE VISIT (OUTPATIENT)
Dept: INTERNAL MEDICINE | Facility: CLINIC | Age: 64
End: 2024-07-23
Payer: MEDICARE

## 2024-07-23 VITALS
DIASTOLIC BLOOD PRESSURE: 86 MMHG | OXYGEN SATURATION: 95 % | SYSTOLIC BLOOD PRESSURE: 136 MMHG | BODY MASS INDEX: 40.78 KG/M2 | HEART RATE: 71 BPM | RESPIRATION RATE: 16 BRPM | WEIGHT: 259.8 LBS | TEMPERATURE: 97.8 F | HEIGHT: 67 IN

## 2024-07-23 DIAGNOSIS — K21.9 GASTROESOPHAGEAL REFLUX DISEASE WITHOUT ESOPHAGITIS: ICD-10-CM

## 2024-07-23 DIAGNOSIS — E53.8 VITAMIN B12 DEFICIENCY: ICD-10-CM

## 2024-07-23 DIAGNOSIS — Z09 HOSPITAL DISCHARGE FOLLOW-UP: Primary | ICD-10-CM

## 2024-07-23 DIAGNOSIS — R06.00 DYSPNEA, UNSPECIFIED TYPE: ICD-10-CM

## 2024-07-23 DIAGNOSIS — R09.02 HYPOXIA: ICD-10-CM

## 2024-07-23 DIAGNOSIS — G47.9 RESTLESS SLEEPER: ICD-10-CM

## 2024-07-23 DIAGNOSIS — I25.119 CORONARY ARTERY DISEASE INVOLVING NATIVE CORONARY ARTERY OF NATIVE HEART WITH ANGINA PECTORIS: ICD-10-CM

## 2024-07-23 PROCEDURE — 3079F DIAST BP 80-89 MM HG: CPT | Performed by: FAMILY MEDICINE

## 2024-07-23 PROCEDURE — 3075F SYST BP GE 130 - 139MM HG: CPT | Performed by: FAMILY MEDICINE

## 2024-07-23 PROCEDURE — 1160F RVW MEDS BY RX/DR IN RCRD: CPT | Performed by: FAMILY MEDICINE

## 2024-07-23 PROCEDURE — 1159F MED LIST DOCD IN RCRD: CPT | Performed by: FAMILY MEDICINE

## 2024-07-23 PROCEDURE — 1111F DSCHRG MED/CURRENT MED MERGE: CPT | Performed by: FAMILY MEDICINE

## 2024-07-23 PROCEDURE — 96372 THER/PROPH/DIAG INJ SC/IM: CPT | Performed by: FAMILY MEDICINE

## 2024-07-23 PROCEDURE — 99495 TRANSJ CARE MGMT MOD F2F 14D: CPT | Performed by: FAMILY MEDICINE

## 2024-07-23 PROCEDURE — 1126F AMNT PAIN NOTED NONE PRSNT: CPT | Performed by: FAMILY MEDICINE

## 2024-07-23 RX ORDER — DEXLANSOPRAZOLE 60 MG/1
60 CAPSULE, DELAYED RELEASE ORAL DAILY
Qty: 90 CAPSULE | Refills: 3 | Status: SHIPPED | OUTPATIENT
Start: 2024-07-23

## 2024-07-23 RX ADMIN — CYANOCOBALAMIN 1000 MCG: 1000 INJECTION, SOLUTION INTRAMUSCULAR; SUBCUTANEOUS at 15:02

## 2024-07-23 NOTE — PROGRESS NOTES
"Transitional Care Follow Up Visit  Subjective     Chris Mejia is a 64 y.o. male who presents for a transitional care management visit.    Within 48 business hours after discharge our office contacted him via telephone to coordinate his care and needs.      I reviewed and discussed the details of that call along with the discharge summary, hospital problems, inpatient lab results, inpatient diagnostic studies, and consultation reports with Chris.     Current outpatient and discharge medications have been reconciled for the patient.  Reviewed by: Naty Lebron MD          7/12/2024     5:43 PM   Date of TCM Phone Call   [facility]   Date of Admission 7/10/2024   Date of Discharge 7/12/2024   Discharge Disposition Home or Self Care     Risk for Readmission (LACE) Score: 5 (7/12/2024  6:00 AM)      History of Present Illness  Here with wife for hospital follow up.    While in hospital he was put on oxygen at night  Slept better  Less restless  Interested in possibly having at home.    Continues to have chest pain  Has cardiology follow up and questions possible need for cardiac cath for full work up?  Having phlegm come up  Possibly heartburn?  Not sure if Protonix 40 mg helping.    Stopped Mirapex as he didn't like how he felt taking tid  Tried taking just one pill nightly and didn't help.     Course During Hospital Stay:  per discharge summary, reviewed.    Discharge Summary by Essence Santiago APRN (07/12/2024 09:48)      The following portions of the patient's history were reviewed and updated as appropriate: allergies, current medications, past family history, past medical history, past social history, past surgical history, and problem list.    Review of Systems    Objective   /86 (BP Location: Left arm, Patient Position: Sitting, Cuff Size: Adult)   Pulse 71   Temp 97.8 °F (36.6 °C) (Temporal)   Resp 16   Ht 170.2 cm (67\")   Wt 118 kg (259 lb 12.8 oz)   SpO2 95%  "  BMI 40.69 kg/m²   Physical Exam  Vitals and nursing note reviewed.   Constitutional:       General: He is not in acute distress.     Appearance: Normal appearance. He is well-developed and well-groomed. He is morbidly obese. He is not ill-appearing, toxic-appearing or diaphoretic.      Comments: In wheelchair   HENT:      Head: Normocephalic and atraumatic.      Right Ear: Hearing normal.      Left Ear: Hearing normal.   Eyes:      General: Lids are normal. No scleral icterus.        Right eye: No discharge.         Left eye: No discharge.      Extraocular Movements: Extraocular movements intact.   Cardiovascular:      Rate and Rhythm: Normal rate and regular rhythm.   Pulmonary:      Effort: Pulmonary effort is normal.      Breath sounds: Normal breath sounds.   Musculoskeletal:      Cervical back: Neck supple.   Skin:     Coloration: Skin is not jaundiced or pale.   Neurological:      General: No focal deficit present.      Mental Status: He is alert and oriented to person, place, and time.   Psychiatric:         Attention and Perception: Attention and perception normal.         Mood and Affect: Mood and affect normal.         Speech: Speech normal.         Behavior: Behavior normal. Behavior is cooperative.         Thought Content: Thought content normal.         Cognition and Memory: Cognition and memory normal.         Judgment: Judgment normal.         Stress Test With Myocardial Perfusion Two Day (07/12/2024 07:13)     Assessment & Plan   Diagnoses and all orders for this visit:    1. Hospital discharge follow-up (Primary)    2. Coronary artery disease involving native coronary artery of native heart with angina pectoris  -     Overnight Sleep Oximetry Study; Future    3. Restless sleeper  -     Overnight Sleep Oximetry Study; Future    4. Hypoxia  -     Overnight Sleep Oximetry Study; Future    5. Dyspnea, unspecified type  -     Overnight Sleep Oximetry Study; Future    6. Gastroesophageal reflux disease  without esophagitis  -     dexlansoprazole (Dexilant) 60 MG capsule; Take 1 capsule by mouth Daily.  Dispense: 90 capsule; Refill: 3    7. Vitamin B12 deficiency  Comments:  b12 shot given today      Emphasized need to keep appointment with cardiology. Discuss ongoing chest discomfort and see if cath is an option.    Trial of different PPI, his chest discomfort could be related to reflux or esophageal spasm. If Dexilant not covered or too expensive let us know.     Return for As scheduled previously.

## 2024-07-26 ENCOUNTER — OFFICE VISIT (OUTPATIENT)
Dept: CARDIOLOGY | Facility: CLINIC | Age: 64
End: 2024-07-26
Payer: MEDICARE

## 2024-07-26 VITALS
DIASTOLIC BLOOD PRESSURE: 98 MMHG | RESPIRATION RATE: 18 BRPM | HEART RATE: 58 BPM | HEIGHT: 67 IN | WEIGHT: 258.6 LBS | SYSTOLIC BLOOD PRESSURE: 138 MMHG | OXYGEN SATURATION: 96 % | BODY MASS INDEX: 40.59 KG/M2

## 2024-07-26 DIAGNOSIS — R06.83 SNORING: ICD-10-CM

## 2024-07-26 DIAGNOSIS — R07.9 CHEST PAIN, UNSPECIFIED TYPE: Primary | ICD-10-CM

## 2024-07-26 DIAGNOSIS — E66.01 MORBID OBESITY WITH BMI OF 40.0-44.9, ADULT: ICD-10-CM

## 2024-07-26 DIAGNOSIS — I10 ESSENTIAL HYPERTENSION: Chronic | ICD-10-CM

## 2024-07-26 DIAGNOSIS — I25.10 CAD IN NATIVE ARTERY: ICD-10-CM

## 2024-07-26 DIAGNOSIS — E78.00 HYPERCHOLESTEREMIA: ICD-10-CM

## 2024-07-26 DIAGNOSIS — R06.02 SHORTNESS OF BREATH: ICD-10-CM

## 2024-07-26 RX ORDER — ISOSORBIDE MONONITRATE 30 MG/1
30 TABLET, EXTENDED RELEASE ORAL DAILY
Qty: 30 TABLET | Refills: 0 | Status: SHIPPED | OUTPATIENT
Start: 2024-07-26 | End: 2024-08-25

## 2024-07-26 NOTE — PROGRESS NOTES
[facility] Cardiology    Office Consult     Chris Mejia  [id number]  07/26/2024    Referred By: No ref. provider found    Chief Complaint   Patient presents with    Shortness of Breath    Chest Pain     Intermittent pain lasting couple of seconds. States when he lays down moving helps ease the pain.        Subjective     History of Present Illness:   Chris Mejia is a 64 y.o. male who presents to the Cardiology Clinic for follow up after recent hospitalization for chest pain.  Patient was admitted to the hospital 7/10/2024 after presenting to the hospital with chest pain.  Troponins were normal with no acute ST/T wave changes.  Patient previously underwent invasive coronary angiography in 2018 showing nonobstructive single-vessel coronary artery disease involving the mid LAD confirmed to be nonobstructive with fractional flow reserve calculation of 1.0. He has had no ischemic evaluation since that time. Patient is treated for hypertension and dyslipidemia and is morbidly obese with BMI-40.  Cardiology (Dr. Dawkins) was consulted and patient had 2 day Lexiscan which did not show any evidence of acute ischemia.  Antihypertensive medications were titrated up with Norvasc increased to 10mg and Coreg increased to 25mg twice daily.  Echocardiogram noted EF-56%.      He presents to the clinic today for follow up.  He reports since discharge from the hospital he has had short episodes of sharp chest pain roughly every other day associated with shortness of breath that sometimes radiate to the back.  He denies any provoking factors and states they usually resolve quickly on their own.  He is a nonsmoker and his wife does report significant snoring.  He is somewhat mobile at baseline with use of walker occasionally.  Patient and wife are wondering if they need a heart catheterization.  His PCP has ordered an overnight oximetry study.  He denies any chest pain, shortness of breath at time of  exam.  States he does have some lower extremity swelling on occasion but does not like his compression stockings too much and tries to elevate legs as able.      Review of Systems   Constitutional:  Negative for activity change and fatigue.   Respiratory:  Positive for shortness of breath. Negative for chest tightness.    Cardiovascular:  Positive for chest pain. Negative for palpitations and leg swelling.   Neurological:  Negative for dizziness.   All other systems reviewed and are negative.       Past Medical History:   Diagnosis Date    Allergic rhinitis     Anxiety     Arthritis     Balance problem     Carpal tunnel syndrome     Cluster headache     Coronary artery disease     Developmental delay     Diabetes mellitus     Noted by PCP    Difficulty walking     Dizziness     Dysphagia     Enlarged prostate     GERD (gastroesophageal reflux disease)     Gout     Hip fracture, left     History of cardiovascular stress test 2018    positive for inferior and lateral ischemia     History of echocardiogram     Hyperlipidemia     Hypertension     Impaired mobility     Lymphadenitis     Memory loss     Migraine     Myocardial infarction 2000    Obesity     DENZEL (obstructive sleep apnea)     Peptic ulcer     Peptic ulceration     Poor historian     Rheumatoid arthritis     Right shoulder pain     SOB (shortness of breath) on exertion     Spinocerebellar ataxia     Tension headache     Trigeminal neuralgia     Vitamin B 12 deficiency     Vitamin D deficiency        Past Surgical History:   Procedure Laterality Date    CARDIAC CATHETERIZATION      CARDIAC CATHETERIZATION Left 3/14/2018    Procedure: Cardiac Catheterization/Vascular Study;  Surgeon: Adam Perkins MD;  Location: [facility] CATH INVASIVE LOCATION;  Service: Cardiovascular    COLONOSCOPY      COLONOSCOPY N/A 9/13/2019    Procedure: COLONOSCOPY W/ COLD SNARE POLYPECTOMY;  Surgeon: Melba Lopez MD;  Location: [facility] ENDOSCOPY;  Service: Gastroenterology    ELBOW  ARTHROPLASTY Bilateral     ENDOSCOPY      HERNIA REPAIR      x3    HIP HEMIARTHROPLASTY Left     NEUROPLASTY Bilateral     decompression median nerve at carpal tunnel    TOTAL KNEE ARTHROPLASTY Bilateral     2005, 2012       Family History   Problem Relation Age of Onset    Deep vein thrombosis Mother     Diabetes Mother     Hyperlipidemia Mother     Hypertension Mother     Heart attack Mother     Cancer Father     Kidney disease Father     Deep vein thrombosis Daughter     Arthritis Sister     Diabetes Sister     Hyperlipidemia Sister     Hypertension Sister     Osteoporosis Sister     Thyroid disease Sister     Liver disease Sister     Arthritis Brother     Diabetes Brother     Hyperlipidemia Brother     Hypertension Brother     Stroke Brother     Cancer Brother     Heart attack Brother     Mental illness Brother        Social History     Socioeconomic History    Marital status:    Tobacco Use    Smoking status: Never    Smokeless tobacco: Never   Vaping Use    Vaping status: Never Used   Substance and Sexual Activity    Alcohol use: Yes     Comment: Rarely    Drug use: No    Sexual activity: Defer         Current Outpatient Medications:     amLODIPine (NORVASC) 10 MG tablet, Take 1 tablet by mouth Daily. Indications: High Blood Pressure Disorder, Disp: 30 tablet, Rfl: 0    aspirin 81 MG tablet, Take 1 tablet by mouth Daily., Disp: 30 tablet, Rfl: 11    busPIRone (BUSPAR) 15 MG tablet, TAKE 1 TABLET TWICE A DAY  AS NEEDED FOR ANXIETY (Patient taking differently: Take 1 tablet by mouth Daily.), Disp: 180 tablet, Rfl: 1    carvedilol (COREG) 25 MG tablet, Take 1 tablet by mouth 2 (Two) Times a Day With Meals., Disp: 60 tablet, Rfl: 0    dexlansoprazole (Dexilant) 60 MG capsule, Take 1 capsule by mouth Daily., Disp: 90 capsule, Rfl: 3    levocetirizine (XYZAL) 5 MG tablet, Take 1 tablet by mouth Every Evening., Disp: 90 tablet, Rfl: 3    nitroglycerin (Nitrostat) 0.4 MG SL tablet, Place 1 tablet under the  "tongue Every 5 (Five) Minutes As Needed for Chest Pain. Take no more than 3 doses in 15 minutes., Disp: 30 tablet, Rfl: 1    pravastatin (PRAVACHOL) 40 MG tablet, Take 1 tablet by mouth Every Night., Disp: 90 tablet, Rfl: 3    tamsulosin (FLOMAX) 0.4 MG capsule 24 hr capsule, TAKE 1 CAPSULE EVERY NIGHT (DOSE DECREASE DUE TO      VISION DISTURBANCE), Disp: 90 capsule, Rfl: 3    tiZANidine (ZANAFLEX) 4 MG tablet, Take 1 tablet by mouth Every Night., Disp: 90 tablet, Rfl: 3    isosorbide mononitrate (IMDUR) 30 MG 24 hr tablet, Take 1 tablet by mouth Daily for 30 days., Disp: 30 tablet, Rfl: 0    Current Facility-Administered Medications:     cyanocobalamin injection 1,000 mcg, 1,000 mcg, Intramuscular, Q28 Days, Naty Lebron MD, 1,000 mcg at 07/23/24 1502      Allergies   Allergen Reactions    Topamax [Topiramate] Headache    Oxycodone Hallucinations       Objective     Vitals:    07/26/24 1104   BP: 138/98   BP Location: Left arm   Patient Position: Sitting   Cuff Size: Adult   Pulse: 58   Resp: 18   SpO2: 96%   Weight: 117 kg (258 lb 9.6 oz)   Height: 170.2 cm (67\")     Body mass index is 40.5 kg/m².    Physical Exam  Vitals and nursing note reviewed.   Constitutional:       General: He is not in acute distress.     Appearance: He is obese. He is not ill-appearing or toxic-appearing.   HENT:      Head: Normocephalic.      Mouth/Throat:      Mouth: Mucous membranes are moist.   Eyes:      Pupils: Pupils are equal, round, and reactive to light.   Cardiovascular:      Rate and Rhythm: Normal rate and regular rhythm.      Pulses: Normal pulses.      Heart sounds: Normal heart sounds. No murmur heard.  Pulmonary:      Effort: Pulmonary effort is normal.      Breath sounds: Normal breath sounds. No wheezing, rhonchi or rales.   Musculoskeletal:      Right lower leg: No edema.      Left lower leg: No edema.   Skin:     General: Skin is warm and dry.      Capillary Refill: Capillary refill takes less than 2 seconds. "   Neurological:      Mental Status: He is alert and oriented to person, place, and time. Mental status is at baseline.   Psychiatric:         Mood and Affect: Mood normal.         Behavior: Behavior normal.         Thought Content: Thought content normal.         Results Review:   I reviewed the patient's new clinical results.    Procedures    Assessment & Plan  CAD in native artery  -Coronary angiography in 2018 showing nonobstructive single-vessel coronary artery disease involving the mid LAD confirmed to be nonobstructive with fractional flow reserve calculation of 1.0.  -Recent 2 day Lexiscan without evidence of ischemic disease  Shortness of breath  -Associated with chest pain  -Reports worse with lying flat  -Echo 7/2024 noted EF-56%  -Likely multifactorial given BMI and restricted activity    Chest pain, unspecified type  -Episodes of chest pain continue roughly every other day, cannot rule out anginal equivalent  -Will start Imdur XL 30mg daily today and see if episodes of chest pain improve.  Discussed side effects and blood pressure log given to bring back to follow up appointment.  -Will have patient return to clinic to follow up with Dr. Dawkins in 2 weeks to reassess symptoms and further discuss possible need for coronary angiography at that time.    Hypercholesteremia  -Tolerating statin therapy  -Lipid panel 7/2024 noted LDL-62, triglycerides-82  -Managed by PCP, continue annual surveillance labs     Essential hypertension  -Blood pressure controlled today  -Continue Coreg and Norvasc--tolerating higher doses without difficulty    Morbid obesity with BMI of 40.0-44.9, adult  -Diet and exercise recommended  -BMI-40    Snoring  -Ambulatory referral to sleep medicine for sleep study.    -Likely underlying sleep apnea       Orders Placed This Encounter   Procedures    Ambulatory Referral to Sleep Medicine     New Medications Ordered This Visit   Medications    isosorbide mononitrate (IMDUR) 30 MG 24 hr  tablet     Sig: Take 1 tablet by mouth Daily for 30 days.     Dispense:  30 tablet     Refill:  0     Preventative Cardiology:   Tobacco Cessation: N/A   Advance Care Planning: ACP discussion was held with the patient during this visit. Patient does not have an advance directive, declines further assistance.     Follow Up:   Return in about 2 weeks (around 8/9/2024) for Next scheduled follow up--Breeding--discuss anti-anginal and possible cath.    Thank you for allowing me to participate in the care of your patient. Please to not hesitate to contact me with additional questions or concerns.     Umu Lantigua, APRN

## 2024-07-26 NOTE — ASSESSMENT & PLAN NOTE
-Associated with chest pain  -Reports worse with lying flat  -Echo 7/2024 noted EF-56%  -Likely multifactorial given BMI and restricted activity

## 2024-07-26 NOTE — ASSESSMENT & PLAN NOTE
-Tolerating statin therapy  -Lipid panel 7/2024 noted LDL-62, triglycerides-82  -Managed by PCP, continue annual surveillance labs

## 2024-07-26 NOTE — ASSESSMENT & PLAN NOTE
-Coronary angiography in 2018 showing nonobstructive single-vessel coronary artery disease involving the mid LAD confirmed to be nonobstructive with fractional flow reserve calculation of 1.0.  -Recent 2 day Lexiscan without evidence of ischemic disease

## 2024-07-26 NOTE — ASSESSMENT & PLAN NOTE
-Episodes of chest pain continue roughly every other day, cannot rule out anginal equivalent  -Will start Imdur XL 30mg daily today and see if episodes of chest pain improve.  Discussed side effects and blood pressure log given to bring back to follow up appointment.  -Will have patient return to clinic to follow up with Dr. Dawkins in 2 weeks to reassess symptoms and further discuss possible need for coronary angiography at that time.

## 2024-08-07 ENCOUNTER — TELEPHONE (OUTPATIENT)
Dept: SURGERY | Facility: CLINIC | Age: 64
End: 2024-08-07
Payer: MEDICARE

## 2024-08-07 NOTE — TELEPHONE ENCOUNTER
NURSE NOTES:

Pt lying in the bed, RASS of -2 noted on Versed 6mg/hr; RR of 20. SR noted. Tolerating vent 
setting, saturating at 98%. Reposition done. Oral care given. Will continue to monitor. Patient ready to schedule 5 year colonoscopy recall.  Last colonoscopy 9/13/2019.  Verified patient information.

## 2024-08-08 ENCOUNTER — OFFICE VISIT (OUTPATIENT)
Dept: CARDIOLOGY | Facility: CLINIC | Age: 64
End: 2024-08-08
Payer: MEDICARE

## 2024-08-08 VITALS
BODY MASS INDEX: 40.49 KG/M2 | DIASTOLIC BLOOD PRESSURE: 90 MMHG | WEIGHT: 258 LBS | HEIGHT: 67 IN | OXYGEN SATURATION: 94 % | SYSTOLIC BLOOD PRESSURE: 122 MMHG | HEART RATE: 66 BPM

## 2024-08-08 DIAGNOSIS — G47.33 OSA (OBSTRUCTIVE SLEEP APNEA): ICD-10-CM

## 2024-08-08 DIAGNOSIS — I10 ESSENTIAL HYPERTENSION: Chronic | ICD-10-CM

## 2024-08-08 DIAGNOSIS — R06.02 SOB (SHORTNESS OF BREATH): ICD-10-CM

## 2024-08-08 DIAGNOSIS — I25.10 CAD IN NATIVE ARTERY: Primary | ICD-10-CM

## 2024-08-08 DIAGNOSIS — G31.9 CEREBELLAR ATROPHY: Chronic | ICD-10-CM

## 2024-08-08 DIAGNOSIS — E66.01 MORBID OBESITY WITH BMI OF 40.0-44.9, ADULT: ICD-10-CM

## 2024-08-08 DIAGNOSIS — R07.1 CHEST PAIN ON BREATHING: ICD-10-CM

## 2024-08-08 RX ORDER — ISOSORBIDE MONONITRATE 30 MG/1
30 TABLET, EXTENDED RELEASE ORAL DAILY
Qty: 90 TABLET | Refills: 3 | Status: SHIPPED | OUTPATIENT
Start: 2024-08-08 | End: 2025-08-03

## 2024-08-08 RX ORDER — CARVEDILOL 25 MG/1
25 TABLET ORAL 2 TIMES DAILY WITH MEALS
Qty: 180 TABLET | Refills: 3 | Status: SHIPPED | OUTPATIENT
Start: 2024-08-08

## 2024-08-08 NOTE — TELEPHONE ENCOUNTER
PRESCREENING FOR OPEN ACCESS SCHEDULING    Chris Mejia, 1960  [id number]    08/08/24    If, the patient answers yes to any of the following questions the provider will be informed prior to scheduling open access for approval and documented in the chart.    [x]  Yes  [] No    1. Have you ever had a colonoscopy in the past?      When:  09/13/2019      Where: [facility]      Polyps or other:     [x]  Yes  [] No    2. Family history of colon cancer?      Relation: Brothers      Age of onset:       Do you currently have any of the following?    []  Yes  [x] No  Rectal bleeding, if so, how long?     []  Yes  [x] No  Abdominal pain, if so, how long?    []  Yes  [x] No  Constipation, if so, how long?    []  Yes  [x] No  Diarrhea, if so, how long?    []  Yes  [x] No  Weight loss, is so, how much?    [] Yes  [x] No  Small caliber stool, if so, how long?    []Yes  [x] No  Do you have Hemorroids?    []Yes  [x] No  Have you been diagnosed with Anemia?    [x]Yes  [] No  Do you have difficulty swallowing?    [x]Yes  [] No  Do you have acid reflux?    Have you ever had any of the following conditions?    [] Yes  [] No  Heart attack?      When?       Last cardiac workup?     Blood thinners?    [] Yes  [] No   Lung problems, asthma or COPD?  [] Yes  [] No  Oxygen required?       [] Yes  [] No  Stroke?     [] Yes  [] No  Have you ever had a reaction to anesthesia?

## 2024-08-08 NOTE — PROGRESS NOTES
Subjective:     Encounter Date:08/08/2024      Patient ID: Chris Mejia is a 64 y.o. male.    Chief Complaint: Shortness of breath  HPI  This is a 64-year-old male patient who presents to cardiology clinic to discuss results of outpatient testing for shortness of breath and atypical chest pain.  The patient underwent a vasodilator nuclear stress test showing no evidence of ischemia or infarction.  The calculated ejection fraction was normal.  The patient underwent an echocardiogram showing normal global and regional LV systolic function.  He was demonstrated to have mild aortic regurgitation but no significant valvular abnormalities or evidence of pericardial disease.  He reports that his chest discomfort has resolved spontaneously.  He indicates his dyspnea remains relatively unchanged.  He has not been able to lose any weight.  He remains a non-smoker.  The following portions of the patient's history were reviewed and updated as appropriate: allergies, current medications, past family history, past medical history, past social history, past surgical history and problem  Review of Systems   Constitutional: Negative for chills, diaphoresis, fever, malaise/fatigue, weight gain and weight loss.   HENT:  Negative for ear discharge, hearing loss, hoarse voice and nosebleeds.    Eyes:  Negative for discharge, double vision, pain and photophobia.   Cardiovascular:  Negative for chest pain, claudication, cyanosis, irregular heartbeat, leg swelling, near-syncope, orthopnea, palpitations, paroxysmal nocturnal dyspnea and syncope.   Respiratory:  Positive for shortness of breath. Negative for cough, hemoptysis, sputum production and wheezing.    Endocrine: Negative for cold intolerance, heat intolerance, polydipsia, polyphagia and polyuria.   Hematologic/Lymphatic: Negative for adenopathy and bleeding problem. Does not bruise/bleed easily.   Skin:  Negative for color change, flushing, itching and rash.    Musculoskeletal:  Negative for muscle cramps, muscle weakness, myalgias and stiffness.   Gastrointestinal:  Negative for abdominal pain, diarrhea, hematemesis, hematochezia, nausea and vomiting.   Genitourinary:  Negative for dysuria, frequency and nocturia.   Neurological:  Negative for focal weakness, loss of balance, numbness, paresthesias and seizures.   Psychiatric/Behavioral:  Negative for altered mental status, hallucinations and suicidal ideas.    Allergic/Immunologic: Negative for HIV exposure, hives and persistent infections.           Current Outpatient Medications:     amLODIPine (NORVASC) 10 MG tablet, Take 1 tablet by mouth Daily. Indications: High Blood Pressure Disorder, Disp: 30 tablet, Rfl: 0    aspirin 81 MG tablet, Take 1 tablet by mouth Daily., Disp: 30 tablet, Rfl: 11    busPIRone (BUSPAR) 15 MG tablet, TAKE 1 TABLET TWICE A DAY  AS NEEDED FOR ANXIETY (Patient taking differently: Take 1 tablet by mouth Daily.), Disp: 180 tablet, Rfl: 1    dexlansoprazole (Dexilant) 60 MG capsule, Take 1 capsule by mouth Daily., Disp: 90 capsule, Rfl: 3    levocetirizine (XYZAL) 5 MG tablet, Take 1 tablet by mouth Every Evening., Disp: 90 tablet, Rfl: 3    nitroglycerin (Nitrostat) 0.4 MG SL tablet, Place 1 tablet under the tongue Every 5 (Five) Minutes As Needed for Chest Pain. Take no more than 3 doses in 15 minutes., Disp: 30 tablet, Rfl: 1    pravastatin (PRAVACHOL) 40 MG tablet, Take 1 tablet by mouth Every Night., Disp: 90 tablet, Rfl: 3    tamsulosin (FLOMAX) 0.4 MG capsule 24 hr capsule, TAKE 1 CAPSULE EVERY NIGHT (DOSE DECREASE DUE TO      VISION DISTURBANCE), Disp: 90 capsule, Rfl: 3    tiZANidine (ZANAFLEX) 4 MG tablet, Take 1 tablet by mouth Every Night., Disp: 90 tablet, Rfl: 3    carvedilol (COREG) 25 MG tablet, Take 1 tablet by mouth 2 (Two) Times a Day With Meals., Disp: 180 tablet, Rfl: 3    isosorbide mononitrate (IMDUR) 30 MG 24 hr tablet, Take 1 tablet by mouth Daily for 360 days., Disp: 90  "tablet, Rfl: 3    Current Facility-Administered Medications:     cyanocobalamin injection 1,000 mcg, 1,000 mcg, Intramuscular, Q28 Days, Naty Lebron MD, 1,000 mcg at 07/23/24 1502    Objective:   Vitals and nursing note reviewed.   Constitutional:       Appearance: Healthy appearance. Not in distress.      Comments: W/C bound   Neck:      Vascular: No JVR. JVD normal.   Pulmonary:      Effort: Pulmonary effort is normal.      Breath sounds: Normal breath sounds. No wheezing. No rhonchi. No rales.   Chest:      Chest wall: Not tender to palpatation.   Cardiovascular:      PMI at left midclavicular line. Normal rate. Regular rhythm. Normal S1. Normal S2.       Murmurs: There is no murmur.      No gallop.  No click. No rub.   Pulses:     Intact distal pulses.   Edema:     Peripheral edema absent.   Abdominal:      General: Bowel sounds are normal.      Palpations: Abdomen is soft.      Tenderness: There is no abdominal tenderness.   Musculoskeletal: Normal range of motion.         General: No tenderness. Skin:     General: Skin is warm and dry.   Neurological:      General: No focal deficit present.      Mental Status: Alert and oriented to person, place and time.       Blood pressure 122/90, pulse 66, height 170.2 cm (67\"), weight 117 kg (258 lb), SpO2 94%.   Lab Review:     Assessment:       1. CAD in native artery  No evidence of ischemic heart disease.    2. Essential hypertension  Acceptable blood pressure control.  Home blood pressure diary has been reviewed indicating that his blood pressures have been in the \"ideal range\".  He indicates he has been trying to eliminate dietary sodium as much as possible.    3. Morbid obesity with BMI of 40.0-44.9, adult  Body mass index is greater than 40.  The obesity pattern is central.  This is due to excess calorie intake.  There are multiple comorbidities.    4. Cerebellar atrophy  The patient has difficulty with ambulation and is currently wheelchair " confined.    5. DENZEL (obstructive sleep apnea)      6. SOB (shortness of breath)  Noncardiac dyspnea.  No evidence of ischemic heart disease.  No structural heart abnormalities to explain dyspnea.    7. Chest pain on breathing  Noncardiac chest pain.  Spontaneous resolution without specific intervention.  Nuclear stress testing shows no evidence of inducible ischemia.    Procedures    Plan:     Advance Care Planning   ACP discussion was held with the patient during this visit. Patient has an advance directive (not in EMR), copy requested.     The patient has been reassured regarding the benign nature of his cardiac test results.    No changes in medications have been made at today's visit.    No further cardiovascular testing is warranted.    The importance of weight reduction cannot be over emphasized.

## 2024-08-08 NOTE — TELEPHONE ENCOUNTER
Pt stated that he does have difficulty swallowing and acid reflux-he is aware of his apt date and time with Dr. Lopez.

## 2024-08-09 ENCOUNTER — TELEPHONE (OUTPATIENT)
Dept: INTERNAL MEDICINE | Facility: CLINIC | Age: 64
End: 2024-08-09
Payer: MEDICARE

## 2024-08-09 DIAGNOSIS — R06.00 DYSPNEA, UNSPECIFIED TYPE: ICD-10-CM

## 2024-08-09 DIAGNOSIS — I25.119 CORONARY ARTERY DISEASE INVOLVING NATIVE CORONARY ARTERY OF NATIVE HEART WITH ANGINA PECTORIS: ICD-10-CM

## 2024-08-09 DIAGNOSIS — G47.34 NOCTURNAL OXYGEN DESATURATION: ICD-10-CM

## 2024-08-09 DIAGNOSIS — G47.34 NOCTURNAL HYPOXIA: Primary | ICD-10-CM

## 2024-08-09 NOTE — TELEPHONE ENCOUNTER
Called Chris, spoke with Diana, verbally informed she stated to send the order to whoever we prefer, [facility]

## 2024-08-09 NOTE — TELEPHONE ENCOUNTER
Received results of nocturnal oxygen testing. Drops at night. Most of sleep under 89%. Qualifies for oxygen at night. Orders in (orders encounter).

## 2024-08-12 ENCOUNTER — TELEPHONE (OUTPATIENT)
Dept: INTERNAL MEDICINE | Facility: CLINIC | Age: 64
End: 2024-08-12
Payer: MEDICARE

## 2024-08-12 NOTE — TELEPHONE ENCOUNTER
Caller: Diana Mejia    Relationship: Emergency Contact    Best call back number: [phone]    What was the call regarding: PATIENT'S WIFE CALLED ASKING IF THE PATIENT NEEDS TO START TAKING HYDROCHLOROTHIAZIDE 12.5 MG CAP AND PANTOPRAZOLE SODIUM 40 MG ONCE A DAY.     WIFE WOULD ALSO LIKE TO GO OVER HIS MEDICATION LIST WITH SOMEONE.

## 2024-08-12 NOTE — TELEPHONE ENCOUNTER
Called Diana boggs, verbally informed Dr. Lebron had stopped this medication back in December due to leg swelling and since his swelling went down after stopping she advised he stay off of it. She verbalized understanding and agreed to discuss more at his appointment in September, pt is not currently having anymore swelling or any further issue, they were just confused as she had a bottle left at home.

## 2024-08-12 NOTE — TELEPHONE ENCOUNTER
Fast busy. I can Never get in touch with this patient. We need another number. Or they need to come in and make an appointment.

## 2024-08-31 ENCOUNTER — APPOINTMENT (OUTPATIENT)
Dept: GENERAL RADIOLOGY | Facility: HOSPITAL | Age: 64
End: 2024-08-31
Payer: MEDICARE

## 2024-08-31 ENCOUNTER — APPOINTMENT (OUTPATIENT)
Dept: CT IMAGING | Facility: HOSPITAL | Age: 64
End: 2024-08-31
Payer: MEDICARE

## 2024-08-31 ENCOUNTER — HOSPITAL ENCOUNTER (EMERGENCY)
Facility: HOSPITAL | Age: 64
Discharge: HOME | End: 2024-08-31
Attending: STUDENT IN AN ORGANIZED HEALTH CARE EDUCATION/TRAINING PROGRAM
Payer: MEDICARE

## 2024-08-31 VITALS
HEART RATE: 75 BPM | OXYGEN SATURATION: 93 % | DIASTOLIC BLOOD PRESSURE: 94 MMHG | SYSTOLIC BLOOD PRESSURE: 140 MMHG | HEIGHT: 67 IN | RESPIRATION RATE: 22 BRPM | BODY MASS INDEX: 39.24 KG/M2 | WEIGHT: 250 LBS | TEMPERATURE: 99.6 F

## 2024-08-31 DIAGNOSIS — R06.02 SOB (SHORTNESS OF BREATH): ICD-10-CM

## 2024-08-31 DIAGNOSIS — U07.1 COVID-19: Primary | ICD-10-CM

## 2024-08-31 DIAGNOSIS — G47.33 OSA (OBSTRUCTIVE SLEEP APNEA): ICD-10-CM

## 2024-08-31 DIAGNOSIS — I25.10 CAD IN NATIVE ARTERY: ICD-10-CM

## 2024-08-31 LAB
ALBUMIN SERPL-MCNC: 3.7 G/DL (ref 3.5–5.2)
ALBUMIN/GLOB SERPL: 0.9 G/DL
ALP SERPL-CCNC: 38 U/L (ref 39–117)
ALT SERPL W P-5'-P-CCNC: 26 U/L (ref 1–41)
ANION GAP SERPL CALCULATED.3IONS-SCNC: 8.6 MMOL/L (ref 5–15)
AST SERPL-CCNC: 26 U/L (ref 1–40)
BASOPHILS # BLD AUTO: 0.02 10*3/MM3 (ref 0–0.2)
BASOPHILS NFR BLD AUTO: 0.3 % (ref 0–1.5)
BILIRUB SERPL-MCNC: 0.5 MG/DL (ref 0–1.2)
BUN SERPL-MCNC: 9 MG/DL (ref 8–23)
BUN/CREAT SERPL: 9.3 (ref 7–25)
CALCIUM SPEC-SCNC: 8.7 MG/DL (ref 8.6–10.5)
CHLORIDE SERPL-SCNC: 101 MMOL/L (ref 98–107)
CO2 SERPL-SCNC: 25.4 MMOL/L (ref 22–29)
CREAT SERPL-MCNC: 0.97 MG/DL (ref 0.76–1.27)
D-LACTATE SERPL-SCNC: 1.8 MMOL/L (ref 0.5–2)
DEPRECATED RDW RBC AUTO: 44.7 FL (ref 37–54)
EGFRCR SERPLBLD CKD-EPI 2021: 86.1 ML/MIN/1.73
EOSINOPHIL # BLD AUTO: 0.02 10*3/MM3 (ref 0–0.4)
EOSINOPHIL NFR BLD AUTO: 0.3 % (ref 0.3–6.2)
ERYTHROCYTE [DISTWIDTH] IN BLOOD BY AUTOMATED COUNT: 12.9 % (ref 12.3–15.4)
GLOBULIN UR ELPH-MCNC: 4 GM/DL
GLUCOSE SERPL-MCNC: 117 MG/DL (ref 65–99)
HCT VFR BLD AUTO: 44.3 % (ref 37.5–51)
HGB BLD-MCNC: 15.7 G/DL (ref 13–17.7)
HOLD SPECIMEN: NORMAL
HOLD SPECIMEN: NORMAL
IMM GRANULOCYTES # BLD AUTO: 0.03 10*3/MM3 (ref 0–0.05)
IMM GRANULOCYTES NFR BLD AUTO: 0.4 % (ref 0–0.5)
LYMPHOCYTES # BLD AUTO: 1.44 10*3/MM3 (ref 0.7–3.1)
LYMPHOCYTES NFR BLD AUTO: 19.7 % (ref 19.6–45.3)
MCH RBC QN AUTO: 33.3 PG (ref 26.6–33)
MCHC RBC AUTO-ENTMCNC: 35.4 G/DL (ref 31.5–35.7)
MCV RBC AUTO: 93.9 FL (ref 79–97)
MONOCYTES # BLD AUTO: 1.18 10*3/MM3 (ref 0.1–0.9)
MONOCYTES NFR BLD AUTO: 16.1 % (ref 5–12)
NEUTROPHILS NFR BLD AUTO: 4.62 10*3/MM3 (ref 1.7–7)
NEUTROPHILS NFR BLD AUTO: 63.2 % (ref 42.7–76)
NRBC BLD AUTO-RTO: 0 /100 WBC (ref 0–0.2)
NT-PROBNP SERPL-MCNC: 108.2 PG/ML (ref 0–900)
PLATELET # BLD AUTO: 138 10*3/MM3 (ref 140–450)
PMV BLD AUTO: 9.7 FL (ref 6–12)
POTASSIUM SERPL-SCNC: 3.9 MMOL/L (ref 3.5–5.2)
PROCALCITONIN SERPL-MCNC: 0.12 NG/ML (ref 0–0.25)
PROT SERPL-MCNC: 7.7 G/DL (ref 6–8.5)
RBC # BLD AUTO: 4.72 10*6/MM3 (ref 4.14–5.8)
SODIUM SERPL-SCNC: 135 MMOL/L (ref 136–145)
TROPONIN T SERPL HS-MCNC: 16 NG/L
WBC NRBC COR # BLD AUTO: 7.31 10*3/MM3 (ref 3.4–10.8)
WHOLE BLOOD HOLD COAG: NORMAL
WHOLE BLOOD HOLD SPECIMEN: NORMAL

## 2024-08-31 PROCEDURE — 25810000003 SODIUM CHLORIDE 0.9 % SOLUTION: Performed by: PHYSICIAN ASSISTANT

## 2024-08-31 PROCEDURE — 85025 COMPLETE CBC W/AUTO DIFF WBC: CPT | Performed by: PHYSICIAN ASSISTANT

## 2024-08-31 PROCEDURE — 71275 CT ANGIOGRAPHY CHEST: CPT

## 2024-08-31 PROCEDURE — 25510000001 IOPAMIDOL 61 % SOLUTION: Performed by: STUDENT IN AN ORGANIZED HEALTH CARE EDUCATION/TRAINING PROGRAM

## 2024-08-31 PROCEDURE — 96360 HYDRATION IV INFUSION INIT: CPT

## 2024-08-31 PROCEDURE — 99285 EMERGENCY DEPT VISIT HI MDM: CPT

## 2024-08-31 PROCEDURE — 84145 PROCALCITONIN (PCT): CPT | Performed by: PHYSICIAN ASSISTANT

## 2024-08-31 PROCEDURE — 83605 ASSAY OF LACTIC ACID: CPT | Performed by: PHYSICIAN ASSISTANT

## 2024-08-31 PROCEDURE — 84484 ASSAY OF TROPONIN QUANT: CPT | Performed by: PHYSICIAN ASSISTANT

## 2024-08-31 PROCEDURE — 71045 X-RAY EXAM CHEST 1 VIEW: CPT

## 2024-08-31 PROCEDURE — 80053 COMPREHEN METABOLIC PANEL: CPT | Performed by: PHYSICIAN ASSISTANT

## 2024-08-31 PROCEDURE — 83880 ASSAY OF NATRIURETIC PEPTIDE: CPT | Performed by: PHYSICIAN ASSISTANT

## 2024-08-31 RX ORDER — IOPAMIDOL 612 MG/ML
100 INJECTION, SOLUTION INTRAVASCULAR
Status: COMPLETED | OUTPATIENT
Start: 2024-08-31 | End: 2024-08-31

## 2024-08-31 RX ORDER — SODIUM CHLORIDE 0.9 % (FLUSH) 0.9 %
10 SYRINGE (ML) INJECTION AS NEEDED
Status: DISCONTINUED | OUTPATIENT
Start: 2024-08-31 | End: 2024-08-31 | Stop reason: HOSPADM

## 2024-08-31 RX ADMIN — SODIUM CHLORIDE 1000 ML: 9 INJECTION, SOLUTION INTRAVENOUS at 17:59

## 2024-08-31 RX ADMIN — IOPAMIDOL 100 ML: 612 INJECTION, SOLUTION INTRAVENOUS at 19:25

## 2024-08-31 NOTE — ED PROVIDER NOTES
Subjective  History of Present Illness:    Chief Complaint: Shortness of breath  History of Present Illness: 64-year-old male who presents to the emergency department for complaints of shortness of breath and testing COVID-positive.  Patient states that he tested positive for COVID yesterday.  Patient states that at baseline he is short of breath, but has noticed increased rapid breathing over the last 24 hours.  At baseline patient uses 1 L of oxygen at home during the night, but has been having to use it more often.  Patient denies chest pain.  Onset: Gradual  Duration: 1 to 2 days  Exacerbating / Alleviating factors: 1 L of oxygen  Associated symptoms: Short of breath      Nurses Notes reviewed and agree, including vitals, allergies, social history and prior medical history.     REVIEW OF SYSTEMS: All systems reviewed and not pertinent unless noted.    Review of Systems   Respiratory:  Positive for shortness of breath.    All other systems reviewed and are negative.      Past Medical History:   Diagnosis Date    Allergic rhinitis     Anxiety     Arthritis     Balance problem     Carpal tunnel syndrome     Cluster headache     Coronary artery disease     Developmental delay     Diabetes mellitus     Noted by PCP    Difficulty walking     Dizziness     Dysphagia     Enlarged prostate     GERD (gastroesophageal reflux disease)     Gout     Hip fracture, left     History of cardiovascular stress test 2018    positive for inferior and lateral ischemia     History of echocardiogram     Hyperlipidemia     Hypertension     Impaired mobility     Lymphadenitis     Memory loss     Migraine     Myocardial infarction 2000    Obesity     DENZEL (obstructive sleep apnea)     Peptic ulcer     Peptic ulceration     Poor historian     Rheumatoid arthritis     Right shoulder pain     SOB (shortness of breath) on exertion     Spinocerebellar ataxia     Tension headache     Trigeminal neuralgia     Vitamin B 12 deficiency     Vitamin D  "deficiency        Allergies:    Topamax [topiramate] and Oxycodone      Past Surgical History:   Procedure Laterality Date    CARDIAC CATHETERIZATION      CARDIAC CATHETERIZATION Left 3/14/2018    Procedure: Cardiac Catheterization/Vascular Study;  Surgeon: Adam Perkins MD;  Location:  [facility];  Service: Cardiovascular    COLONOSCOPY      COLONOSCOPY N/A 9/13/2019    Procedure: COLONOSCOPY W/ COLD SNARE POLYPECTOMY;  Surgeon: Melba Lopez MD;  Location: [facility] ENDOSCOPY;  Service: Gastroenterology    ELBOW ARTHROPLASTY Bilateral     ENDOSCOPY      HERNIA REPAIR      x3    HIP HEMIARTHROPLASTY Left     NEUROPLASTY Bilateral     decompression median nerve at carpal tunnel    TOTAL KNEE ARTHROPLASTY Bilateral     2005, 2012         Social History     Socioeconomic History    Marital status:    Tobacco Use    Smoking status: Never     Passive exposure: Never    Smokeless tobacco: Never   Vaping Use    Vaping status: Never Used   Substance and Sexual Activity    Alcohol use: Yes     Comment: Rarely    Drug use: No    Sexual activity: Defer         Family History   Problem Relation Age of Onset    Deep vein thrombosis Mother     Diabetes Mother     Hyperlipidemia Mother     Hypertension Mother     Heart attack Mother     Cancer Father     Kidney disease Father     Deep vein thrombosis Daughter     Arthritis Sister     Diabetes Sister     Hyperlipidemia Sister     Hypertension Sister     Osteoporosis Sister     Thyroid disease Sister     Liver disease Sister     Arthritis Brother     Diabetes Brother     Hyperlipidemia Brother     Hypertension Brother     Stroke Brother     Cancer Brother     Heart attack Brother     Mental illness Brother        Objective  Physical Exam:  /94   Pulse 75   Temp 99.6 °F (37.6 °C)   Resp 22   Ht 170.2 cm (67\")   Wt 113 kg (250 lb)   SpO2 93%   BMI 39.16 kg/m²      Physical Exam  Vitals reviewed.   HENT:      Head: Normocephalic and atraumatic. "      Mouth/Throat:      Mouth: Mucous membranes are moist.   Eyes:      Pupils: Pupils are equal, round, and reactive to light.   Cardiovascular:      Rate and Rhythm: Normal rate and regular rhythm.   Pulmonary:      Effort: Pulmonary effort is normal.      Breath sounds: Normal breath sounds. No wheezing.   Abdominal:      General: Bowel sounds are normal.      Palpations: Abdomen is soft.   Musculoskeletal:      Cervical back: Normal range of motion.   Skin:     General: Skin is warm and dry.   Neurological:      General: No focal deficit present.      Mental Status: He is alert.           Procedures    ED Course:    EKG interpretation by me: Sinus rhythm, rate of 85, right axis deviation, no ST elevation type I AV block abnormal EKG    CT PE interpretation by me: No acute pulmonary embolism, no intrathoracic abnormality    ED Course as of 08/31/24 2149   Sat Aug 31, 2024   1824 Review of previous  non ED visits, prior labs, prior imaging, available notes from prior evaluations or visits with specialists, medication list, allergies, past medical history, past surgical history     [CS]      ED Course User Index  [CS] Dallas Rachel Jr., TITUS       Lab Results (last 24 hours)       Procedure Component Value Units Date/Time    CBC & Differential [[id number]]  (Abnormal) Collected: 08/31/24 1758    Specimen: Blood Updated: 08/31/24 1808    Narrative:      The following orders were created for panel order CBC & Differential.  Procedure                               Abnormality         Status                     ---------                               -----------         ------                     CBC Auto Differential[[id number]]        Abnormal            Final result               Scan Slide[[id number]]                                                                    Please view results for these tests on the individual orders.    Comprehensive Metabolic Panel [[id number]]  (Abnormal) Collected: 08/31/24 1758     Specimen: Blood Updated: 08/31/24 1836     Glucose 117 mg/dL      BUN 9 mg/dL      Creatinine 0.97 mg/dL      Sodium 135 mmol/L      Potassium 3.9 mmol/L      Comment: Slight hemolysis detected by analyzer. Result may be falsely elevated.        Chloride 101 mmol/L      CO2 25.4 mmol/L      Calcium 8.7 mg/dL      Total Protein 7.7 g/dL      Albumin 3.7 g/dL      ALT (SGPT) 26 U/L      AST (SGOT) 26 U/L      Comment: Slight hemolysis detected by analyzer. Result may be falsely elevated.        Alkaline Phosphatase 38 U/L      Total Bilirubin 0.5 mg/dL      Globulin 4.0 gm/dL      A/G Ratio 0.9 g/dL      BUN/Creatinine Ratio 9.3     Anion Gap 8.6 mmol/L      eGFR 86.1 mL/min/1.73     Narrative:      GFR Normal >60  Chronic Kidney Disease <60  Kidney Failure <15      BNP [[id number]]  (Normal) Collected: 08/31/24 1758    Specimen: Blood Updated: 08/31/24 1828     proBNP 108.2 pg/mL     Narrative:      This assay is used as an aid in the diagnosis of individuals suspected of having heart failure. It can be used as an aid in the diagnosis of acute decompensated heart failure (ADHF) in patients presenting with signs and symptoms of ADHF to the emergency department (ED). In addition, NT-proBNP of <300 pg/mL indicates ADHF is not likely.    Age Range Result Interpretation  NT-proBNP Concentration (pg/mL:      <50             Positive            >450                   Gray                 300-450                    Negative             <300    50-75           Positive            >900                  Gray                300-900                  Negative            <300      >75             Positive            >1800                  Gray                300-1800                  Negative            <300    Single High Sensitivity Troponin T [[id number]]  (Normal) Collected: 08/31/24 1758    Specimen: Blood Updated: 08/31/24 1828     HS Troponin T 16 ng/L     Narrative:      High Sensitive Troponin T Reference Range:  <14.0 ng/L-  "Negative Female for AMI  <22.0 ng/L- Negative Male for AMI  >=14 - Abnormal Female indicating possible myocardial injury.  >=22 - Abnormal Male indicating possible myocardial injury.   Clinicians would have to utilize clinical acumen, EKG, Troponin, and serial changes to determine if it is an Acute Myocardial Infarction or myocardial injury due to an underlying chronic condition.         Lactic Acid, Plasma [[id number]]  (Normal) Collected: 08/31/24 1758    Specimen: Blood Updated: 08/31/24 1823     Lactate 1.8 mmol/L     Procalcitonin [[id number]]  (Normal) Collected: 08/31/24 1758    Specimen: Blood Updated: 08/31/24 1833     Procalcitonin 0.12 ng/mL     Narrative:      As a Marker for Sepsis (Non-Neonates):    1. <0.5 ng/mL represents a low risk of severe sepsis and/or septic shock.  2. >2 ng/mL represents a high risk of severe sepsis and/or septic shock.    As a Marker for Lower Respiratory Tract Infections that require antibiotic therapy:    PCT on Admission    Antibiotic Therapy       6-12 Hrs later    >0.5                Strongly Recommended  >0.25 - <0.5        Recommended   0.1 - 0.25          Discouraged              Remeasure/reassess PCT  <0.1                Strongly Discouraged     Remeasure/reassess PCT    As 28 day mortality risk marker: \"Change in Procalcitonin Result\" (>80% or <=80%) if Day 0 (or Day 1) and Day 4 values are available. Refer to http://www.[facility]-pct-calculator.com    Change in PCT <=80%  A decrease of PCT levels below or equal to 80% defines a positive change in PCT test result representing a higher risk for 28-day all-cause mortality of patients diagnosed with severe sepsis for septic shock.    Change in PCT >80%  A decrease of PCT levels of more than 80% defines a negative change in PCT result representing a lower risk for 28-day all-cause mortality of patients diagnosed with severe sepsis or septic shock.       CBC Auto Differential [[id number]]  (Abnormal) Collected: 08/31/24 1758 "    Specimen: Blood Updated: 08/31/24 1808     WBC 7.31 10*3/mm3      RBC 4.72 10*6/mm3      Hemoglobin 15.7 g/dL      Hematocrit 44.3 %      MCV 93.9 fL      MCH 33.3 pg      MCHC 35.4 g/dL      RDW 12.9 %      RDW-SD 44.7 fl      MPV 9.7 fL      Platelets 138 10*3/mm3      Neutrophil % 63.2 %      Lymphocyte % 19.7 %      Monocyte % 16.1 %      Eosinophil % 0.3 %      Basophil % 0.3 %      Immature Grans % 0.4 %      Neutrophils, Absolute 4.62 10*3/mm3      Lymphocytes, Absolute 1.44 10*3/mm3      Monocytes, Absolute 1.18 10*3/mm3      Eosinophils, Absolute 0.02 10*3/mm3      Basophils, Absolute 0.02 10*3/mm3      Immature Grans, Absolute 0.03 10*3/mm3      nRBC 0.0 /100 WBC              CT Angiogram Chest Pulmonary Embolism    Result Date: 8/31/2024  FINAL REPORT TECHNIQUE: null CLINICAL HISTORY: covid positive; soa, weakness COMPARISON: null FINDINGS: CT angiography chest with contrast. 3D Postprocessing. Comparison: CT/SR - CT ANGIOGRAM CHEST PULMONARY EMBOLISM - 03/17/2023 12:06 PM EDT Findings: Mildly enlarged heart. Moderate coronary artery calcification. The thoracic aorta is normal caliber without dissection. The great vessels are patent. The visualized thyroid and mediastinum are unremarkable. There is an azygous lobe. There is no consolidation or pleural effusion. There are bandlike and linear foci of atelectasis within the lower lungs. The liver is hypodense. There is moderate pancreatic volume loss. The bones are intact.     Impression: IMPRESSION: 1. No evidence of pulmonary artery embolism. 2. Multiple linear and bandlike foci of atelectasis within the lower lungs. 3. Fatty infiltration of the liver. Authenticated and Electronically Signed by Chiqui Bailey MD on 08/31/2024 07:57:07 PM        Medical Decision Making  Patient Presentation 64-year-old male presented with positive COVID test at home, shortness of breath    DDX COVID, viral illness, bronchitis, pneumonia, pulmonary embolism, acute  respiratory failure hypoxia, hypercapnia    Data Review/ Non ED Records /Analysis/Ordering unique tests  Review of previous  non ED visits, prior labs, prior imaging, available notes from prior evaluations or visits with specialists, medication list, allergies, past medical history, past surgical history        Independent Review Studies  I Personally reviewed all laboratory studies performed in the emergency department     Intervention/Re-evaluation IV fluids on reevaluation patient symptoms improved, he remained stable    Independent Clinician no consultation    Risk Stratification tools/clinical decision rules patient presented with known COVID-19, significant comorbidities include coronary disease, sleep apnea, respiratory failure hypoxia oxygen dependent, I was concerned about pneumonia, respiratory failure, sepsis, bacteremia, labs were drawn, labs also considered a pulmonary embolism, CT scan was performed that was negative for PE, he remained stable while in the ED, he was a candidate for Paxlovid and was discharged home on Paxlovid and instructed medications to hold while taking.    Shared Decision Making discussed all results and findings with patient and family    Disposition patient stable for discharge    Problems Addressed:  COVID-19: complicated acute illness or injury    Amount and/or Complexity of Data Reviewed  External Data Reviewed: labs and notes.     Details: Review of previous  non ED visits, prior labs, prior imaging, available notes from prior evaluations or visits with specialists, medication list, allergies, past medical history, past surgical history      Labs: ordered. Decision-making details documented in ED Course.  Radiology: ordered and independent interpretation performed. Decision-making details documented in ED Course.  ECG/medicine tests: ordered and independent interpretation performed. Decision-making details documented in ED Course.    Risk  Prescription drug  management.          Final diagnoses:   COVID-19   CAD in native artery   DENZEL (obstructive sleep apnea)   SOB (shortness of breath)           Disposition DISCHARGE    Patient discharged in stable condition.    Reviewed implications of results, diagnosis, meds, responsibility to follow up, warning signs and symptoms of possible worsening, potential complications and reasons to return to ER.    Patient/Family voiced understanding of above instructions.    Discussed plan for discharge, as there is no emergent indication for admission.  Pt/family is agreeable and understands need for follow up and possible repeat testing.  Pt/family is aware that discharge does not mean that nothing is wrong but that it indicates no emergency is currently present that requires admission and they must continue care with follow-up as given below or with a physician of their choice.     FOLLOW-UP  Naty Lebron MD  [address]  [facility] [place] [address] [phone]    Schedule an appointment as soon as possible for a visit       [facility] EMERGENCY DEPARTMENT  [address] [address] [phone]    If symptoms worsen         Medication List        New Prescriptions      Nirmatrelvir & Ritonavir (300mg/100mg)  Commonly known as: PAXLOVID  Take 3 tablets by mouth 2 (Two) Times a Day for 5 days.            Changed      busPIRone 15 MG tablet  Commonly known as: BUSPAR  TAKE 1 TABLET TWICE A DAY  AS NEEDED FOR ANXIETY  What changed: when to take this               Where to Get Your Medications        These medications were sent to [id number] - [place], [place] - [address] - [phone]  - [phone] FX  [address] [facility] [address]      Phone: [phone]   Nirmatrelvir & Ritonavir (300mg/100mg)             Dallas Rachel Jr., PASONIA  08/31/24 1379

## 2024-09-05 NOTE — H&P (VIEW-ONLY)
Subjective   Chris Mejia is a 64 y.o. male.   Chief Complaint   Patient presents with    Difficulty Swallowing     And GERD         History of Present Illness       Mr. Mejia is a 64-year-old male who comes the office today to discuss surveillance colonoscopy.  His last exam was in 2019.  He has no lower GI complaints.  He does have a long history of gastroesophageal reflux, which is now well-controlled with Dexilant.  He denies episodes of choking, or food getting stuck.  However, he does have some dysphagia which he attributes to excess mucus/postnasal drip.  He states that he has this feeling is present every time he swallows.  He has had no recent upper GI imaging.    The following portions of the patient's history were reviewed and updated as appropriate: allergies, current medications, past family history, past medical history, past social history, past surgical history, and problem list.    Patient Active Problem List   Diagnosis    Tubular adenoma of colon    Trigeminal neuralgia    Spinocerebellar ataxia    RA (rheumatoid arthritis)    Insomnia    Essential hypertension    Hypercholesteremia    Acid reflux    Anxiety    Vitamin B12 deficiency    Vitamin D deficiency    DENZEL (obstructive sleep apnea)    Atopic rhinitis    Ataxic gait    Shoulder pain    Ophthalmoplegia    CAD in native artery    Seasonal allergies    Benign non-nodular prostatic hyperplasia with lower urinary tract symptoms    Weakness    Tension headache    Limited response to serotonin reuptake inhibitors 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    HTR2A GG genotype    HLA-B*1502 allele negative    CYP2B6 intermediate metabolizer    Vertigo    Cerebellar atrophy    Chest pain    SOB (shortness of breath)    Aortic root dilation       Past Medical History:   Diagnosis Date    Allergic rhinitis     Anxiety     Arthritis     Balance problem     Carpal tunnel syndrome     Cluster headache     Coronary artery  disease     Developmental delay     Diabetes mellitus     Noted by PCP    Difficulty walking     Dizziness     Dysphagia     Enlarged prostate     GERD (gastroesophageal reflux disease)     Gout     Hip fracture, left     History of cardiovascular stress test 2018    positive for inferior and lateral ischemia     History of echocardiogram     Hyperlipidemia     Hypertension     Impaired mobility     Lymphadenitis     Memory loss     Migraine     Morbid obesity with BMI of 40.0-44.9, adult 08/10/2020    Associated with hypertension, hyperlipidemia       Myocardial infarction 2000    Obesity     DENZEL (obstructive sleep apnea)     Peptic ulcer     Peptic ulceration     Poor historian     Rheumatoid arthritis     Right shoulder pain     SOB (shortness of breath) on exertion     Spinocerebellar ataxia     Tension headache     Trigeminal neuralgia     Vitamin B 12 deficiency     Vitamin D deficiency        Past Surgical History:   Procedure Laterality Date    CARDIAC CATHETERIZATION      CARDIAC CATHETERIZATION Left 3/14/2018    Procedure: Cardiac Catheterization/Vascular Study;  Surgeon: Adam Perkins MD;  Location:  [facility];  Service: Cardiovascular    COLONOSCOPY      COLONOSCOPY N/A 9/13/2019    Procedure: COLONOSCOPY W/ COLD SNARE POLYPECTOMY;  Surgeon: Melba Lopez MD;  Location: [facility]COPY;  Service: Gastroenterology    ELBOW ARTHROPLASTY Bilateral     ENDOSCOPY      HERNIA REPAIR      x3    HIP HEMIARTHROPLASTY Left     NEUROPLASTY Bilateral     decompression median nerve at carpal tunnel    TOTAL KNEE ARTHROPLASTY Bilateral     2005, 2012       Medications:     Current Outpatient Medications:     amLODIPine (NORVASC) 10 MG tablet, Take 1 tablet by mouth Daily. Indications: High Blood Pressure Disorder, Disp: 90 tablet, Rfl: 3    aspirin 81 MG tablet, Take 1 tablet by mouth Daily., Disp: 30 tablet, Rfl: 11    azelastine (ASTELIN) 0.1 % nasal spray, 2 sprays into the nostril(s) as  directed by provider 2 (Two) Times a Day., Disp: 90 mL, Rfl: 3    busPIRone (BUSPAR) 15 MG tablet, TAKE 1 TABLET TWICE A DAY  AS NEEDED FOR ANXIETY (Patient taking differently: Take 1 tablet by mouth Daily.), Disp: 180 tablet, Rfl: 1    carvedilol (COREG) 25 MG tablet, Take 1 tablet by mouth 2 (Two) Times a Day With Meals., Disp: 180 tablet, Rfl: 3    dexlansoprazole (Dexilant) 60 MG capsule, Take 1 capsule by mouth Daily., Disp: 90 capsule, Rfl: 3    hydroCHLOROthiazide 25 MG tablet, Take 1 tablet by mouth Daily., Disp: , Rfl:     isosorbide mononitrate (IMDUR) 30 MG 24 hr tablet, Take 1 tablet by mouth Daily for 360 days., Disp: 90 tablet, Rfl: 3    levocetirizine (XYZAL) 5 MG tablet, Take 1 tablet by mouth Every Evening., Disp: 90 tablet, Rfl: 3    meclizine (ANTIVERT) 25 MG tablet, Take 1 tablet by mouth 3 (Three) Times a Day As Needed for Dizziness., Disp: , Rfl:     nitroglycerin (Nitrostat) 0.4 MG SL tablet, Place 1 tablet under the tongue Every 5 (Five) Minutes As Needed for Chest Pain. Take no more than 3 doses in 15 minutes., Disp: 30 tablet, Rfl: 1    pramipexole (MIRAPEX) 0.5 MG tablet, Take 1 tablet by mouth 3 (Three) Times a Day., Disp: , Rfl:     pravastatin (PRAVACHOL) 40 MG tablet, Take 1 tablet by mouth Every Night., Disp: 90 tablet, Rfl: 3    tamsulosin (FLOMAX) 0.4 MG capsule 24 hr capsule, TAKE 1 CAPSULE EVERY NIGHT (DOSE DECREASE DUE TO      VISION DISTURBANCE), Disp: 90 capsule, Rfl: 3    tiZANidine (ZANAFLEX) 4 MG tablet, Take 1 tablet by mouth Every Night., Disp: 90 tablet, Rfl: 3    Current Facility-Administered Medications:     cyanocobalamin injection 1,000 mcg, 1,000 mcg, Intramuscular, Q28 Days, Naty Lebron MD, 1,000 mcg at 09/06/24 1116    Allergies:   Allergies   Allergen Reactions    Topamax [Topiramate] Headache    Oxycodone Hallucinations         Family History   Problem Relation Age of Onset    Deep vein thrombosis Mother     Diabetes Mother     Hyperlipidemia Mother      Hypertension Mother     Heart attack Mother     Cancer Father     Kidney disease Father     Deep vein thrombosis Daughter     Arthritis Sister     Diabetes Sister     Hyperlipidemia Sister     Hypertension Sister     Osteoporosis Sister     Thyroid disease Sister     Liver disease Sister     Arthritis Brother     Diabetes Brother     Hyperlipidemia Brother     Hypertension Brother     Stroke Brother     Cancer Brother     Heart attack Brother     Mental illness Brother        Social History     Socioeconomic History    Marital status:    Tobacco Use    Smoking status: Never     Passive exposure: Never    Smokeless tobacco: Never   Vaping Use    Vaping status: Never Used   Substance and Sexual Activity    Alcohol use: Yes     Comment: Rarely    Drug use: No    Sexual activity: Defer         Review of Systems   Constitutional:  Negative for chills, fever and unexpected weight loss.   HENT:  Positive for trouble swallowing. Negative for hearing loss and voice change.    Eyes:  Negative for visual disturbance.   Respiratory:  Negative for apnea, cough, chest tightness, shortness of breath and wheezing.    Cardiovascular:  Negative for chest pain, palpitations and leg swelling.   Gastrointestinal:  Positive for GERD. Negative for abdominal distention, abdominal pain, anal bleeding, blood in stool, constipation, diarrhea, nausea, rectal pain, vomiting and indigestion.   Endocrine: Negative for cold intolerance and heat intolerance.   Genitourinary:  Negative for difficulty urinating, dysuria and flank pain.   Musculoskeletal:  Negative for back pain and gait problem.   Skin:  Negative for color change, rash, skin lesions and wound.   Neurological:  Negative for dizziness, syncope, speech difficulty, weakness, light-headedness and numbness.   Hematological:  Negative for adenopathy. Does not bruise/bleed easily.   Psychiatric/Behavioral:  Negative for depressed mood. The patient is not nervous/anxious.    I have  "reviewed and confirmed the accuracy of the ROS as documented by the MA/LPN/RN Melba Lopez MD      Objective   /70   Pulse 80   Temp 98.6 °F (37 °C)   Ht 170.2 cm (67.01\")   Wt 114 kg (252 lb)   SpO2 94%   BMI 39.46 kg/m²     Physical Exam  Constitutional:       Appearance: He is well-developed.   HENT:      Head: Normocephalic and atraumatic.   Eyes:      General: No scleral icterus.     Pupils: Pupils are equal, round, and reactive to light.   Cardiovascular:      Rate and Rhythm: Regular rhythm.   Pulmonary:      Effort: Pulmonary effort is normal.   Abdominal:      General: There is no distension.      Palpations: Abdomen is soft.      Tenderness: There is no abdominal tenderness.   Musculoskeletal:      Cervical back: Neck supple.   Skin:     General: Skin is warm and dry.   Neurological:      Mental Status: He is alert and oriented to person, place, and time.   Psychiatric:         Behavior: Behavior normal.           Assessment & Plan   Diagnoses and all orders for this visit:    1. Colon cancer screening (Primary)    2. Dysphagia, unspecified type      We discussed EGD for diagnostic purposes.  We discussed esophageal dilation if indicated.  We discussed the indications for upper endoscopy as well as the risks, benefits and alternatives to this procedure.   The patient was given an opportunity to ask questions.  The patient verbalized understanding of these recommendations and the plan of care. The patient is willing to proceed with endoscopy and has signed informed consent in the office today.  My office will arrange scheduling for the EGD procedure and pre-admission testing.      We discussed colonoscopy for colon cancer screening purposes.  We discussed the indications for screening colonoscopy as well as the risks, benefits and alternatives to this procedure. Risks including but not limited to perforation, bleeding,need for blood transfusion or emergent surgery ,and missed neoplasm were " reviewed in detail with the patient. The necessary bowel preparation and pre-procedure clear liquid diet was explained in detail.  A written instructional handout was also provided.  Electronic prescriptions for miralax and dulcolax were sent to the patient's pharmacy.  The patient was given an opportunity to ask questions.  The patient verbalized understanding of these recommendations and the plan of care. The patient is willing to proceed with colonoscopy and has signed informed consent in the office today.  My office will arrange scheduling for the colonoscopy procedure and pre-admission testing.

## 2024-09-06 ENCOUNTER — OFFICE VISIT (OUTPATIENT)
Dept: INTERNAL MEDICINE | Facility: CLINIC | Age: 64
End: 2024-09-06
Payer: MEDICARE

## 2024-09-06 VITALS
DIASTOLIC BLOOD PRESSURE: 88 MMHG | TEMPERATURE: 97.9 F | BODY MASS INDEX: 39.3 KG/M2 | HEART RATE: 64 BPM | HEIGHT: 67 IN | SYSTOLIC BLOOD PRESSURE: 120 MMHG | OXYGEN SATURATION: 96 % | WEIGHT: 250.4 LBS

## 2024-09-06 DIAGNOSIS — R06.02 SOB (SHORTNESS OF BREATH): ICD-10-CM

## 2024-09-06 DIAGNOSIS — K21.9 GASTROESOPHAGEAL REFLUX DISEASE WITHOUT ESOPHAGITIS: ICD-10-CM

## 2024-09-06 DIAGNOSIS — I77.810 AORTIC ROOT DILATION: ICD-10-CM

## 2024-09-06 DIAGNOSIS — Z91.81 AT HIGH RISK FOR FALLS: ICD-10-CM

## 2024-09-06 DIAGNOSIS — J30.2 SEASONAL ALLERGIES: ICD-10-CM

## 2024-09-06 DIAGNOSIS — E53.8 VITAMIN B12 DEFICIENCY: ICD-10-CM

## 2024-09-06 DIAGNOSIS — I10 ESSENTIAL HYPERTENSION: Chronic | ICD-10-CM

## 2024-09-06 DIAGNOSIS — Z00.00 MEDICARE ANNUAL WELLNESS VISIT, SUBSEQUENT: Primary | ICD-10-CM

## 2024-09-06 PROBLEM — E66.01 MORBID OBESITY WITH BMI OF 40.0-44.9, ADULT: Status: RESOLVED | Noted: 2020-08-10 | Resolved: 2024-09-06

## 2024-09-06 RX ORDER — AZELASTINE 1 MG/ML
2 SPRAY, METERED NASAL 2 TIMES DAILY
Qty: 90 ML | Refills: 3 | Status: SHIPPED | OUTPATIENT
Start: 2024-09-06

## 2024-09-06 RX ORDER — AMLODIPINE BESYLATE 10 MG/1
10 TABLET ORAL DAILY
Qty: 90 TABLET | Refills: 3 | Status: SHIPPED | OUTPATIENT
Start: 2024-09-06

## 2024-09-06 RX ADMIN — CYANOCOBALAMIN 1000 MCG: 1000 INJECTION, SOLUTION INTRAMUSCULAR; SUBCUTANEOUS at 11:16

## 2024-09-06 NOTE — PROGRESS NOTES
Subjective   The ABCs of the Annual Wellness Visit  Medicare Wellness Visit      Chris Mejia is a 64 y.o. patient who presents for a Medicare Wellness Visit.    The following portions of the patient's history were reviewed and   updated as appropriate: allergies, current medications, past family history, past medical history, past social history, past surgical history, and problem list.    Compared to one year ago, the patient's physical   health is the same.  Compared to one year ago, the patient's mental   health is the same.    Recent Hospitalizations:  This patient has had a [facility] admission record on file within the last 365 days.  Current Medical Providers:  Patient Care Team:  Naty Lebron MD as PCP - General (Family Medicine)  Adam Perkins MD as Consulting Physician (Cardiology)  Melba Lopez MD as Surgeon (General Surgery)  Nils Salinas (Optometry)    Outpatient Medications Prior to Visit   Medication Sig Dispense Refill    aspirin 81 MG tablet Take 1 tablet by mouth Daily. 30 tablet 11    busPIRone (BUSPAR) 15 MG tablet TAKE 1 TABLET TWICE A DAY  AS NEEDED FOR ANXIETY (Patient taking differently: Take 1 tablet by mouth Daily.) 180 tablet 1    carvedilol (COREG) 25 MG tablet Take 1 tablet by mouth 2 (Two) Times a Day With Meals. 180 tablet 3    dexlansoprazole (Dexilant) 60 MG capsule Take 1 capsule by mouth Daily. 90 capsule 3    isosorbide mononitrate (IMDUR) 30 MG 24 hr tablet Take 1 tablet by mouth Daily for 360 days. 90 tablet 3    levocetirizine (XYZAL) 5 MG tablet Take 1 tablet by mouth Every Evening. 90 tablet 3    nitroglycerin (Nitrostat) 0.4 MG SL tablet Place 1 tablet under the tongue Every 5 (Five) Minutes As Needed for Chest Pain. Take no more than 3 doses in 15 minutes. 30 tablet 1    pravastatin (PRAVACHOL) 40 MG tablet Take 1 tablet by mouth Every Night. 90 tablet 3    tamsulosin (FLOMAX) 0.4 MG capsule 24 hr capsule TAKE 1 CAPSULE EVERY NIGHT (DOSE DECREASE  DUE TO      VISION DISTURBANCE) 90 capsule 3    tiZANidine (ZANAFLEX) 4 MG tablet Take 1 tablet by mouth Every Night. 90 tablet 3    amLODIPine (NORVASC) 10 MG tablet Take 1 tablet by mouth Daily. Indications: High Blood Pressure Disorder 30 tablet 0    Nirmatrelvir & Ritonavir, 300mg/100mg, (PAXLOVID) Take 3 tablets by mouth 2 (Two) Times a Day for 5 days. 30 tablet 0     Facility-Administered Medications Prior to Visit   Medication Dose Route Frequency Provider Last Rate Last Admin    cyanocobalamin injection 1,000 mcg  1,000 mcg Intramuscular Q28 Days Naty Lebron MD   1,000 mcg at 09/06/24 1116     No opioid medication identified on active medication list. I have reviewed chart for other potential  high risk medication/s and harmful drug interactions in the elderly.      Aspirin is on active medication list. Aspirin use is indicated based on review of current medical condition/s. Pros and cons of this therapy have been discussed today. Benefits of this medication outweigh potential harm.  Patient has been encouraged to continue taking this medication.  .      Patient Active Problem List   Diagnosis    Tubular adenoma of colon    Trigeminal neuralgia    Spinocerebellar ataxia    RA (rheumatoid arthritis)    Insomnia    Essential hypertension    Hypercholesteremia    Acid reflux    Anxiety    Vitamin B12 deficiency    Vitamin D deficiency    DENZEL (obstructive sleep apnea)    Atopic rhinitis    Ataxic gait    Shoulder pain    Ophthalmoplegia    CAD in native artery    Seasonal allergies    Benign non-nodular prostatic hyperplasia with lower urinary tract symptoms    Weakness    Tension headache    Limited response to serotonin reuptake inhibitors 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  ACP discussion was declined by the patient. Patient does not have an advance directive, declines further assistance.            Objective   Vitals:    24 1037   BP: 120/88   Pulse: 64   Temp: 97.9 °F (36.6 °C)   TempSrc: Temporal   SpO2: 96%   Weight: 114 kg (250 lb 6.4 oz)   Height: 170.2 cm (67\")   PainSc: 0-No pain       Estimated body mass index is 39.22 kg/m² as calculated from the following:    Height as of this encounter: 170.2 cm (67\").    Weight as of this encounter: 114 kg (250 lb 6.4 oz).    Class 2 Severe Obesity (BMI >=35 and <=39.9). Obesity-related health conditions include the following: obstructive sleep apnea, hypertension, coronary heart disease, dyslipidemias, GERD, and osteoarthritis. Obesity is unchanged. BMI is is above average; BMI management plan is completed. We discussed Information on healthy weight added to patient's after visit summary.       Does the patient have evidence of cognitive impairment? No  Lab Results   Component Value Date    TRIG 82 07/10/2024    HDL 34 (L) 07/10/2024    LDL 62 07/10/2024    VLDL 16 07/10/2024    HGBA1C 5.70 (H) 07/10/2024                                                                                                Health  Risk Assessment    Smoking Status:  Social History     Tobacco Use   Smoking Status Never    Passive exposure: Never   Smokeless Tobacco Never     Alcohol Consumption:  Social History     Substance and Sexual Activity   Alcohol Use Yes    Comment: Rarely       Fall Risk Screen  STEADI Fall Risk Assessment was completed, and patient is at MODERATE risk for falls. Assessment completed on:2024    Depression Screenin/6/2024    10:36 AM   PHQ-2/PHQ-9 Depression Screening   Little Interest or Pleasure in Doing Things 0-->not at all   Feeling Down, Depressed or Hopeless 0-->not at all   PHQ-9: Brief Depression Severity Measure Score 0     Health Habits and Functional and Cognitive " Screenin/6/2024    10:32 AM   Functional & Cognitive Status   Do you have difficulty preparing food and eating? No   Do you have difficulty bathing yourself, getting dressed or grooming yourself? No   Do you have difficulty using the toilet? No   Do you have difficulty moving around from place to place? Yes   Do you have trouble with steps or getting out of a bed or a chair? Yes   Current Diet Limited Junk Food   Dental Exam Not up to date   Eye Exam Up to date   Exercise (times per week) 7 times per week   Current Exercises Include No Regular Exercise;Walking   Do you need help using the phone?  No   Are you deaf or do you have serious difficulty hearing?  Yes   Do you need help to go to places out of walking distance? Yes   Do you need help shopping? No   Do you need help preparing meals?  No   Do you need help with housework?  No   Do you need help with laundry? No   Do you need help taking your medications? No   Do you need help managing money? No   Do you ever drive or ride in a car without wearing a seat belt? No   Have you felt unusual stress, anger or loneliness in the last month? No   Who do you live with? Spouse   If you need help, do you have trouble finding someone available to you? No   Have you been bothered in the last four weeks by sexual problems? No   Do you have difficulty concentrating, remembering or making decisions? Yes           Age-appropriate Screening Schedule:  Refer to the list below for future screening recommendations based on patient's age, sex and/or medical conditions. Orders for these recommended tests are listed in the plan section. The patient has been provided with a written plan.    Health Maintenance List  Health Maintenance   Topic Date Due    INFLUENZA VACCINE  2024    COLORECTAL CANCER SCREENING  2024    COVID-19 Vaccine (2023- season) 2024 (Originally 2024)    LIPID PANEL  07/10/2025    ANNUAL WELLNESS VISIT  2025    BMI FOLLOWUP   09/06/2025    TDAP/TD VACCINES (4 - Td or Tdap) 01/03/2034    HEPATITIS C SCREENING  Completed    Pneumococcal Vaccine 65+  Completed    ZOSTER VACCINE  Completed                                                                                                                                                CMS Preventative Services Quick Reference  Risk Factors Identified During Encounter  Fall Risk-High or Moderate: Information on Fall Prevention Shared in After Visit Summary and Sit to Stand Exercise Information Shared in After Visit Summary    The above risks/problems have been discussed with the patient.  Pertinent information has been shared with the patient in the After Visit Summary.  An After Visit Summary and PPPS were made available to the patient.    Follow Up:   Next Medicare Wellness visit to be scheduled in 1 year.         Additional E&M Note during same encounter follows:  Patient has additional, significant, and separately identifiable condition(s)/problem(s) that require work above and beyond the Medicare Wellness Visit     Chief Complaint  Medicare Wellness-subsequent, Annual Exam, and Hypertension    Subjective   With oxygen sleeping well at night and feels more rested.     Insurance covered Dexilant and it seems to be working better than the last one he was on.     Hypertension  This is a chronic problem. The current episode started more than 1 year ago. Hypertensive end-organ damage includes CAD/MI. Identifiable causes of hypertension include sleep apnea.   Additional comments: Current therapy: amlodipine 10 mg, carvedilol 25 mg bid, Imdur 30    Per chart review amlodipine increased from 5 to 10 mg by cardiology. Needs 90 day rx sent.     Chris is also being seen today for an annual adult preventative physical exam.  and Chris is also being seen today for additional medical problem/s.                Objective   Vital Signs:  /88   Pulse 64   Temp 97.9 °F (36.6 °C) (Temporal)   Ht  "170.2 cm (67\")   Wt 114 kg (250 lb 6.4 oz)   SpO2 96%   BMI 39.22 kg/m²   Physical Exam  Vitals and nursing note reviewed.   Constitutional:       General: He is not in acute distress.     Appearance: Normal appearance. He is well-developed and well-groomed. He is morbidly obese. He is not ill-appearing, toxic-appearing or diaphoretic.      Comments: In wheelchair   HENT:      Head: Normocephalic and atraumatic.      Right Ear: Hearing, tympanic membrane, ear canal and external ear normal.      Left Ear: Hearing, tympanic membrane, ear canal and external ear normal.   Eyes:      General: Lids are normal. No scleral icterus.        Right eye: No discharge.         Left eye: No discharge.      Extraocular Movements: Extraocular movements intact.   Cardiovascular:      Rate and Rhythm: Normal rate and regular rhythm.   Pulmonary:      Effort: Pulmonary effort is normal.      Breath sounds: Normal breath sounds.   Musculoskeletal:      Cervical back: Neck supple.   Skin:     Coloration: Skin is not jaundiced or pale.   Neurological:      General: No focal deficit present.      Mental Status: He is alert and oriented to person, place, and time.   Psychiatric:         Attention and Perception: Attention and perception normal.         Mood and Affect: Mood and affect normal.         Speech: Speech normal.         Behavior: Behavior normal. Behavior is cooperative.         Thought Content: Thought content normal.         Cognition and Memory: Cognition and memory normal.         Judgment: Judgment normal.         The following data was reviewed by: Naty Lebron MD on 09/06/2024:  ED Provider Notes by Dallas Rachel Jr., PA-C (08/31/2024 17:46)   CT Angiogram Chest Pulmonary Embolism (08/31/2024 19:24)     Progress Notes by Peter Dawkins MD (08/08/2024 10:15)    Stress Test With Myocardial Perfusion Two Day (07/12/2024 07:13)   Adult Transthoracic Echo Complete W/ Cont if Necessary Per Protocol (07/10/2024 " 13:05)     Full Pulmonary Function Test & ABG With Bronchodilator (04/05/2018 09:43)     COLONOSCOPY (09/13/2019 07:20)  repeat 5 years    Lab Results   Component Value Date    CHOL 112 07/10/2024    TRIG 82 07/10/2024    HDL 34 (L) 07/10/2024    LDL 62 07/10/2024      Lab Results   Component Value Date    HGBA1C 5.70 (H) 07/10/2024    HGBA1C 5.70 (H) 03/22/2024    HGBA1C 5.7 (A) 11/07/2023         Assessment and Plan Additional age appropriate preventative wellness advice topics were discussed during today's preventative wellness exam(some topics already addressed during AWV portion of the note above):   Nutrition: Discussed nutrition plan with patient. Information shared in after visit summary. Goal is for a well balanced diet to enhance overall health.     Healthy Weight: Discussed current and goal BMI with patient. Steps to attain this goal discussed. Information shared in after visit summary.      Diagnoses and all orders for this visit:    1. Medicare annual wellness visit, subsequent (Primary)    2. Aortic root dilation  Overview:  Adult Transthoracic Echo Complete W/ Cont if Necessary Per Protocol (07/10/2024 13:05) :  Mild aortic valve regurgitation is present.  Mild dilation of the aortic root is present. The aortic root measures 3.8 cm.    Assessment & Plan:  No alarming findings on most recent CT PE protocol.      3. Essential hypertension  Assessment & Plan:  Hypertension is stable and controlled  Continue current treatment regimen.  Blood pressure will be reassessed in 6 months.    Orders:  -     amLODIPine (NORVASC) 10 MG tablet; Take 1 tablet by mouth Daily. Indications: High Blood Pressure Disorder  Dispense: 90 tablet; Refill: 3    4. Gastroesophageal reflux disease without esophagitis  Assessment & Plan:  Continue Dexilant, has improved quality of life with this PPI.      5. Vitamin B12 deficiency  Assessment & Plan:  Vitamin B12 improved with injections.  Methylmalonic acid normal, continue  current frequency  Failed oral replacement      6. SOB (shortness of breath)  Assessment & Plan:  Improved some with oxygen (nocturnal).  Patient does not feel need to see pulmonary at this time.      7. Seasonal allergies  -     azelastine (ASTELIN) 0.1 % nasal spray; 2 sprays into the nostril(s) as directed by provider 2 (Two) Times a Day.  Dispense: 90 mL; Refill: 3    8. At high risk for falls  Comments:  information via avs          No orders of the defined types were placed in this encounter.    New Medications Ordered This Visit   Medications    amLODIPine (NORVASC) 10 MG tablet     Sig: Take 1 tablet by mouth Daily. Indications: High Blood Pressure Disorder     Dispense:  90 tablet     Refill:  3    azelastine (ASTELIN) 0.1 % nasal spray     Si sprays into the nostril(s) as directed by provider 2 (Two) Times a Day.     Dispense:  90 mL     Refill:  3        I spent 34 minutes caring for Chris on this date of service. This time includes time spent by me in the following activities:preparing for the visit, reviewing tests, obtaining and/or reviewing a separately obtained history, performing a medically appropriate examination and/or evaluation , counseling and educating the patient/family/caregiver, ordering medications, tests, or procedures, and documenting information in the medical record    I spent 25 minutes on the separately reported service of [id number]. This time is not included in the time used to support the E/M service also reported today.     Follow Up   Return in about 5 months (around 2/3/2025) for follow up.  Patient was given instructions and counseling regarding his condition or for health maintenance advice. Please see specific information pulled into the AVS if appropriate.

## 2024-09-06 NOTE — PATIENT INSTRUCTIONS
Medicare Wellness  Personal Prevention Plan of Service     Date of Office Visit:    Encounter Provider:  Naty Lebron MD  Place of Service:  [facility]  Patient Name: Chris Mejia  :  1960    As part of the Medicare Wellness portion of your visit today, we are providing you with this personalized preventive plan of services (PPPS). This plan is based upon recommendations of the [place] Preventive Services Task Force (USPSTF) and the Advisory Committee on Immunization Practices (ACIP).    This lists the preventive care services that should be considered, and provides dates of when you are due. Items listed as completed are up-to-date and do not require any further intervention.    Health Maintenance   Topic Date Due   • ANNUAL WELLNESS VISIT  2024   • BMI FOLLOWUP  2024   • INFLUENZA VACCINE  2024   • COLORECTAL CANCER SCREENING  2024   • COVID-19 Vaccine (2023-24 season) 2024 (Originally 2024)   • LIPID PANEL  07/10/2025   • TDAP/TD VACCINES (4 - Td or Tdap) 2034   • HEPATITIS C SCREENING  Completed   • Pneumococcal Vaccine 65+  Completed   • ZOSTER VACCINE  Completed       No orders of the defined types were placed in this encounter.      No follow-ups on file.        Health Maintenance After Age 65    After age 65, you are at a higher risk for certain long-term diseases and infections as well as injuries from falls. Falls are a major cause of broken bones and head injuries in people who are older than age 65. Getting regular preventive care can help to keep you healthy and well. Preventive care includes getting regular testing and making lifestyle changes as recommended by your health care provider. Talk with your health care provider about:  Which screenings and tests you should have. A screening is a test that checks for a disease when you have no symptoms.  A diet and exercise plan that is right for  you.    What should I know about screenings and tests to prevent falls?  Screening and testing are the best ways to find a health problem early. Early diagnosis and treatment give you the best chance of managing medical conditions that are common after age 65. Certain conditions and lifestyle choices may make you more likely to have a fall. Your health care provider may recommend:  Regular vision checks. Poor vision and conditions such as cataracts can make you more likely to have a fall. If you wear glasses, make sure to get your prescription updated if your vision changes.  Medicine review. Work with your health care provider to regularly review all of the medicines you are taking, including over-the-counter medicines. Ask your health care provider about any side effects that may make you more likely to have a fall. Tell your health care provider if any medicines that you take make you feel dizzy or sleepy.  Strength and balance checks. Your health care provider may recommend certain tests to check your strength and balance while standing, walking, or changing positions.  Foot health exam. Foot pain and numbness, as well as not wearing proper footwear, can make you more likely to have a fall.  Screenings, including:  Osteoporosis screening. Osteoporosis is a condition that causes the bones to get weaker and break more easily.  Blood pressure screening. Blood pressure changes and medicines to control blood pressure can make you feel dizzy.  Depression screening. You may be more likely to have a fall if you have a fear of falling, feel depressed, or feel unable to do activities that you used to do.  Alcohol use screening. Using too much alcohol can affect your balance and may make you more likely to have a fall.    Follow these instructions at home:  Lifestyle  Do not drink alcohol if:  Your health care provider tells you not to drink.  If you drink alcohol:  Limit how much you have to:  0-1 drink a day for  women.  0-2 drinks a day for men.  Know how much alcohol is in your drink. In the [place] one drink equals one 12 oz bottle of beer (355 mL), one 5 oz glass of wine (148 mL), or one 1½ oz glass of hard liquor (44 mL).  Do not use any products that contain nicotine or tobacco. These products include cigarettes, chewing tobacco, and vaping devices, such as e-cigarettes. If you need help quitting, ask your health care provider.    Activity    Follow a regular exercise program to stay fit. This will help you maintain your balance. Ask your health care provider what types of exercise are appropriate for you.  If you need a cane or walker, use it as recommended by your health care provider.  Wear supportive shoes that have nonskid soles.  Safety    Remove any tripping hazards, such as rugs, cords, and clutter.  Install safety equipment such as grab bars in bathrooms and safety rails on stairs.  Keep rooms and walkways well-lit.    General instructions  Talk with your health care provider about your risks for falling. Tell your health care provider if:  You fall. Be sure to tell your health care provider about all falls, even ones that seem minor.  You feel dizzy, tiredness (fatigue), or off-balance.  Take over-the-counter and prescription medicines only as told by your health care provider. These include supplements.  Eat a healthy diet and maintain a healthy weight. A healthy diet includes low-fat dairy products, low-fat (lean) meats, and fiber from whole grains, beans, and lots of fruits and vegetables.  Stay current with your vaccines.  Schedule regular health, dental, and eye exams.    Summary  Having a healthy lifestyle and getting preventive care can help to protect your health and wellness after age 65.  Screening and testing are the best way to find a health problem early and help you avoid having a fall. Early diagnosis and treatment give you the best chance for managing medical conditions that are more common for  people who are older than age 65.  Falls are a major cause of broken bones and head injuries in people who are older than age 65. Take precautions to prevent a fall at home.  Work with your health care provider to learn what changes you can make to improve your health and wellness and to prevent falls.    This information is not intended to replace advice given to you by your health care provider. Make sure you discuss any questions you have with your health care provider.  Document Revised: 05/09/2022 Document Reviewed: 05/09/2022  [employer] Patient Education © 2023 [employer]  Updated 2/29/24 tc     Fall Prevention in the Home, Adult  Falls can cause injuries and affect people of all ages. There are many simple things that you can do to make your home safe and to help prevent falls.  If you need it, ask for help making these changes.  What actions can I take to prevent falls?  General information  Use good lighting in all rooms. Make sure to:  Replace any light bulbs that burn out.  Turn on lights if it is dark and use night-lights.  Keep items that you use often in easy-to-reach places. Lower the shelves around your home if needed.  Move furniture so that there are clear paths around it.  Do not keep throw rugs or other things on the floor that can make you trip.  If any of your floors are uneven, fix them.  Add color or contrast paint or tape to clearly trina and help you see:  Grab bars or handrails.  First and last steps of staircases.  Where the edge of each step is.  If you use a ladder or stepladder:  Make sure that it is fully opened. Do not climb a closed ladder.  Make sure the sides of the ladder are locked in place.  Have someone hold the ladder while you use it.  Know where your pets are as you move through your home.  What can I do in the bathroom?         Keep the floor dry. Clean up any water that is on the floor right away.  Remove soap buildup in the bathtub or shower. Buildup makes bathtubs and  showers slippery.  Use non-skid mats or decals on the floor of the bathtub or shower.  Attach bath mats securely with double-sided, non-slip rug tape.  If you need to sit down while you are in the shower, use a non-slip stool.  Install grab bars by the toilet and in the bathtub and shower. Do not use towel bars as grab bars.  What can I do in the bedroom?  Make sure that you have a light by your bed that is easy to reach.  Do not use any sheets or blankets on your bed that hang to the floor.  Have a firm bench or chair with side arms that you can use for support when you get dressed.  What can I do in the kitchen?  Clean up any spills right away.  If you need to reach something above you, use a sturdy step stool that has a grab bar.  Keep electrical cables out of the way.  Do not use floor polish or wax that makes floors slippery.  What can I do with my stairs?  Do not leave anything on the stairs.  Make sure that you have a light switch at the top and the bottom of the stairs. Have them installed if you do not have them.  Make sure that there are handrails on both sides of the stairs. Fix handrails that are broken or loose. Make sure that handrails are as long as the staircases.  Install non-slip stair treads on all stairs in your home if they do not have carpet.  Avoid having throw rugs at the top or bottom of stairs, or secure the rugs with carpet tape to prevent them from moving.  Choose a carpet design that does not hide the edge of steps on the stairs. Make sure that carpet is firmly attached to the stairs. Fix any carpet that is loose or worn.  What can I do on the outside of my home?  Use bright outdoor lighting.  Repair the edges of walkways and driveways and fix any cracks. Clear paths of anything that can make you trip, such as tools or rocks.  Add color or contrast paint or tape to clearly trina and help you see high doorway thresholds.  Trim any bushes or trees on the main path into your home.  Check  that handrails are securely fastened and in good repair. Both sides of all steps should have handrails.  Install guardrails along the edges of any raised decks or porches.  Have leaves, snow, and ice cleared regularly. Use sand, salt, or ice melt on walkways during winter months if you live where there is ice and snow.  In the garage, clean up any spills right away, including grease or oil spills.  What other actions can I take?  Review your medicines with your health care provider. Some medicines can make you confused or feel dizzy. This can increase your chance of falling.  Wear closed-toe shoes that fit well and support your feet. Wear shoes that have rubber soles and low heels.  Use a cane, walker, scooter, or crutches that help you move around if needed.  Talk with your provider about other ways that you can decrease your risk of falls. This may include seeing a physical therapist to learn to do exercises to improve movement and strength.  Where to find more information  Centers for Disease Control and Prevention[place]ENA: cdc.gov  [place] on Aging: wilmer.nih.gov  National [place] on Aging: wil[place].nih.gov  Contact a health care provider if:  You are afraid of falling at home.  You feel weak, drowsy, or dizzy at home.  You fall at home.  Get help right away if you:  Lose consciousness or have trouble moving after a fall.  Have a fall that causes a head injury.  These symptoms may be an emergency. Get help right away. Call 911.  Do not wait to see if the symptoms will go away.  Do not drive yourself to the hospital.  This information is not intended to replace advice given to you by your health care provider. Make sure you discuss any questions you have with your health care provider.  Document Revised: 08/21/2023 Document Reviewed: 08/21/2023  [employer] Patient Education © 2024 [employer]    Sit-to-Stand Exercise    The sit-to-stand exercise (also known as the chair stand or chair rise exercise)  strengthens your lower body and helps you maintain or improve your mobility and independence. The end goal is to do the sit-to-stand exercise without using your hands. This will be easier as you become stronger. You should always talk with your health care provider before starting any exercise program, especially if you have had recent surgery.  Do the exercise exactly as told by your health care provider and adjust it as directed. It is normal to feel mild stretching, pulling, tightness, or discomfort as you do this exercise, but you should stop right away if you feel sudden pain or your pain gets worse. Do not begin doing this exercise until told by your health care provider.  What the sit-to-stand exercise does  The sit-to-stand exercise helps to strengthen the muscles in your thighs and the muscles in the center of your body that give you stability (core muscles). This exercise is especially helpful if:  You have had knee or hip surgery.  You have trouble getting up from a chair, out of a car, or off the toilet due to muscle weakness.  How to do the sit-to-stand exercise  Sit toward the front edge of a sturdy chair without armrests. Your knees should be bent and your feet should be flat on the floor and shoulder-width apart and underneath your hips.  Place your hands lightly on each side of the seat. Keep your back and neck as straight as possible, with your chest slightly forward.  Breathe in slowly. Lean forward and slightly shift your weight to the front of your feet.  Breathe out as you slowly stand up. Try not to support any weight with your hands.  Stand and pause for a full breath in and out.  Breathe in as you sit down slowly. Tighten your core and abdominal muscles to control your lowering as much as possible. You should lower yourself back to the chair slowly, not just drop back into the seat.  Breathe out slowly.  Do this exercise 10-15 times. If needed, do it fewer times until you build up  strength.  Rest for 1 minute, then do another set of 10-15 repetitions.  To change the difficulty of the sit-to-stand exercise  If the exercise is too difficult, use a chair with sturdy armrests, and push off the armrests to help you come to the standing position. You can also use the armrests to help slowly lower yourself back to sitting. As this gets easier, try to use your arms less. You can also place a firm cushion or pillow on the chair to make the surface higher.  If this exercise is too easy, do not use your arms to help raise or lower yourself. You can also wear a weighted vest, use hand weights, increase your repetitions, or try a lower chair.  General tips  You may feel tired when starting an exercise routine. This is normal.  You may have muscle soreness that lasts a few days. This is normal. As you get stronger, you may not feel muscle soreness.  Use smooth, steady movements.  Do not  hold your breath during strength exercises. This can cause unsafe changes in your blood pressure.  Breathe in slowly through your nose, and breathe out slowly through your mouth.  Summary  Strengthening your lower body is an important step to help you move safely and independently.  The sit-to-stand exercise helps strengthen the muscles in your thighs and core.  You should always talk with your health care provider before starting any exercise program, especially if you have had recent surgery.  This information is not intended to replace advice given to you by your health care provider. Make sure you discuss any questions you have with your health care provider.  Document Revised: 04/10/2022 Document Reviewed: 04/10/2022  Elsevier Patient Education © 2024 [employer].    Exercising to Stay Healthy  To become healthy and stay healthy, it is recommended that you do moderate-intensity and vigorous-intensity exercise. You can tell that you are exercising at a moderate intensity if your heart starts beating faster and you  start breathing faster but can still hold a conversation. You can tell that you are exercising at a vigorous intensity if you are breathing much harder and faster and cannot hold a conversation while exercising.  How can exercise benefit me?  Exercising regularly is important. It has many health benefits, such as:  Improving overall fitness, flexibility, and endurance.  Increasing bone density.  Helping with weight control.  Decreasing body fat.  Increasing muscle strength and endurance.  Reducing stress and tension, anxiety, depression, or anger.  Improving overall health.  What guidelines should I follow while exercising?  Before you start a new exercise program, talk with your health care provider.  Do not exercise so much that you hurt yourself, feel dizzy, or get very short of breath.  Wear comfortable clothes and wear shoes with good support.  Drink plenty of water while you exercise to prevent dehydration or heat stroke.  Work out until your breathing and your heartbeat get faster (moderate intensity).  How often should I exercise?  Choose an activity that you enjoy, and set realistic goals. Your health care provider can help you make an activity plan that is individually designed and works best for you.  Exercise regularly as told by your health care provider. This may include:  Doing strength training two times a week, such as:  Lifting weights.  Using resistance bands.  Push-ups.  Sit-ups.  Yoga.  Doing a certain intensity of exercise for a given amount of time. Choose from these options:  A total of 150 minutes of moderate-intensity exercise every week.  A total of 75 minutes of vigorous-intensity exercise every week.  A mix of moderate-intensity and vigorous-intensity exercise every week.  Children, pregnant women, people who have not exercised regularly, people who are overweight, and older adults may need to talk with a health care provider about what activities are safe to perform. If you have a  medical condition, be sure to talk with your health care provider before you start a new exercise program.  What are some exercise ideas?  Moderate-intensity exercise ideas include:  Walking 1 mile (1.6 km) in about 15 minutes.  Biking.  Hiking.  Golfing.  Dancing.  Water aerobics.  Vigorous-intensity exercise ideas include:  Walking 4.5 miles (7.2 km) or more in about 1 hour.  Jogging or running 5 miles (8 km) in about 1 hour.  Biking 10 miles (16.1 km) or more in about 1 hour.  Lap swimming.  Roller-skating or in-line skating.  Cross-country skiing.  Vigorous competitive sports, such as football, basketball, and soccer.  Jumping rope.  Aerobic dancing.  What are some everyday activities that can help me get exercise?  Yard work, such as:  Pushing a .  Raking and bagging leaves.  Washing your car.  Pushing a stroller.  Shoveling snow.  Gardening.  Washing windows or floors.  How can I be more active in my day-to-day activities?  Use stairs instead of an elevator.  Take a walk during your lunch break.  If you drive, park your car farther away from your work or school.  If you take public transportation, get off one stop early and walk the rest of the way.  Stand up or walk around during all of your indoor phone calls.  Get up, stretch, and walk around every 30 minutes throughout the day.  Enjoy exercise with a friend. Support to continue exercising will help you keep a regular routine of activity.  Where to find more information  You can find more information about exercising to stay healthy from:  [place] Department of Health and Human Services: www.hhs.gov  Centers for Disease Control and Prevention (CDC): www.cdc.gov  Summary  Exercising regularly is important. It will improve your overall fitness, flexibility, and endurance.  Regular exercise will also improve your overall health. It can help you control your weight, reduce stress, and improve your bone density.  Do not exercise so much that you hurt  yourself, feel dizzy, or get very short of breath.  Before you start a new exercise program, talk with your health care provider.  This information is not intended to replace advice given to you by your health care provider. Make sure you discuss any questions you have with your health care provider.  Document Revised: 04/15/2022 Document Reviewed: 04/15/2022  [employer] Patient Education © 2024 [employer].

## 2024-09-12 ENCOUNTER — OFFICE VISIT (OUTPATIENT)
Dept: SURGERY | Facility: CLINIC | Age: 64
End: 2024-09-12
Payer: MEDICARE

## 2024-09-12 VITALS
SYSTOLIC BLOOD PRESSURE: 122 MMHG | WEIGHT: 252 LBS | HEART RATE: 80 BPM | BODY MASS INDEX: 39.55 KG/M2 | DIASTOLIC BLOOD PRESSURE: 70 MMHG | OXYGEN SATURATION: 94 % | HEIGHT: 67 IN | TEMPERATURE: 98.6 F

## 2024-09-12 DIAGNOSIS — R13.10 DYSPHAGIA, UNSPECIFIED TYPE: ICD-10-CM

## 2024-09-12 DIAGNOSIS — Z12.11 COLON CANCER SCREENING: Primary | ICD-10-CM

## 2024-09-12 PROCEDURE — 3078F DIAST BP <80 MM HG: CPT | Performed by: SURGERY

## 2024-09-12 PROCEDURE — 3074F SYST BP LT 130 MM HG: CPT | Performed by: SURGERY

## 2024-09-12 PROCEDURE — 1159F MED LIST DOCD IN RCRD: CPT | Performed by: SURGERY

## 2024-09-12 PROCEDURE — 1160F RVW MEDS BY RX/DR IN RCRD: CPT | Performed by: SURGERY

## 2024-09-12 PROCEDURE — 99203 OFFICE O/P NEW LOW 30 MIN: CPT | Performed by: SURGERY

## 2024-09-12 RX ORDER — HYDROCHLOROTHIAZIDE 25 MG/1
25 TABLET ORAL DAILY
COMMUNITY

## 2024-09-12 RX ORDER — MECLIZINE HYDROCHLORIDE 25 MG/1
25 TABLET ORAL 3 TIMES DAILY PRN
COMMUNITY

## 2024-09-12 RX ORDER — PRAMIPEXOLE DIHYDROCHLORIDE 0.5 MG/1
0.5 TABLET ORAL 3 TIMES DAILY
COMMUNITY

## 2024-09-13 RX ORDER — BISACODYL 5 MG/1
TABLET, DELAYED RELEASE ORAL
Qty: 4 TABLET | Refills: 0 | Status: SHIPPED | OUTPATIENT
Start: 2024-09-13 | End: 2024-09-13

## 2024-09-13 RX ORDER — POLYETHYLENE GLYCOL 3350 17 G/17G
POWDER, FOR SOLUTION ORAL
Qty: 238 G | Refills: 0 | Status: SHIPPED | OUTPATIENT
Start: 2024-09-13

## 2024-09-13 RX ORDER — POLYETHYLENE GLYCOL 3350 17 G/17G
POWDER, FOR SOLUTION ORAL
Qty: 238 G | Refills: 0 | Status: SHIPPED | OUTPATIENT
Start: 2024-09-13 | End: 2024-09-13

## 2024-09-13 RX ORDER — BISACODYL 5 MG/1
TABLET, DELAYED RELEASE ORAL
Qty: 4 TABLET | Refills: 0 | Status: SHIPPED | OUTPATIENT
Start: 2024-09-13

## 2024-09-14 NOTE — ASSESSMENT & PLAN NOTE
Hypertension is improving with treatment.  Continue current treatment regimen.  Blood pressure will be reassessed at the next regular appointment.   None known in lifetime alert

## 2024-09-20 ENCOUNTER — PREP FOR SURGERY (OUTPATIENT)
Dept: OTHER | Facility: HOSPITAL | Age: 64
End: 2024-09-20
Payer: MEDICARE

## 2024-09-20 DIAGNOSIS — Z12.11 COLON CANCER SCREENING: ICD-10-CM

## 2024-09-20 DIAGNOSIS — R13.10 DYSPHAGIA, UNSPECIFIED TYPE: Primary | ICD-10-CM

## 2024-09-20 RX ORDER — SODIUM CHLORIDE 9 MG/ML
40 INJECTION, SOLUTION INTRAVENOUS AS NEEDED
OUTPATIENT
Start: 2024-09-20

## 2024-09-20 RX ORDER — SODIUM CHLORIDE, SODIUM LACTATE, POTASSIUM CHLORIDE, CALCIUM CHLORIDE 600; 310; 30; 20 MG/100ML; MG/100ML; MG/100ML; MG/100ML
50 INJECTION, SOLUTION INTRAVENOUS CONTINUOUS
OUTPATIENT
Start: 2024-09-20

## 2024-09-20 RX ORDER — SODIUM CHLORIDE 0.9 % (FLUSH) 0.9 %
10 SYRINGE (ML) INJECTION AS NEEDED
OUTPATIENT
Start: 2024-09-20

## 2024-09-20 RX ORDER — SODIUM CHLORIDE 0.9 % (FLUSH) 0.9 %
10 SYRINGE (ML) INJECTION EVERY 12 HOURS SCHEDULED
OUTPATIENT
Start: 2024-09-20

## 2024-09-23 PROBLEM — R13.10 DYSPHAGIA: Status: ACTIVE | Noted: 2024-09-20

## 2024-09-23 PROBLEM — Z12.11 COLON CANCER SCREENING: Status: ACTIVE | Noted: 2024-09-20

## 2024-09-23 NOTE — PRE-PROCEDURE INSTRUCTIONS
PAT phone history completed with patient for upcoming procedure on 9/24/24 with Dr. Lopez.    PAT PASS reviewed with patient and they verbalize understanding of the following:     Do not eat or drink anything after midnight the night before procedure unless otherwise instructed by physician/surgeon's office, this includes no gum, candy, mints, tobacco products or e-cigarettes.  Do not shave the area to be operated on at least 48 hours prior to procedure.  Do not wear makeup, lotion, hair products, or nail polish.  Do not wear any jewelry and remove all piercings.  Do not wear any adhesive if you wear dentures.  Do not wear contacts; bring in glasses if needed.  Only take medications on the morning of procedure as instructed by PAT nurse per anesthesia guidelines or as instructed by physician's office.  If you are on any blood thinners reach out to the physician/surgeon's office for instructions on when/if they will need to be stopped prior to procedure.  Bring in picture ID and insurance card, advanced directive copies if applicable, CPAP/BIPAP/Inhalers if indicated morning of procedure, leave any other valuables at home.  Ensure you have arranged for someone to drive you home the day of your procedure and someone to care for you at home afterwards. It is recommended that you do not drive, drink alcohol, or make any major legal decisions for at least 24 hours after your procedure is complete.    Instructions given on hospital entrance and registration location.

## 2024-09-24 ENCOUNTER — HOSPITAL ENCOUNTER (OUTPATIENT)
Facility: HOSPITAL | Age: 64
Setting detail: HOSPITAL OUTPATIENT SURGERY
Discharge: HOME | End: 2024-09-24
Attending: SURGERY | Admitting: SURGERY
Payer: MEDICARE

## 2024-09-24 ENCOUNTER — ANESTHESIA (OUTPATIENT)
Dept: GASTROENTEROLOGY | Facility: HOSPITAL | Age: 64
End: 2024-09-24
Payer: MEDICARE

## 2024-09-24 ENCOUNTER — ANESTHESIA EVENT (OUTPATIENT)
Dept: GASTROENTEROLOGY | Facility: HOSPITAL | Age: 64
End: 2024-09-24
Payer: MEDICARE

## 2024-09-24 ENCOUNTER — TELEPHONE (OUTPATIENT)
Dept: INTERNAL MEDICINE | Facility: CLINIC | Age: 64
End: 2024-09-24
Payer: MEDICARE

## 2024-09-24 VITALS
SYSTOLIC BLOOD PRESSURE: 122 MMHG | OXYGEN SATURATION: 96 % | TEMPERATURE: 97.5 F | RESPIRATION RATE: 16 BRPM | DIASTOLIC BLOOD PRESSURE: 79 MMHG | HEART RATE: 85 BPM

## 2024-09-24 DIAGNOSIS — R13.10 DYSPHAGIA, UNSPECIFIED TYPE: ICD-10-CM

## 2024-09-24 DIAGNOSIS — Z12.11 COLON CANCER SCREENING: ICD-10-CM

## 2024-09-24 PROCEDURE — 25010000002 FENTANYL CITRATE (PF) 50 MCG/ML SOLUTION PREFILLED SYRINGE: Performed by: NURSE ANESTHETIST, CERTIFIED REGISTERED

## 2024-09-24 PROCEDURE — 88305 TISSUE EXAM BY PATHOLOGIST: CPT

## 2024-09-24 PROCEDURE — 25010000002 PROPOFOL 10 MG/ML EMULSION: Performed by: NURSE ANESTHETIST, CERTIFIED REGISTERED

## 2024-09-24 PROCEDURE — 25810000003 LACTATED RINGERS PER 1000 ML: Performed by: SURGERY

## 2024-09-24 RX ORDER — SODIUM CHLORIDE 0.9 % (FLUSH) 0.9 %
10 SYRINGE (ML) INJECTION AS NEEDED
Status: DISCONTINUED | OUTPATIENT
Start: 2024-09-24 | End: 2024-09-24 | Stop reason: HOSPADM

## 2024-09-24 RX ORDER — FENTANYL CITRATE 50 UG/ML
INJECTION, SOLUTION INTRAMUSCULAR; INTRAVENOUS AS NEEDED
Status: DISCONTINUED | OUTPATIENT
Start: 2024-09-24 | End: 2024-09-24 | Stop reason: SURG

## 2024-09-24 RX ORDER — SODIUM CHLORIDE 0.9 % (FLUSH) 0.9 %
10 SYRINGE (ML) INJECTION EVERY 12 HOURS SCHEDULED
Status: DISCONTINUED | OUTPATIENT
Start: 2024-09-24 | End: 2024-09-24 | Stop reason: HOSPADM

## 2024-09-24 RX ORDER — SODIUM CHLORIDE, SODIUM LACTATE, POTASSIUM CHLORIDE, CALCIUM CHLORIDE 600; 310; 30; 20 MG/100ML; MG/100ML; MG/100ML; MG/100ML
50 INJECTION, SOLUTION INTRAVENOUS CONTINUOUS
Status: DISCONTINUED | OUTPATIENT
Start: 2024-09-24 | End: 2024-09-24 | Stop reason: HOSPADM

## 2024-09-24 RX ORDER — SODIUM CHLORIDE 9 MG/ML
40 INJECTION, SOLUTION INTRAVENOUS AS NEEDED
Status: DISCONTINUED | OUTPATIENT
Start: 2024-09-24 | End: 2024-09-24 | Stop reason: HOSPADM

## 2024-09-24 RX ORDER — PROPOFOL 10 MG/ML
VIAL (ML) INTRAVENOUS AS NEEDED
Status: DISCONTINUED | OUTPATIENT
Start: 2024-09-24 | End: 2024-09-24 | Stop reason: SURG

## 2024-09-24 RX ORDER — ONDANSETRON 2 MG/ML
4 INJECTION INTRAMUSCULAR; INTRAVENOUS ONCE AS NEEDED
Status: DISCONTINUED | OUTPATIENT
Start: 2024-09-24 | End: 2024-09-24 | Stop reason: HOSPADM

## 2024-09-24 RX ADMIN — PROPOFOL 100 MG: 10 INJECTION, EMULSION INTRAVENOUS at 07:40

## 2024-09-24 RX ADMIN — FENTANYL CITRATE 50 MCG: 50 INJECTION, SOLUTION INTRAMUSCULAR; INTRAVENOUS at 07:40

## 2024-09-24 RX ADMIN — LIDOCAINE HYDROCHLORIDE 60 MG: 20 INJECTION, SOLUTION INTRAVENOUS at 07:40

## 2024-09-24 RX ADMIN — SODIUM CHLORIDE, POTASSIUM CHLORIDE, SODIUM LACTATE AND CALCIUM CHLORIDE 50 ML/HR: 600; 310; 30; 20 INJECTION, SOLUTION INTRAVENOUS at 06:39

## 2024-09-24 RX ADMIN — PROPOFOL 180 MCG/KG/MIN: 10 INJECTION, EMULSION INTRAVENOUS at 07:41

## 2024-09-25 LAB — REF LAB TEST METHOD: NORMAL

## 2024-09-26 NOTE — PROGRESS NOTES
Sheila Mejia is a 64 y.o. male.   Chief Complaint   Patient presents with    Follow-up     COLON         History of Present Illness The patient is in the office today for a follow up visit for a recent colonoscopy and EGD that were performed on 09/24/2024 after expressing having issues of dysphagia and GERD. The pathology was received and read as follows: the duodenum biopsy showed duodenal type mucosa with no significant histopathologic abnormalities. Negative for Celiac disease, metaplasia, microorganisms or atypia. The gastric antrum biopsy showed reactive gastropathy with incomplete intestinal metaplasia. Negative for H. pylori or dysplasia. The esophagus biopsy showed reflux esophagitis. Negative for intestinal metaplasia, dysplasia or malignancy.     The following portions of the patient's history were reviewed and updated as appropriate: allergies, current medications, past family history, past medical history, past social history, past surgical history, and problem list.    Review of Systems   Constitutional:  Negative for chills, fever and unexpected weight loss.   HENT:  Negative for hearing loss, trouble swallowing and voice change.    Eyes:  Negative for visual disturbance.   Respiratory:  Negative for apnea, cough, chest tightness, shortness of breath and wheezing.    Cardiovascular:  Negative for chest pain, palpitations and leg swelling.   Gastrointestinal:  Negative for abdominal distention, abdominal pain, anal bleeding, blood in stool, constipation, diarrhea, nausea, rectal pain, vomiting, GERD and indigestion.   Endocrine: Negative for cold intolerance and heat intolerance.   Genitourinary:  Negative for difficulty urinating, dysuria and flank pain.   Musculoskeletal:  Negative for back pain and gait problem.   Skin:  Negative for color change, rash, skin lesions and wound.   Neurological:  Negative for dizziness, syncope, speech difficulty, weakness, light-headedness and  "numbness.   Hematological:  Negative for adenopathy. Does not bruise/bleed easily.   Psychiatric/Behavioral:  Negative for depressed mood. The patient is not nervous/anxious.        Objective   /78   Pulse 64   Temp 98 °F (36.7 °C)   Ht 170.2 cm (67.01\")   Wt 114 kg (251 lb)   SpO2 94%   BMI 39.30 kg/m²     Physical Exam  Constitutional:       Appearance: He is well-developed.   HENT:      Head: Normocephalic and atraumatic.   Eyes:      General: No scleral icterus.     Pupils: Pupils are equal, round, and reactive to light.   Cardiovascular:      Rate and Rhythm: Regular rhythm.   Pulmonary:      Effort: Pulmonary effort is normal.   Abdominal:      General: There is no distension.      Palpations: Abdomen is soft.      Tenderness: There is no abdominal tenderness.   Musculoskeletal:      Cervical back: Neck supple.   Skin:     General: Skin is warm and dry.   Neurological:      Mental Status: He is alert and oriented to person, place, and time.   Psychiatric:         Behavior: Behavior normal.           Assessment & Plan   Diagnoses and all orders for this visit:    1. Colon cancer screening (Primary)    2. Gastroesophageal reflux disease without esophagitis                 "

## 2024-10-03 ENCOUNTER — OFFICE VISIT (OUTPATIENT)
Dept: SURGERY | Facility: CLINIC | Age: 64
End: 2024-10-03
Payer: MEDICARE

## 2024-10-03 VITALS
HEIGHT: 67 IN | WEIGHT: 251 LBS | OXYGEN SATURATION: 94 % | DIASTOLIC BLOOD PRESSURE: 78 MMHG | SYSTOLIC BLOOD PRESSURE: 114 MMHG | TEMPERATURE: 98 F | BODY MASS INDEX: 39.39 KG/M2 | HEART RATE: 64 BPM

## 2024-10-03 DIAGNOSIS — K21.9 GASTROESOPHAGEAL REFLUX DISEASE WITHOUT ESOPHAGITIS: ICD-10-CM

## 2024-10-03 DIAGNOSIS — Z12.11 COLON CANCER SCREENING: Primary | ICD-10-CM

## 2024-10-07 ENCOUNTER — CLINICAL SUPPORT (OUTPATIENT)
Dept: INTERNAL MEDICINE | Facility: CLINIC | Age: 64
End: 2024-10-07
Payer: MEDICARE

## 2024-10-07 DIAGNOSIS — E53.8 VITAMIN B12 DEFICIENCY: Primary | ICD-10-CM

## 2024-10-07 PROCEDURE — 96372 THER/PROPH/DIAG INJ SC/IM: CPT | Performed by: FAMILY MEDICINE

## 2024-10-07 RX ADMIN — CYANOCOBALAMIN 1000 MCG: 1000 INJECTION, SOLUTION INTRAMUSCULAR; SUBCUTANEOUS at 10:47

## 2024-10-30 DIAGNOSIS — F41.9 ANXIETY: ICD-10-CM

## 2024-10-30 RX ORDER — BUSPIRONE HYDROCHLORIDE 15 MG/1
15 TABLET ORAL 2 TIMES DAILY PRN
Qty: 180 TABLET | Refills: 1 | Status: SHIPPED | OUTPATIENT
Start: 2024-10-30

## 2024-11-06 ENCOUNTER — CLINICAL SUPPORT (OUTPATIENT)
Dept: INTERNAL MEDICINE | Facility: CLINIC | Age: 64
End: 2024-11-06
Payer: MEDICARE

## 2024-11-06 DIAGNOSIS — E53.8 VITAMIN B12 DEFICIENCY: Primary | ICD-10-CM

## 2024-11-06 PROCEDURE — 96372 THER/PROPH/DIAG INJ SC/IM: CPT | Performed by: FAMILY MEDICINE

## 2024-11-06 RX ORDER — CYANOCOBALAMIN 1000 UG/ML
1000 INJECTION, SOLUTION INTRAMUSCULAR; SUBCUTANEOUS
Status: SHIPPED | OUTPATIENT
Start: 2024-11-06

## 2024-11-06 RX ADMIN — CYANOCOBALAMIN 1000 MCG: 1000 INJECTION, SOLUTION INTRAMUSCULAR; SUBCUTANEOUS at 12:57

## 2024-12-05 ENCOUNTER — CLINICAL SUPPORT (OUTPATIENT)
Dept: INTERNAL MEDICINE | Facility: CLINIC | Age: 64
End: 2024-12-05
Payer: MEDICARE

## 2024-12-05 DIAGNOSIS — E53.8 VITAMIN B12 DEFICIENCY: Primary | ICD-10-CM

## 2024-12-05 PROCEDURE — 96372 THER/PROPH/DIAG INJ SC/IM: CPT | Performed by: FAMILY MEDICINE

## 2024-12-05 RX ADMIN — CYANOCOBALAMIN 1000 MCG: 1000 INJECTION, SOLUTION INTRAMUSCULAR; SUBCUTANEOUS at 11:20

## 2025-01-07 ENCOUNTER — CLINICAL SUPPORT (OUTPATIENT)
Dept: INTERNAL MEDICINE | Facility: CLINIC | Age: 65
End: 2025-01-07
Payer: MEDICARE

## 2025-01-07 PROCEDURE — 96372 THER/PROPH/DIAG INJ SC/IM: CPT | Performed by: FAMILY MEDICINE

## 2025-01-07 RX ADMIN — CYANOCOBALAMIN 1000 MCG: 1000 INJECTION, SOLUTION INTRAMUSCULAR; SUBCUTANEOUS at 12:24

## 2025-02-06 DIAGNOSIS — E78.00 HYPERCHOLESTEREMIA: ICD-10-CM

## 2025-02-06 RX ORDER — PRAVASTATIN SODIUM 40 MG
40 TABLET ORAL NIGHTLY
Qty: 90 TABLET | Refills: 3 | Status: SHIPPED | OUTPATIENT
Start: 2025-02-06

## 2025-02-07 ENCOUNTER — OFFICE VISIT (OUTPATIENT)
Dept: INTERNAL MEDICINE | Facility: CLINIC | Age: 65
End: 2025-02-07
Payer: MEDICARE

## 2025-02-07 VITALS
HEART RATE: 64 BPM | HEIGHT: 67 IN | TEMPERATURE: 97.4 F | OXYGEN SATURATION: 95 % | DIASTOLIC BLOOD PRESSURE: 80 MMHG | SYSTOLIC BLOOD PRESSURE: 134 MMHG | WEIGHT: 222.12 LBS | BODY MASS INDEX: 34.86 KG/M2

## 2025-02-07 DIAGNOSIS — I10 ESSENTIAL HYPERTENSION: Primary | ICD-10-CM

## 2025-02-07 DIAGNOSIS — F41.9 ANXIETY: ICD-10-CM

## 2025-02-07 DIAGNOSIS — I25.119 CORONARY ARTERY DISEASE INVOLVING NATIVE CORONARY ARTERY OF NATIVE HEART WITH ANGINA PECTORIS: ICD-10-CM

## 2025-02-07 DIAGNOSIS — E53.8 VITAMIN B12 DEFICIENCY: ICD-10-CM

## 2025-02-07 PROBLEM — Z12.11 COLON CANCER SCREENING: Status: RESOLVED | Noted: 2024-09-20 | Resolved: 2025-02-07

## 2025-02-07 PROCEDURE — 96372 THER/PROPH/DIAG INJ SC/IM: CPT | Performed by: FAMILY MEDICINE

## 2025-02-07 PROCEDURE — 3075F SYST BP GE 130 - 139MM HG: CPT | Performed by: FAMILY MEDICINE

## 2025-02-07 PROCEDURE — 99214 OFFICE O/P EST MOD 30 MIN: CPT | Performed by: FAMILY MEDICINE

## 2025-02-07 PROCEDURE — 1159F MED LIST DOCD IN RCRD: CPT | Performed by: FAMILY MEDICINE

## 2025-02-07 PROCEDURE — 1160F RVW MEDS BY RX/DR IN RCRD: CPT | Performed by: FAMILY MEDICINE

## 2025-02-07 PROCEDURE — 1126F AMNT PAIN NOTED NONE PRSNT: CPT | Performed by: FAMILY MEDICINE

## 2025-02-07 PROCEDURE — 3079F DIAST BP 80-89 MM HG: CPT | Performed by: FAMILY MEDICINE

## 2025-02-07 RX ORDER — NITROGLYCERIN 0.4 MG/1
0.4 TABLET SUBLINGUAL
Qty: 30 TABLET | Refills: 1 | Status: SHIPPED | OUTPATIENT
Start: 2025-02-07

## 2025-02-07 RX ADMIN — CYANOCOBALAMIN 1000 MCG: 1000 INJECTION, SOLUTION INTRAMUSCULAR; SUBCUTANEOUS at 11:04

## 2025-02-07 NOTE — PROGRESS NOTES
"Chief Complaint  Coronary Artery Disease (Nitroglycerin refill. )    Subjective        Chris Mejia presents to [facility]  History of Present Illness  Anxiety: chronic issue. Patient feels current medicine is doing okay. Break in on property last fall. Both of them still struggling with that.  Hypertension  This is a chronic problem. The current episode started more than 1 year ago. The problem has been improved since onset. The problem is controlled. Current antihypertension treatment includes diuretics, calcium channel blockers and beta blockers. Hypertensive end-organ damage includes CAD/MI.       Objective   Vital Signs:  /80   Pulse 64   Temp 97.4 °F (36.3 °C)   Ht 170.2 cm (67.01\")   Wt 101 kg (222 lb 1.9 oz)   SpO2 95%   BMI 34.78 kg/m²   Estimated body mass index is 34.78 kg/m² as calculated from the following:    Height as of this encounter: 170.2 cm (67.01\").    Weight as of this encounter: 101 kg (222 lb 1.9 oz).            Physical Exam  Vitals and nursing note reviewed.   Constitutional:       General: He is not in acute distress.     Appearance: Normal appearance. He is well-developed and well-groomed. He is obese. He is not ill-appearing, toxic-appearing or diaphoretic.      Comments: In wheelchair   HENT:      Head: Normocephalic and atraumatic.      Right Ear: Hearing normal.      Left Ear: Hearing normal.   Eyes:      General: Lids are normal. No scleral icterus.        Right eye: No discharge.         Left eye: No discharge.      Extraocular Movements: Extraocular movements intact.   Cardiovascular:      Rate and Rhythm: Normal rate and regular rhythm.   Pulmonary:      Effort: Pulmonary effort is normal.      Breath sounds: Normal breath sounds.   Musculoskeletal:      Cervical back: Neck supple.   Skin:     Coloration: Skin is not jaundiced or pale.   Neurological:      General: No focal deficit present.      Mental Status: He is alert and " oriented to person, place, and time.   Psychiatric:         Attention and Perception: Attention and perception normal.         Mood and Affect: Mood and affect normal.         Speech: Speech normal.         Behavior: Behavior normal. Behavior is cooperative.         Thought Content: Thought content normal.         Cognition and Memory: Cognition and memory normal.         Judgment: Judgment normal.        Result Review :  The following data was reviewed by: Naty Lebron MD on 02/07/2025:  Colonoscopy (09/24/2024 07:15)   Upper GI Endoscopy (09/24/2024 07:14)          Assessment and Plan   Diagnoses and all orders for this visit:    1. Essential hypertension (Primary)  Assessment & Plan:  Hypertension is borderline  Continue current treatment regimen.  Weight loss.  Blood pressure will be reassessedat the next regular appointment.      2. Vitamin B12 deficiency  Assessment & Plan:  Vitamin B12 improved with injections.  Methylmalonic acid normal last check, continue current frequency  Failed oral replacement      3. Coronary artery disease involving native coronary artery of native heart with angina pectoris  -     nitroglycerin (Nitrostat) 0.4 MG SL tablet; Place 1 tablet under the tongue Every 5 (Five) Minutes As Needed for Chest Pain. Take no more than 3 doses in 15 minutes.  Dispense: 30 tablet; Refill: 1    4. Anxiety  Assessment & Plan:  Continue current medicines.               Follow Up   Return in about 3 months (around 5/13/2025) for Blood Pressure follow up.  Patient was given instructions and counseling regarding his condition or for health maintenance advice. Please see specific information pulled into the AVS if appropriate.

## 2025-02-08 NOTE — ASSESSMENT & PLAN NOTE
Vitamin B12 improved with injections.  Methylmalonic acid normal last check, continue current frequency  Failed oral replacement

## 2025-02-08 NOTE — ASSESSMENT & PLAN NOTE
Hypertension is borderline  Continue current treatment regimen.  Weight loss.  Blood pressure will be reassessedat the next regular appointment.

## 2025-02-27 ENCOUNTER — TELEPHONE (OUTPATIENT)
Dept: INTERNAL MEDICINE | Facility: CLINIC | Age: 65
End: 2025-02-27
Payer: MEDICARE

## 2025-02-27 NOTE — TELEPHONE ENCOUNTER
Caller: Diana Mejia    Relationship: WIFE    Best call back number: [phone]     What is the best time to reach you: ANY    Who are you requesting to speak with (clinical staff, provider,  specific staff member): CLINICAL    What was the call regarding: HEARD ABOUT THE MEASLES OUTBREAK. HAS NOT BEEN EXPOSED TO THEIR KNOWLEDGE. ASKING ABOUT VACCINATIONS. PLEASE CALL TO ADVISE    Is it okay if the provider responds through MyChart: NOT SET UP

## 2025-02-28 ENCOUNTER — TELEPHONE (OUTPATIENT)
Dept: INTERNAL MEDICINE | Facility: CLINIC | Age: 65
End: 2025-02-28
Payer: MEDICARE

## 2025-02-28 DIAGNOSIS — Z01.84 IMMUNITY STATUS TESTING: Primary | ICD-10-CM

## 2025-02-28 NOTE — TELEPHONE ENCOUNTER
Spoke with Chris, Is unsure if he has had MMR vaccine. Would like to have lab draw to check for immunity. Pended to you for approval if appropriate.

## 2025-03-03 ENCOUNTER — CLINICAL SUPPORT (OUTPATIENT)
Dept: INTERNAL MEDICINE | Facility: CLINIC | Age: 65
End: 2025-03-03
Payer: MEDICARE

## 2025-03-03 DIAGNOSIS — E53.8 VITAMIN B12 DEFICIENCY: Primary | ICD-10-CM

## 2025-03-03 PROCEDURE — 96372 THER/PROPH/DIAG INJ SC/IM: CPT | Performed by: FAMILY MEDICINE

## 2025-03-03 RX ADMIN — CYANOCOBALAMIN 1000 MCG: 1000 INJECTION, SOLUTION INTRAMUSCULAR; SUBCUTANEOUS at 15:07

## 2025-03-07 ENCOUNTER — APPOINTMENT (OUTPATIENT)
Dept: CT IMAGING | Facility: HOSPITAL | Age: 65
End: 2025-03-07
Payer: MEDICARE

## 2025-03-07 ENCOUNTER — HOSPITAL ENCOUNTER (OUTPATIENT)
Facility: HOSPITAL | Age: 65
Setting detail: OBSERVATION
Discharge: NURSING, REHAB OR LONG-TERM CARE | End: 2025-03-11
Attending: EMERGENCY MEDICINE | Admitting: INTERNAL MEDICINE
Payer: MEDICARE

## 2025-03-07 ENCOUNTER — APPOINTMENT (OUTPATIENT)
Dept: GENERAL RADIOLOGY | Facility: HOSPITAL | Age: 65
End: 2025-03-07
Payer: MEDICARE

## 2025-03-07 DIAGNOSIS — S32.592A CLOSED FRACTURE OF LEFT INFERIOR PUBIC RAMUS, INITIAL ENCOUNTER: Primary | ICD-10-CM

## 2025-03-07 DIAGNOSIS — S32.512A CLOSED FRACTURE OF SUPERIOR RAMUS OF LEFT PUBIS, INITIAL ENCOUNTER: ICD-10-CM

## 2025-03-07 DIAGNOSIS — S32.592A CLOSED FRACTURE OF RAMUS OF LEFT PUBIS, INITIAL ENCOUNTER: ICD-10-CM

## 2025-03-07 PROCEDURE — 25010000002 ONDANSETRON PER 1 MG: Performed by: EMERGENCY MEDICINE

## 2025-03-07 PROCEDURE — 96374 THER/PROPH/DIAG INJ IV PUSH: CPT

## 2025-03-07 PROCEDURE — 96375 TX/PRO/DX INJ NEW DRUG ADDON: CPT

## 2025-03-07 PROCEDURE — 72192 CT PELVIS W/O DYE: CPT

## 2025-03-07 PROCEDURE — 73502 X-RAY EXAM HIP UNI 2-3 VIEWS: CPT

## 2025-03-07 PROCEDURE — 99285 EMERGENCY DEPT VISIT HI MDM: CPT | Performed by: EMERGENCY MEDICINE

## 2025-03-07 PROCEDURE — G0378 HOSPITAL OBSERVATION PER HR: HCPCS

## 2025-03-07 PROCEDURE — 25010000002 MORPHINE PER 10 MG: Performed by: EMERGENCY MEDICINE

## 2025-03-07 RX ORDER — PANTOPRAZOLE SODIUM 40 MG/1
40 TABLET, DELAYED RELEASE ORAL DAILY
COMMUNITY

## 2025-03-07 RX ORDER — PRAMIPEXOLE DIHYDROCHLORIDE 0.12 MG/1
0.12 TABLET ORAL 3 TIMES DAILY
COMMUNITY

## 2025-03-07 RX ORDER — SODIUM CHLORIDE 0.9 % (FLUSH) 0.9 %
10 SYRINGE (ML) INJECTION AS NEEDED
Status: DISCONTINUED | OUTPATIENT
Start: 2025-03-07 | End: 2025-03-11 | Stop reason: HOSPADM

## 2025-03-07 RX ORDER — ONDANSETRON 2 MG/ML
4 INJECTION INTRAMUSCULAR; INTRAVENOUS ONCE
Status: COMPLETED | OUTPATIENT
Start: 2025-03-07 | End: 2025-03-07

## 2025-03-07 RX ADMIN — ONDANSETRON 4 MG: 2 INJECTION INTRAMUSCULAR; INTRAVENOUS at 23:26

## 2025-03-07 RX ADMIN — MORPHINE SULFATE 4 MG: 4 INJECTION, SOLUTION INTRAMUSCULAR; INTRAVENOUS at 23:26

## 2025-03-08 PROBLEM — S32.599A PUBIC RAMUS FRACTURE: Status: ACTIVE | Noted: 2025-03-08

## 2025-03-08 LAB
ALBUMIN SERPL-MCNC: 4 G/DL (ref 3.5–5.2)
ALBUMIN/GLOB SERPL: 1.3 G/DL
ALP SERPL-CCNC: 45 U/L (ref 39–117)
ALT SERPL W P-5'-P-CCNC: 42 U/L (ref 1–41)
ANION GAP SERPL CALCULATED.3IONS-SCNC: 10.6 MMOL/L (ref 5–15)
ANION GAP SERPL CALCULATED.3IONS-SCNC: 13.3 MMOL/L (ref 5–15)
AST SERPL-CCNC: 37 U/L (ref 1–40)
BASOPHILS # BLD AUTO: 0.02 10*3/MM3 (ref 0–0.2)
BASOPHILS # BLD AUTO: 0.04 10*3/MM3 (ref 0–0.2)
BASOPHILS NFR BLD AUTO: 0.2 % (ref 0–1.5)
BASOPHILS NFR BLD AUTO: 0.3 % (ref 0–1.5)
BILIRUB SERPL-MCNC: 0.5 MG/DL (ref 0–1.2)
BUN SERPL-MCNC: 11 MG/DL (ref 8–23)
BUN SERPL-MCNC: 13 MG/DL (ref 8–23)
BUN/CREAT SERPL: 10.7 (ref 7–25)
BUN/CREAT SERPL: 13.3 (ref 7–25)
CALCIUM SPEC-SCNC: 8.8 MG/DL (ref 8.6–10.5)
CALCIUM SPEC-SCNC: 8.9 MG/DL (ref 8.6–10.5)
CHLORIDE SERPL-SCNC: 100 MMOL/L (ref 98–107)
CHLORIDE SERPL-SCNC: 101 MMOL/L (ref 98–107)
CLUMPED PLATELETS: PRESENT
CO2 SERPL-SCNC: 23.7 MMOL/L (ref 22–29)
CO2 SERPL-SCNC: 25.4 MMOL/L (ref 22–29)
CREAT SERPL-MCNC: 0.98 MG/DL (ref 0.76–1.27)
CREAT SERPL-MCNC: 1.03 MG/DL (ref 0.76–1.27)
DEPRECATED RDW RBC AUTO: 44.1 FL (ref 37–54)
DEPRECATED RDW RBC AUTO: 45.2 FL (ref 37–54)
EGFRCR SERPLBLD CKD-EPI 2021: 79.6 ML/MIN/1.73
EGFRCR SERPLBLD CKD-EPI 2021: 84.5 ML/MIN/1.73
EOSINOPHIL # BLD AUTO: 0.19 10*3/MM3 (ref 0–0.4)
EOSINOPHIL # BLD AUTO: 0.43 10*3/MM3 (ref 0–0.4)
EOSINOPHIL NFR BLD AUTO: 1.5 % (ref 0.3–6.2)
EOSINOPHIL NFR BLD AUTO: 3.9 % (ref 0.3–6.2)
ERYTHROCYTE [DISTWIDTH] IN BLOOD BY AUTOMATED COUNT: 12.5 % (ref 12.3–15.4)
ERYTHROCYTE [DISTWIDTH] IN BLOOD BY AUTOMATED COUNT: 12.7 % (ref 12.3–15.4)
GLOBULIN UR ELPH-MCNC: 3 GM/DL
GLUCOSE SERPL-MCNC: 114 MG/DL (ref 65–99)
GLUCOSE SERPL-MCNC: 134 MG/DL (ref 65–99)
HCT VFR BLD AUTO: 45.1 % (ref 37.5–51)
HCT VFR BLD AUTO: 46.6 % (ref 37.5–51)
HGB BLD-MCNC: 16.1 G/DL (ref 13–17.7)
HGB BLD-MCNC: 16.4 G/DL (ref 13–17.7)
IMM GRANULOCYTES # BLD AUTO: 0.06 10*3/MM3 (ref 0–0.05)
IMM GRANULOCYTES # BLD AUTO: 0.2 10*3/MM3 (ref 0–0.05)
IMM GRANULOCYTES NFR BLD AUTO: 0.5 % (ref 0–0.5)
IMM GRANULOCYTES NFR BLD AUTO: 1.6 % (ref 0–0.5)
LYMPHOCYTES # BLD AUTO: 1.27 10*3/MM3 (ref 0.7–3.1)
LYMPHOCYTES # BLD AUTO: 1.5 10*3/MM3 (ref 0.7–3.1)
LYMPHOCYTES NFR BLD AUTO: 10 % (ref 19.6–45.3)
LYMPHOCYTES NFR BLD AUTO: 13.5 % (ref 19.6–45.3)
MCH RBC QN AUTO: 34 PG (ref 26.6–33)
MCH RBC QN AUTO: 34 PG (ref 26.6–33)
MCHC RBC AUTO-ENTMCNC: 35.2 G/DL (ref 31.5–35.7)
MCHC RBC AUTO-ENTMCNC: 35.7 G/DL (ref 31.5–35.7)
MCV RBC AUTO: 95.1 FL (ref 79–97)
MCV RBC AUTO: 96.5 FL (ref 79–97)
MONOCYTES # BLD AUTO: 0.7 10*3/MM3 (ref 0.1–0.9)
MONOCYTES # BLD AUTO: 0.97 10*3/MM3 (ref 0.1–0.9)
MONOCYTES NFR BLD AUTO: 6.3 % (ref 5–12)
MONOCYTES NFR BLD AUTO: 7.7 % (ref 5–12)
NEUTROPHILS NFR BLD AUTO: 10 10*3/MM3 (ref 1.7–7)
NEUTROPHILS NFR BLD AUTO: 75.6 % (ref 42.7–76)
NEUTROPHILS NFR BLD AUTO: 78.9 % (ref 42.7–76)
NEUTROPHILS NFR BLD AUTO: 8.41 10*3/MM3 (ref 1.7–7)
NRBC BLD AUTO-RTO: 0 /100 WBC (ref 0–0.2)
NRBC BLD AUTO-RTO: 0 /100 WBC (ref 0–0.2)
PLATELET # BLD AUTO: 144 10*3/MM3 (ref 140–450)
PLATELET # BLD AUTO: ABNORMAL 10*3/UL
PMV BLD AUTO: 9.4 FL (ref 6–12)
PMV BLD AUTO: 9.5 FL (ref 6–12)
POTASSIUM SERPL-SCNC: 4.2 MMOL/L (ref 3.5–5.2)
POTASSIUM SERPL-SCNC: 4.3 MMOL/L (ref 3.5–5.2)
PROT SERPL-MCNC: 7 G/DL (ref 6–8.5)
RBC # BLD AUTO: 4.74 10*6/MM3 (ref 4.14–5.8)
RBC # BLD AUTO: 4.83 10*6/MM3 (ref 4.14–5.8)
RBC MORPH BLD: NORMAL
SMALL PLATELETS BLD QL SMEAR: ADEQUATE
SODIUM SERPL-SCNC: 137 MMOL/L (ref 136–145)
SODIUM SERPL-SCNC: 137 MMOL/L (ref 136–145)
WBC MORPH BLD: NORMAL
WBC NRBC COR # BLD AUTO: 11.12 10*3/MM3 (ref 3.4–10.8)
WBC NRBC COR # BLD AUTO: 12.67 10*3/MM3 (ref 3.4–10.8)

## 2025-03-08 PROCEDURE — 96376 TX/PRO/DX INJ SAME DRUG ADON: CPT

## 2025-03-08 PROCEDURE — 80053 COMPREHEN METABOLIC PANEL: CPT | Performed by: PHYSICIAN ASSISTANT

## 2025-03-08 PROCEDURE — 25810000003 SODIUM CHLORIDE 0.9 % SOLUTION: Performed by: STUDENT IN AN ORGANIZED HEALTH CARE EDUCATION/TRAINING PROGRAM

## 2025-03-08 PROCEDURE — 85025 COMPLETE CBC W/AUTO DIFF WBC: CPT | Performed by: STUDENT IN AN ORGANIZED HEALTH CARE EDUCATION/TRAINING PROGRAM

## 2025-03-08 PROCEDURE — 25010000002 MORPHINE PER 10 MG: Performed by: STUDENT IN AN ORGANIZED HEALTH CARE EDUCATION/TRAINING PROGRAM

## 2025-03-08 PROCEDURE — 99222 1ST HOSP IP/OBS MODERATE 55: CPT | Performed by: STUDENT IN AN ORGANIZED HEALTH CARE EDUCATION/TRAINING PROGRAM

## 2025-03-08 PROCEDURE — G0378 HOSPITAL OBSERVATION PER HR: HCPCS

## 2025-03-08 PROCEDURE — 96361 HYDRATE IV INFUSION ADD-ON: CPT

## 2025-03-08 PROCEDURE — 85025 COMPLETE CBC W/AUTO DIFF WBC: CPT | Performed by: PHYSICIAN ASSISTANT

## 2025-03-08 PROCEDURE — 97162 PT EVAL MOD COMPLEX 30 MIN: CPT

## 2025-03-08 PROCEDURE — 85007 BL SMEAR W/DIFF WBC COUNT: CPT | Performed by: STUDENT IN AN ORGANIZED HEALTH CARE EDUCATION/TRAINING PROGRAM

## 2025-03-08 RX ORDER — ISOSORBIDE MONONITRATE 30 MG/1
30 TABLET, EXTENDED RELEASE ORAL DAILY
Status: DISCONTINUED | OUTPATIENT
Start: 2025-03-08 | End: 2025-03-11 | Stop reason: HOSPADM

## 2025-03-08 RX ORDER — AMLODIPINE BESYLATE 5 MG/1
10 TABLET ORAL DAILY
Status: DISCONTINUED | OUTPATIENT
Start: 2025-03-08 | End: 2025-03-11 | Stop reason: HOSPADM

## 2025-03-08 RX ORDER — PANTOPRAZOLE SODIUM 40 MG/1
40 TABLET, DELAYED RELEASE ORAL DAILY
Status: DISCONTINUED | OUTPATIENT
Start: 2025-03-08 | End: 2025-03-11 | Stop reason: HOSPADM

## 2025-03-08 RX ORDER — SODIUM CHLORIDE 0.9 % (FLUSH) 0.9 %
10 SYRINGE (ML) INJECTION EVERY 12 HOURS SCHEDULED
Status: DISCONTINUED | OUTPATIENT
Start: 2025-03-08 | End: 2025-03-11 | Stop reason: HOSPADM

## 2025-03-08 RX ORDER — HYDROCHLOROTHIAZIDE 25 MG/1
25 TABLET ORAL DAILY
Status: DISCONTINUED | OUTPATIENT
Start: 2025-03-08 | End: 2025-03-11 | Stop reason: HOSPADM

## 2025-03-08 RX ORDER — SODIUM CHLORIDE 9 MG/ML
75 INJECTION, SOLUTION INTRAVENOUS CONTINUOUS
Status: DISCONTINUED | OUTPATIENT
Start: 2025-03-08 | End: 2025-03-09

## 2025-03-08 RX ORDER — BISACODYL 5 MG/1
5 TABLET, DELAYED RELEASE ORAL DAILY PRN
Status: DISCONTINUED | OUTPATIENT
Start: 2025-03-08 | End: 2025-03-11 | Stop reason: HOSPADM

## 2025-03-08 RX ORDER — POLYETHYLENE GLYCOL 3350 17 G/17G
17 POWDER, FOR SOLUTION ORAL DAILY PRN
Status: DISCONTINUED | OUTPATIENT
Start: 2025-03-08 | End: 2025-03-11 | Stop reason: HOSPADM

## 2025-03-08 RX ORDER — ASPIRIN 81 MG/1
81 TABLET ORAL DAILY
Status: DISCONTINUED | OUTPATIENT
Start: 2025-03-08 | End: 2025-03-11 | Stop reason: HOSPADM

## 2025-03-08 RX ORDER — SODIUM CHLORIDE 9 MG/ML
40 INJECTION, SOLUTION INTRAVENOUS AS NEEDED
Status: DISCONTINUED | OUTPATIENT
Start: 2025-03-08 | End: 2025-03-11 | Stop reason: HOSPADM

## 2025-03-08 RX ORDER — CARVEDILOL 25 MG/1
25 TABLET ORAL 2 TIMES DAILY WITH MEALS
Status: DISCONTINUED | OUTPATIENT
Start: 2025-03-08 | End: 2025-03-11 | Stop reason: HOSPADM

## 2025-03-08 RX ORDER — MORPHINE SULFATE 2 MG/ML
2 INJECTION, SOLUTION INTRAMUSCULAR; INTRAVENOUS EVERY 4 HOURS PRN
Status: DISCONTINUED | OUTPATIENT
Start: 2025-03-08 | End: 2025-03-11 | Stop reason: HOSPADM

## 2025-03-08 RX ORDER — SODIUM CHLORIDE 0.9 % (FLUSH) 0.9 %
10 SYRINGE (ML) INJECTION AS NEEDED
Status: DISCONTINUED | OUTPATIENT
Start: 2025-03-08 | End: 2025-03-11 | Stop reason: HOSPADM

## 2025-03-08 RX ORDER — GUAIFENESIN 600 MG/1
600 TABLET, EXTENDED RELEASE ORAL EVERY 12 HOURS SCHEDULED
Status: DISCONTINUED | OUTPATIENT
Start: 2025-03-09 | End: 2025-03-11 | Stop reason: HOSPADM

## 2025-03-08 RX ORDER — PRAVASTATIN SODIUM 20 MG
40 TABLET ORAL NIGHTLY
Status: DISCONTINUED | OUTPATIENT
Start: 2025-03-08 | End: 2025-03-11 | Stop reason: HOSPADM

## 2025-03-08 RX ORDER — BISACODYL 10 MG
10 SUPPOSITORY, RECTAL RECTAL DAILY PRN
Status: DISCONTINUED | OUTPATIENT
Start: 2025-03-08 | End: 2025-03-11 | Stop reason: HOSPADM

## 2025-03-08 RX ORDER — AMOXICILLIN 250 MG
2 CAPSULE ORAL 2 TIMES DAILY PRN
Status: DISCONTINUED | OUTPATIENT
Start: 2025-03-08 | End: 2025-03-11 | Stop reason: HOSPADM

## 2025-03-08 RX ORDER — HYDRALAZINE HYDROCHLORIDE 20 MG/ML
10 INJECTION INTRAMUSCULAR; INTRAVENOUS EVERY 6 HOURS PRN
Status: DISCONTINUED | OUTPATIENT
Start: 2025-03-08 | End: 2025-03-11 | Stop reason: HOSPADM

## 2025-03-08 RX ORDER — TAMSULOSIN HYDROCHLORIDE 0.4 MG/1
0.4 CAPSULE ORAL DAILY
Status: DISCONTINUED | OUTPATIENT
Start: 2025-03-08 | End: 2025-03-11 | Stop reason: HOSPADM

## 2025-03-08 RX ADMIN — MORPHINE SULFATE 2 MG: 2 INJECTION, SOLUTION INTRAMUSCULAR; INTRAVENOUS at 21:00

## 2025-03-08 RX ADMIN — TAMSULOSIN HYDROCHLORIDE 0.4 MG: 0.4 CAPSULE ORAL at 09:39

## 2025-03-08 RX ADMIN — HYDROCHLOROTHIAZIDE 25 MG: 25 TABLET ORAL at 09:39

## 2025-03-08 RX ADMIN — MORPHINE SULFATE 2 MG: 2 INJECTION, SOLUTION INTRAMUSCULAR; INTRAVENOUS at 08:48

## 2025-03-08 RX ADMIN — AMLODIPINE BESYLATE 10 MG: 5 TABLET ORAL at 09:39

## 2025-03-08 RX ADMIN — CARVEDILOL 25 MG: 25 TABLET, FILM COATED ORAL at 09:39

## 2025-03-08 RX ADMIN — Medication 10 ML: at 08:48

## 2025-03-08 RX ADMIN — ISOSORBIDE MONONITRATE 30 MG: 30 TABLET, EXTENDED RELEASE ORAL at 09:39

## 2025-03-08 RX ADMIN — Medication 10 ML: at 21:00

## 2025-03-08 RX ADMIN — PANTOPRAZOLE SODIUM 40 MG: 40 TABLET, DELAYED RELEASE ORAL at 09:39

## 2025-03-08 RX ADMIN — SODIUM CHLORIDE 75 ML/HR: 9 INJECTION, SOLUTION INTRAVENOUS at 15:49

## 2025-03-08 RX ADMIN — SODIUM CHLORIDE 75 ML/HR: 9 INJECTION, SOLUTION INTRAVENOUS at 03:27

## 2025-03-08 RX ADMIN — ASPIRIN 81 MG: 81 TABLET, COATED ORAL at 09:39

## 2025-03-08 RX ADMIN — MORPHINE SULFATE 2 MG: 2 INJECTION, SOLUTION INTRAMUSCULAR; INTRAVENOUS at 15:49

## 2025-03-08 RX ADMIN — CARVEDILOL 25 MG: 25 TABLET, FILM COATED ORAL at 18:11

## 2025-03-08 RX ADMIN — PRAVASTATIN SODIUM 40 MG: 20 TABLET ORAL at 21:00

## 2025-03-08 NOTE — ED PROVIDER NOTES
EMERGENCY DEPARTMENT ENCOUNTER    Pt Name: Chris Mejia  MRN: [id number]  Pt :   1960  Room Number:  431/1  Date of encounter:  3/7/2025  PCP: Naty Lebron MD  ED Provider: Amrik Steel PA-C    Historian: Patient      HPI:  Chief Complaint   Patient presents with    Fall    Hip Pain          Context: Chris Mejia is a 65 y.o. male who presents to the ED c/o hip pain.  Has a history of balance issues and got up to go to the bathroom and turned and lost his balance and fell striking his left hip.  Did not strike his head.  No neck pain.  Unable to bear weight secondary to pain.  Has no other complaints.      PAST MEDICAL HISTORY  Past Medical History:   Diagnosis Date    Allergic rhinitis     Anxiety     Arthritis     Balance problem     Carpal tunnel syndrome     Cluster headache     Coronary artery disease     Developmental delay     Diabetes mellitus     Noted by PCP    Difficulty walking     Dizziness     Dysphagia     Enlarged prostate     GERD (gastroesophageal reflux disease)     Gout     Hip fracture, left     History of cardiovascular stress test 2018    positive for inferior and lateral ischemia     History of echocardiogram     Hyperlipidemia     Hypertension     Impaired mobility     Lymphadenitis     Memory loss     Migraine     Morbid obesity with BMI of 40.0-44.9, adult 08/10/2020    Associated with hypertension, hyperlipidemia       Myocardial infarction 2000    Obesity     DENZEL (obstructive sleep apnea)     oxygen at night- 2LNC    Peptic ulcer     Peptic ulceration     Poor historian     Rheumatoid arthritis     Right shoulder pain     SOB (shortness of breath) on exertion     Spinocerebellar ataxia     Tension headache     Trigeminal neuralgia     Vitamin B 12 deficiency     Vitamin D deficiency          PAST SURGICAL HISTORY  Past Surgical History:   Procedure Laterality Date    CARDIAC CATHETERIZATION      CARDIAC CATHETERIZATION Left 3/14/2018     Procedure: Cardiac Catheterization/Vascular Study;  Surgeon: Adam Perkins MD;  Location: [facility] CATH INVASIVE LOCATION;  Service: Cardiovascular    COLONOSCOPY      COLONOSCOPY N/A 9/13/2019    Procedure: COLONOSCOPY W/ COLD SNARE POLYPECTOMY;  Surgeon: Melba Lopez MD;  Location: [facility] ENDOSCOPY;  Service: Gastroenterology    COLONOSCOPY N/A 9/24/2024    Procedure: COLONOSCOPY;  Surgeon: Melba Lopez MD;  Location: [facility] ENDOSCOPY;  Service: Gastroenterology;  Laterality: N/A;    ELBOW ARTHROPLASTY Bilateral     ENDOSCOPY      ENDOSCOPY N/A 9/24/2024    Procedure: ESOPHAGOGASTRODUODENOSCOPY WITH BIOPSY;  Surgeon: Melba Lopez MD;  Location: [facility] ENDOSCOPY;  Service: Gastroenterology;  Laterality: N/A;    HERNIA REPAIR      x3    HIP HEMIARTHROPLASTY Left     NEUROPLASTY Bilateral     decompression median nerve at carpal tunnel    TOTAL KNEE ARTHROPLASTY Bilateral     2005, 2012         FAMILY HISTORY  Family History   Problem Relation Age of Onset    Deep vein thrombosis Mother     Diabetes Mother     Hyperlipidemia Mother     Hypertension Mother     Heart attack Mother     Cancer Father     Kidney disease Father     Deep vein thrombosis Daughter     Arthritis Sister     Diabetes Sister     Hyperlipidemia Sister     Hypertension Sister     Osteoporosis Sister     Thyroid disease Sister     Liver disease Sister     Arthritis Brother     Diabetes Brother     Hyperlipidemia Brother     Hypertension Brother     Stroke Brother     Cancer Brother     Heart attack Brother     Mental illness Brother          SOCIAL HISTORY  Social History     Socioeconomic History    Marital status:    Tobacco Use    Smoking status: Never     Passive exposure: Never    Smokeless tobacco: Never   Vaping Use    Vaping status: Never Used   Substance and Sexual Activity    Alcohol use: Yes     Comment: Rarely    Drug use: No    Sexual activity: Defer         ALLERGIES  Oxycodone and Topamax [topiramate]        REVIEW  OF SYSTEMS  Review of Systems   Constitutional: Negative.    HENT: Negative.     Eyes: Negative.    Respiratory: Negative.     Cardiovascular: Negative.    Gastrointestinal: Negative.    Genitourinary: Negative.    Musculoskeletal:         Left hip injury   Skin: Negative.    Allergic/Immunologic: Negative.    Neurological: Negative.    Psychiatric/Behavioral: Negative.     All other systems reviewed and are negative.       All systems reviewed and negative except for those discussed in HPI.       PHYSICAL EXAM    I have reviewed the triage vital signs and nursing notes.    ED Triage Vitals [03/07/25 2213]   Temp Heart Rate Resp BP SpO2   98.2 °F (36.8 °C) 75 18 168/88 93 %      Temp src Heart Rate Source Patient Position BP Location FiO2 (%)   Oral Monitor Lying Left arm --       Physical Exam  Vitals and nursing note reviewed.   HENT:      Head: Normocephalic and atraumatic.      Nose: Nose normal.   Eyes:      Extraocular Movements: Extraocular movements intact.   Cardiovascular:      Rate and Rhythm: Normal rate and regular rhythm.      Pulses: Normal pulses.   Pulmonary:      Effort: Pulmonary effort is normal.   Abdominal:      General: Abdomen is flat.   Musculoskeletal:      Cervical back: Normal range of motion.      Comments: Tenderness over the left greater trochanter into the left groin.  2+ DP pulse on the left.  Very subtle shortening of the left lower extremity as compared to the right which patient states is chronic.  No pain with palpation of the right midshaft femur knee tib-fib ankle or foot.   Neurological:      General: No focal deficit present.   Psychiatric:         Mood and Affect: Mood normal.         Behavior: Behavior normal.            LAB RESULTS  Recent Results (from the past 24 hours)   Basic Metabolic Panel    Collection Time: 03/08/25  4:47 AM    Specimen: Blood   Result Value Ref Range    Glucose 134 (H) 65 - 99 mg/dL    BUN 13 8 - 23 mg/dL    Creatinine 0.98 0.76 - 1.27 mg/dL     Sodium 137 136 - 145 mmol/L    Potassium 4.3 3.5 - 5.2 mmol/L    Chloride 100 98 - 107 mmol/L    CO2 23.7 22.0 - 29.0 mmol/L    Calcium 8.9 8.6 - 10.5 mg/dL    BUN/Creatinine Ratio 13.3 7.0 - 25.0    Anion Gap 13.3 5.0 - 15.0 mmol/L    eGFR 84.5 >60.0 mL/min/1.73   CBC Auto Differential    Collection Time: 03/08/25  4:47 AM    Specimen: Blood   Result Value Ref Range    WBC 12.67 (H) 3.40 - 10.80 10*3/mm3    RBC 4.83 4.14 - 5.80 10*6/mm3    Hemoglobin 16.4 13.0 - 17.7 g/dL    Hematocrit 46.6 37.5 - 51.0 %    MCV 96.5 79.0 - 97.0 fL    MCH 34.0 (H) 26.6 - 33.0 pg    MCHC 35.2 31.5 - 35.7 g/dL    RDW 12.7 12.3 - 15.4 %    RDW-SD 45.2 37.0 - 54.0 fl    MPV 9.4 6.0 - 12.0 fL    Platelets      Neutrophil % 78.9 (H) 42.7 - 76.0 %    Lymphocyte % 10.0 (L) 19.6 - 45.3 %    Monocyte % 7.7 5.0 - 12.0 %    Eosinophil % 1.5 0.3 - 6.2 %    Basophil % 0.3 0.0 - 1.5 %    Immature Grans % 1.6 (H) 0.0 - 0.5 %    Neutrophils, Absolute 10.00 (H) 1.70 - 7.00 10*3/mm3    Lymphocytes, Absolute 1.27 0.70 - 3.10 10*3/mm3    Monocytes, Absolute 0.97 (H) 0.10 - 0.90 10*3/mm3    Eosinophils, Absolute 0.19 0.00 - 0.40 10*3/mm3    Basophils, Absolute 0.04 0.00 - 0.20 10*3/mm3    Immature Grans, Absolute 0.20 (H) 0.00 - 0.05 10*3/mm3    nRBC 0.0 0.0 - 0.2 /100 WBC   Scan Slide    Collection Time: 03/08/25  4:47 AM    Specimen: Blood   Result Value Ref Range    RBC Morphology Normal Normal    WBC Morphology Normal Normal    Platelet Estimate Adequate Normal    Clumped Platelets Present None Seen       If labs were ordered, I independently reviewed the results and considered them in treating the patient.        RADIOLOGY  No Radiology Exams Resulted Within Past 24 Hours          PROCEDURES    Procedures    Interpretations    O2 Sat: The patient's oxygen saturation was 93% on Room Air.  This was independently interpreted by me as Normal    Radiology: I ordered and independently reviewed the above noted radiographic studies.  I viewed images of Xray  of the left hip  which showed No fracture per my independent interpretation. See radiologist's dictation for official interpretation.     MEDICATIONS GIVEN IN ER    Medications   sodium chloride 0.9 % flush 10 mL (has no administration in time range)   amLODIPine (NORVASC) tablet 10 mg (10 mg Oral Given 3/8/25 0939)   aspirin EC tablet 81 mg (81 mg Oral Given 3/8/25 0939)   carvedilol (COREG) tablet 25 mg (25 mg Oral Given 3/8/25 1811)   hydroCHLOROthiazide tablet 25 mg (25 mg Oral Given 3/8/25 0939)   isosorbide mononitrate (IMDUR) 24 hr tablet 30 mg (30 mg Oral Given 3/8/25 0939)   pantoprazole (PROTONIX) EC tablet 40 mg (40 mg Oral Given 3/8/25 0939)   pravastatin (PRAVACHOL) tablet 40 mg (40 mg Oral Given 3/8/25 2100)   tamsulosin (FLOMAX) 24 hr capsule 0.4 mg (0.4 mg Oral Given 3/8/25 0939)   sodium chloride 0.9 % flush 10 mL (10 mL Intravenous Given 3/8/25 2100)   sodium chloride 0.9 % flush 10 mL (has no administration in time range)   sodium chloride 0.9 % infusion 40 mL (has no administration in time range)   sennosides-docusate (PERICOLACE) 8.6-50 MG per tablet 2 tablet (has no administration in time range)     And   polyethylene glycol (MIRALAX) packet 17 g (has no administration in time range)     And   bisacodyl (DULCOLAX) EC tablet 5 mg (has no administration in time range)     And   bisacodyl (DULCOLAX) suppository 10 mg (has no administration in time range)   sodium chloride 0.9 % infusion (75 mL/hr Intravenous Currently Infusing 3/8/25 2338)   Morphine sulfate (PF) injection 2 mg (2 mg Intravenous Given 3/8/25 2100)   hydrALAZINE (APRESOLINE) injection 10 mg (has no administration in time range)   guaiFENesin (MUCINEX) 12 hr tablet 600 mg (600 mg Oral Given 3/9/25 0041)   morphine injection 4 mg (4 mg Intravenous Given 3/7/25 2326)   ondansetron (ZOFRAN) injection 4 mg (4 mg Intravenous Given 3/7/25 2326)         MEDICAL DECISION MAKING, PROGRESS, and CONSULTS    All labs, if obtained, have been  independently reviewed by me.  All radiology studies, if obtained, have been reviewed by me and the radiologist dictating the report.  All EKG's, if obtained, have been independently viewed and interpreted by me      Discussion below represents my analysis of pertinent findings related to patient's condition, differential diagnosis, treatment plan and final disposition.      Differential diagnosis:    Pelvic fracture, hip fracture, hip contusion    Additional Sources:  None      Orders placed during this visit:  Orders Placed This Encounter   Procedures    XR Hip With or Without Pelvis 2 - 3 View Left    CT Pelvis Without Contrast    Comprehensive Metabolic Panel    CBC Auto Differential    Basic Metabolic Panel    CBC Auto Differential    Scan Slide    CBC Auto Differential    Ambulatory Referral to Orthopedic Surgery    Diet: Regular/House; Fluid Consistency: Thin (IDDSI 0)    Vital Signs    Intake & Output    Weigh Patient    Oral Care    Saline Lock & Maintain IV Access    Place Sequential Compression Device    Maintain Sequential Compression Device    Up With Assistance    Daily Weights    Strict Intake & Output    Opioid Administration - Document EtCO2 and / or SpO2 With Each Set of Vitals & Any Change in Patient Status    Opioid Administration - Notify Provider Hypercapnic Monitoring    Opioid Administration - Continuous Pulse Oximetry (SpO2)    Inpatient Orthopedic Surgery Consult    Inpatient Case Management  Consult    OT Consult: Eval & Treat ADL Performance Below Baseline    PT Consult: Eval & Treat Discharge Placement Assessment    PT Plan of Care Cert / Re-Cert    Oxygen Therapy- Nasal Cannula; Titrate 1-6 LPM Per SpO2; 90 - 95%    Insert Peripheral IV    Insert Peripheral IV    Initiate Observation Status    CBC & Differential    CBC & Differential         Additional orders considered but not ordered:  None    ED Course:    Consultants:   Orthopedics, Dr. Gaytan.  Discussed the  "radiologist read and Dr. Gaytan personally reviewed the images of the CT pelvis and states \"that will be nonop, does not need transfer, walker, wbat.\"    ED Course as of 03/09/25 0142   Sat Mar 08, 2025   0328 Discussed the case with Dr. Mo, hospitalist, who graciously accepts the patient for admission. [TM]      ED Course User Index  [TM] Amrik Steel PA-C     Nursing staff attempted to get patient up and ambulate.  Patient states was unable to ambulate secondary to pain in his left hip.  He states he is nervous about falling, also complained of dizziness.  Lying in bed at this time denies chest pain, shortness of breath, dizziness, headache states his only issue is ongoing left hip pain and does not feel as though he can ambulate with a walker.      After my consideration of clinical presentation and any laboratory/radiology studies obtained, I discussed the findings with the patient/patient representative who is in agreement with the treatment plan and the final disposition. Risks and benefits of admission were discussed.     AS OF 01:42 EST VITALS:    BP - 136/77  HR - 80  TEMP - 98 °F (36.7 °C) (Oral)  O2 SATS - 91%    I reviewed the patient's prescription monitoring report if available prior to discharge    DIAGNOSIS  Final diagnoses:   Closed fracture of left inferior pubic ramus, initial encounter   Closed fracture of superior ramus of left pubis, initial encounter         DISPOSITION  ED Disposition       ED Disposition   Decision to Admit    Condition   --    Comment   Level of Care: Med/Surg [1]   Diagnosis: Pubic ramus fracture [427904]   Admitting Physician: TYLOR REINOSO [[id number]]                     Please note that portions of this document were completed with voice recognition software.        Amrik Steel PA-C  03/08/25 0055       Amrik Steel PA-C  03/08/25 0229       Amrik Steel PA-C  03/08/25 0242       Amrik Steel, " TITUS  03/08/25 0328       Amrik Steel PA-C  03/09/25 0142

## 2025-03-08 NOTE — DISCHARGE INSTRUCTIONS
Please use your walker at home and weight-bear as tolerated.  Follow-up with Dr. Gaytan by calling Monday for the next available appointment.

## 2025-03-08 NOTE — CONSULTS
Patient Care Team:  Naty Lebron MD as PCP - General (Family Medicine)  Adam Perkins MD as Consulting Physician (Cardiology)  Melba Lopez MD as Surgeon (General Surgery)  Nils Salinas (Optometry)    Chief complaint fall with pelvic injury    Subjective     Patient is a 65 y.o. male presents with usual state of health when he sustained a ground-level fall he was a gentleman that sustained injury from a fall he is usually a ambulator with a cane.  He was seen in the ER is unable to bear weight he was evaluated and had a CT scan consistent with a pelvic ring injury.  He seen today and is without any other complaint denies radicular symptoms..      Review of Systems   Pertinent items are noted in HPI    History  Past Medical History:   Diagnosis Date    Allergic rhinitis     Anxiety     Arthritis     Balance problem     Carpal tunnel syndrome     Cluster headache     Coronary artery disease     Developmental delay     Diabetes mellitus     Noted by PCP    Difficulty walking     Dizziness     Dysphagia     Enlarged prostate     GERD (gastroesophageal reflux disease)     Gout     Hip fracture, left     History of cardiovascular stress test 2018    positive for inferior and lateral ischemia     History of echocardiogram     Hyperlipidemia     Hypertension     Impaired mobility     Lymphadenitis     Memory loss     Migraine     Morbid obesity with BMI of 40.0-44.9, adult 08/10/2020    Associated with hypertension, hyperlipidemia       Myocardial infarction 2000    Obesity     DENZEL (obstructive sleep apnea)     oxygen at night- 2LNC    Peptic ulcer     Peptic ulceration     Poor historian     Rheumatoid arthritis     Right shoulder pain     SOB (shortness of breath) on exertion     Spinocerebellar ataxia     Tension headache     Trigeminal neuralgia     Vitamin B 12 deficiency     Vitamin D deficiency      Past Surgical History:   Procedure Laterality Date    CARDIAC CATHETERIZATION      CARDIAC  CATHETERIZATION Left 3/14/2018    Procedure: Cardiac Catheterization/Vascular Study;  Surgeon: Adam Perkins MD;  Location: [facility] CATH INVASIVE LOCATION;  Service: Cardiovascular    COLONOSCOPY      COLONOSCOPY N/A 9/13/2019    Procedure: COLONOSCOPY W/ COLD SNARE POLYPECTOMY;  Surgeon: Melba Lopez MD;  Location: [facility] ENDOSCOPY;  Service: Gastroenterology    COLONOSCOPY N/A 9/24/2024    Procedure: COLONOSCOPY;  Surgeon: Melba Lopez MD;  Location: [facility] ENDOSCOPY;  Service: Gastroenterology;  Laterality: N/A;    ELBOW ARTHROPLASTY Bilateral     ENDOSCOPY      ENDOSCOPY N/A 9/24/2024    Procedure: ESOPHAGOGASTRODUODENOSCOPY WITH BIOPSY;  Surgeon: Melba Lopez MD;  Location: [facility] ENDOSCOPY;  Service: Gastroenterology;  Laterality: N/A;    HERNIA REPAIR      x3    HIP HEMIARTHROPLASTY Left     NEUROPLASTY Bilateral     decompression median nerve at carpal tunnel    TOTAL KNEE ARTHROPLASTY Bilateral     2005, 2012     Family History   Problem Relation Age of Onset    Deep vein thrombosis Mother     Diabetes Mother     Hyperlipidemia Mother     Hypertension Mother     Heart attack Mother     Cancer Father     Kidney disease Father     Deep vein thrombosis Daughter     Arthritis Sister     Diabetes Sister     Hyperlipidemia Sister     Hypertension Sister     Osteoporosis Sister     Thyroid disease Sister     Liver disease Sister     Arthritis Brother     Diabetes Brother     Hyperlipidemia Brother     Hypertension Brother     Stroke Brother     Cancer Brother     Heart attack Brother     Mental illness Brother      Social History     Tobacco Use    Smoking status: Never     Passive exposure: Never    Smokeless tobacco: Never   Vaping Use    Vaping status: Never Used   Substance Use Topics    Alcohol use: Yes     Comment: Rarely    Drug use: No     (Not in a hospital admission)   Allergies:  Oxycodone and Topamax [topiramate]    Objective     Vital Signs  Temp:  [98.2 °F (36.8 °C)-98.5 °F (36.9 °C)]  98.5 °F (36.9 °C)  Heart Rate:  [] 105  Resp:  [18] 18  BP: (110-168)/(74-91) 134/91    Physical Exam:      General Appearance:  Alert, cooperative, in no acute distress   Head:  Normocephalic, without obvious abnormality, atraumatic   Eyes:  Lids and lashes normal, conjunctivae and sclerae normal, no icterus, no pallor, corneas clear, PERRLA   Ears:  Ears appear intact with no abnormalities noted   Throat:  No oral lesions, no thrush, oral mucosa moist   Neck:  No adenopathy, supple, trachea midline, no thyromegaly, no carotid bruit, no JVD   Back:  No kyphosis present, no scoliosis present, no skin lesions, erythema or scars, no tenderness to percussion or palpation, range of motion normal   Lungs:  Clear to auscultation, respirations regular, even and unlabored    Heart:  Regular rhythm and normal rate, normal S1 and S2, no murmur, no gallop, no rub, no click   Chest Wall:  No abnormalities observed   Abdomen:  Normal bowel sounds, no masses, no organomegaly, soft non-tender, non-distended, no guarding, no rebound tenderness   Rectal:  Deferred   Extremities:  Moves all extremities well, no edema, no cyanosis, no redness tenderness status pelvis   Pulses:  Pulses palpable and equal bilaterally   Skin:  No bleeding, bruising or rash   Lymph nodes:  No palpable adenopathy   Neurologic:  Cranial nerves 2 - 12 grossly intact, sensation intact, DTR present and equal bilaterally                                                                               Results Review:    I reviewed the patient's new clinical results.    Assessment & Plan       Pubic ramus fracture    Plan at this point is he can be weightbearing as tolerated he can use a walker and participate with physical therapy.  He is slated to be admitted for physical therapy.  He can follow-up as an outpatient in 2 weeks    I discussed the patients findings and my recommendations with patient.     Shon Gaytan MD  03/08/25  08:32 EST

## 2025-03-08 NOTE — H&P
[facility]    Patient Identification:  Name:  Chris Mejia  Age:  65 y.o.  Sex:  male  :  1960  MRN:  [id number]   Visit Number:  [id number]  Admit Date: 3/7/2025   Room number:    Primary Care Physician:  Naty Lebron MD    Date of Admission: 3/7/2025     Subjective     Chief complaint:    Chief Complaint   Patient presents with    Fall    Hip Pain       History of presenting illness:     This is a 65-year-old male with a past medical history of hypertension, coronary artery disease, anxiety, GERD, hyperlipidemia and BPH presenting with left hip pain pain.  Patient states, he got up to go to the bathroom, turned around too quickly and lost his balance.  Unfortunately patient fell landing on his left hip.  At this time, he is complaining of severe left hip pain.  He describes the pain as sharp and nonradiating.  He rates the pain a 9 out of 10 in severity and movement makes it worse.    In ED, blood pressure 136/89, heart rate 78, respiratory rate 18, temperature 98.2 and O2 saturation 93% on room air.  Labs on arrival showed sodium 137, potassium 4.2, BUN 11, creatinine 1.03, WBC 11.1, hemoglobin 16.1 and platelet count 144.  CT of abdomen and pelvis showed Acute nondisplaced fracture of the inferior left pubic ramus. Acute nondisplaced intra-articular fracture of the anterior inferior left acetabulum/root of the superior pubic ramus. No acute hip fracture or dislocation.    ---------------------------------------------------------------------------------------------------------------------   Review of Systems   Constitutional:  Negative for chills.   HENT:  Negative for ear discharge and sinus pressure.    Eyes:  Negative for pain.   Respiratory:  Negative for choking.    Cardiovascular:  Negative for leg swelling.   Gastrointestinal:  Negative for anal bleeding.   Endocrine: Negative for polydipsia.   Genitourinary:  Negative for flank pain.    Musculoskeletal:  Positive for back pain and gait problem.   Skin:  Negative for pallor.   Allergic/Immunologic: Negative for food allergies.   Neurological:  Negative for light-headedness.   Hematological:  Negative for adenopathy.   Psychiatric/Behavioral:  Negative for confusion.      ---------------------------------------------------------------------------------------------------------------------   Past Medical History:   Diagnosis Date    Allergic rhinitis     Anxiety     Arthritis     Balance problem     Carpal tunnel syndrome     Cluster headache     Coronary artery disease     Developmental delay     Diabetes mellitus     Noted by PCP    Difficulty walking     Dizziness     Dysphagia     Enlarged prostate     GERD (gastroesophageal reflux disease)     Gout     Hip fracture, left     History of cardiovascular stress test 2018    positive for inferior and lateral ischemia     History of echocardiogram     Hyperlipidemia     Hypertension     Impaired mobility     Lymphadenitis     Memory loss     Migraine     Morbid obesity with BMI of 40.0-44.9, adult 08/10/2020    Associated with hypertension, hyperlipidemia       Myocardial infarction 2000    Obesity     DENZEL (obstructive sleep apnea)     oxygen at night- 2LNC    Peptic ulcer     Peptic ulceration     Poor historian     Rheumatoid arthritis     Right shoulder pain     SOB (shortness of breath) on exertion     Spinocerebellar ataxia     Tension headache     Trigeminal neuralgia     Vitamin B 12 deficiency     Vitamin D deficiency      Past Surgical History:   Procedure Laterality Date    CARDIAC CATHETERIZATION      CARDIAC CATHETERIZATION Left 3/14/2018    Procedure: Cardiac Catheterization/Vascular Study;  Surgeon: Adam Perkins MD;  Location: [facility] INVASIVE LOCATION;  Service: Cardiovascular    COLONOSCOPY      COLONOSCOPY N/A 9/13/2019    Procedure: COLONOSCOPY W/ COLD SNARE POLYPECTOMY;  Surgeon: Melba Lopez MD;  Location: [facility]  ENDOSCOPY;  Service: Gastroenterology    COLONOSCOPY N/A 9/24/2024    Procedure: COLONOSCOPY;  Surgeon: Melba Lopez MD;  Location: [facility] ENDOSCOPY;  Service: Gastroenterology;  Laterality: N/A;    ELBOW ARTHROPLASTY Bilateral     ENDOSCOPY      ENDOSCOPY N/A 9/24/2024    Procedure: ESOPHAGOGASTRODUODENOSCOPY WITH BIOPSY;  Surgeon: Melba Lopez MD;  Location: [facility] ENDOSCOPY;  Service: Gastroenterology;  Laterality: N/A;    HERNIA REPAIR      x3    HIP HEMIARTHROPLASTY Left     NEUROPLASTY Bilateral     decompression median nerve at carpal tunnel    TOTAL KNEE ARTHROPLASTY Bilateral     2005, 2012     Family History   Problem Relation Age of Onset    Deep vein thrombosis Mother     Diabetes Mother     Hyperlipidemia Mother     Hypertension Mother     Heart attack Mother     Cancer Father     Kidney disease Father     Deep vein thrombosis Daughter     Arthritis Sister     Diabetes Sister     Hyperlipidemia Sister     Hypertension Sister     Osteoporosis Sister     Thyroid disease Sister     Liver disease Sister     Arthritis Brother     Diabetes Brother     Hyperlipidemia Brother     Hypertension Brother     Stroke Brother     Cancer Brother     Heart attack Brother     Mental illness Brother      Social History     Socioeconomic History    Marital status:    Tobacco Use    Smoking status: Never     Passive exposure: Never    Smokeless tobacco: Never   Vaping Use    Vaping status: Never Used   Substance and Sexual Activity    Alcohol use: Yes     Comment: Rarely    Drug use: No    Sexual activity: Defer     ---------------------------------------------------------------------------------------------------------------------   Allergies:  Oxycodone and Topamax [topiramate]  ---------------------------------------------------------------------------------------------------------------------   Medications below are reported home medications pulling from within the system; at this time, these  medications have not been reconciled unless otherwise specified and are in the verification process for further verifcation as current home medications.      Prior to Admission Medications       Prescriptions Last Dose Informant Patient Reported? Taking?    amLODIPine (NORVASC) 10 MG tablet   No No    Take 1 tablet by mouth Daily. Indications: High Blood Pressure Disorder    aspirin 81 MG tablet   Yes No    Take 1 tablet by mouth Daily.    azelastine (ASTELIN) 0.1 % nasal spray   No No    2 sprays into the nostril(s) as directed by provider 2 (Two) Times a Day.    busPIRone (BUSPAR) 15 MG tablet   No No    TAKE 1 TABLET TWICE A DAY AS NEEDED FOR ANXIETY    carvedilol (COREG) 25 MG tablet   No No    Take 1 tablet by mouth 2 (Two) Times a Day With Meals.    cyanocobalamin injection 1,000 mcg   No No    dexlansoprazole (Dexilant) 60 MG capsule   No No    Take 1 capsule by mouth Daily.    hydroCHLOROthiazide 25 MG tablet   Yes No    Take 1 tablet by mouth Daily.    isosorbide mononitrate (IMDUR) 30 MG 24 hr tablet   No No    Take 1 tablet by mouth Daily for 360 days.    levocetirizine (XYZAL) 5 MG tablet   No No    Take 1 tablet by mouth Every Evening.    meclizine (ANTIVERT) 25 MG tablet   Yes No    Take 1 tablet by mouth 3 (Three) Times a Day As Needed for Dizziness.    nitroglycerin (Nitrostat) 0.4 MG SL tablet   No No    Place 1 tablet under the tongue Every 5 (Five) Minutes As Needed for Chest Pain. Take no more than 3 doses in 15 minutes.    pantoprazole (PROTONIX) 40 MG EC tablet   Yes No    Take 1 tablet by mouth Daily.    pramipexole (MIRAPEX) 0.125 MG tablet   Yes No    Take 1 tablet by mouth 3 (Three) Times a Day. Unsure of dose    pravastatin (PRAVACHOL) 40 MG tablet   No No    TAKE ONE TABLET BY MOUTH EVERY DAY AT NIGHT    tamsulosin (FLOMAX) 0.4 MG capsule 24 hr capsule   No No    TAKE 1 CAPSULE EVERY NIGHT (DOSE DECREASE DUE TO      VISION DISTURBANCE)    tiZANidine (ZANAFLEX) 4 MG tablet   No No    TAKE  1 TABLET BY MOUTH EVERY NIGHT          Objective     Vital Signs:  Temp:  [98.2 °F (36.8 °C)] 98.2 °F (36.8 °C)  Heart Rate:  [71-84] 84  Resp:  [18] 18  BP: (110-168)/(80-90) 117/82    Mean Arterial Pressure (Non-Invasive) for the past 24 hrs (Last 3 readings):   Noninvasive MAP (mmHg)   03/08/25 0229 94   03/08/25 0158 108   03/08/25 0128 97     SpO2:  [91 %-94 %] 94 %  on   ;   Device (Oxygen Therapy): room air  Body mass index is 49.97 kg/m².    Wt Readings from Last 3 Encounters:   03/07/25 107 kg (235 lb)   02/07/25 101 kg (222 lb 1.9 oz)   10/03/24 114 kg (251 lb)      ----------------------------------------------------------------------------------------------------------------------  PHYSICAL EXAMINATION:  GENERAL: The patient is well developed and nontoxic.  HEENT: Anicteric sclerae, PERRLA, EOMI. Oropharynx clear. Moist mucous membranes. Conjunctivae appear well perfused.  CHEST: Chest wall is nontender.  HEART: Regular rate and rhythm without murmurs.  LUNGS: Clear to auscultation bilaterally.  ABDOMEN: Soft, positive bowel sounds, nontender, no organomegaly.  RECTAL: Deferred.  SKIN: No rash, no excessive bruising, petechiae, or purpura.  NEUROLOGIC: Cranial nerves II-XII intact without motor/sensory deficit.    ---------------------------------------------------------------------------------------------------------------------  --------------------------------------------------------------------------------------------------------------------  LABS:    CBC and coagulation:  Results from last 7 days   Lab Units 03/08/25  0018   WBC 10*3/mm3 11.12*   HEMOGLOBIN g/dL 16.1   HEMATOCRIT % 45.1   MCV fL 95.1   MCHC g/dL 35.7   PLATELETS 10*3/mm3 144     Acid/base balance:      Renal and electrolytes:  Results from last 7 days   Lab Units 03/08/25  0018   SODIUM mmol/L 137   POTASSIUM mmol/L 4.2   CHLORIDE mmol/L 101   CO2 mmol/L 25.4   BUN mg/dL 11   CREATININE mg/dL 1.03   CALCIUM mg/dL 8.8   GLUCOSE mg/dL  "114*     Estimated Creatinine Clearance: 71.2 mL/min (by C-G formula based on SCr of 1.03 mg/dL).    Liver and pancreatic function:  Results from last 7 days   Lab Units 03/08/25  0018   ALBUMIN g/dL 4.0   BILIRUBIN mg/dL 0.5   ALK PHOS U/L 45   AST (SGOT) U/L 37   ALT (SGPT) U/L 42*     Endocrine function:  Lab Results   Component Value Date    HGBA1C 5.70 (H) 07/10/2024     Point of care bedside glucose levels:      Lab Results   Component Value Date    TSH 1.980 03/22/2024    FREET4 1.38 03/22/2024     Cardiac:        Cultures:  Lab Results   Component Value Date    COLORU Yellow 03/02/2023    CLARITYU Clear 03/02/2023    PHUR 8.0 03/02/2023    GLUCOSEU Negative 03/02/2023    KETONESU Negative 03/02/2023    BLOODU Negative 03/02/2023    NITRITEU Negative 03/02/2023    LEUKOCYTESUR Negative 03/02/2023    BILIRUBINUR Negative 03/02/2023    UROBILINOGEN 1.0 E.U./dL 03/02/2023     Microbiology Results (last 10 days)       ** No results found for the last 240 hours. **            No results found for: \"PREGTESTUR\", \"PREGSERUM\", \"HCG\", \"HCGQUANT\"  Pain Management Panel          Latest Ref Rng & Units 3/2/2023   Pain Management Panel   Amphetamine, Urine Qual Negative Negative    Barbiturates Screen, Urine Negative Negative    Benzodiazepine Screen, Urine Negative Negative    Buprenorphine, Screen, Urine Negative Negative    Cocaine Screen, Urine Negative Negative    Methadone Screen , Urine Negative Negative    Methamphetamine, Ur Negative Negative        I have personally looked at the labs and they are summarized above.  ----------------------------------------------------------------------------------------------------------------------  Detailed radiology reports for the last 24 hours:    Imaging Results (Last 24 Hours)       Procedure Component Value Units Date/Time    CT Pelvis Without Contrast [[id number]] Collected: 03/07/25 2344     Updated: 03/08/25 0144    Addenda:        ADDENDUM REPORT    ADDENDUM:  " Addendum:    Error in the report. Fractures are both left-sided.    Findings:    Acute nondisplaced fracture of the inferior left pubic ramus.    Acute nondisplaced intra-articular fracture of the anterior inferior left   acetabulum/root of the superior pubic ramus on axial series 2 image 55-58.    No acute right hip fracture or dislocation. Moderate to severe right hip   arthritis    No acute left hip fracture or dislocation. Moderate to severe left hip   arthritis    Prior ORIF for left femur fracture.    Degenerative disease at L4-5 with suspected severe canal and foraminal   stenosis.    Degenerative disease at L5-S1 with mild canal narrowing and severe   bilateral foraminal stenosis.    Impression:    1. Acute nondisplaced fracture of the inferior left pubic ramus.    2. Acute nondisplaced intra-articular fracture of the anterior inferior   left acetabulum/root of the superior pubic ramus.    3. No acute hip fracture or dislocation.    Authenticated and Electronically Signed by Joann Roberto MD  on 03/08/2025 01:36:49 AM  Signed: 03/08/25 0136 by Joann Roberto MD    Narrative:      FINAL REPORT    TECHNIQUE:  null    CLINICAL HISTORY:  s/p fall, left hip pain, unable to bear weight, possible pelvic  fracture on Xray    COMPARISON:  null    FINDINGS:  Exam: CT pelvis without contrast    Comparison: None    Clinical history: Status post fall, left hip pain unable to bear weight.    Findings:    Acute nondisplaced fracture of the inferior left pubic ramus.    Acute nondisplaced intra-articular fracture of the anterior inferior right acetabulum/root of the superior pubic ramus on axial series 2 image 55-58.    No acute right hip fracture or dislocation. Moderate to severe right hip arthritis    No acute left hip fracture or dislocation. Moderate to severe left hip arthritis    Prior ORIF for left femur fracture.    Degenerative disease at L4-5 with suspected severe canal and foraminal  stenosis.    Degenerative disease at L5-S1 with mild canal narrowing and severe bilateral foraminal stenosis.      Impression:      Impression:    1. Acute nondisplaced fracture of the inferior left pubic ramus.    2. Acute nondisplaced intra-articular fracture of the anterior inferior right acetabulum/root of the superior pubic ramus.    3. No acute hip fracture or dislocation.    Authenticated and Electronically Signed by Joann Roberto MD  on 03/07/2025 11:44:21 PM    XR Hip With or Without Pelvis 2 - 3 View Left [[id number]] Collected: 03/07/25 2339     Updated: 03/07/25 2342    Narrative:      FINAL REPORT    TECHNIQUE:  null    CLINICAL HISTORY:  left hip injury, fall    COMPARISON:  null    FINDINGS:  Exam: AP pelvis with two-view left hip    Comparison: None    Findings:    No acute pelvic fracture.    No acute right hip fracture or dislocation.    Prior ORIF for left femur fracture. No acute left hip fracture or dislocation.    Moderate bilateral hip arthritis.    Degenerative disease in the lower lumbar spine.    No acute fracture or dislocation.    No radiodense foreign bodies.      Impression:      Impression:    1. No acute pelvic or hip fracture.    If there is concern for occult fracture, then CT may be considered.    Authenticated and Electronically Signed by Joann Roberto MD  on 03/07/2025 11:39:53 PM          Final impressions for the last 30 days of radiology reports:    CT Pelvis Without Contrast  Addendum Date: 3/8/2025  ADDENDUM REPORT ADDENDUM: Addendum: Error in the report. Fractures are both left-sided. Findings: Acute nondisplaced fracture of the inferior left pubic ramus. Acute nondisplaced intra-articular fracture of the anterior inferior left acetabulum/root of the superior pubic ramus on axial series 2 image 55-58. No acute right hip fracture or dislocation. Moderate to severe right hip arthritis No acute left hip fracture or dislocation. Moderate to severe left hip arthritis Prior  ORIF for left femur fracture. Degenerative disease at L4-5 with suspected severe canal and foraminal stenosis. Degenerative disease at L5-S1 with mild canal narrowing and severe bilateral foraminal stenosis. Impression: 1. Acute nondisplaced fracture of the inferior left pubic ramus. 2. Acute nondisplaced intra-articular fracture of the anterior inferior left acetabulum/root of the superior pubic ramus. 3. No acute hip fracture or dislocation. Authenticated and Electronically Signed by Joann Roberto MD on 03/08/2025 01:36:49 AM    Result Date: 3/8/2025  Impression: 1. Acute nondisplaced fracture of the inferior left pubic ramus. 2. Acute nondisplaced intra-articular fracture of the anterior inferior right acetabulum/root of the superior pubic ramus. 3. No acute hip fracture or dislocation. Authenticated and Electronically Signed by Joann Roberto MD on 03/07/2025 11:44:21 PM    XR Hip With or Without Pelvis 2 - 3 View Left  Result Date: 3/7/2025  Impression: 1. No acute pelvic or hip fracture. If there is concern for occult fracture, then CT may be considered. Authenticated and Electronically Signed by Joann Roberto MD on 03/07/2025 11:39:53 PM        Assessment & Plan     This is a 65-year-old male with a past medical history of hypertension, coronary artery disease, anxiety, GERD, hyperlipidemia and BPH presenting with left hip pain pain.     Assessment:  Left hip pain  Pubic ramus fracture  Fall  Hypertension  Coronary disease  GERD  Anxiety  Hyperlipidemia  BPH    Plan:  - CT pelvis showed Acute nondisplaced fracture of the inferior left pubic ramus. Acute nondisplaced intra-articular fracture of the anterior inferior left acetabulum/root of the superior pubic ramus. No acute hip fracture or dislocation.  - Admit to observation  - Pain management  - PT/OT  - Consult ortho  - BP is Currently well controlled  - Hydralazine as needed  - Resume home meds        Nicko Rodriguez MD  [facility]  Hospitalist  03/08/25  02:49 EST

## 2025-03-08 NOTE — ED NOTES
Attempted to ambulate pt with walker per provider request. Pt reports dizziness while attempting to ambulate. Pt was only able to walk approx 2-3 steps without having to sit down. Pt returned to bed and positioned for comfort. Amrik FOSTER notified of ambulation trial.

## 2025-03-08 NOTE — PLAN OF CARE
Goal Outcome Evaluation:  Plan of Care Reviewed With: patient, spouse        Progress: no change  Outcome Evaluation: Patient participated well in PT evaluation and demonstrated decreased overall mobility due to left pubic rami fracture.  He was able to perform bed mobility with minimal assistance for left leg movements.  He was able to perform sit to stand transfers with RW and minimal assistance.  He is able to perform gait x 3 feet with RW and CGA.  He is able to use his arms to assist with weight bearing through his left leg.  He reports pain increased to 3/10 while walking.  He is eager to go home as soon as his mobility allows.  Patient is expected to benefit from additional PT services while hospitalized and upon discharge to home with home health care.    Anticipated Discharge Disposition (PT): home with home health

## 2025-03-08 NOTE — PLAN OF CARE
Problem: Fall Injury Risk  Goal: Absence of Fall and Fall-Related Injury  Intervention: Identify and Manage Contributors  Recent Flowsheet Documentation  Taken 3/8/2025 1600 by Hayley Maharaj RN  Self-Care Promotion:   independence encouraged   BADL personal objects within reach   BADL personal routines maintained  Intervention: Promote Injury-Free Environment  Recent Flowsheet Documentation  Taken 3/8/2025 1600 by Hayley Maharaj RN  Safety Promotion/Fall Prevention:   activity supervised   assistive device/personal items within reach   clutter free environment maintained   fall prevention program maintained   room organization consistent   safety round/check completed     Problem: Adult Inpatient Plan of Care  Goal: Absence of Hospital-Acquired Illness or Injury  Intervention: Identify and Manage Fall Risk  Recent Flowsheet Documentation  Taken 3/8/2025 1600 by Hayley Maharaj RN  Safety Promotion/Fall Prevention:   activity supervised   assistive device/personal items within reach   clutter free environment maintained   fall prevention program maintained   room organization consistent   safety round/check completed  Intervention: Prevent Skin Injury  Recent Flowsheet Documentation  Taken 3/8/2025 1600 by Hayley Maharaj RN  Body Position:   sitting up in bed   neutral head position   neutral body alignment  Skin Protection:   incontinence pads utilized   pulse oximeter probe site changed   skin sealant/moisture barrier applied  Intervention: Prevent Infection  Recent Flowsheet Documentation  Taken 3/8/2025 1600 by Hayley Maharaj RN  Infection Prevention:   environmental surveillance performed   single patient room provided  Goal: Optimal Comfort and Wellbeing  Intervention: Monitor Pain and Promote Comfort  Recent Flowsheet Documentation  Taken 3/8/2025 1549 by Hayley Maharaj RN  Pain Management Interventions:   pain medication given   pillow support provided   position adjusted  Intervention: Provide Person-Centered  Care  Recent Flowsheet Documentation  Taken 3/8/2025 1600 by Hayley Maharaj RN  Trust Relationship/Rapport:   care explained   thoughts/feelings acknowledged   reassurance provided   questions encouraged   questions answered     Problem: Comorbidity Management  Goal: Maintenance of Osteoarthritis Symptom Control  Intervention: Maintain Osteoarthritis Symptom Control  Recent Flowsheet Documentation  Taken 3/8/2025 1600 by Hayley Maharaj RN  Activity Management: activity minimized  Assistive Device Utilized:   walker   gait belt     Problem: Skin Injury Risk Increased  Goal: Skin Health and Integrity  Intervention: Optimize Skin Protection  Recent Flowsheet Documentation  Taken 3/8/2025 1600 by Hayley Maharaj RN  Activity Management: activity minimized  Pressure Reduction Techniques:   frequent weight shift encouraged   heels elevated off bed   pressure points protected   weight shift assistance provided  Head of Bed (HOB) Positioning: HOB at 60-90 degrees  Pressure Reduction Devices: positioning supports utilized  Skin Protection:   incontinence pads utilized   pulse oximeter probe site changed   skin sealant/moisture barrier applied   Goal Outcome Evaluation:              Outcome Evaluation: New admission to the unit during shift.

## 2025-03-08 NOTE — THERAPY EVALUATION
Patient Name: Chris Mejia  : 1960    MRN: [id number]                              Today's Date: 3/8/2025       Admit Date: 3/7/2025    Visit Dx:     ICD-10-CM ICD-9-CM   1. Closed fracture of left inferior pubic ramus, initial encounter  S32.592A 808.2   2. Closed fracture of superior ramus of left pubis, initial encounter  S32.512A 808.2     Patient Active Problem List   Diagnosis    Tubular adenoma of colon    Trigeminal neuralgia    Spinocerebellar ataxia    RA (rheumatoid arthritis)    Insomnia    Essential hypertension    Hypercholesteremia    Acid reflux    Anxiety    Vitamin B12 deficiency    Vitamin D deficiency    DENZEL (obstructive sleep apnea)    Atopic rhinitis    Ataxic gait    Shoulder pain    Ophthalmoplegia    CAD in native artery    Seasonal allergies    Benign non-nodular prostatic hyperplasia with lower urinary tract symptoms    Weakness    Tension headache    Limited response to serotonin reuptake inhibitors 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    HTR2A GG genotype    HLA-B*1502 allele negative    CYP2B6 intermediate metabolizer    Vertigo    Cerebellar atrophy    Chest pain    SOB (shortness of breath)    Aortic root dilation    Dysphagia    Pubic ramus fracture     Past Medical History:   Diagnosis Date    Allergic rhinitis     Anxiety     Arthritis     Balance problem     Carpal tunnel syndrome     Cluster headache     Coronary artery disease     Developmental delay     Diabetes mellitus     Noted by PCP    Difficulty walking     Dizziness     Dysphagia     Enlarged prostate     GERD (gastroesophageal reflux disease)     Gout     Hip fracture, left     History of cardiovascular stress test 2018    positive for inferior and lateral ischemia     History of echocardiogram     Hyperlipidemia     Hypertension     Impaired mobility     Lymphadenitis     Memory loss     Migraine     Morbid obesity with BMI of 40.0-44.9, adult 08/10/2020    Associated with  hypertension, hyperlipidemia       Myocardial infarction 2000    Obesity     DENZEL (obstructive sleep apnea)     oxygen at night- 2LNC    Peptic ulcer     Peptic ulceration     Poor historian     Rheumatoid arthritis     Right shoulder pain     SOB (shortness of breath) on exertion     Spinocerebellar ataxia     Tension headache     Trigeminal neuralgia     Vitamin B 12 deficiency     Vitamin D deficiency      Past Surgical History:   Procedure Laterality Date    CARDIAC CATHETERIZATION      CARDIAC CATHETERIZATION Left 3/14/2018    Procedure: Cardiac Catheterization/Vascular Study;  Surgeon: Adam Perkins MD;  Location: [facility] CATH INVASIVE LOCATION;  Service: Cardiovascular    COLONOSCOPY      COLONOSCOPY N/A 9/13/2019    Procedure: COLONOSCOPY W/ COLD SNARE POLYPECTOMY;  Surgeon: Melba Lopez MD;  Location: [facility] ENDOSCOPY;  Service: Gastroenterology    COLONOSCOPY N/A 9/24/2024    Procedure: COLONOSCOPY;  Surgeon: Melba Lopez MD;  Location: [facility] ENDOSCOPY;  Service: Gastroenterology;  Laterality: N/A;    ELBOW ARTHROPLASTY Bilateral     ENDOSCOPY      ENDOSCOPY N/A 9/24/2024    Procedure: ESOPHAGOGASTRODUODENOSCOPY WITH BIOPSY;  Surgeon: Melba Lopez MD;  Location: [facility] ENDOSCOPY;  Service: Gastroenterology;  Laterality: N/A;    HERNIA REPAIR      x3    HIP HEMIARTHROPLASTY Left     NEUROPLASTY Bilateral     decompression median nerve at carpal tunnel    TOTAL KNEE ARTHROPLASTY Bilateral     2005, 2012      General Information       Row Name 03/08/25 1001          Physical Therapy Time and Intention    Document Type evaluation  -JR     Mode of Treatment physical therapy  -JR       Row Name 03/08/25 1001          General Information    Patient Profile Reviewed yes  -JR     Prior Level of Function independent:;all household mobility;community mobility  -JR     Existing Precautions/Restrictions fall;oxygen therapy device and L/min  -JR     Barriers to Rehab none identified  -JR       Row Name  03/08/25 1001          Living Environment    Current Living Arrangements home  -JR     People in Home spouse  -JR       Row Name 03/08/25 1001          Home Main Entrance    Number of Stairs, Main Entrance three  -JR     Stair Railings, Main Entrance railing on right side (ascending)  -JR       Row Name 03/08/25 1001          Stairs Within Home, Primary    Number of Stairs, Within Home, Primary none  -JR       Row Name 03/08/25 1001          Cognition    Orientation Status (Cognition) oriented x 4  -JR       Row Name 03/08/25 1001          Safety Issues/Impairments Affecting Functional Mobility    Safety Issues Affecting Function (Mobility) safety precautions follow-through/compliance;insight into deficits/self-awareness;positioning of assistive device;friction/shear risk;safety precaution awareness  -     Impairments Affecting Function (Mobility) pain;strength;endurance/activity tolerance;balance;postural/trunk control  -               User Key  (r) = Recorded By, (t) = Taken By, (c) = Cosigned By      Initials Name Provider Type    JR Mechelle Mcfarland, PT Physical Therapist                   Mobility       Row Name 03/08/25 1001          Bed Mobility    Bed Mobility supine-sit;sit-supine  -JR     Supine-Sit [place] (Bed Mobility) minimum assist (75% patient effort)  -     Sit-Supine [place] (Bed Mobility) minimum assist (75% patient effort)  -     Assistive Device (Bed Mobility) head of bed elevated;bed rails  -     Comment, (Bed Mobility) he uses hospital bed with a bed rail at home  -JR       Row Name 03/08/25 1001          Sit-Stand Transfer    Sit-Stand [place] (Transfers) minimum assist (75% patient effort)  -     Assistive Device (Sit-Stand Transfers) walker, front-wheeled  -JR       Row Name 03/08/25 1001          Gait/Stairs (Locomotion)    [place] Level (Gait) contact guard  -     Assistive Device (Gait) walker, front-wheeled  -     Patient was able to Ambulate yes  -JR      Distance in Feet (Gait) 3  -JR     Deviations/Abnormal Patterns (Gait) antalgic;base of support, wide;festinating/shuffling;stride length decreased  -JR     Bilateral Gait Deviations forward flexed posture  -JR     Left Sided Gait Deviations weight shift ability decreased;heel strike decreased  -JR     Comment, (Gait/Stairs) educated on stair sequencing with verbalized understanding  -       Row Name 03/08/25 1001          Mobility    Extremity Weight-bearing Status left lower extremity  -JR     Left Lower Extremity (Weight-bearing Status) weight-bearing as tolerated (WBAT)  -               User Key  (r) = Recorded By, (t) = Taken By, (c) = Cosigned By      Initials Name Provider Type     Mechelle Mcfarland, PT Physical Therapist                   Obj/Interventions       Row Name 03/08/25 1001          Range of Motion Comprehensive    Comment, General Range of Motion LLE causes pelvic pain, but has WFL range  -JR       Row Name 03/08/25 1001          Strength Comprehensive (MMT)    Comment, General Manual Muscle Testing (MMT) Assessment LLE grossly 3-/5t  -       Row Name 03/08/25 1001          Balance    Balance Assessment sitting static balance;sitting dynamic balance;standing static balance;standing dynamic balance  -JR     Static Sitting Balance supervision  -JR     Dynamic Sitting Balance supervision  -JR     Position, Sitting Balance unsupported;sitting edge of bed  -JR     Static Standing Balance contact guard  -JR     Dynamic Standing Balance minimal assist  -JR     Position/Device Used, Standing Balance walker, rolling  -JR               User Key  (r) = Recorded By, (t) = Taken By, (c) = Cosigned By      Initials Name Provider Type    Mechelle Orta, PT Physical Therapist                   Goals/Plan       Row Name 03/08/25 1001          Bed Mobility Goal 1 (PT)    Activity/Assistive Device (Bed Mobility Goal 1, PT) bed mobility activities, all  -JR     [place] Level/Cues Needed (Bed Mobility Goal  1, PT) supervision required  -JR     Time Frame (Bed Mobility Goal 1, PT) short term goal (STG)  -JR     Progress/Outcomes (Bed Mobility Goal 1, PT) goal ongoing  -JR       Row Name 03/08/25 1001          Transfer Goal 1 (PT)    Activity/Assistive Device (Transfer Goal 1, PT) sit-to-stand/stand-to-sit;bed-to-chair/chair-to-bed;walker, rolling  -JR     [place] Level/Cues Needed (Transfer Goal 1, PT) contact guard required  -JR     Time Frame (Transfer Goal 1, PT) long term goal (LTG)  -JR     Progress/Outcome (Transfer Goal 1, PT) goal ongoing  -JR       Row Name 03/08/25 1001          Gait Training Goal 1 (PT)    Activity/Assistive Device (Gait Training Goal 1, PT) gait (walking locomotion);walker, rolling;increase endurance/gait distance;diminish gait deviation;improve balance and speed  -JR     [place] Level (Gait Training Goal 1, PT) contact guard required  -JR     Distance (Gait Training Goal 1, PT) 50  -JR     Time Frame (Gait Training Goal 1, PT) long term goal (LTG)  -JR     Progress/Outcome (Gait Training Goal 1, PT) goal ongoing  -JR       Row Name 03/08/25 1001          Patient Education Goal (PT)    Activity (Patient Education Goal, PT) Perform BLE exercises x 20 reps  -JR     [place]/Cues/Accuracy (Memory Goal 2, PT) demonstrates adequately  -JR     Time Frame (Patient Education Goal, PT) 3 days  -JR     Progress/Outcome (Patient Education Goal, PT) goal ongoing  -JR       Row Name 03/08/25 1001          Therapy Assessment/Plan (PT)    Planned Therapy Interventions (PT) strengthening;balance training;bed mobility training;transfer training;gait training;home exercise program;patient/family education  -JR               User Key  (r) = Recorded By, (t) = Taken By, (c) = Cosigned By      Initials Name Provider Type    Mechelle Orta, PT Physical Therapist                   Clinical Impression       Row Name 03/08/25 1001          Pain    Pretreatment Pain Rating 0/10 - no pain  -JR      Posttreatment Pain Rating 3/10  -JR     Pain Location groin  -JR     Pain Side/Orientation left  -JR     Pain Management Interventions activity modification encouraged;exercise or physical activity utilized  -JR     Response to Pain Interventions activity participation with tolerable pain;activity level improved  -JR       Row Name 03/08/25 1001          Plan of Care Review    Plan of Care Reviewed With patient;spouse  -JR     Progress no change  -JR     Outcome Evaluation Patient participated well in PT evaluation and demonstrated decreased overall mobility due to left pubic rami fracture.  He was able to perform bed mobility with minimal assistance for left leg movements.  He was able to perform sit to stand transfers with RW and minimal assistance.  He is able to perform gait x 3 feet with RW and CGA.  He is able to use his arms to assist with weight bearing through his left leg.  He reports pain increased to 3/10 while walking.  He is eager to go home as soon as his mobility allows.  Patient is expected to benefit from additional PT services while hospitalized and upon discharge to home with home health care.  -       Row Name 03/08/25 1001          Therapy Assessment/Plan (PT)    Patient/Family Therapy Goals Statement (PT) Patient is eager to get better and go home  -JR     Rehab Potential (PT) good  -JR     Criteria for Skilled Interventions Met (PT) yes;meets criteria;skilled treatment is necessary  -JR     Therapy Frequency (PT) daily  -JR       Row Name 03/08/25 1001          Positioning and Restraints    Pre-Treatment Position in bed  -JR     Post Treatment Position bed  -JR     In Bed fowlers;call light within reach;encouraged to call for assist;with family/caregiver;with ns  -JR               User Key  (r) = Recorded By, (t) = Taken By, (c) = Cosigned By      Initials Name Provider Type    Mechelle Orta, PT Physical Therapist                   Outcome Measures       Row Name 03/08/25 1001 03/08/25  0900       How much help from another person do you currently need...    Turning from your back to your side while in flat bed without using bedrails? 3  -JR 2  -CC    Moving from lying on back to sitting on the side of a flat bed without bedrails? 3  -JR 2  -CC    Moving to and from a bed to a chair (including a wheelchair)? 3  -JR 2  -CC    Standing up from a chair using your arms (e.g., wheelchair, bedside chair)? 3  -JR 2  -CC    Climbing 3-5 steps with a railing? 2  -JR 2  -CC    To walk in hospital room? 3  -JR 2  -CC    AM-PAC 6 Clicks Score (PT) 17  - 12  -CC    Highest Level of Mobility Goal 5 --> Static standing  - 4 --> Transfer to chair/commode  -      Row Name 03/08/25 1001          Functional Assessment    Outcome Measure Options AM-PAC 6 Clicks Basic Mobility (PT)  -               User Key  (r) = Recorded By, (t) = Taken By, (c) = Cosigned By      Initials Name Provider Type     Mechelle Mcfarland, PT Physical Therapist    Tracy Najera, RN Registered Nurse                                 Physical Therapy Education       Title: PT OT SLP Therapies (In Progress)       Topic: Physical Therapy (In Progress)       Point: Mobility training (Done)       Learning Progress Summary            Patient Acceptance, E,TB, VU by  at 3/8/2025 1328    Comment: Role of PT and POC.  Proper use of RW and sequencing for stairs   Family Acceptance, E,TB, VU by  at 3/8/2025 1328    Comment: Role of PT and POC.  Proper use of RW and sequencing for stairs                      Point: Home exercise program (Not Started)       Learner Progress:  Not documented in this visit.              Point: Body mechanics (Not Started)       Learner Progress:  Not documented in this visit.              Point: Precautions (Not Started)       Learner Progress:  Not documented in this visit.                              User Key       Initials Effective Dates Name Provider Type [facility] 08/22/23 -  Mechelle Mcfarland, PT  Physical Therapist PT                  PT Recommendation and Plan  Planned Therapy Interventions (PT): strengthening, balance training, bed mobility training, transfer training, gait training, home exercise program, patient/family education  Progress: no change  Outcome Evaluation: Patient participated well in PT evaluation and demonstrated decreased overall mobility due to left pubic rami fracture.  He was able to perform bed mobility with minimal assistance for left leg movements.  He was able to perform sit to stand transfers with RW and minimal assistance.  He is able to perform gait x 3 feet with RW and CGA.  He is able to use his arms to assist with weight bearing through his left leg.  He reports pain increased to 3/10 while walking.  He is eager to go home as soon as his mobility allows.  Patient is expected to benefit from additional PT services while hospitalized and upon discharge to home with home health care.     Time Calculation:   PT Evaluation Complexity  History, PT Evaluation Complexity: 1-2 personal factors and/or comorbidities  Examination of Body Systems (PT Eval Complexity): total of 3 or more elements  Clinical Presentation (PT Evaluation Complexity): evolving  Clinical Decision Making (PT Evaluation Complexity): moderate complexity  Overall Complexity (PT Evaluation Complexity): moderate complexity     PT Charges       Row Name 03/08/25 1001             Time Calculation    Start Time 1001  -JR      PT Received On 03/08/25  -JR      PT Goal Re-Cert Due Date 03/18/25  -JR         Untimed Charges    PT Eval/Re-eval Minutes 80  -JR         Total Minutes    Untimed Charges Total Minutes 80  -JR       Total Minutes 80  -JR                User Key  (r) = Recorded By, (t) = Taken By, (c) = Cosigned By      Initials Name Provider Type    Mechelle Orta, PT Physical Therapist                  Therapy Charges for Today       Code Description Service Date Service Provider Modifiers Qty    36954467961  HC-PT EVAL MOD COMPLEXITY 5 3/8/2025 Mechelle Mcfarland, PT  1            PT G-Codes  Outcome Measure Options: AM-PAC 6 Clicks Basic Mobility (PT)  AM-PAC 6 Clicks Score (PT): 17  PT Discharge Summary  Anticipated Discharge Disposition (PT): home with home health    Mechelle Mcfarland, PT  3/8/2025

## 2025-03-09 LAB
ANION GAP SERPL CALCULATED.3IONS-SCNC: 11.2 MMOL/L (ref 5–15)
BASOPHILS # BLD AUTO: 0.07 10*3/MM3 (ref 0–0.2)
BASOPHILS NFR BLD AUTO: 0.8 % (ref 0–1.5)
BUN SERPL-MCNC: 9 MG/DL (ref 8–23)
BUN/CREAT SERPL: 10.5 (ref 7–25)
CALCIUM SPEC-SCNC: 8.4 MG/DL (ref 8.6–10.5)
CHLORIDE SERPL-SCNC: 100 MMOL/L (ref 98–107)
CO2 SERPL-SCNC: 24.8 MMOL/L (ref 22–29)
CREAT SERPL-MCNC: 0.86 MG/DL (ref 0.76–1.27)
DEPRECATED RDW RBC AUTO: 43.5 FL (ref 37–54)
EGFRCR SERPLBLD CKD-EPI 2021: 94.9 ML/MIN/1.73
EOSINOPHIL # BLD AUTO: 0.71 10*3/MM3 (ref 0–0.4)
EOSINOPHIL NFR BLD AUTO: 7.8 % (ref 0.3–6.2)
ERYTHROCYTE [DISTWIDTH] IN BLOOD BY AUTOMATED COUNT: 12.5 % (ref 12.3–15.4)
GLUCOSE SERPL-MCNC: 127 MG/DL (ref 65–99)
HCT VFR BLD AUTO: 42.7 % (ref 37.5–51)
HGB BLD-MCNC: 15.3 G/DL (ref 13–17.7)
IMM GRANULOCYTES # BLD AUTO: 0.03 10*3/MM3 (ref 0–0.05)
IMM GRANULOCYTES NFR BLD AUTO: 0.3 % (ref 0–0.5)
LYMPHOCYTES # BLD AUTO: 1.34 10*3/MM3 (ref 0.7–3.1)
LYMPHOCYTES NFR BLD AUTO: 14.7 % (ref 19.6–45.3)
MCH RBC QN AUTO: 33.8 PG (ref 26.6–33)
MCHC RBC AUTO-ENTMCNC: 35.8 G/DL (ref 31.5–35.7)
MCV RBC AUTO: 94.3 FL (ref 79–97)
MONOCYTES # BLD AUTO: 0.77 10*3/MM3 (ref 0.1–0.9)
MONOCYTES NFR BLD AUTO: 8.4 % (ref 5–12)
NEUTROPHILS NFR BLD AUTO: 6.21 10*3/MM3 (ref 1.7–7)
NEUTROPHILS NFR BLD AUTO: 68 % (ref 42.7–76)
NRBC BLD AUTO-RTO: 0 /100 WBC (ref 0–0.2)
PLATELET # BLD AUTO: 112 10*3/MM3 (ref 140–450)
PMV BLD AUTO: 10 FL (ref 6–12)
POTASSIUM SERPL-SCNC: 3.7 MMOL/L (ref 3.5–5.2)
RBC # BLD AUTO: 4.53 10*6/MM3 (ref 4.14–5.8)
SODIUM SERPL-SCNC: 136 MMOL/L (ref 136–145)
WBC NRBC COR # BLD AUTO: 9.13 10*3/MM3 (ref 3.4–10.8)

## 2025-03-09 PROCEDURE — 85025 COMPLETE CBC W/AUTO DIFF WBC: CPT | Performed by: STUDENT IN AN ORGANIZED HEALTH CARE EDUCATION/TRAINING PROGRAM

## 2025-03-09 PROCEDURE — 80048 BASIC METABOLIC PNL TOTAL CA: CPT | Performed by: STUDENT IN AN ORGANIZED HEALTH CARE EDUCATION/TRAINING PROGRAM

## 2025-03-09 PROCEDURE — 25010000002 MORPHINE PER 10 MG: Performed by: STUDENT IN AN ORGANIZED HEALTH CARE EDUCATION/TRAINING PROGRAM

## 2025-03-09 PROCEDURE — 97116 GAIT TRAINING THERAPY: CPT

## 2025-03-09 PROCEDURE — 96361 HYDRATE IV INFUSION ADD-ON: CPT

## 2025-03-09 PROCEDURE — 25810000003 SODIUM CHLORIDE 0.9 % SOLUTION: Performed by: STUDENT IN AN ORGANIZED HEALTH CARE EDUCATION/TRAINING PROGRAM

## 2025-03-09 PROCEDURE — 97530 THERAPEUTIC ACTIVITIES: CPT

## 2025-03-09 PROCEDURE — G0378 HOSPITAL OBSERVATION PER HR: HCPCS

## 2025-03-09 PROCEDURE — 96376 TX/PRO/DX INJ SAME DRUG ADON: CPT

## 2025-03-09 PROCEDURE — 99232 SBSQ HOSP IP/OBS MODERATE 35: CPT | Performed by: INTERNAL MEDICINE

## 2025-03-09 RX ADMIN — CARVEDILOL 25 MG: 25 TABLET, FILM COATED ORAL at 08:11

## 2025-03-09 RX ADMIN — Medication 10 ML: at 08:12

## 2025-03-09 RX ADMIN — PRAVASTATIN SODIUM 40 MG: 20 TABLET ORAL at 20:17

## 2025-03-09 RX ADMIN — MORPHINE SULFATE 2 MG: 2 INJECTION, SOLUTION INTRAMUSCULAR; INTRAVENOUS at 20:17

## 2025-03-09 RX ADMIN — GUAIFENESIN 600 MG: 600 TABLET, EXTENDED RELEASE ORAL at 08:14

## 2025-03-09 RX ADMIN — GUAIFENESIN 600 MG: 600 TABLET, EXTENDED RELEASE ORAL at 00:41

## 2025-03-09 RX ADMIN — CARVEDILOL 25 MG: 25 TABLET, FILM COATED ORAL at 17:32

## 2025-03-09 RX ADMIN — ISOSORBIDE MONONITRATE 30 MG: 30 TABLET, EXTENDED RELEASE ORAL at 08:11

## 2025-03-09 RX ADMIN — MORPHINE SULFATE 2 MG: 2 INJECTION, SOLUTION INTRAMUSCULAR; INTRAVENOUS at 08:11

## 2025-03-09 RX ADMIN — Medication 10 ML: at 20:17

## 2025-03-09 RX ADMIN — TAMSULOSIN HYDROCHLORIDE 0.4 MG: 0.4 CAPSULE ORAL at 08:11

## 2025-03-09 RX ADMIN — SODIUM CHLORIDE 75 ML/HR: 9 INJECTION, SOLUTION INTRAVENOUS at 06:18

## 2025-03-09 RX ADMIN — GUAIFENESIN 600 MG: 600 TABLET, EXTENDED RELEASE ORAL at 20:17

## 2025-03-09 RX ADMIN — PANTOPRAZOLE SODIUM 40 MG: 40 TABLET, DELAYED RELEASE ORAL at 08:11

## 2025-03-09 RX ADMIN — AMLODIPINE BESYLATE 10 MG: 5 TABLET ORAL at 08:11

## 2025-03-09 RX ADMIN — HYDROCHLOROTHIAZIDE 25 MG: 25 TABLET ORAL at 08:11

## 2025-03-09 RX ADMIN — ASPIRIN 81 MG: 81 TABLET, COATED ORAL at 08:11

## 2025-03-09 NOTE — PROGRESS NOTES
[facility]  INTERNAL MEDICINE PROGRESS NOTE    Name:  Chris Mejia   Age:  65 y.o.  Sex:  male  :  1960  MRN:  [id number]   Visit Number:  [id number]  Admission Date:  3/7/2025  Date Of Service:  25  Primary Care Physician:  Naty Lebron MD     LOS: 0 days :  Patient Care Team:  Naty Lebron MD as PCP - General (Family Medicine)  Adam Perkins MD as Consulting Physician (Cardiology)  Melba Lopez MD as Surgeon (General Surgery)  Nils Salinas (Optometry):      Subjective / Interval History:     65-year-old patient with history of hypertension, coronary artery disease, anxiety, GERD, hyperlipidemia, BPH, who comes into the ER because of fall related pubic ramus fracture.  Surgical evaluation has been done and Dr. Fernando recommends conservative treatment and rehab and weightbearing as tolerated    Patient had PT evaluation done and he is unable to walk safely.  He says his pain is tolerable with medications      Vital Signs:    Temp:  [98 °F (36.7 °C)-99.4 °F (37.4 °C)] 98.1 °F (36.7 °C)  Heart Rate:  [75-87] 81  Resp:  [16-18] 18  BP: (115-140)/(71-86) 131/82    Intake and output:    I/O last 3 completed shifts:  In: 2733.8 [P.O.:720; I.V.:2013.8]  Out: 2150 [Urine:2150]  I/O this shift:  In: 600 [P.O.:600]  Out: 1500 [Urine:1500]    Physical Examination:    General Appearance:    Alert and cooperative, not in any acute distress.   Head:    Atraumatic and normocephalic, without obvious abnormality.   Eyes:            PERRLA,  No pallor. Extraocular movements are within normal limits.   Neck:   Supple,  No lymph glands, no bruit.   Lungs:     Chest shape is normal. Breath sounds heard bilaterally equally.  No crackles or wheezing.     Heart:    Normal S1 and S2, no murmur, no JVD.   Abdomen:     Normal bowel sounds, no masses, no organomegaly. Soft     nontender, no guarding, no rebound tenderness.   Extremities:   Moves all extremities well, no edema, no  cyanosis,    Skin:   No  bruising or rash.   Neurologic:   Grossly nonfocal and moves all extremities.  Gait not checked     Laboratory results:  Results from last 7 days   Lab Units 03/09/25  0608 03/08/25 0447 03/08/25  0018   SODIUM mmol/L 136 137 137   POTASSIUM mmol/L 3.7 4.3 4.2   CHLORIDE mmol/L 100 100 101   CO2 mmol/L 24.8 23.7 25.4   BUN mg/dL 9 13 11   CREATININE mg/dL 0.86 0.98 1.03   CALCIUM mg/dL 8.4* 8.9 8.8   BILIRUBIN mg/dL  --   --  0.5   ALK PHOS U/L  --   --  45   ALT (SGPT) U/L  --   --  42*   AST (SGOT) U/L  --   --  37   GLUCOSE mg/dL 127* 134* 114*     Results from last 7 days   Lab Units 03/09/25  0608 03/08/25 0447 03/08/25  0018   WBC 10*3/mm3 9.13 12.67* 11.12*   HEMOGLOBIN g/dL 15.3 16.4 16.1   HEMATOCRIT % 42.7 46.6 45.1   PLATELETS 10*3/mm3 112*  --  144                   Radiology results:    Imaging Results (Last 24 Hours)       ** No results found for the last 24 hours. **            I have reviewed the patient's radiology reports.    Medication Review:     I have reviewed the patient's active and prn medications.     Assessment:      Pubic ramus fracture  Fall  Hypertension  Coronary disease  GERD  Anxiety  Hyperlipidemia  BPH      Plan:    Patient has a nondisplaced intra-articular fracture of the anterior-inferior left acetabulum and root of the superior pubic ramus.  Conservative treatment has been planned as per Dr. Gaytan and recommends a weightbearing as tolerated and he can use a walker and participate in physical therapy.    He would benefit from inpatient physical therapy and unable to go home as per the physical therapy evaluation and social service consult to be done accordingly  Pain control as per orders    Goals of treatment plan of care has been addressed with the patient and agreeable with the above    Denilson Aguilar MD  03/09/25  15:11 EDT      Please note that portions of this note were completed with a voice recognition program.

## 2025-03-09 NOTE — THERAPY TREATMENT NOTE
Patient Name: Chris Mejia  : 1960    MRN: [id number]                              Today's Date: 3/9/2025       Admit Date: 3/7/2025    Visit Dx:     ICD-10-CM ICD-9-CM   1. Closed fracture of left inferior pubic ramus, initial encounter  S32.592A 808.2   2. Closed fracture of superior ramus of left pubis, initial encounter  S32.512A 808.2     Patient Active Problem List   Diagnosis    Tubular adenoma of colon    Trigeminal neuralgia    Spinocerebellar ataxia    RA (rheumatoid arthritis)    Insomnia    Essential hypertension    Hypercholesteremia    Acid reflux    Anxiety    Vitamin B12 deficiency    Vitamin D deficiency    DENZEL (obstructive sleep apnea)    Atopic rhinitis    Ataxic gait    Shoulder pain    Ophthalmoplegia    CAD in native artery    Seasonal allergies    Benign non-nodular prostatic hyperplasia with lower urinary tract symptoms    Weakness    Tension headache    Limited response to serotonin reuptake inhibitors 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    HTR2A GG genotype    HLA-B*1502 allele negative    CYP2B6 intermediate metabolizer    Vertigo    Cerebellar atrophy    Chest pain    SOB (shortness of breath)    Aortic root dilation    Dysphagia    Pubic ramus fracture     Past Medical History:   Diagnosis Date    Allergic rhinitis     Anxiety     Arthritis     Balance problem     Carpal tunnel syndrome     Cluster headache     Coronary artery disease     Developmental delay     Diabetes mellitus     Noted by PCP    Difficulty walking     Dizziness     Dysphagia     Enlarged prostate     GERD (gastroesophageal reflux disease)     Gout     Hip fracture, left     History of cardiovascular stress test 2018    positive for inferior and lateral ischemia     History of echocardiogram     Hyperlipidemia     Hypertension     Impaired mobility     Lymphadenitis     Memory loss     Migraine     Morbid obesity with BMI of 40.0-44.9, adult 08/10/2020    Associated with  hypertension, hyperlipidemia       Myocardial infarction 2000    Obesity     DENZEL (obstructive sleep apnea)     oxygen at night- 2LNC    Peptic ulcer     Peptic ulceration     Poor historian     Rheumatoid arthritis     Right shoulder pain     SOB (shortness of breath) on exertion     Spinocerebellar ataxia     Tension headache     Trigeminal neuralgia     Vitamin B 12 deficiency     Vitamin D deficiency      Past Surgical History:   Procedure Laterality Date    CARDIAC CATHETERIZATION      CARDIAC CATHETERIZATION Left 3/14/2018    Procedure: Cardiac Catheterization/Vascular Study;  Surgeon: Adam Perkins MD;  Location:  [facility];  Service: Cardiovascular    COLONOSCOPY      COLONOSCOPY N/A 9/13/2019    Procedure: COLONOSCOPY W/ COLD SNARE POLYPECTOMY;  Surgeon: Melba Lopez MD;  Location: [facility] ENDOSCOPY;  Service: Gastroenterology    COLONOSCOPY N/A 9/24/2024    Procedure: COLONOSCOPY;  Surgeon: Melba Lopez MD;  Location: [facility] ENDOSCOPY;  Service: Gastroenterology;  Laterality: N/A;    ELBOW ARTHROPLASTY Bilateral     ENDOSCOPY      ENDOSCOPY N/A 9/24/2024    Procedure: ESOPHAGOGASTRODUODENOSCOPY WITH BIOPSY;  Surgeon: Melba Lopez MD;  Location: [facility] ENDOSCOPY;  Service: Gastroenterology;  Laterality: N/A;    HERNIA REPAIR      x3    HIP HEMIARTHROPLASTY Left     NEUROPLASTY Bilateral     decompression median nerve at carpal tunnel    TOTAL KNEE ARTHROPLASTY Bilateral     2005, 2012      General Information       Row Name 03/09/25 1221          Physical Therapy Time and Intention    Document Type therapy note (daily note)  -TW     Mode of Treatment physical therapy;individual therapy  -TW       Row Name 03/09/25 1221          General Information    Patient Profile Reviewed yes  -TW     Existing Precautions/Restrictions fall  -TW     Barriers to Rehab previous functional deficit  -TW       Row Name 03/09/25 1221          Cognition    Orientation Status (Cognition) oriented  to;person;place;situation  -       Row Name 03/09/25 1221          Safety Issues/Impairments Affecting Functional Mobility    Safety Issues Affecting Function (Mobility) insight into deficits/self-awareness;judgment;positioning of assistive device;safety precaution awareness;safety precautions follow-through/compliance;sequencing abilities  -     Impairments Affecting Function (Mobility) pain;endurance/activity tolerance;balance;strength;postural/trunk control  -               User Key  (r) = Recorded By, (t) = Taken By, (c) = Cosigned By      Initials Name Provider Type    TW Janis Colindres, PT Physical Therapist                   Mobility       Row Name 03/09/25 1221          Bed Mobility    Bed Mobility supine-sit  -TW     Supine-Sit [place] (Bed Mobility) moderate assist (50% patient effort);verbal cues;nonverbal cues (demo/gesture)  -     Assistive Device (Bed Mobility) head of bed elevated;bed rails  -TW     Comment, (Bed Mobility) pt was able to sit on EOB with SBA only without loss of balance.  -       Row Name 03/09/25 1221          Bed-Chair Transfer    Bed-Chair [place] (Transfers) minimum assist (75% patient effort);verbal cues  -     Assistive Device (Bed-Chair Transfers) walker, front-wheeled  -TW     Comment, (Bed-Chair Transfer) Cues to fully turn and to reach back prior to sitting down.  -       Row Name 03/09/25 1221          Sit-Stand Transfer    Sit-Stand [place] (Transfers) minimum assist (75% patient effort);moderate assist (50% patient effort);verbal cues  -     Assistive Device (Sit-Stand Transfers) walker, front-wheeled  -TW     Comment, (Sit-Stand Transfer) On first sit to stand min assist needed. On second sit to stand, pt was more fatigued and needed mod assist for coming up from EOB.  -       Row Name 03/09/25 1221          Gait/Stairs (Locomotion)    [place] Level (Gait) minimum assist (75% patient effort);moderate assist (50% patient effort)  -      Assistive Device (Gait) walker, front-wheeled  -TW     Patient was able to Ambulate yes  -TW     Distance in Feet (Gait) 10  -TW     Deviations/Abnormal Patterns (Gait) festinating/shuffling;weight shifting decreased;antalgic;other (see comments)  -TW     Bilateral Gait Deviations forward flexed posture;heel strike decreased;weight shift ability decreased  -TW     Comment, (Gait/Stairs) pt had difficulty advancing BLE and pain in LLE pelvic area increasing to 8/10. Pt was noted to have unsteadiness in his trunk in static stand that increased with ambulation. Per pt's wife pf has had balance difficulties for awhile now.  -TW               User Key  (r) = Recorded By, (t) = Taken By, (c) = Cosigned By      Initials Name Provider Type    Janis Mohr PT Physical Therapist                   Obj/Interventions       Row Name 03/09/25 1221          Balance    Balance Assessment sitting static balance;sitting dynamic balance;standing static balance;standing dynamic balance  -TW     Static Sitting Balance standby assist  -TW     Dynamic Sitting Balance standby assist  -TW     Position, Sitting Balance sitting edge of bed  -TW     Static Standing Balance minimal assist;contact guard  -TW     Dynamic Standing Balance minimal assist;moderate assist  -TW     Position/Device Used, Standing Balance supported;walker, rolling  -TW               User Key  (r) = Recorded By, (t) = Taken By, (c) = Cosigned By      Initials Name Provider Type    Janis Mohr PT Physical Therapist                   Goals/Plan    No documentation.                  Clinical Impression       Row Name 03/09/25 1221          Pain    Pretreatment Pain Rating 2/10  -TW     Posttreatment Pain Rating 4/10  -TW     Pain Location other (see comments)  pelvic  -TW     Pain Side/Orientation left  -TW     Pain Management Interventions other (see comments)  -TW     Pre/Posttreatment Pain Comment Pain in standing had L pelvic pain at 8/10. Once in chair  this pain did decrease to 4/10  -TW       Row Name 03/09/25 1221          Plan of Care Review    Plan of Care Reviewed With patient;spouse  -TW     Outcome Evaluation PT treatment completed this date with pt presenting supine in bed with pain in L pelvic area rated at 2/10. Pt needed mod assist to come to sit on L side of the bed with HOB raised. Pt was able to sit on EOB with SBA. Sit to stand wtih FWW min assist and pt amb 10 ft with difficulty wt shifting onto the LLE due to pain increasing to 8/10. Very short shuffle steps with pt having decreased control of hips/balance during gait needing min to mod assist for safe ambulation. Pt had to sit post 10 ft due to fatigue and increased pain. Post resting, Mod assist for sit to stand and min assist to pivot to bedside chair. Once positioned in chair pt reated L pelvic pain 4/10. Pt's O2 sat on room air post gait 92%. After seeing how difficult it was for ambulation pt and his wife open to discussing STR upon d/c. Cont current PT POC.  -TW       Row Name 03/09/25 1221          Vital Signs    Pre SpO2 (%) 93  -TW     O2 Delivery Pre Treatment supplemental O2  -TW     Post SpO2 (%) 92  -TW     O2 Delivery Post Treatment room air  -TW     Pre Patient Position Supine  -TW     Intra Patient Position Standing  -TW     Post Patient Position Sitting  -TW       Row Name 03/09/25 1221          Positioning and Restraints    Pre-Treatment Position in bed  -TW     Post Treatment Position chair  -TW     In Chair notified nsg;reclined;call light within reach;encouraged to call for assist;with family/caregiver  -TW               User Key  (r) = Recorded By, (t) = Taken By, (c) = Cosigned By      Initials Name Provider Type    TW Janis Colindres PT Physical Therapist                   Outcome Measures       Row Name 03/09/25 1221 03/09/25 0800       How much help from another person do you currently need...    Turning from your back to your side while in flat bed without using  bedrails? 3  -TW 3  -LA    Moving from lying on back to sitting on the side of a flat bed without bedrails? 2  -TW 3  -LA    Moving to and from a bed to a chair (including a wheelchair)? 3  -TW 3  -LA    Standing up from a chair using your arms (e.g., wheelchair, bedside chair)? 3  -TW 3  -LA    Climbing 3-5 steps with a railing? 1  -TW 2  -LA    To walk in hospital room? 2  -TW 2  -LA    AM-PAC 6 Clicks Score (PT) 14  -TW 16  -LA    Highest Level of Mobility Goal 4 --> Transfer to chair/commode  -TW 5 --> Static standing  -LA      Row Name 03/09/25 1221          Functional Assessment    Outcome Measure Options AM-PAC 6 Clicks Basic Mobility (PT)  -TW               User Key  (r) = Recorded By, (t) = Taken By, (c) = Cosigned By      Initials Name Provider Type    Janis Mohr, PT Physical Therapist    Hayley Gilbert RN Registered Nurse                                 Physical Therapy Education       Title: PT OT SLP Therapies (In Progress)       Topic: Physical Therapy (In Progress)       Point: Mobility training (Done)       Learning Progress Summary            Patient Acceptance, E,D, VU,DU by  at 3/9/2025 1221    Comment: Sequencing with FWW for ambulation discussed with pt prior to ambulation and safety cues for sequencing during gait.    Acceptance, E,TB, VU by  at 3/8/2025 1328    Comment: Role of PT and POC.  Proper use of RW and sequencing for stairs   Family Acceptance, E,TB, VU by  at 3/8/2025 1328    Comment: Role of PT and POC.  Proper use of RW and sequencing for stairs                      Point: Home exercise program (Not Started)       Learner Progress:  Not documented in this visit.              Point: Body mechanics (Not Started)       Learner Progress:  Not documented in this visit.              Point: Precautions (Not Started)       Learner Progress:  Not documented in this visit.                              User Key       Initials Effective Dates Name Provider Type Yousif GUERRERO  08/22/23 -  Mechelle Mcfarland PT Physical Therapist PT    TW 06/16/21 -  Janis Colindres PT Physical Therapist PT                  PT Recommendation and Plan     Outcome Evaluation: PT treatment completed this date with pt presenting supine in bed with pain in L pelvic area rated at 2/10. Pt needed mod assist to come to sit on L side of the bed with HOB raised. Pt was able to sit on EOB with SBA. Sit to stand wtih FWW min assist and pt amb 10 ft with difficulty wt shifting onto the LLE due to pain increasing to 8/10. Very short shuffle steps with pt having decreased control of hips/balance during gait needing min to mod assist for safe ambulation. Pt had to sit post 10 ft due to fatigue and increased pain. Post resting, Mod assist for sit to stand and min assist to pivot to bedside chair. Once positioned in chair pt reated L pelvic pain 4/10. Pt's O2 sat on room air post gait 92%. After seeing how difficult it was for ambulation pt and his wife open to discussing STR upon d/c. Cont current PT POC.     Time Calculation:         PT Charges       Row Name 03/09/25 1221             Time Calculation    Start Time 1152  -TW      Stop Time 1221  -TW      Time Calculation (min) 29 min  -TW      PT Received On 03/09/25  -TW         Timed Charges    11145 - Gait Training Minutes  14  -TW      63988 - PT Therapeutic Activity Minutes 15  -TW         Total Minutes    Timed Charges Total Minutes 29  -TW       Total Minutes 29  -TW                User Key  (r) = Recorded By, (t) = Taken By, (c) = Cosigned By      Initials Name Provider Type    TW Janis Colindres PT Physical Therapist                  Therapy Charges for Today       Code Description Service Date Service Provider Modifiers Qty    27000255570 HC GAIT TRAINING EA 15 MIN 3/9/2025 Janis Colindres, PT GP 1    18043085517 HC PT THERAPEUTIC ACT EA 15 MIN 3/9/2025 Janis Colindres PT GP 1            PT G-Codes  Outcome Measure Options: AM-PAC 6 Clicks Basic Mobility (PT)  AM-PAC 6  Clicks Score (PT): 14  PT Discharge Summary  Anticipated Discharge Disposition (PT): inpatient rehabilitation facility    Janis Colindres, PT  3/9/2025

## 2025-03-09 NOTE — PLAN OF CARE
Problem: Fall Injury Risk  Goal: Absence of Fall and Fall-Related Injury  Intervention: Identify and Manage Contributors  Recent Flowsheet Documentation  Taken 3/9/2025 0800 by Hayley Maharaj RN  Self-Care Promotion:   independence encouraged   BADL personal objects within reach  Intervention: Promote Injury-Free Environment  Recent Flowsheet Documentation  Taken 3/9/2025 1600 by Hayley Maharaj RN  Safety Promotion/Fall Prevention:   activity supervised   assistive device/personal items within reach   clutter free environment maintained   fall prevention program maintained   room organization consistent   safety round/check completed  Taken 3/9/2025 1400 by Hayley Maharaj RN  Safety Promotion/Fall Prevention:   activity supervised   assistive device/personal items within reach   clutter free environment maintained   fall prevention program maintained   room organization consistent   safety round/check completed  Taken 3/9/2025 1200 by Hayley Maharaj RN  Safety Promotion/Fall Prevention:   activity supervised   assistive device/personal items within reach   clutter free environment maintained   fall prevention program maintained   room organization consistent   safety round/check completed  Taken 3/9/2025 1000 by Hayley Maharaj RN  Safety Promotion/Fall Prevention:   activity supervised   assistive device/personal items within reach   clutter free environment maintained   fall prevention program maintained   room organization consistent   safety round/check completed  Taken 3/9/2025 0800 by Hayley Maharaj RN  Safety Promotion/Fall Prevention:   activity supervised   assistive device/personal items within reach   clutter free environment maintained   fall prevention program maintained   room organization consistent   safety round/check completed     Problem: Adult Inpatient Plan of Care  Goal: Absence of Hospital-Acquired Illness or Injury  Intervention: Identify and Manage Fall Risk  Recent Flowsheet Documentation  Taken  3/9/2025 1600 by Hayley Maharaj RN  Safety Promotion/Fall Prevention:   activity supervised   assistive device/personal items within reach   clutter free environment maintained   fall prevention program maintained   room organization consistent   safety round/check completed  Taken 3/9/2025 1400 by Hayley Maharaj RN  Safety Promotion/Fall Prevention:   activity supervised   assistive device/personal items within reach   clutter free environment maintained   fall prevention program maintained   room organization consistent   safety round/check completed  Taken 3/9/2025 1200 by Hayley Maharaj RN  Safety Promotion/Fall Prevention:   activity supervised   assistive device/personal items within reach   clutter free environment maintained   fall prevention program maintained   room organization consistent   safety round/check completed  Taken 3/9/2025 1000 by Hayley Maharaj RN  Safety Promotion/Fall Prevention:   activity supervised   assistive device/personal items within reach   clutter free environment maintained   fall prevention program maintained   room organization consistent   safety round/check completed  Taken 3/9/2025 0800 by Hayley Maharaj RN  Safety Promotion/Fall Prevention:   activity supervised   assistive device/personal items within reach   clutter free environment maintained   fall prevention program maintained   room organization consistent   safety round/check completed  Intervention: Prevent Skin Injury  Recent Flowsheet Documentation  Taken 3/9/2025 1600 by Hayley Maharaj RN  Body Position:   sitting up in bed   neutral head position   neutral body alignment  Taken 3/9/2025 1000 by Hayley Maharaj RN  Body Position:   sitting up in bed   tilted   right  Taken 3/9/2025 0800 by Hayley Maharaj RN  Body Position:   sitting up in bed   neutral head position   neutral body alignment  Skin Protection:   incontinence pads utilized   pulse oximeter probe site changed   skin sealant/moisture barrier  applied  Intervention: Prevent Infection  Recent Flowsheet Documentation  Taken 3/9/2025 1600 by Hayley Maharaj RN  Infection Prevention:   environmental surveillance performed   single patient room provided  Taken 3/9/2025 1400 by Hayley Maharaj RN  Infection Prevention:   environmental surveillance performed   single patient room provided  Taken 3/9/2025 1200 by Hayley Maharaj RN  Infection Prevention:   environmental surveillance performed   single patient room provided  Taken 3/9/2025 1000 by Hayley Maharaj RN  Infection Prevention:   environmental surveillance performed   single patient room provided  Taken 3/9/2025 0800 by Hayley Maharaj RN  Infection Prevention:   environmental surveillance performed   single patient room provided  Goal: Optimal Comfort and Wellbeing  Intervention: Monitor Pain and Promote Comfort  Recent Flowsheet Documentation  Taken 3/9/2025 0811 by Hayley Maharaj RN  Pain Management Interventions:   pain medication given   pillow support provided   position adjusted   care clustered  Intervention: Provide Person-Centered Care  Recent Flowsheet Documentation  Taken 3/9/2025 0800 by Hayley Maharaj RN  Trust Relationship/Rapport:   care explained   thoughts/feelings acknowledged   reassurance provided   questions encouraged   questions answered     Problem: Comorbidity Management  Goal: Maintenance of Osteoarthritis Symptom Control  Intervention: Maintain Osteoarthritis Symptom Control  Recent Flowsheet Documentation  Taken 3/9/2025 1600 by Hayley Maharaj RN  Activity Management: activity encouraged  Assistive Device Utilized:   gait belt   walker  Taken 3/9/2025 1400 by Hayley Maharaj RN  Activity Management:   activity encouraged   up in chair  Taken 3/9/2025 1200 by Hayley Maharaj RN  Activity Management:   activity encouraged   up in chair  Taken 3/9/2025 1000 by Hayley Maharaj RN  Activity Management: activity encouraged  Taken 3/9/2025 0800 by Hayley Maharaj RN  Activity Management: activity  encouraged     Problem: Skin Injury Risk Increased  Goal: Skin Health and Integrity  Intervention: Optimize Skin Protection  Recent Flowsheet Documentation  Taken 3/9/2025 1600 by Hayley Maharaj RN  Activity Management: activity encouraged  Head of Bed (HOB) Positioning: HOB at 60-90 degrees  Taken 3/9/2025 1400 by Hayley Maharaj RN  Activity Management:   activity encouraged   up in chair  Taken 3/9/2025 1200 by Hayley Maharaj RN  Activity Management:   activity encouraged   up in chair  Taken 3/9/2025 1000 by Hayley Maharaj RN  Activity Management: activity encouraged  Head of Bed (HOB) Positioning: HOB at 30-45 degrees  Taken 3/9/2025 0800 by Hayley Maharaj RN  Activity Management: activity encouraged  Pressure Reduction Techniques:   frequent weight shift encouraged   weight shift assistance provided  Head of Bed (HOB) Positioning: HOB at 60-90 degrees  Pressure Reduction Devices: positioning supports utilized  Skin Protection:   incontinence pads utilized   pulse oximeter probe site changed   skin sealant/moisture barrier applied   Goal Outcome Evaluation:           Progress: improving  Outcome Evaluation: Patient participated in therapy and sat up in recliner during shift. Some complaints of pain this morning.

## 2025-03-09 NOTE — PLAN OF CARE
Goal Outcome Evaluation:  Plan of Care Reviewed With: patient, spouse           Outcome Evaluation: PT treatment completed this date with pt presenting supine in bed with pain in L pelvic area rated at 2/10. Pt needed mod assist to come to sit on L side of the bed with HOB raised. Pt was able to sit on EOB with SBA. Sit to stand wtih FWW min assist and pt amb 10 ft with difficulty wt shifting onto the LLE due to pain increasing to 8/10. Very short shuffle steps with pt having decreased control of hips/balance during gait needing min to mod assist for safe ambulation. Pt had to sit post 10 ft due to fatigue and increased pain. Post resting, Mod assist for sit to stand and min assist to pivot to bedside chair. Once positioned in chair pt reated L pelvic pain 4/10. Pt's O2 sat on room air post gait 92%. After seeing how difficult it was for ambulation pt and his wife open to discussing STR upon d/c. Cont current PT POC.    Anticipated Discharge Disposition (PT): inpatient rehabilitation facility

## 2025-03-10 ENCOUNTER — TELEPHONE (OUTPATIENT)
Dept: INTERNAL MEDICINE | Facility: CLINIC | Age: 65
End: 2025-03-10
Payer: MEDICARE

## 2025-03-10 PROCEDURE — 96372 THER/PROPH/DIAG INJ SC/IM: CPT

## 2025-03-10 PROCEDURE — 99232 SBSQ HOSP IP/OBS MODERATE 35: CPT | Performed by: INTERNAL MEDICINE

## 2025-03-10 PROCEDURE — 97110 THERAPEUTIC EXERCISES: CPT

## 2025-03-10 PROCEDURE — 96376 TX/PRO/DX INJ SAME DRUG ADON: CPT

## 2025-03-10 PROCEDURE — 97116 GAIT TRAINING THERAPY: CPT

## 2025-03-10 PROCEDURE — 25010000002 MORPHINE PER 10 MG: Performed by: STUDENT IN AN ORGANIZED HEALTH CARE EDUCATION/TRAINING PROGRAM

## 2025-03-10 PROCEDURE — G0378 HOSPITAL OBSERVATION PER HR: HCPCS

## 2025-03-10 PROCEDURE — 97166 OT EVAL MOD COMPLEX 45 MIN: CPT

## 2025-03-10 PROCEDURE — 25010000002 ENOXAPARIN PER 10 MG: Performed by: INTERNAL MEDICINE

## 2025-03-10 RX ORDER — BUSPIRONE HYDROCHLORIDE 15 MG/1
15 TABLET ORAL 2 TIMES DAILY PRN
Status: DISCONTINUED | OUTPATIENT
Start: 2025-03-10 | End: 2025-03-11 | Stop reason: HOSPADM

## 2025-03-10 RX ORDER — ENOXAPARIN SODIUM 100 MG/ML
60 INJECTION SUBCUTANEOUS EVERY 12 HOURS
Status: DISCONTINUED | OUTPATIENT
Start: 2025-03-10 | End: 2025-03-11 | Stop reason: HOSPADM

## 2025-03-10 RX ORDER — HYDROCODONE BITARTRATE AND ACETAMINOPHEN 7.5; 325 MG/1; MG/1
1 TABLET ORAL EVERY 6 HOURS PRN
Refills: 0 | Status: DISCONTINUED | OUTPATIENT
Start: 2025-03-10 | End: 2025-03-11 | Stop reason: HOSPADM

## 2025-03-10 RX ORDER — PRAMIPEXOLE DIHYDROCHLORIDE 0.25 MG/1
0.12 TABLET ORAL 3 TIMES DAILY
Status: DISCONTINUED | OUTPATIENT
Start: 2025-03-10 | End: 2025-03-11 | Stop reason: HOSPADM

## 2025-03-10 RX ADMIN — PRAMIPEXOLE DIHYDROCHLORIDE 0.12 MG: 0.25 TABLET ORAL at 21:40

## 2025-03-10 RX ADMIN — AMLODIPINE BESYLATE 10 MG: 5 TABLET ORAL at 09:04

## 2025-03-10 RX ADMIN — CARVEDILOL 25 MG: 25 TABLET, FILM COATED ORAL at 17:38

## 2025-03-10 RX ADMIN — TIZANIDINE 4 MG: 4 TABLET ORAL at 21:41

## 2025-03-10 RX ADMIN — CARVEDILOL 25 MG: 25 TABLET, FILM COATED ORAL at 09:04

## 2025-03-10 RX ADMIN — Medication 10 ML: at 09:05

## 2025-03-10 RX ADMIN — PANTOPRAZOLE SODIUM 40 MG: 40 TABLET, DELAYED RELEASE ORAL at 09:05

## 2025-03-10 RX ADMIN — MORPHINE SULFATE 2 MG: 2 INJECTION, SOLUTION INTRAMUSCULAR; INTRAVENOUS at 10:54

## 2025-03-10 RX ADMIN — TAMSULOSIN HYDROCHLORIDE 0.4 MG: 0.4 CAPSULE ORAL at 09:05

## 2025-03-10 RX ADMIN — SENNOSIDES AND DOCUSATE SODIUM 1 TABLET: 50; 8.6 TABLET ORAL at 06:11

## 2025-03-10 RX ADMIN — GUAIFENESIN 600 MG: 600 TABLET, EXTENDED RELEASE ORAL at 09:05

## 2025-03-10 RX ADMIN — GUAIFENESIN 600 MG: 600 TABLET, EXTENDED RELEASE ORAL at 21:40

## 2025-03-10 RX ADMIN — HYDROCHLOROTHIAZIDE 25 MG: 25 TABLET ORAL at 09:05

## 2025-03-10 RX ADMIN — ISOSORBIDE MONONITRATE 30 MG: 30 TABLET, EXTENDED RELEASE ORAL at 09:05

## 2025-03-10 RX ADMIN — PRAMIPEXOLE DIHYDROCHLORIDE 0.12 MG: 0.25 TABLET ORAL at 15:08

## 2025-03-10 RX ADMIN — ENOXAPARIN SODIUM 60 MG: 60 INJECTION SUBCUTANEOUS at 17:38

## 2025-03-10 RX ADMIN — Medication 10 ML: at 21:41

## 2025-03-10 RX ADMIN — PRAVASTATIN SODIUM 40 MG: 20 TABLET ORAL at 21:41

## 2025-03-10 RX ADMIN — ASPIRIN 81 MG: 81 TABLET, COATED ORAL at 09:05

## 2025-03-10 RX ADMIN — MORPHINE SULFATE 2 MG: 2 INJECTION, SOLUTION INTRAMUSCULAR; INTRAVENOUS at 00:28

## 2025-03-10 RX ADMIN — HYDROCODONE BITARTRATE AND ACETAMINOPHEN 1 TABLET: 7.5; 325 TABLET ORAL at 21:41

## 2025-03-10 NOTE — PROGRESS NOTES
"Pharmacy Consult - Enoxaparin Dosing  Chris Mejia is a 65 y.o. male who has been consulted to dose Enoxaparin for VTE PPX.     Allergies  Oxycodone and Topamax [topiramate]    Relevant clinical data and objective history reviewed:   [Ht: 147.3 cm (58\"); Wt: 116 kg (255 lb 8.2 oz)]  Body mass index is 53.4 kg/m².  Estimated Creatinine Clearance: 90.1 mL/min (by C-G formula based on SCr of 0.86 mg/dL).  Results from last 7 days   Lab Units 03/09/25  0608 03/08/25  0447 03/08/25  0018   HEMOGLOBIN g/dL 15.3 16.4 16.1   HEMATOCRIT % 42.7 46.6 45.1   PLATELETS 10*3/mm3 112*  --  144   CREATININE mg/dL 0.86 0.98 1.03       Asessment/Plan  Initiate Enoxaparin 60mg SQ every 12 hours  Pharmacy will monitor Mr. Mejia's renal function and clinical status and adjust the Enoxaparin dose and/or frequency as needed.    Thanks,   Diana Lazo, PharmD  3/10/2025  15:11 EDT      "

## 2025-03-10 NOTE — TELEPHONE ENCOUNTER
[facility] HAS CALLED TO LET PCP KNOW THAT PATIENT HAD A FALL ON 03/07/25. PATIENT IS IN HOSPITAL WITH A BROKEN PELVIS IN TWO PLACES. PATIENT IS REQUESTING TO GO TO [facility]. IF PATIENT CAN NOT GET INTO [facility] HE WILL BE GOING TO [facility].    CALL BACK NUMBER [phone]

## 2025-03-10 NOTE — THERAPY EVALUATION
Patient Name: Chris Mejia  : 1960    MRN: [id number]                              Today's Date: 3/10/2025       Admit Date: 3/7/2025    Visit Dx:     ICD-10-CM ICD-9-CM   1. Closed fracture of left inferior pubic ramus, initial encounter  S32.592A 808.2   2. Closed fracture of superior ramus of left pubis, initial encounter  S32.512A 808.2     Patient Active Problem List   Diagnosis    Tubular adenoma of colon    Trigeminal neuralgia    Spinocerebellar ataxia    RA (rheumatoid arthritis)    Insomnia    Essential hypertension    Hypercholesteremia    Acid reflux    Anxiety    Vitamin B12 deficiency    Vitamin D deficiency    DENZEL (obstructive sleep apnea)    Atopic rhinitis    Ataxic gait    Shoulder pain    Ophthalmoplegia    CAD in native artery    Seasonal allergies    Benign non-nodular prostatic hyperplasia with lower urinary tract symptoms    Weakness    Tension headache    Limited response to serotonin reuptake inhibitors 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    HTR2A GG genotype    HLA-B*1502 allele negative    CYP2B6 intermediate metabolizer    Vertigo    Cerebellar atrophy    Chest pain    SOB (shortness of breath)    Aortic root dilation    Dysphagia    Pubic ramus fracture     Past Medical History:   Diagnosis Date    Allergic rhinitis     Anxiety     Arthritis     Balance problem     Carpal tunnel syndrome     Cluster headache     Coronary artery disease     Developmental delay     Diabetes mellitus     Noted by PCP    Difficulty walking     Dizziness     Dysphagia     Enlarged prostate     GERD (gastroesophageal reflux disease)     Gout     Hip fracture, left     History of cardiovascular stress test 2018    positive for inferior and lateral ischemia     History of echocardiogram     Hyperlipidemia     Hypertension     Impaired mobility     Lymphadenitis     Memory loss     Migraine     Morbid obesity with BMI of 40.0-44.9, adult 08/10/2020    Associated with  hypertension, hyperlipidemia       Myocardial infarction 2000    Obesity     DENZEL (obstructive sleep apnea)     oxygen at night- 2LNC    Peptic ulcer     Peptic ulceration     Poor historian     Rheumatoid arthritis     Right shoulder pain     SOB (shortness of breath) on exertion     Spinocerebellar ataxia     Tension headache     Trigeminal neuralgia     Vitamin B 12 deficiency     Vitamin D deficiency      Past Surgical History:   Procedure Laterality Date    CARDIAC CATHETERIZATION      CARDIAC CATHETERIZATION Left 3/14/2018    Procedure: Cardiac Catheterization/Vascular Study;  Surgeon: Adam Perkins MD;  Location:  [facility];  Service: Cardiovascular    COLONOSCOPY      COLONOSCOPY N/A 9/13/2019    Procedure: COLONOSCOPY W/ COLD SNARE POLYPECTOMY;  Surgeon: Melba Lopez MD;  Location: [facility] ENDOSCOPY;  Service: Gastroenterology    COLONOSCOPY N/A 9/24/2024    Procedure: COLONOSCOPY;  Surgeon: Melba Lopez MD;  Location: [facility] ENDOSCOPY;  Service: Gastroenterology;  Laterality: N/A;    ELBOW ARTHROPLASTY Bilateral     ENDOSCOPY      ENDOSCOPY N/A 9/24/2024    Procedure: ESOPHAGOGASTRODUODENOSCOPY WITH BIOPSY;  Surgeon: Melba Lopez MD;  Location: [facility] ENDOSCOPY;  Service: Gastroenterology;  Laterality: N/A;    HERNIA REPAIR      x3    HIP HEMIARTHROPLASTY Left     NEUROPLASTY Bilateral     decompression median nerve at carpal tunnel    TOTAL KNEE ARTHROPLASTY Bilateral     2005, 2012      General Information       Row Name 03/10/25 1500          OT Time and Intention    Subjective Information complains of;pain  -AH     Document Type evaluation  -     Mode of Treatment occupational therapy  -     Patient Effort good  -     Symptoms Noted During/After Treatment increased pain  -AH       Row Name 03/10/25 6912          General Information    Patient Profile Reviewed yes  -AH     Prior Level of Function independent:;ADL's;community mobility  reports he uses a cane normally   -     Existing Precautions/Restrictions fall  -     Barriers to Rehab previous functional deficit  -[facility] Name 03/10/25 1335          Occupational Profile    Reason for Services/Referral (Occupational Profile) ADL decline  -[facility] Name 03/10/25 1335          Living Environment    Current Living Arrangements home  -     People in Home spouse  -[facility] Name 03/10/25 1335          Home Main Entrance    Number of Stairs, Main Entrance three  -     Stair Railings, Main Entrance railing on right side (ascending)  -[facility] Name 03/10/25 1335          Stairs Within Home, Primary    Number of Stairs, Within Home, Primary none  -[facility] Name 03/10/25 1335          Cognition    Orientation Status (Cognition) oriented x 4  -[facility] Name 03/10/25 1335          Safety Issues/Impairments Affecting Functional Mobility    Safety Issues Affecting Function (Mobility) safety precaution awareness;safety precautions follow-through/compliance;insight into deficits/self-awareness;awareness of need for assistance  -     Impairments Affecting Function (Mobility) pain;endurance/activity tolerance;balance;strength  -               User Key  (r) = Recorded By, (t) = Taken By, (c) = Cosigned By      Initials Name Provider Type     Maria Esther Rodríguez Occupational Therapist                     Mobility/ADL's       [facility] Name 03/10/25 1336          Bed Mobility    Bed Mobility supine-sit  -     Supine-Sit [place] (Bed Mobility) minimum assist (75% patient effort);verbal cues  -     Assistive Device (Bed Mobility) head of bed elevated;bed rails  -[facility] Name 03/10/25 1336          Sit-Stand Transfer    Sit-Stand [place] (Transfers) minimum assist (75% patient effort);verbal cues  -     Assistive Device (Sit-Stand Transfers) walker, front-wheeled  -[facility] Name 03/10/25 1336          Functional Mobility    Functional Mobility- Ind. Level minimum assist (75% patient effort)  -      Functional Mobility- Device walker, front-wheeled  -     Functional Mobility-Distance (Feet) 14  -     Patient was able to Ambulate yes  -[facility] Name 03/10/25 1336          Activities of Daily Living    BADL Assessment/Intervention bathing;upper body dressing;lower body dressing;grooming;feeding;toileting  -[facility] Name 03/10/25 1336          Mobility    Extremity Weight-bearing Status left lower extremity  -     Left Lower Extremity (Weight-bearing Status) weight-bearing as tolerated (WBAT)  -[facility] Name 03/10/25 1336          Bathing Assessment/Intervention    [place] Level (Bathing) minimum assist (75% patient effort)  -[facility] Name 03/10/25 1336          Upper Body Dressing Assessment/Training    [facility] Level (Upper Body Dressing) set up  -[facility] Name 03/10/25 1336          Lower Body Dressing Assessment/Training    [place] Level (Lower Body Dressing) maximum assist (25% patient effort)  -[facility] Name 03/10/25 1336          Grooming Assessment/Training    [facility] [facility] Level (Grooming) set up  -[facility] Name 03/10/25 1336          Self-Feeding Assessment/Training    [place] Level (Feeding) set up  -[facility] Name 03/10/25 1336          Toileting Assessment/Training    [facility] Level (Toileting) minimum assist (75% patient effort)  -               User Key  (r) = Recorded By, (t) = Taken By, (c) = Cosigned By      Initials Name Provider Type    Maria Esther Chun Occupational Therapist                   Obj/Interventions       [facility] Name 03/10/25 1336          Vision Assessment/Intervention    Visual Impairment/Limitations WFL;corrective lenses full-time  -[facility] Name 03/10/25 1333          Range of Motion Comprehensive    General Range of Motion bilateral upper extremity ROM WFL  -     Comment, General Range of Motion pt has some limited shoulder ROM and has some arthritic changes in his L hand  -[facility] Name 03/10/25 1336           Strength Comprehensive (MMT)    Comment, General Manual Muscle Testing [facility]MMT) [facility]  -               User Key  (r) = Recorded By, (t) = Taken By, (c) = Cosigned By      Initials Name Provider Type    Maria Esther Chun Occupational Therapist                   Goals/Plan       Row Name 03/10/25 1401          Bed Mobility Goal 1 (OT)    Activity/Assistive Device (Bed Mobility Goal 1, OT) bed mobility activities, all  -     [place] Level/Cues Needed (Bed Mobility Goal 1, OT) supervision required  -AH     Time Frame (Bed Mobility Goal 1, OT) by discharge  -     Progress/Outcomes (Bed Mobility Goal 1, OT) goal ongoing  -       Row Name 03/10/25 1401          Transfer Goal 1 (OT)    Activity/Assistive Device (Transfer Goal 1, OT) sit-to-stand/stand-to-sit;walker, rolling  -     [place] Level/Cues Needed (Transfer Goal 1, OT) contact guard required  -     Time Frame (Transfer Goal 1, OT) by discharge  -     Progress/Outcome (Transfer Goal 1, OT) goal ongoing  -       Row Name 03/10/25 1401          Bathing Goal 1 (OT)    Activity/Device (Bathing Goal 1, OT) bathing skills, all  -     [place] Level/Cues Needed (Bathing Goal 1, OT) minimum assist (75% or more patient effort)  -     Time Frame (Bathing Goal 1, OT) long term goal (LTG);1 week  -     Progress/Outcomes (Bathing Goal 1, OT) goal ongoing  -       Row Name 03/10/25 1401          Dressing Goal 1 (OT)    Activity/Device (Dressing Goal 1, OT) lower body dressing  -     [place]/Cues Needed (Dressing Goal 1, OT) minimum assist (75% or more patient effort)  -     Time Frame (Dressing Goal 1, OT) long term goal (LTG);1 week  -     Progress/Outcome (Dressing Goal 1, OT) goal ongoing  -       Row Name 03/10/25 1401          Toileting Goal 1 (OT)    Activity/Device (Toileting Goal 1, OT) adjust/manage clothing;perform perineal hygiene  -     [place] Level/Cues Needed (Toileting Goal 1, OT) supervision  required  -     Time Frame (Toileting Goal 1, OT) by discharge  -     Progress/Outcome (Toileting Goal 1, OT) goal ongoing  -       Row Name 03/10/25 140          Strength Goal 1 (OT)    Strength Goal 1 (OT) Pt will perform UB strengthening ex using theraband for resistance.  -     Time Frame (Strength Goal 1, OT) by discharge  -     Progress/Outcome (Strength Goal 1, OT) goal ongoing  -       Row Name 03/10/25 1408          Therapy Assessment/Plan (OT)    Planned Therapy Interventions (OT) activity tolerance training;BADL retraining;patient/caregiver education/training;transfer/mobility retraining;strengthening exercise  -               User Key  (r) = Recorded By, (t) = Taken By, (c) = Cosigned By      Initials Name Provider Type     Maria Esther Rodríguez Occupational Therapist                   Clinical Impression       Row Name 03/10/25 3065          Pain Assessment    Pretreatment Pain Rating 2/10  -     Posttreatment Pain Rating 3/10  -     Pain Location other (see comments)  pelvis  -     Pain Side/Orientation left  -     Pain Management Interventions activity modification encouraged  -     Response to Pain Interventions activity participation with tolerable pain;activity participation with increased pain  -       Row Name 03/10/25 9585          Plan of Care Review    Plan of Care Reviewed With patient  -     Progress no change  -     Outcome Evaluation Pt seen for OT evaluation today.  Pt lives at home with his wife and is normally independent with self care tasks, he uses a cane for mobility tasks.  Pt received supine in bed and was min assist to sit eob, min assist to stand and min assist to walk 14' with RW.  Pt is expected to benefit from skilled OT to improve his independence with ADL tasks.  Pt wants to d/c to rehab upon d/c from the hospital.  -       Row Name 03/10/25 6636          Therapy Assessment/Plan (OT)    Patient/Family Therapy Goal Statement (OT) d/c rehab  -      Rehab Potential (OT) good  -     Criteria for Skilled Therapeutic Interventions Met (OT) yes;skilled treatment is necessary  -     Therapy Frequency (OT) 3 times/wk  -       Row Name 03/10/25 1355          Therapy Plan Review/Discharge Plan (OT)    Anticipated Discharge Disposition (OT) sub acute care setting  -       Row Name 03/10/25 1354          Positioning and Restraints    Pre-Treatment Position in bed  -     Post Treatment Position chair  -     In Chair sitting;call light within reach;encouraged to call for assist;notified [facility]  -               User Key  (r) = Recorded By, (t) = Taken By, (c) = Cosigned By      Initials Name Provider Type     Maria Esther Rodríguez Occupational Therapist                   Outcome Measures       Row Name 03/10/25 1402          How much help from another is currently needed...    Putting on and taking off regular lower body clothing? 2  -AH     Bathing (including washing, rinsing, and drying) 2  -AH     Toileting (which includes using toilet bed pan or urinal) 3  -AH     Putting on and taking off regular upper body clothing 4  -AH     Taking care of personal grooming (such as brushing teeth) 3  -AH     Eating meals 4  -AH     AM-PAC 6 Clicks Score (OT) 18  -       Row Name 03/10/25 1349 03/10/25 0500       How much help from another person do you currently need...    Turning from your back to your side while in flat bed without using bedrails? 3  -MS 3  -KP    Moving from lying on back to sitting on the side of a flat bed without bedrails? 3  -MS 2  -KP    Moving to and from a bed to a chair (including a wheelchair)? 3  -MS 3  -KP    Standing up from a chair using your arms (e.g., wheelchair, bedside chair)? 3  -MS 3  -KP    Climbing 3-5 steps with a railing? 2  -MS 1  -KP    To walk in hospital room? 3  -MS 2  -KP    AM-PAC 6 Clicks Score (PT) 17  -MS 14  -KP    Highest Level of Mobility Goal 5 --> Static standing  -MS 4 --> Transfer to chair/commode  -      [place]  Name 03/10/25 0300          How much help from another person do you currently need...    Turning from your back to your side while in flat bed without using bedrails? 3  -KP     Moving from lying on back to sitting on the side of a flat bed without bedrails? 2  -KP     Moving to and from a bed to a chair (including a wheelchair)? 3  -KP     Standing up from a chair using your arms (e.g., wheelchair, bedside chair)? 3  -KP     Climbing 3-5 steps with a railing? 1  -KP     To walk in hospital room? 2  -KP     AM-PAC 6 Clicks Score (PT) 14  -KP     Highest Level of Mobility Goal 4 --> Transfer to chair/commode  -KP       Row Name 03/10/25 1402          Functional Assessment    Outcome Measure Options AM-PAC 6 Clicks Daily Activity (OT)  -               User Key  (r) = Recorded By, (t) = Taken By, (c) = Cosigned By      Initials Name Provider Type     Maria Esther Rodríguez Occupational Therapist    Bandar Vera, PT Physical Therapist    Vanesa Zimmerman, RN Registered Nurse                    Occupational Therapy Education       Title: PT OT SLP Therapies (In Progress)       Topic: Occupational Therapy (In Progress)       Point: ADL training (Done)       Description:   Instruct learner(s) on proper safety adaptation and remediation techniques during self care or transfers.   Instruct in proper use of assistive devices.                  Learning Progress Summary            Patient Acceptance, E,TB, VU by  at 3/10/2025 1403    Comment: Role of OT/POC                      Point: Home exercise program (Not Started)       Description:   Instruct learner(s) on appropriate technique for monitoring, assisting and/or progressing therapeutic exercises/activities.                  Learner Progress:  Not documented in this visit.              Point: Precautions (Not Started)       Description:   Instruct learner(s) on prescribed precautions during self-care and functional transfers.                  Learner Progress:  Not  documented in this visit.              Point: Body mechanics (Not Started)       Description:   Instruct learner(s) on proper positioning and spine alignment during self-care, functional mobility activities and/or exercises.                  Learner Progress:  Not documented in this visit.                              User Key       Initials Effective Dates Name Provider Type Discipline     06/16/21 -  Maria Esther Rodríguez Occupational Therapist OT                  OT Recommendation and Plan  Planned Therapy Interventions (OT): activity tolerance training, BADL retraining, patient/caregiver education/training, transfer/mobility retraining, strengthening exercise  Therapy Frequency (OT): 3 times/wk  Plan of Care Review  Plan of Care Reviewed With: patient  Progress: no change  Outcome Evaluation: Pt seen for OT evaluation today.  Pt lives at home with his wife and is normally independent with self care tasks, he uses a cane for mobility tasks.  Pt received supine in bed and was min assist to sit eob, min assist to stand and min assist to walk 14' with RW.  Pt is expected to benefit from skilled OT to improve his independence with ADL tasks.  Pt wants to d/c to rehab upon d/c from the hospital.     Time Calculation:   Evaluation Complexity (OT)  Review Occupational Profile/Medical/Therapy History Complexity: expanded/moderate complexity  Assessment, Occupational Performance/Identification of Deficit Complexity: 3-5 performance deficits  Clinical Decision Making Complexity (OT): detailed assessment/moderate complexity  Overall Complexity of Evaluation (OT): moderate complexity     Time Calculation- OT       Row Name 03/10/25 1404 03/10/25 1359          Time Calculation- OT    OT Start Time 1105  - --     OT Received On 03/10/25  - --     OT Goal Re-Cert Due Date 03/20/25  - --        Timed Charges    20981 - Gait Training Minutes  -- 14  -MS        Untimed Charges    OT Eval/Re-eval Minutes 45  - --        Total  Minutes    Timed Charges Total Minutes -- 14  -MS     Untimed Charges Total Minutes 45  -AH --      Total Minutes 45  -AH 14  -MS               User Key  (r) = Recorded By, (t) = Taken By, (c) = Cosigned By      Initials Name Provider Type    Maria Esther Chun Occupational Therapist    Bandar Vera, PT Physical Therapist                  Therapy Charges for Today       Code Description Service Date Service Provider Modifiers Qty    16179659510 HC OT EVAL MOD COMPLEXITY 3 3/10/2025 Maria Esther Rodríguez GO 1                 Maria Esther Rodríguez  3/10/2025

## 2025-03-10 NOTE — PLAN OF CARE
Goal Outcome Evaluation:  Plan of Care Reviewed With: patient        Progress: improving  Outcome Evaluation: Pt participated in PT tx this date. Pt reports he is feeling better today. Pt was able to t/f to EOB with Raul. Pt was Raul for STS transfer with cues for hand placement. Pt was able to ambulate 14ft with Rwx and Raul. Pt reports he was dx with cerebellar atrophy which attributes to his difficulties with ambulation, and reports that he feels these symptoms are worse since the fall. Pt would benefit from skilled PT tx during this inpatient stay.    Anticipated Discharge Disposition (PT): inpatient rehabilitation facility

## 2025-03-10 NOTE — CASE MANAGEMENT/SOCIAL WORK
Discharge Planning Assessment   [place]     Patient Name: Chris Mejia  MRN: [id number]  Today's Date: 3/10/2025    Admit Date: 3/7/2025    Plan: STR   Discharge Needs Assessment       Row Name 03/10/25 1119       Living Environment    People in Home spouse    Current Living Arrangements home    Potentially Unsafe Housing Conditions none    In the past 12 months has the electric, gas, oil, or water company threatened to shut off services in your home? No    Primary Care Provided by self    Provides Primary Care For no one    Able to Return to Prior Arrangements yes       Resource/Environmental Concerns    Resource/Environmental Concerns none    Transportation Concerns none       Transportation Needs    In the past 12 months, has lack of transportation kept you from medical appointments or from getting medications? no    In the past 12 months, has lack of transportation kept you from meetings, work, or from getting things needed for daily living? No       Food Insecurity    Within the past 12 months, you worried that your food would run out before you got the money to buy more. Sometimes    Within the past 12 months, the food you bought just didn't last and you didn't have money to get more. Sometimes       Transition Planning    Patient/Family Anticipates Transition to home with family    Patient/Family Anticipated Services at Transition none    Transportation Anticipated family or friend will provide       Discharge Needs Assessment    Readmission Within the Last 30 Days no previous admission in last 30 days    Equipment Currently Used at Home walker, standard;cane, straight;wheelchair;rollator;oxygen    Concerns to be Addressed no discharge needs identified    Anticipated Changes Related to Illness none    Equipment Needed After Discharge none                   Discharge Plan       Row Name 03/10/25 3170       Plan    Plan STR    Patient/Family in Agreement with Plan yes    Plan Comments Met with patient  at bedside.Verified patient's address, phone number, contacts, physician and pharmacy. Patient has adequate transportation. Reports no financial or food insecurity.  Lives with his wife. HE has a walker, cane, wheelchair, rollator, and wears 2L of oxygen at night (from [employer]). He uses [employer] delivery. Wants referrals to [facility] choice and [facility]  #2 choice.                       Demographic Summary       Row Name 03/10/25 1110       General Information    Admission Type observation    Arrived From emergency department    Required Notices Provided Observation Status Notice    Referral Source admission list    Reason for Consult discharge planning    Preferred Language English       Contact Information    Permission Granted to Share Info With                    Functional Status       Row Name 03/10/25 1119       Functional Status    Usual Activity Tolerance moderate    Current Activity Tolerance moderate       Physical Activity    On average, how many days per week do you engage in moderate to strenuous exercise (like a brisk walk)? 0 days    On average, how many minutes do you engage in exercise at this level? 0 min    Number of minutes of exercise per week 0       Assessment of Health Literacy    How often do you have someone help you read hospital materials? Never    How often do you have problems learning about your medical condition because of difficulty understanding written information? Never    How often do you have a problem understanding what is told to you about your medical condition? Never    How confident are you filling out medical forms by yourself? Extremely    Health Literacy Excellent       Functional Status, IADL    Medications assistive equipment    Meal Preparation assistive equipment    Housekeeping assistive equipment    Laundry assistive equipment    Shopping assistive equipment                   Psychosocial       Row Name 03/10/25 111       Values/Beliefs     Spiritual, Cultural Beliefs, Judaism Practices, Values that Affect Care no       Mental Health    Little interest or pleasure in doing things Not at all    Feeling down, depressed, or hopeless Not at all       Stress    Do you feel stress - tense, restless, nervous, or anxious, or unable to sleep at night because your mind is troubled all the time - these days? Not at all       Coping/Stress    Major Change/Loss/Stressor illness    Patient Personal Strengths able to adapt;resilient    Techniques to [place] with Loss/Stress/Change diversional activities    Reaction to Health Status accepting    Understanding of Condition and Treatment adequate understanding of medical condition;adequate understanding of treatment       Developmental Stage (Eriksson's Stages of Development)    Developmental Stage Stage 8 (65 years-death/Late Adulthood) Integrity vs. Despair                   Abuse/Neglect    No documentation.                  Legal    No documentation.                  Substance Abuse    No documentation.                  Patient Forms    No documentation.                     Kirill Colin RN

## 2025-03-10 NOTE — PLAN OF CARE
Goal Outcome Evaluation:  Plan of Care Reviewed With: patient, spouse        Progress: improving  Outcome Evaluation: VSS- Pain effectively controlled with PRN medications. Still working with therapy and sat up in chair some of day. Possible D/C [facility] tomorrow.

## 2025-03-10 NOTE — CASE MANAGEMENT/SOCIAL WORK
Case Management/Social Work    Patient Name:  Chris Mejia  YOB: 1960  MRN: [id number]  Admit Date:  3/7/2025      Pt received bed offer from [facility].  Sw reached out to [facility] to see if they have any open male beds.  Currently awaiting a response from [facility].  CM notified.     15:19 EDT  [facility] states at this time they do not have any male beds available. Unsure of when one may become available.  Pt notified and agreed to bed offer from [facility].  Auth initiated and approved.  [facility] requests pt admit to the facility tomorrow.  Team updated.       Electronically signed by:  MARLY Pemberton  03/10/25 14:20 EDT

## 2025-03-10 NOTE — PROGRESS NOTES
[facility]    Name:  Chris Mejia   Age:  65 y.o.  Sex:  male  :  1960  MRN:  [id number]   Visit Number:  [id number]  Admission Date:  3/7/2025  Date Of Service:  03/10/25  Primary Care Physician:  Naty Lebron MD     LOS: 0 days :    Chief Complaint:      Pelvic pain.    Subjective:    Chris Mejia was seen and examined this morning.  He is currently sitting up on the chair and is feeling better.  He has been working with physical therapy and is currently awaiting rehab placement.  Denies any chest pain, shortness of breath or headache.    Hospital Course:    Chris Mejia is a 65-year-old male with a past medical history of hypertension, coronary artery disease, anxiety, GERD, hyperlipidemia and BPH presenting with left hip pain pain.  Patient states, he got up to go to the bathroom, turned around too quickly and lost his balance.  Unfortunately patient fell landing on his left hip.  At this time, he is complaining of severe left hip pain.  He describes the pain as sharp and nonradiating.  He does have prior history of left hip and bilateral knee surgery and Dr. Gaytan has been his previous orthopedic surgeon.     In ED, blood pressure 136/89, heart rate 78, respiratory rate 18, temperature 98.2 and O2 saturation 93% on room air.  Labs on arrival showed sodium 137, potassium 4.2, BUN 11, creatinine 1.03, WBC 11.1, hemoglobin 16.1 and platelet count 144.  CT of abdomen and pelvis showed Acute nondisplaced fracture of the inferior left pubic ramus. Acute nondisplaced intra-articular fracture of the anterior inferior left acetabulum/root of the superior pubic ramus. No acute hip fracture or dislocation.    Patient was admitted to the medical floor and was seen by Dr. Gaytan who recommended conservative management with weightbearing as tolerated, physical therapy and outpatient follow-up with orthopedic services.  Patient was seen by  physical therapy and was able to walk using a walker.  Case management was consulted for short-term rehab placement.    Review of Systems:     All systems were reviewed and negative except as mentioned in subjective, assessment and plan.    Vital Signs:    Temp:  [99.6 °F (37.6 °C)] 99.6 °F (37.6 °C)  Heart Rate:  [80] 80  Resp:  [14-18] 18  BP: (122-129)/(75-76) 129/76    Intake and output:    I/O last 3 completed shifts:  In: 3193.8 [P.O.:1180; I.V.:2013.8]  Out: 3950 [Urine:3950]  I/O this shift:  In: 720 [P.O.:720]  Out: -     Physical Examination:    General Appearance:  Alert and cooperative.    Head:  Atraumatic and normocephalic.   Eyes: Conjunctivae and sclerae normal, no icterus. No pallor.   Throat: No oral lesions, no thrush, oral mucosa moist.   Neck: Supple, trachea midline, no thyromegaly.   Lungs:   Breath sounds heard bilaterally equally.  No wheezing or crackles. No Pleural rub or bronchial breathing.   Heart:  Normal S1 and S2, no murmur, no gallop, no rub. No JVD.   Abdomen:   Normal bowel sounds, no masses, no organomegaly. Soft, nontender, obese, no rebound tenderness.   Extremities: Supple, no edema, no cyanosis, no clubbing.  Left hip surgical scar noted.  Bilateral knee joint surgical scar noted.   Skin: No bleeding or rash.   Neurologic: Alert and oriented x 3. No facial asymmetry. Moves all four limbs. No tremors.    Laboratory results:    Results from last 7 days   Lab Units 03/09/25  0608 03/08/25  0447 03/08/25  0018   SODIUM mmol/L 136 137 137   POTASSIUM mmol/L 3.7 4.3 4.2   CHLORIDE mmol/L 100 100 101   CO2 mmol/L 24.8 23.7 25.4   BUN mg/dL 9 13 11   CREATININE mg/dL 0.86 0.98 1.03   CALCIUM mg/dL 8.4* 8.9 8.8   BILIRUBIN mg/dL  --   --  0.5   ALK PHOS U/L  --   --  45   ALT (SGPT) U/L  --   --  42*   AST (SGOT) U/L  --   --  37   GLUCOSE mg/dL 127* 134* 114*     Results from last 7 days   Lab Units 03/09/25  0608 03/08/25  0447 03/08/25  0018   WBC 10*3/mm3 9.13 12.67* 11.12*    HEMOGLOBIN g/dL 15.3 16.4 16.1   HEMATOCRIT % 42.7 46.6 45.1   PLATELETS 10*3/mm3 112*  --  144     I have reviewed the patient's laboratory results.    Radiology results:    No radiology results from the last 24 hrs  I have reviewed the patient's radiology reports.    Medication Review:     I have reviewed the patient's active and prn medications.     Problem List:      Pubic ramus fracture    Assessment:    Status post fall at home.  Left inferior pubic ramus fracture, POA.  Essential hypertension.  Coronary artery disease.  Gastroesophageal reflux disease.  Benign prostatic hyperplasia.    Plan:    Left inferior pubic ramus fracture.  - Seen by Dr. Gaytan who recommended conservative management with weightbearing as tolerated.  - Continue physical and occupational therapy.  - She is currently on morphine as needed and I will place him on Lortab for pain.  - Continue Zanaflex nightly from home medications.    Essential hypertension/CAD.  - Continue aspirin, amlodipine, carvedilol, Imdur, hydrochlorothiazide and pravastatin.    BPH.  - Continue tamsulosin.    Discussed with the patient's wife Diana who is at the bedside.  Discussed with nursing staff and multidisciplinary team.    I have reviewed the copied text and it is accurate as of 03/10/25    DVT Prophylaxis: Enoxaparin  Code Status: Full  Diet: Regular  Discharge Plan: [facility] and rehab facility tomorrow.    Jung Cummins MD  03/10/25  15:04 EDT    Dictated utilizing Dragon dictation.

## 2025-03-10 NOTE — DISCHARGE PLACEMENT REQUEST
"Hilton Mejia (65 y.o. Male)       Date of Birth   1960    Social Security Number       Address   [address] [place], [place] [address]    Home Phone   [phone]    MRN   [id number]       Oriental orthodox   None    Marital Status                               Admission Date   3/7/2025    Admission Type   Emergency    Admitting Provider   Nicko Rodriguez MD    Attending Provider   Jung Cummins MD    Department, Room/Bed   [facility] TELEMETRY 4, 431/1       Discharge Date       Discharge Disposition       Discharge Destination                                 Attending Provider: Jung Cummins MD    Allergies: Oxycodone, Topamax [Topiramate]    Isolation: None   Infection: None   Code Status: CPR    Ht: 147.3 cm (58\")   Wt: 116 kg (255 lb 8.2 oz)    Admission Cmt: None   Principal Problem: Pubic ramus fracture [S32.599A]                   Active Insurance as of 3/7/2025       Primary Coverage       Payor Plan Insurance Group Employer/Plan Group    ANTH MEDICARE REPLACEMENT [facility] MEDICARE ADVANTAGE HMO [id number]       Payor Plan Address Payor Plan Phone Number Payor Plan Fax Number Effective Dates    [address] [phone]  2025 - None Entered    [facility] [id number]         Subscriber Name Subscriber Birth Date Member ID       HILTON MEJIA 1960 [id number]                     Emergency Contacts        (Rel.) Home Phone Work Phone Mobile Phone    Diana Mejia (Spouse) [phone] -- --                 History & Physical        Nicko Rodriguez MD at [facility]              [facility]    Patient Identification:  Name:  Hilton Mejia  Age:  65 y.o.  Sex:  male  :  1960  MRN:  [id number]   Visit Number:  [id number]  Admit Date: 3/7/2025   Room number:    Primary Care Physician:  Naty Lebron MD    Date of Admission: 3/7/2025     Subjective     Chief complaint:    Chief Complaint "   Patient presents with    Fall    Hip Pain       History of presenting illness:     This is a 65-year-old male with a past medical history of hypertension, coronary artery disease, anxiety, GERD, hyperlipidemia and BPH presenting with left hip pain pain.  Patient states, he got up to go to the bathroom, turned around too quickly and lost his balance.  Unfortunately patient fell landing on his left hip.  At this time, he is complaining of severe left hip pain.  He describes the pain as sharp and nonradiating.  He rates the pain a 9 out of 10 in severity and movement makes it worse.    In ED, blood pressure 136/89, heart rate 78, respiratory rate 18, temperature 98.2 and O2 saturation 93% on room air.  Labs on arrival showed sodium 137, potassium 4.2, BUN 11, creatinine 1.03, WBC 11.1, hemoglobin 16.1 and platelet count 144.  CT of abdomen and pelvis showed Acute nondisplaced fracture of the inferior left pubic ramus. Acute nondisplaced intra-articular fracture of the anterior inferior left acetabulum/root of the superior pubic ramus. No acute hip fracture or dislocation.    ---------------------------------------------------------------------------------------------------------------------   Review of Systems   Constitutional:  Negative for chills.   HENT:  Negative for ear discharge and sinus pressure.    Eyes:  Negative for pain.   Respiratory:  Negative for choking.    Cardiovascular:  Negative for leg swelling.   Gastrointestinal:  Negative for anal bleeding.   Endocrine: Negative for polydipsia.   Genitourinary:  Negative for flank pain.   Musculoskeletal:  Positive for back pain and gait problem.   Skin:  Negative for pallor.   Allergic/Immunologic: Negative for food allergies.   Neurological:  Negative for light-headedness.   Hematological:  Negative for adenopathy.   Psychiatric/Behavioral:  Negative for confusion.       ---------------------------------------------------------------------------------------------------------------------   Past Medical History:   Diagnosis Date    Allergic rhinitis     Anxiety     Arthritis     Balance problem     Carpal tunnel syndrome     Cluster headache     Coronary artery disease     Developmental delay     Diabetes mellitus     Noted by PCP    Difficulty walking     Dizziness     Dysphagia     Enlarged prostate     GERD (gastroesophageal reflux disease)     Gout     Hip fracture, left     History of cardiovascular stress test 2018    positive for inferior and lateral ischemia     History of echocardiogram     Hyperlipidemia     Hypertension     Impaired mobility     Lymphadenitis     Memory loss     Migraine     Morbid obesity with BMI of 40.0-44.9, adult 08/10/2020    Associated with hypertension, hyperlipidemia       Myocardial infarction 2000    Obesity     DENZEL (obstructive sleep apnea)     oxygen at night- 2LNC    Peptic ulcer     Peptic ulceration     Poor historian     Rheumatoid arthritis     Right shoulder pain     SOB (shortness of breath) on exertion     Spinocerebellar ataxia     Tension headache     Trigeminal neuralgia     Vitamin B 12 deficiency     Vitamin D deficiency      Past Surgical History:   Procedure Laterality Date    CARDIAC CATHETERIZATION      CARDIAC CATHETERIZATION Left 3/14/2018    Procedure: Cardiac Catheterization/Vascular Study;  Surgeon: Adam Perkins MD;  Location: [facility] CATH INVASIVE LOCATION;  Service: Cardiovascular    COLONOSCOPY      COLONOSCOPY N/A 9/13/2019    Procedure: COLONOSCOPY W/ COLD SNARE POLYPECTOMY;  Surgeon: Melba Lopez MD;  Location: [facility] ENDOSCOPY;  Service: Gastroenterology    COLONOSCOPY N/A 9/24/2024    Procedure: COLONOSCOPY;  Surgeon: Melba Lopez MD;  Location: [facility] ENDOSCOPY;  Service: Gastroenterology;  Laterality: N/A;    ELBOW ARTHROPLASTY Bilateral     ENDOSCOPY      ENDOSCOPY N/A 9/24/2024    Procedure:  ESOPHAGOGASTRODUODENOSCOPY WITH BIOPSY;  Surgeon: Melba Lopez MD;  Location: [facility] ENDOSCOPY;  Service: Gastroenterology;  Laterality: N/A;    HERNIA REPAIR      x3    HIP HEMIARTHROPLASTY Left     NEUROPLASTY Bilateral     decompression median nerve at carpal tunnel    TOTAL KNEE ARTHROPLASTY Bilateral     2005, 2012     Family History   Problem Relation Age of Onset    Deep vein thrombosis Mother     Diabetes Mother     Hyperlipidemia Mother     Hypertension Mother     Heart attack Mother     Cancer Father     Kidney disease Father     Deep vein thrombosis Daughter     Arthritis Sister     Diabetes Sister     Hyperlipidemia Sister     Hypertension Sister     Osteoporosis Sister     Thyroid disease Sister     Liver disease Sister     Arthritis Brother     Diabetes Brother     Hyperlipidemia Brother     Hypertension Brother     Stroke Brother     Cancer Brother     Heart attack Brother     Mental illness Brother      Social History     Socioeconomic History    Marital status:    Tobacco Use    Smoking status: Never     Passive exposure: Never    Smokeless tobacco: Never   Vaping Use    Vaping status: Never Used   Substance and Sexual Activity    Alcohol use: Yes     Comment: Rarely    Drug use: No    Sexual activity: Defer     ---------------------------------------------------------------------------------------------------------------------   Allergies:  Oxycodone and Topamax [topiramate]  ---------------------------------------------------------------------------------------------------------------------   Medications below are reported home medications pulling from within the system; at this time, these medications have not been reconciled unless otherwise specified and are in the verification process for further verifcation as current home medications.      Prior to Admission Medications       Prescriptions Last Dose Informant Patient Reported? Taking?    amLODIPine (NORVASC) 10 MG tablet   No No     Take 1 tablet by mouth Daily. Indications: High Blood Pressure Disorder    aspirin 81 MG tablet   Yes No    Take 1 tablet by mouth Daily.    azelastine (ASTELIN) 0.1 % nasal spray   No No    2 sprays into the nostril(s) as directed by provider 2 (Two) Times a Day.    busPIRone (BUSPAR) 15 MG tablet   No No    TAKE 1 TABLET TWICE A DAY AS NEEDED FOR ANXIETY    carvedilol (COREG) 25 MG tablet   No No    Take 1 tablet by mouth 2 (Two) Times a Day With Meals.    cyanocobalamin injection 1,000 mcg   No No    dexlansoprazole (Dexilant) 60 MG capsule   No No    Take 1 capsule by mouth Daily.    hydroCHLOROthiazide 25 MG tablet   Yes No    Take 1 tablet by mouth Daily.    isosorbide mononitrate (IMDUR) 30 MG 24 hr tablet   No No    Take 1 tablet by mouth Daily for 360 days.    levocetirizine (XYZAL) 5 MG tablet   No No    Take 1 tablet by mouth Every Evening.    meclizine (ANTIVERT) 25 MG tablet   Yes No    Take 1 tablet by mouth 3 (Three) Times a Day As Needed for Dizziness.    nitroglycerin (Nitrostat) 0.4 MG SL tablet   No No    Place 1 tablet under the tongue Every 5 (Five) Minutes As Needed for Chest Pain. Take no more than 3 doses in 15 minutes.    pantoprazole (PROTONIX) 40 MG EC tablet   Yes No    Take 1 tablet by mouth Daily.    pramipexole (MIRAPEX) 0.125 MG tablet   Yes No    Take 1 tablet by mouth 3 (Three) Times a Day. Unsure of dose    pravastatin (PRAVACHOL) 40 MG tablet   No No    TAKE ONE TABLET BY MOUTH EVERY DAY AT NIGHT    tamsulosin (FLOMAX) 0.4 MG capsule 24 hr capsule   No No    TAKE 1 CAPSULE EVERY NIGHT (DOSE DECREASE DUE TO      VISION DISTURBANCE)    tiZANidine (ZANAFLEX) 4 MG tablet   No No    TAKE 1 TABLET BY MOUTH EVERY NIGHT          Objective     Vital Signs:  Temp:  [98.2 °F (36.8 °C)] 98.2 °F (36.8 °C)  Heart Rate:  [71-84] 84  Resp:  [18] 18  BP: (110-168)/(80-90) 117/82    Mean Arterial Pressure (Non-Invasive) for the past 24 hrs (Last 3 readings):   Noninvasive MAP (mmHg)   03/08/25  0229 94   03/08/25 0158 108   03/08/25 0128 97     SpO2:  [91 %-94 %] 94 %  on   ;   Device (Oxygen Therapy): room air  Body mass index is 49.97 kg/m².    Wt Readings from Last 3 Encounters:   03/07/25 107 kg (235 lb)   02/07/25 101 kg (222 lb 1.9 oz)   10/03/24 114 kg (251 lb)      ----------------------------------------------------------------------------------------------------------------------  PHYSICAL EXAMINATION:  GENERAL: The patient is well developed and nontoxic.  HEENT: Anicteric sclerae, PERRLA, EOMI. Oropharynx clear. Moist mucous membranes. Conjunctivae appear well perfused.  CHEST: Chest wall is nontender.  HEART: Regular rate and rhythm without murmurs.  LUNGS: Clear to auscultation bilaterally.  ABDOMEN: Soft, positive bowel sounds, nontender, no organomegaly.  RECTAL: Deferred.  SKIN: No rash, no excessive bruising, petechiae, or purpura.  NEUROLOGIC: Cranial nerves II-XII intact without motor/sensory deficit.    ---------------------------------------------------------------------------------------------------------------------  --------------------------------------------------------------------------------------------------------------------  LABS:    CBC and coagulation:  Results from last 7 days   Lab Units 03/08/25  0018   WBC 10*3/mm3 11.12*   HEMOGLOBIN g/dL 16.1   HEMATOCRIT % 45.1   MCV fL 95.1   MCHC g/dL 35.7   PLATELETS 10*3/mm3 144     Acid/base balance:      Renal and electrolytes:  Results from last 7 days   Lab Units 03/08/25  0018   SODIUM mmol/L 137   POTASSIUM mmol/L 4.2   CHLORIDE mmol/L 101   CO2 mmol/L 25.4   BUN mg/dL 11   CREATININE mg/dL 1.03   CALCIUM mg/dL 8.8   GLUCOSE mg/dL 114*     Estimated Creatinine Clearance: 71.2 mL/min (by C-G formula based on SCr of 1.03 mg/dL).    Liver and pancreatic function:  Results from last 7 days   Lab Units 03/08/25  0018   ALBUMIN g/dL 4.0   BILIRUBIN mg/dL 0.5   ALK PHOS U/L 45   AST (SGOT) U/L 37   ALT (SGPT) U/L 42*     Endocrine  "function:  Lab Results   Component Value Date    HGBA1C 5.70 (H) 07/10/2024     Point of care bedside glucose levels:      Lab Results   Component Value Date    TSH 1.980 03/22/2024    FREET4 1.38 03/22/2024     Cardiac:        Cultures:  Lab Results   Component Value Date    COLORU Yellow 03/02/2023    CLARITYU Clear 03/02/2023    PHUR 8.0 03/02/2023    GLUCOSEU Negative 03/02/2023    KETONESU Negative 03/02/2023    BLOODU Negative 03/02/2023    NITRITEU Negative 03/02/2023    LEUKOCYTESUR Negative 03/02/2023    BILIRUBINUR Negative 03/02/2023    UROBILINOGEN 1.0 E.U./dL 03/02/2023     Microbiology Results (last 10 days)       ** No results found for the last 240 hours. **            No results found for: \"PREGTESTUR\", \"PREGSERUM\", \"HCG\", \"HCGQUANT\"  Pain Management Panel          Latest Ref Rng & Units 3/2/2023   Pain Management Panel   Amphetamine, Urine Qual Negative Negative    Barbiturates Screen, Urine Negative Negative    Benzodiazepine Screen, Urine Negative Negative    Buprenorphine, Screen, Urine Negative Negative    Cocaine Screen, Urine Negative Negative    Methadone Screen , Urine Negative Negative    Methamphetamine, Ur Negative Negative        I have personally looked at the labs and they are summarized above.  ----------------------------------------------------------------------------------------------------------------------  Detailed radiology reports for the last 24 hours:    Imaging Results (Last 24 Hours)       Procedure Component Value Units Date/Time    CT Pelvis Without Contrast [[id number]] Collected: 03/07/25 2344     Updated: 03/08/25 0144    Addenda:        ADDENDUM REPORT    ADDENDUM:  Addendum:    Error in the report. Fractures are both left-sided.    Findings:    Acute nondisplaced fracture of the inferior left pubic ramus.    Acute nondisplaced intra-articular fracture of the anterior inferior left   acetabulum/root of the superior pubic ramus on axial series 2 image 55-58.    No " acute right hip fracture or dislocation. Moderate to severe right hip   arthritis    No acute left hip fracture or dislocation. Moderate to severe left hip   arthritis    Prior ORIF for left femur fracture.    Degenerative disease at L4-5 with suspected severe canal and foraminal   stenosis.    Degenerative disease at L5-S1 with mild canal narrowing and severe   bilateral foraminal stenosis.    Impression:    1. Acute nondisplaced fracture of the inferior left pubic ramus.    2. Acute nondisplaced intra-articular fracture of the anterior inferior   left acetabulum/root of the superior pubic ramus.    3. No acute hip fracture or dislocation.    Authenticated and Electronically Signed by Joann Roberto MD  on 03/08/2025 01:36:49 AM  Signed: 03/08/25 0136 by Joann Roberto MD    Narrative:      FINAL REPORT    TECHNIQUE:  null    CLINICAL HISTORY:  s/p fall, left hip pain, unable to bear weight, possible pelvic  fracture on Xray    COMPARISON:  null    FINDINGS:  Exam: CT pelvis without contrast    Comparison: None    Clinical history: Status post fall, left hip pain unable to bear weight.    Findings:    Acute nondisplaced fracture of the inferior left pubic ramus.    Acute nondisplaced intra-articular fracture of the anterior inferior right acetabulum/root of the superior pubic ramus on axial series 2 image 55-58.    No acute right hip fracture or dislocation. Moderate to severe right hip arthritis    No acute left hip fracture or dislocation. Moderate to severe left hip arthritis    Prior ORIF for left femur fracture.    Degenerative disease at L4-5 with suspected severe canal and foraminal stenosis.    Degenerative disease at L5-S1 with mild canal narrowing and severe bilateral foraminal stenosis.      Impression:      Impression:    1. Acute nondisplaced fracture of the inferior left pubic ramus.    2. Acute nondisplaced intra-articular fracture of the anterior inferior right acetabulum/root of the superior  pubic ramus.    3. No acute hip fracture or dislocation.    Authenticated and Electronically Signed by Joann Roberto MD  on 03/07/2025 11:44:21 PM    XR Hip With or Without Pelvis 2 - 3 View Left [[id number]] Collected: 03/07/25 2339     Updated: 03/07/25 2342    Narrative:      FINAL REPORT    TECHNIQUE:  null    CLINICAL HISTORY:  left hip injury, fall    COMPARISON:  null    FINDINGS:  Exam: AP pelvis with two-view left hip    Comparison: None    Findings:    No acute pelvic fracture.    No acute right hip fracture or dislocation.    Prior ORIF for left femur fracture. No acute left hip fracture or dislocation.    Moderate bilateral hip arthritis.    Degenerative disease in the lower lumbar spine.    No acute fracture or dislocation.    No radiodense foreign bodies.      Impression:      Impression:    1. No acute pelvic or hip fracture.    If there is concern for occult fracture, then CT may be considered.    Authenticated and Electronically Signed by Joann Roberto MD  on 03/07/2025 11:39:53 PM          Final impressions for the last 30 days of radiology reports:    CT Pelvis Without Contrast  Addendum Date: 3/8/2025  ADDENDUM REPORT ADDENDUM: Addendum: Error in the report. Fractures are both left-sided. Findings: Acute nondisplaced fracture of the inferior left pubic ramus. Acute nondisplaced intra-articular fracture of the anterior inferior left acetabulum/root of the superior pubic ramus on axial series 2 image 55-58. No acute right hip fracture or dislocation. Moderate to severe right hip arthritis No acute left hip fracture or dislocation. Moderate to severe left hip arthritis Prior ORIF for left femur fracture. Degenerative disease at L4-5 with suspected severe canal and foraminal stenosis. Degenerative disease at L5-S1 with mild canal narrowing and severe bilateral foraminal stenosis. Impression: 1. Acute nondisplaced fracture of the inferior left pubic ramus. 2. Acute nondisplaced intra-articular  fracture of the anterior inferior left acetabulum/root of the superior pubic ramus. 3. No acute hip fracture or dislocation. Authenticated and Electronically Signed by Joann Roberto MD on 2025 01:36:49 AM    Result Date: 3/8/2025  Impression: 1. Acute nondisplaced fracture of the inferior left pubic ramus. 2. Acute nondisplaced intra-articular fracture of the anterior inferior right acetabulum/root of the superior pubic ramus. 3. No acute hip fracture or dislocation. Authenticated and Electronically Signed by Joann Roberto MD on 2025 11:44:21 PM    XR Hip With or Without Pelvis 2 - 3 View Left  Result Date: 3/7/2025  Impression: 1. No acute pelvic or hip fracture. If there is concern for occult fracture, then CT may be considered. Authenticated and Electronically Signed by Joann Roberto MD on 2025 11:39:53 PM        Assessment & Plan     This is a 65-year-old male with a past medical history of hypertension, coronary artery disease, anxiety, GERD, hyperlipidemia and BPH presenting with left hip pain pain.     Assessment:  Left hip pain  Pubic ramus fracture  Fall  Hypertension  Coronary disease  GERD  Anxiety  Hyperlipidemia  BPH    Plan:  - CT pelvis showed Acute nondisplaced fracture of the inferior left pubic ramus. Acute nondisplaced intra-articular fracture of the anterior inferior left acetabulum/root of the superior pubic ramus. No acute hip fracture or dislocation.  - Admit to observation  - Pain management  - PT/OT  - Consult ortho  - BP is Currently well controlled  - Hydralazine as needed  - Resume home meds        Nicko Rodriguez MD  [facility] Hospitalist  25  02:49 EST      Electronically signed by Nicko Rodriguez MD at 25 0256          Physician Progress Notes (most recent note)        Denilson Aguilar MD at 25 1511                [facility]  INTERNAL MEDICINE PROGRESS NOTE    Name:  Chris Mejia   Age:  65 y.o.  Sex:  male  :   1960  MRN:  [id number]   Visit Number:  [id number]  Admission Date:  3/7/2025  Date Of Service:  03/09/25  Primary Care Physician:  Naty Lebron MD     LOS: 0 days :  Patient Care Team:  Naty Lebron MD as PCP - General (Family Medicine)  Adam Perkins MD as Consulting Physician (Cardiology)  Melba Lopez MD as Surgeon (General Surgery)  Nils Salinas (Optometry):      Subjective / Interval History:     65-year-old patient with history of hypertension, coronary artery disease, anxiety, GERD, hyperlipidemia, BPH, who comes into the ER because of fall related pubic ramus fracture.  Surgical evaluation has been done and Dr. Fernando recommends conservative treatment and rehab and weightbearing as tolerated    Patient had PT evaluation done and he is unable to walk safely.  He says his pain is tolerable with medications      Vital Signs:    Temp:  [98 °F (36.7 °C)-99.4 °F (37.4 °C)] 98.1 °F (36.7 °C)  Heart Rate:  [75-87] 81  Resp:  [16-18] 18  BP: (115-140)/(71-86) 131/82    Intake and output:    I/O last 3 completed shifts:  In: 2733.8 [P.O.:720; I.V.:2013.8]  Out: 2150 [Urine:2150]  I/O this shift:  In: 600 [P.O.:600]  Out: 1500 [Urine:1500]    Physical Examination:    General Appearance:    Alert and cooperative, not in any acute distress.   Head:    Atraumatic and normocephalic, without obvious abnormality.   Eyes:            PERRLA,  No pallor. Extraocular movements are within normal limits.   Neck:   Supple,  No lymph glands, no bruit.   Lungs:     Chest shape is normal. Breath sounds heard bilaterally equally.  No crackles or wheezing.     Heart:    Normal S1 and S2, no murmur, no JVD.   Abdomen:     Normal bowel sounds, no masses, no organomegaly. Soft     nontender, no guarding, no rebound tenderness.   Extremities:   Moves all extremities well, no edema, no cyanosis,    Skin:   No  bruising or rash.   Neurologic:   Grossly nonfocal and moves all extremities.  Gait not checked      Laboratory results:  Results from last 7 days   Lab Units 03/09/25  0608 03/08/25 0447 03/08/25  0018   SODIUM mmol/L 136 137 137   POTASSIUM mmol/L 3.7 4.3 4.2   CHLORIDE mmol/L 100 100 101   CO2 mmol/L 24.8 23.7 25.4   BUN mg/dL 9 13 11   CREATININE mg/dL 0.86 0.98 1.03   CALCIUM mg/dL 8.4* 8.9 8.8   BILIRUBIN mg/dL  --   --  0.5   ALK PHOS U/L  --   --  45   ALT (SGPT) U/L  --   --  42*   AST (SGOT) U/L  --   --  37   GLUCOSE mg/dL 127* 134* 114*     Results from last 7 days   Lab Units 03/09/25  0608 03/08/25 0447 03/08/25  0018   WBC 10*3/mm3 9.13 12.67* 11.12*   HEMOGLOBIN g/dL 15.3 16.4 16.1   HEMATOCRIT % 42.7 46.6 45.1   PLATELETS 10*3/mm3 112*  --  144                   Radiology results:    Imaging Results (Last 24 Hours)       ** No results found for the last 24 hours. **            I have reviewed the patient's radiology reports.    Medication Review:     I have reviewed the patient's active and prn medications.     Assessment:      Pubic ramus fracture  Fall  Hypertension  Coronary disease  GERD  Anxiety  Hyperlipidemia  BPH      Plan:    Patient has a nondisplaced intra-articular fracture of the anterior-inferior left acetabulum and root of the superior pubic ramus.  Conservative treatment has been planned as per Dr. Gaytan and recommends a weightbearing as tolerated and he can use a walker and participate in physical therapy.    He would benefit from inpatient physical therapy and unable to go home as per the physical therapy evaluation and social service consult to be done accordingly  Pain control as per orders    Goals of treatment plan of care has been addressed with the patient and agreeable with the above    Denilson LOYA. MD Jeff  03/09/25  15:11 EDT      Please note that portions of this note were completed with a voice recognition program.        Electronically signed by Denilson Aguilar MD at 03/09/25 5855          Physical Therapy Notes (last 48 hours)        Janis Colindres, PT at 03/09/25  1221  Version 1 of 1         Goal Outcome Evaluation:  Plan of Care Reviewed With: patient, spouse           Outcome Evaluation: PT treatment completed this date with pt presenting supine in bed with pain in L pelvic area rated at 2/10. Pt needed mod assist to come to sit on L side of the bed with HOB raised. Pt was able to sit on EOB with SBA. Sit to stand wtih FWW min assist and pt amb 10 ft with difficulty wt shifting onto the LLE due to pain increasing to 8/10. Very short shuffle steps with pt having decreased control of hips/balance during gait needing min to mod assist for safe ambulation. Pt had to sit post 10 ft due to fatigue and increased pain. Post resting, Mod assist for sit to stand and min assist to pivot to bedside chair. Once positioned in chair pt reated L pelvic pain 4/10. Pt's O2 sat on room air post gait 92%. After seeing how difficult it was for ambulation pt and his wife open to discussing STR upon d/c. Cont current PT POC.    Anticipated Discharge Disposition (PT): inpatient rehabilitation facility                          Electronically signed by Janis Colindres, PT at 25 1406       Janis Colindres, PT at 25 1221  Version 1 of 1         Patient Name: Chris Mejia  : 1960    MRN: [id number]                              Today's Date: 3/9/2025       Admit Date: 3/7/2025    Visit Dx:     ICD-10-CM ICD-9-CM   1. Closed fracture of left inferior pubic ramus, initial encounter  S32.592A 808.2   2. Closed fracture of superior ramus of left pubis, initial encounter  S32.512A 808.2     Patient Active Problem List   Diagnosis    Tubular adenoma of colon    Trigeminal neuralgia    Spinocerebellar ataxia    RA (rheumatoid arthritis)    Insomnia    Essential hypertension    Hypercholesteremia    Acid reflux    Anxiety    Vitamin B12 deficiency    Vitamin D deficiency    DENZEL (obstructive sleep apnea)    Atopic rhinitis    Ataxic gait    Shoulder pain    Ophthalmoplegia    CAD in native  artery    Seasonal allergies    Benign non-nodular prostatic hyperplasia with lower urinary tract symptoms    Weakness    Tension headache    Limited response to serotonin reuptake inhibitors 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    HTR2A GG genotype    HLA-B*1502 allele negative    CYP2B6 intermediate metabolizer    Vertigo    Cerebellar atrophy    Chest pain    SOB (shortness of breath)    Aortic root dilation    Dysphagia    Pubic ramus fracture     Past Medical History:   Diagnosis Date    Allergic rhinitis     Anxiety     Arthritis     Balance problem     Carpal tunnel syndrome     Cluster headache     Coronary artery disease     Developmental delay     Diabetes mellitus     Noted by PCP    Difficulty walking     Dizziness     Dysphagia     Enlarged prostate     GERD (gastroesophageal reflux disease)     Gout     Hip fracture, left     History of cardiovascular stress test 2018    positive for inferior and lateral ischemia     History of echocardiogram     Hyperlipidemia     Hypertension     Impaired mobility     Lymphadenitis     Memory loss     Migraine     Morbid obesity with BMI of 40.0-44.9, adult 08/10/2020    Associated with hypertension, hyperlipidemia       Myocardial infarction 2000    Obesity     DENZEL (obstructive sleep apnea)     oxygen at night- 2LNC    Peptic ulcer     Peptic ulceration     Poor historian     Rheumatoid arthritis     Right shoulder pain     SOB (shortness of breath) on exertion     Spinocerebellar ataxia     Tension headache     Trigeminal neuralgia     Vitamin B 12 deficiency     Vitamin D deficiency      Past Surgical History:   Procedure Laterality Date    CARDIAC CATHETERIZATION      CARDIAC CATHETERIZATION Left 3/14/2018    Procedure: Cardiac Catheterization/Vascular Study;  Surgeon: Adam Perkins MD;  Location: [facility] INVASIVE LOCATION;  Service: Cardiovascular    COLONOSCOPY      COLONOSCOPY N/A 9/13/2019    Procedure: COLONOSCOPY W/ COLD SNARE  POLYPECTOMY;  Surgeon: Melba Lopez MD;  Location: [facility] ENDOSCOPY;  Service: Gastroenterology    COLONOSCOPY N/A 9/24/2024    Procedure: COLONOSCOPY;  Surgeon: Melba Lopez MD;  Location: [facility] ENDOSCOPY;  Service: Gastroenterology;  Laterality: N/A;    ELBOW ARTHROPLASTY Bilateral     ENDOSCOPY      ENDOSCOPY N/A 9/24/2024    Procedure: ESOPHAGOGASTRODUODENOSCOPY WITH BIOPSY;  Surgeon: Melba Lopez MD;  Location: [facility] ENDOSCOPY;  Service: Gastroenterology;  Laterality: N/A;    HERNIA REPAIR      x3    HIP HEMIARTHROPLASTY Left     NEUROPLASTY Bilateral     decompression median nerve at carpal tunnel    TOTAL KNEE ARTHROPLASTY Bilateral     2005, 2012      General Information       Row Name 03/09/25 1221          Physical Therapy Time and Intention    Document Type therapy note (daily note)  -TW     Mode of Treatment physical therapy;individual therapy  -       Row Name 03/09/25 1221          General Information    Patient Profile Reviewed yes  -TW     Existing Precautions/Restrictions fall  -TW     Barriers to Rehab previous functional deficit  -       Row Name 03/09/25 1221          Cognition    Orientation Status (Cognition) oriented to;person;place;situation  -       Row Name 03/09/25 1221          Safety Issues/Impairments Affecting Functional Mobility    Safety Issues Affecting Function (Mobility) insight into deficits/self-awareness;judgment;positioning of assistive device;safety precaution awareness;safety precautions follow-through/compliance;sequencing abilities  -TW     Impairments Affecting Function (Mobility) pain;endurance/activity tolerance;balance;strength;postural/trunk control  -TW               User Key  (r) = Recorded By, (t) = Taken By, (c) = Cosigned By      Initials Name Provider Type    TW Janis Colindres PT Physical Therapist                   Mobility       Row Name 03/09/25 1221          Bed Mobility    Bed Mobility supine-sit  -TW     Supine-Sit [place] (Bed  Mobility) moderate assist (50% patient effort);verbal cues;nonverbal cues (demo/gesture)  -TW     Assistive Device (Bed Mobility) head of bed elevated;bed rails  -TW     Comment, (Bed Mobility) pt was able to sit on EOB with SBA only without loss of balance.  -TW       Row Name 03/09/25 1221          Bed-Chair Transfer    Bed-Chair [place] (Transfers) minimum assist (75% patient effort);verbal cues  -TW     Assistive Device (Bed-Chair Transfers) walker, front-wheeled  -TW     Comment, (Bed-Chair Transfer) Cues to fully turn and to reach back prior to sitting down.  -TW       Row Name 03/09/25 1221          Sit-Stand Transfer    Sit-Stand [place] (Transfers) minimum assist (75% patient effort);moderate assist (50% patient effort);verbal cues  -TW     Assistive Device (Sit-Stand Transfers) walker, front-wheeled  -TW     Comment, (Sit-Stand Transfer) On first sit to stand min assist needed. On second sit to stand, pt was more fatigued and needed mod assist for coming up from EOB.  -TW       Row Name 03/09/25 1221          Gait/Stairs (Locomotion)    [place] Level (Gait) minimum assist (75% patient effort);moderate assist (50% patient effort)  -TW     Assistive Device (Gait) walker, front-wheeled  -TW     Patient was able to Ambulate yes  -TW     Distance in Feet (Gait) 10  -TW     Deviations/Abnormal Patterns (Gait) festinating/shuffling;weight shifting decreased;antalgic;other (see comments)  -TW     Bilateral Gait Deviations forward flexed posture;heel strike decreased;weight shift ability decreased  -TW     Comment, (Gait/Stairs) pt had difficulty advancing BLE and pain in LLE pelvic area increasing to 8/10. Pt was noted to have unsteadiness in his trunk in static stand that increased with ambulation. Per pt's wife pf has had balance difficulties for awhile now.  -TW               User Key  (r) = Recorded By, (t) = Taken By, (c) = Cosigned By      Initials Name Provider Type    Janis Mohr, CODY  Physical Therapist                   Obj/Interventions       Row Name 03/09/25 1221          Balance    Balance Assessment sitting static balance;sitting dynamic balance;standing static balance;standing dynamic balance  -TW     Static Sitting Balance standby assist  -TW     Dynamic Sitting Balance standby assist  -TW     Position, Sitting Balance sitting edge of bed  -TW     Static Standing Balance minimal assist;contact guard  -TW     Dynamic Standing Balance minimal assist;moderate assist  -TW     Position/Device Used, Standing Balance supported;walker, rolling  -TW               User Key  (r) = Recorded By, (t) = Taken By, (c) = Cosigned By      Initials Name Provider Type    TW Jens, Janis, PT Physical Therapist                   Goals/Plan    No documentation.                  Clinical Impression       Row Name 03/09/25 1221          Pain    Pretreatment Pain Rating 2/10  -TW     Posttreatment Pain Rating 4/10  -TW     Pain Location other (see comments)  pelvic  -TW     Pain Side/Orientation left  -TW     Pain Management Interventions other (see comments)  -TW     Pre/Posttreatment Pain Comment Pain in standing had L pelvic pain at 8/10. Once in chair this pain did decrease to 4/10  -TW       Row Name 03/09/25 1221          Plan of Care Review    Plan of Care Reviewed With patient;spouse  -TW     Outcome Evaluation PT treatment completed this date with pt presenting supine in bed with pain in L pelvic area rated at 2/10. Pt needed mod assist to come to sit on L side of the bed with HOB raised. Pt was able to sit on EOB with SBA. Sit to stand wtih FWW min assist and pt amb 10 ft with difficulty wt shifting onto the LLE due to pain increasing to 8/10. Very short shuffle steps with pt having decreased control of hips/balance during gait needing min to mod assist for safe ambulation. Pt had to sit post 10 ft due to fatigue and increased pain. Post resting, Mod assist for sit to stand and min assist to pivot to  bedside chair. Once positioned in chair pt reated L pelvic pain 4/10. Pt's O2 sat on room air post gait 92%. After seeing how difficult it was for ambulation pt and his wife open to discussing STR upon d/c. Cont current PT POC.  -TW       Row Name 03/09/25 1221          Vital Signs    Pre SpO2 (%) 93  -TW     O2 Delivery Pre Treatment supplemental O2  -TW     Post SpO2 (%) 92  -TW     O2 Delivery Post Treatment room air  -TW     Pre Patient Position Supine  -TW     Intra Patient Position Standing  -TW     Post Patient Position Sitting  -TW       Row Name 03/09/25 1221          Positioning and Restraints    Pre-Treatment Position in bed  -TW     Post Treatment Position chair  -TW     In Chair notified nsg;reclined;call light within reach;encouraged to call for assist;with family/caregiver  -TW               User Key  (r) = Recorded By, (t) = Taken By, (c) = Cosigned By      Initials Name Provider Type    TW Janis Colindres, PT Physical Therapist                   Outcome Measures       Row Name 03/09/25 1221 03/09/25 0800       How much help from another person do you currently need...    Turning from your back to your side while in flat bed without using bedrails? 3  -TW 3  -LA    Moving from lying on back to sitting on the side of a flat bed without bedrails? 2  -TW 3  -LA    Moving to and from a bed to a chair (including a wheelchair)? 3  -TW 3  -LA    Standing up from a chair using your arms (e.g., wheelchair, bedside chair)? 3  -TW 3  -LA    Climbing 3-5 steps with a railing? 1  -TW 2  -LA    To walk in hospital room? 2  -TW 2  -LA    AM-PAC 6 Clicks Score (PT) 14  -TW 16  -LA    Highest Level of Mobility Goal 4 --> Transfer to chair/commode  -TW 5 --> Static standing  -LA      Row Name 03/09/25 1221          Functional Assessment    Outcome Measure Options AM-PAC 6 Clicks Basic Mobility (PT)  -TW               User Key  (r) = Recorded By, (t) = Taken By, (c) = Cosigned By      Initials Name Provider Type    TW  Janis Colindres, PT Physical Therapist    Hayley Gilbert, RN Registered Nurse                                 Physical Therapy Education       Title: PT OT SLP Therapies (In Progress)       Topic: Physical Therapy (In Progress)       Point: Mobility training (Done)       Learning Progress Summary            Patient Acceptance, E,D, VU,DU by  at 3/9/2025 1221    Comment: Sequencing with FWW for ambulation discussed with pt prior to ambulation and safety cues for sequencing during gait.    Acceptance, E,TB, VU by  at 3/8/2025 1328    Comment: Role of PT and POC.  Proper use of RW and sequencing for stairs   Family Acceptance, E,TB, VU by  at 3/8/2025 1328    Comment: Role of PT and POC.  Proper use of RW and sequencing for stairs                      Point: Home exercise program (Not Started)       Learner Progress:  Not documented in this visit.              Point: Body mechanics (Not Started)       Learner Progress:  Not documented in this visit.              Point: Precautions (Not Started)       Learner Progress:  Not documented in this visit.                              User Key       Initials Effective Dates Name Provider Type Discipline     08/22/23 -  Mechelle Mcfarland, PT Physical Therapist PT     06/16/21 -  Janis Colindres PT Physical Therapist PT                  PT Recommendation and Plan     Outcome Evaluation: PT treatment completed this date with pt presenting supine in bed with pain in L pelvic area rated at 2/10. Pt needed mod assist to come to sit on L side of the bed with HOB raised. Pt was able to sit on EOB with SBA. Sit to stand wtih FWW min assist and pt amb 10 ft with difficulty wt shifting onto the LLE due to pain increasing to 8/10. Very short shuffle steps with pt having decreased control of hips/balance during gait needing min to mod assist for safe ambulation. Pt had to sit post 10 ft due to fatigue and increased pain. Post resting, Mod assist for sit to stand and min assist to pivot  to bedside chair. Once positioned in chair pt reated L pelvic pain 4/10. Pt's O2 sat on room air post gait 92%. After seeing how difficult it was for ambulation pt and his wife open to discussing STR upon d/c. Cont current PT POC.     Time Calculation:         PT Charges       Row Name 03/09/25 1221             Time Calculation    Start Time 1152  -TW      Stop Time 1221  -TW      Time Calculation (min) 29 min  -TW      PT Received On 03/09/25  -TW         Timed Charges    69823 - Gait Training Minutes  14  -TW      46305 - PT Therapeutic Activity Minutes 15  -TW         Total Minutes    Timed Charges Total Minutes 29  -TW       Total Minutes 29  -TW                User Key  (r) = Recorded By, (t) = Taken By, (c) = Cosigned By      Initials Name Provider Type    TW Janis Colindres PT Physical Therapist                  Therapy Charges for Today       Code Description Service Date Service Provider Modifiers Qty    87948812676 HC GAIT TRAINING EA 15 MIN 3/9/2025 Janis Colindres, PT GP 1    32839686029 HC PT THERAPEUTIC ACT EA 15 MIN 3/9/2025 Janis Colindres, PT GP 1            PT G-Codes  Outcome Measure Options: AM-PAC 6 Clicks Basic Mobility (PT)  AM-PAC 6 Clicks Score (PT): 14  PT Discharge Summary  Anticipated Discharge Disposition (PT): inpatient rehabilitation facility    Janis Colindres PT  3/9/2025      Electronically signed by Janis Colindres PT at 03/09/25 1407       Bandar Ramey PT at 03/10/25 1359  Version 1 of 1         Goal Outcome Evaluation:  Plan of Care Reviewed With: patient        Progress: improving  Outcome Evaluation: Pt participated in PT tx this date. Pt reports he is feeling better today. Pt was able to t/f to EOB with Raul. Pt was Raul for STS transfer with cues for hand placement. Pt was able to ambulate 14ft with Rwx and Raul. Pt reports he was dx with cerebellar atrophy which attributes to his difficulties with ambulation, and reports that he feels these symptoms are worse since the fall. Pt  would benefit from skilled PT tx during this inpatient stay.    Anticipated Discharge Disposition (PT): inpatient rehabilitation facility                          Electronically signed by Bandar Ramey, PT at 03/10/25 3319       Bandar Ramey, PT at 03/10/25 1351  Version 1 of 1         Patient Name: Chris Mejia  : 1960    MRN: [id number]                              Today's Date: 3/10/2025       Admit Date: 3/7/2025    Visit Dx:     ICD-10-CM ICD-9-CM   1. Closed fracture of left inferior pubic ramus, initial encounter  S32.592A 808.2   2. Closed fracture of superior ramus of left pubis, initial encounter  S32.512A 808.2     Patient Active Problem List   Diagnosis    Tubular adenoma of colon    Trigeminal neuralgia    Spinocerebellar ataxia    RA (rheumatoid arthritis)    Insomnia    Essential hypertension    Hypercholesteremia    Acid reflux    Anxiety    Vitamin B12 deficiency    Vitamin D deficiency    DENZEL (obstructive sleep apnea)    Atopic rhinitis    Ataxic gait    Shoulder pain    Ophthalmoplegia    CAD in native artery    Seasonal allergies    Benign non-nodular prostatic hyperplasia with lower urinary tract symptoms    Weakness    Tension headache    Limited response to serotonin reuptake inhibitors 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    HTR2A GG genotype    HLA-B*1502 allele negative    CYP2B6 intermediate metabolizer    Vertigo    Cerebellar atrophy    Chest pain    SOB (shortness of breath)    Aortic root dilation    Dysphagia    Pubic ramus fracture     Past Medical History:   Diagnosis Date    Allergic rhinitis     Anxiety     Arthritis     Balance problem     Carpal tunnel syndrome     Cluster headache     Coronary artery disease     Developmental delay     Diabetes mellitus     Noted by PCP    Difficulty walking     Dizziness     Dysphagia     Enlarged prostate     GERD (gastroesophageal reflux disease)     Gout     Hip fracture, left     History of  cardiovascular stress test 2018    positive for inferior and lateral ischemia     History of echocardiogram     Hyperlipidemia     Hypertension     Impaired mobility     Lymphadenitis     Memory loss     Migraine     Morbid obesity with BMI of 40.0-44.9, adult 08/10/2020    Associated with hypertension, hyperlipidemia       Myocardial infarction 2000    Obesity     DENZEL (obstructive sleep apnea)     oxygen at night- 2LNC    Peptic ulcer     Peptic ulceration     Poor historian     Rheumatoid arthritis     Right shoulder pain     SOB (shortness of breath) on exertion     Spinocerebellar ataxia     Tension headache     Trigeminal neuralgia     Vitamin B 12 deficiency     Vitamin D deficiency      Past Surgical History:   Procedure Laterality Date    CARDIAC CATHETERIZATION      CARDIAC CATHETERIZATION Left 3/14/2018    Procedure: Cardiac Catheterization/Vascular Study;  Surgeon: Adam Perkins MD;  Location:  [facility];  Service: Cardiovascular    COLONOSCOPY      COLONOSCOPY N/A 9/13/2019    Procedure: COLONOSCOPY W/ COLD SNARE POLYPECTOMY;  Surgeon: Melba Lopez MD;  Location: [facility] ENDOSCOPY;  Service: Gastroenterology    COLONOSCOPY N/A 9/24/2024    Procedure: COLONOSCOPY;  Surgeon: Melba Lopez MD;  Location: [facility] ENDOSCOPY;  Service: Gastroenterology;  Laterality: N/A;    ELBOW ARTHROPLASTY Bilateral     ENDOSCOPY      ENDOSCOPY N/A 9/24/2024    Procedure: ESOPHAGOGASTRODUODENOSCOPY WITH BIOPSY;  Surgeon: Melba Lopez MD;  Location: [facility] ENDOSCOPY;  Service: Gastroenterology;  Laterality: N/A;    HERNIA REPAIR      x3    HIP HEMIARTHROPLASTY Left     NEUROPLASTY Bilateral     decompression median nerve at carpal tunnel    TOTAL KNEE ARTHROPLASTY Bilateral     2005, 2012      General Information       Row Name 03/10/25 1324          Physical Therapy Time and Intention    Document Type therapy note (daily note)  -MS     Mode of Treatment physical therapy  -MS       Row Name  03/10/25 1324          General Information    Patient Profile Reviewed yes  -MS               User Key  (r) = Recorded By, (t) = Taken By, (c) = Cosigned By      Initials Name Provider Type    Bandar Vera PT Physical Therapist                   Mobility       Row Name 03/10/25 1324          Bed Mobility    Supine-Sit [place] (Bed Mobility) minimum assist (75% patient effort);verbal cues  -MS       Row Name 03/10/25 1324          Sit-Stand Transfer    Sit-Stand [place] (Transfers) minimum assist (75% patient effort);moderate assist (50% patient effort);verbal cues  -MS     Assistive Device (Sit-Stand Transfers) walker, front-wheeled  -MS       Row Name 03/10/25 1324          Gait/Stairs (Locomotion)    [place] Level (Gait) minimum assist (75% patient effort);moderate assist (50% patient effort)  -MS     Assistive Device (Gait) walker, front-wheeled  -MS     Patient was able to Ambulate yes  -MS     Distance in Feet (Gait) 14  -MS     Deviations/Abnormal Patterns (Gait) festinating/shuffling;weight shifting decreased  -MS     Left Sided Gait Deviations foot drop/toe drag;weight shift ability decreased  -MS       Row Name 03/10/25 1324          Mobility    Extremity Weight-bearing Status left lower extremity  -MS     Left Lower Extremity (Weight-bearing Status) weight-bearing as tolerated (WBAT)  -MS               User Key  (r) = Recorded By, (t) = Taken By, (c) = Cosigned By      Initials Name Provider Type    Bandar Vera PT Physical Therapist                   Obj/Interventions       Row Name 03/10/25 1331          Range of Motion Comprehensive    General Range of Motion bilateral lower extremity ROM WFL  -MS       Row Name 03/10/25 1331          Strength Comprehensive (MMT)    General Manual Muscle Testing (MMT) Assessment lower extremity strength deficits identified  -MS     Comment, General Manual Muscle Testing (MMT) Assessment B LE 4-/5  -MS       Row Name 03/10/25 1331           Motor Skills    Therapeutic Exercise hip;knee;ankle  -MS       Row Name 03/10/25 1331          Hip (Therapeutic Exercise)    Hip (Therapeutic Exercise) strengthening exercise  -MS     Hip Strengthening (Therapeutic Exercise) marching while seated;10 repetitions;aBduction;aDduction;bilateral  -MS       Row Name 03/10/25 1331          Knee (Therapeutic Exercise)    Knee (Therapeutic Exercise) strengthening exercise  -MS     Knee Strengthening (Therapeutic Exercise) LAQ (long arc quad);10 repetitions;bilateral  -MS               User Key  (r) = Recorded By, (t) = Taken By, (c) = Cosigned By      Initials Name Provider Type    MS Bandar Ramey, PT Physical Therapist                   Goals/Plan    No documentation.                  Clinical Impression       Row Name 03/10/25 1332          Pain    Pretreatment Pain Rating 2/10  -MS     Posttreatment Pain Rating 3/10  -MS     Pain Location other (see comments)  pelvis  -MS     Pain Side/Orientation left  -MS     Pain Management Interventions activity modification encouraged  -MS     Response to Pain Interventions activity participation with tolerable pain  -MS       Row Name 03/10/25 1332          Plan of Care Review    Plan of Care Reviewed With patient  -MS     Progress improving  -MS     Outcome Evaluation Pt participated in PT tx this date. Pt reports he is feeling better today. Pt was able to t/f to EOB with Raul. Pt was Rual for STS transfer with cues for hand placement. Pt was able to ambulate 14ft with Rwx and Raul. Pt reports he was dx with cerebellar atrophy which attributes to his difficulties with ambulation, and reports that he feels these symptoms are worse since the fall. Pt would benefit from skilled PT tx during this inpatient stay.  -MS       Row Name 03/10/25 1332          Therapy Assessment/Plan (PT)    Patient/Family Therapy Goals Statement (PT) to get stronger  -MS     Rehab Potential (PT) good  -MS     Criteria for Skilled Interventions Met (PT)  yes;meets criteria;skilled treatment is necessary  -MS     Therapy Frequency (PT) daily  -MS       Row Name 03/10/25 1332          Vital Signs    O2 Delivery Pre Treatment room air  -MS     O2 Delivery Intra Treatment room air  -MS     O2 Delivery Post Treatment room air  -MS     Pre Patient Position Supine  -MS     Intra Patient Position Standing  -MS     Post Patient Position Sitting  -MS       Row Name 03/10/25 1332          Positioning and Restraints    Pre-Treatment Position in bed  -MS     Post Treatment Position chair  -MS     In Chair sitting;call light within reach;encouraged to call for assist;notified nsg  -MS               User Key  (r) = Recorded By, (t) = Taken By, (c) = Cosigned By      Initials Name Provider Type    Bandar Vera, PT Physical Therapist                   Outcome Measures       Row Name 03/10/25 1349 03/10/25 0500       How much help from another person do you currently need...    Turning from your back to your side while in flat bed without using bedrails? 3  -MS 3  -KP    Moving from lying on back to sitting on the side of a flat bed without bedrails? 3  -MS 2  -KP    Moving to and from a bed to a chair (including a wheelchair)? 3  -MS 3  -KP    Standing up from a chair using your arms (e.g., wheelchair, bedside chair)? 3  -MS 3  -KP    Climbing 3-5 steps with a railing? 2  -MS 1  -KP    To walk in hospital room? 3  -MS 2  -KP    AM-PAC 6 Clicks Score (PT) 17  -MS 14  -KP    Highest Level of Mobility Goal 5 --> Static standing  -MS 4 --> Transfer to chair/commode  -KP      Row Name 03/10/25 0300          How much help from another person do you currently need...    Turning from your back to your side while in flat bed without using bedrails? 3  -KP     Moving from lying on back to sitting on the side of a flat bed without bedrails? 2  -KP     Moving to and from a bed to a chair (including a wheelchair)? 3  -KP     Standing up from a chair using your arms (e.g., wheelchair,  bedside chair)? 3  -KP     Climbing 3-5 steps with a railing? 1  -KP     To walk in hospital room? 2  -KP     AM-PAC 6 Clicks Score (PT) 14  -KP     Highest Level of Mobility Goal 4 --> Transfer to chair/commode  -               User Key  (r) = Recorded By, (t) = Taken By, (c) = Cosigned By      Initials Name Provider Type    MS Bandar Ramey, PT Physical Therapist    Vanesa Zimmerman, RN Registered Nurse                                 Physical Therapy Education       Title: PT OT SLP Therapies (In Progress)       Topic: Physical Therapy (In Progress)       Point: Mobility training (Done)       Learning Progress Summary            Patient Acceptance, E, VU by MS at 3/10/2025 1359    Comment: importance of mobility    Acceptance, E, VU by MS at 3/10/2025 1349    Comment: importance of mobility    Acceptance, E,D, VU,DU by  at 3/9/2025 1221    Comment: Sequencing with FWW for ambulation discussed with pt prior to ambulation and safety cues for sequencing during gait.    Acceptance, E,TB, VU by  at 3/8/2025 1328    Comment: Role of PT and POC.  Proper use of RW and sequencing for stairs   Family Acceptance, E,TB, VU by  at 3/8/2025 1328    Comment: Role of PT and POC.  Proper use of RW and sequencing for stairs                      Point: Home exercise program (Done)       Learning Progress Summary            Patient Acceptance, E, VU by MS at 3/10/2025 1359    Comment: importance of mobility    Acceptance, E, VU by MS at 3/10/2025 1349    Comment: importance of mobility                      Point: Body mechanics (Not Started)       Learner Progress:  Not documented in this visit.              Point: Precautions (Not Started)       Learner Progress:  Not documented in this visit.                              User Key       Initials Effective Dates Name Provider Type Discipline     08/22/23 -  Mechelle Mcfarland, PT Physical Therapist PT    TW 06/16/21 -  Janis Colindres, PT Physical Therapist PT    MS 08/22/23  -  Bandar aRmey PT Physical Therapist PT                  PT Recommendation and Plan     Progress: improving  Outcome Evaluation: Pt participated in PT tx this date. Pt reports he is feeling better today. Pt was able to t/f to EOB with Raul. Pt was Raul for STS transfer with cues for hand placement. Pt was able to ambulate 14ft with Rwx and Raul. Pt reports he was dx with cerebellar atrophy which attributes to his difficulties with ambulation, and reports that he feels these symptoms are worse since the fall. Pt would benefit from skilled PT tx during this inpatient stay.     Time Calculation:         PT Charges       Row Name 03/10/25 1359             Time Calculation    Start Time 1104  -MS      Stop Time 1128  -MS      Time Calculation (min) 24 min  -MS      PT Received On 03/10/25  -MS         Timed Charges    54726 - PT Therapeutic Exercise Minutes 10  -MS      12200 - Gait Training Minutes  14  -MS         Total Minutes    Timed Charges Total Minutes 24  -MS       Total Minutes 24  -MS                User Key  (r) = Recorded By, (t) = Taken By, (c) = Cosigned By      Initials Name Provider Type    MS Bandar Ramey PT Physical Therapist                  Therapy Charges for Today       Code Description Service Date Service Provider Modifiers Qty    59832016049 HC PT THER PROC EA 15 MIN 3/10/2025 Bandar Ramey, PT GP 1    59409485115 HC GAIT TRAINING EA 15 MIN 3/10/2025 Bandar Ramey PT GP 1            PT G-Codes  Outcome Measure Options: AM-PAC 6 Clicks Basic Mobility (PT)  AM-PAC 6 Clicks Score (PT): 17  PT Discharge Summary  Anticipated Discharge Disposition (PT): inpatient rehabilitation facility    Bandar aRmey PT  3/10/2025      Electronically signed by Bandar Ramey PT at 03/10/25 1400          Occupational Therapy Notes (last 48 hours)        Maria Esther Rodríguez at 03/10/25 1404          Patient Name: Chris Mejia  : 1960    MRN: [id number]                               Today's Date: 3/10/2025       Admit Date: 3/7/2025    Visit Dx:     ICD-10-CM ICD-9-CM   1. Closed fracture of left inferior pubic ramus, initial encounter  S32.592A 808.2   2. Closed fracture of superior ramus of left pubis, initial encounter  S32.512A 808.2     Patient Active Problem List   Diagnosis    Tubular adenoma of colon    Trigeminal neuralgia    Spinocerebellar ataxia    RA (rheumatoid arthritis)    Insomnia    Essential hypertension    Hypercholesteremia    Acid reflux    Anxiety    Vitamin B12 deficiency    Vitamin D deficiency    DENZEL (obstructive sleep apnea)    Atopic rhinitis    Ataxic gait    Shoulder pain    Ophthalmoplegia    CAD in native artery    Seasonal allergies    Benign non-nodular prostatic hyperplasia with lower urinary tract symptoms    Weakness    Tension headache    Limited response to serotonin reuptake inhibitors 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    HTR2A GG genotype    HLA-B*1502 allele negative    CYP2B6 intermediate metabolizer    Vertigo    Cerebellar atrophy    Chest pain    SOB (shortness of breath)    Aortic root dilation    Dysphagia    Pubic ramus fracture     Past Medical History:   Diagnosis Date    Allergic rhinitis     Anxiety     Arthritis     Balance problem     Carpal tunnel syndrome     Cluster headache     Coronary artery disease     Developmental delay     Diabetes mellitus     Noted by PCP    Difficulty walking     Dizziness     Dysphagia     Enlarged prostate     GERD (gastroesophageal reflux disease)     Gout     Hip fracture, left     History of cardiovascular stress test 2018    positive for inferior and lateral ischemia     History of echocardiogram     Hyperlipidemia     Hypertension     Impaired mobility     Lymphadenitis     Memory loss     Migraine     Morbid obesity with BMI of 40.0-44.9, adult 08/10/2020    Associated with hypertension, hyperlipidemia       Myocardial infarction 2000    Obesity     DENZEL (obstructive sleep  apnea)     oxygen at night- 2LNC    Peptic ulcer     Peptic ulceration     Poor historian     Rheumatoid arthritis     Right shoulder pain     SOB (shortness of breath) on exertion     Spinocerebellar ataxia     Tension headache     Trigeminal neuralgia     Vitamin B 12 deficiency     Vitamin D deficiency      Past Surgical History:   Procedure Laterality Date    CARDIAC CATHETERIZATION      CARDIAC CATHETERIZATION Left 3/14/2018    Procedure: Cardiac Catheterization/Vascular Study;  Surgeon: Adam Perkins MD;  Location:  [facility];  Service: Cardiovascular    COLONOSCOPY      COLONOSCOPY N/A 9/13/2019    Procedure: COLONOSCOPY W/ COLD SNARE POLYPECTOMY;  Surgeon: Melba Lopez MD;  Location:  [facility];  Service: Gastroenterology    COLONOSCOPY N/A 9/24/2024    Procedure: COLONOSCOPY;  Surgeon: Melba Lopez MD;  Location:  [facility];  Service: Gastroenterology;  Laterality: N/A;    ELBOW ARTHROPLASTY Bilateral     ENDOSCOPY      ENDOSCOPY N/A 9/24/2024    Procedure: ESOPHAGOGASTRODUODENOSCOPY WITH BIOPSY;  Surgeon: Melba Lopez MD;  Location:  [facility];  Service: Gastroenterology;  Laterality: N/A;    HERNIA REPAIR      x3    HIP HEMIARTHROPLASTY Left     NEUROPLASTY Bilateral     decompression median nerve at carpal tunnel    TOTAL KNEE ARTHROPLASTY Bilateral     2005, 2012      General Information       Row Name 03/10/25 8774          OT Time and Intention    Subjective Information complains of;pain  -AH     Document Type evaluation  -     Mode of Treatment occupational therapy  -     Patient Effort good  -     Symptoms Noted During/After Treatment increased pain  -       Row Name 03/10/25 3696          General Information    Patient Profile Reviewed yes  -AH     Prior Level of Function independent:;ADL's;community mobility  reports he uses a cane normally  -     Existing Precautions/Restrictions fall  -     Barriers to Rehab previous functional  deficit  -[facility] Name 03/10/25 1335          Occupational Profile    Reason for Services/Referral (Occupational Profile) ADL decline  -[facility] Name 03/10/25 1335          Living Environment    Current Living Arrangements home  -     People in Home spouse  -[facility] Name 03/10/25 1335          Home Main Entrance    Number of Stairs, Main Entrance three  -     Stair Railings, Main Entrance railing on right side (ascending)  -[facility] Name 03/10/25 1335          Stairs Within Home, Primary    Number of Stairs, Within Home, Primary none  -[facility] Name 03/10/25 1335          Cognition    Orientation Status (Cognition) oriented x 4  -[facility] Name 03/10/25 1335          Safety Issues/Impairments Affecting Functional Mobility    Safety Issues Affecting Function (Mobility) safety precaution awareness;safety precautions follow-through/compliance;insight into deficits/self-awareness;awareness of need for assistance  -     Impairments Affecting Function (Mobility) pain;endurance/activity tolerance;balance;strength  -               User Key  (r) = Recorded By, (t) = Taken By, (c) = Cosigned By      Initials Name Provider Type     Maria Eshter Rodríguez Occupational Therapist                     Mobility/ADL's       [facility] Name 03/10/25 1336          Bed Mobility    Bed Mobility supine-sit  -     Supine-Sit [place] (Bed Mobility) minimum assist (75% patient effort);verbal cues  -     Assistive Device (Bed Mobility) head of bed elevated;bed rails  -[facility] Name 03/10/25 1336          Sit-Stand Transfer    Sit-Stand [place] (Transfers) minimum assist (75% patient effort);verbal cues  -     Assistive Device (Sit-Stand Transfers) walker, front-wheeled  -[facility] Name 03/10/25 1336          Functional Mobility    Functional Mobility- Ind. Level minimum assist (75% patient effort)  [facility]     Functional Mobility- Device walker, front-wheeled  -     Functional Mobility-Distance (Feet)  14  -     Patient was able to Ambulate yes  -[facility] Name 03/10/25 1336          Activities of Daily Living    BADL Assessment/Intervention bathing;upper body dressing;lower body dressing;grooming;feeding;toileting  -[facility] Name 03/10/25 1336          Mobility    Extremity Weight-bearing Status left lower extremity  -     Left Lower Extremity (Weight-bearing Status) weight-bearing as tolerated (WBAT)  -[facility] Name 03/10/25 1336          Bathing Assessment/Intervention    [place] Level (Bathing) minimum assist (75% patient effort)  -[facility] Name 03/10/25 1336          Upper Body Dressing Assessment/Training    [place] Level (Upper Body Dressing) set up  -[facility] Name 03/10/25 1336          Lower Body Dressing Assessment/Training    [place] Level (Lower Body Dressing) maximum assist (25% patient effort)  -[facility] Name 03/10/25 1336          Grooming Assessment/Training    [place] Level (Grooming) set up  -AH       Row Name 03/10/25 1336          Self-Feeding Assessment/Training    [place] Level (Feeding) set up  -AH       Row Name 03/10/25 1336          Toileting Assessment/Training    [place] Level (Toileting) minimum assist (75% patient effort)  -               User Key  (r) = Recorded By, (t) = Taken By, (c) = Cosigned By      Initials Name Provider Type    Maria Esther Chun Occupational Therapist                   Obj/Interventions       [facility] Name 03/10/25 1338          Vision Assessment/Intervention    Visual Impairment/Limitations WFL;corrective lenses full-time  -[facility] Name 03/10/25 1338          Range of Motion Comprehensive    General Range of Motion bilateral upper extremity ROM WFL  -     Comment, General Range of Motion pt has some limited shoulder ROM and has some arthritic changes in his L hand  -[facility] Name 03/10/25 1338          Strength Comprehensive (MMT)    Comment, General Manual Muscle Testing (MMT) Assessment JAE  [facility]  -               User Key  (r) = Recorded By, (t) = Taken By, (c) = Cosigned By      Initials Name Provider Type    Maria Esther Chun Occupational Therapist                   Goals/Plan       Row Name 03/10/25 1401          Bed Mobility Goal 1 (OT)    Activity/Assistive Device (Bed Mobility Goal 1, OT) bed mobility activities, all  -AH     [place] Level/Cues Needed (Bed Mobility Goal 1, OT) supervision required  -AH     Time Frame (Bed Mobility Goal 1, OT) by discharge  -     Progress/Outcomes (Bed Mobility Goal 1, OT) goal ongoing  -       Row Name 03/10/25 1401          Transfer Goal 1 (OT)    Activity/Assistive Device (Transfer Goal 1, OT) sit-to-stand/stand-to-sit;walker, rolling  -     [place] Level/Cues Needed (Transfer Goal 1, OT) contact guard required  -     Time Frame (Transfer Goal 1, OT) by discharge  -     Progress/Outcome (Transfer Goal 1, OT) goal ongoing  -       Row Name 03/10/25 1401          Bathing Goal 1 (OT)    Activity/Device (Bathing Goal 1, OT) bathing skills, all  -     [place] Level/Cues Needed (Bathing Goal 1, OT) minimum assist (75% or more patient effort)  -     Time Frame (Bathing Goal 1, OT) long term goal (LTG);1 week  -     Progress/Outcomes (Bathing Goal 1, OT) goal ongoing  -       Row Name 03/10/25 1401          Dressing Goal 1 (OT)    Activity/Device (Dressing Goal 1, OT) lower body dressing  -     [place]/Cues Needed (Dressing Goal 1, OT) minimum assist (75% or more patient effort)  -     Time Frame (Dressing Goal 1, OT) long term goal (LTG);1 week  -     Progress/Outcome (Dressing Goal 1, OT) goal ongoing  -       Row Name 03/10/25 1401          Toileting Goal 1 (OT)    Activity/Device (Toileting Goal 1, OT) adjust/manage clothing;perform perineal hygiene  -     [place] Level/Cues Needed (Toileting Goal 1, OT) supervision required  -     Time Frame (Toileting Goal 1, OT) by discharge  -     Progress/Outcome  (Toileting Goal 1, OT) goal ongoing  -       Row Name 03/10/25 1401          Strength Goal 1 (OT)    Strength Goal 1 (OT) Pt will perform UB strengthening ex using theraband for resistance.  -     Time Frame (Strength Goal 1, OT) by discharge  -     Progress/Outcome (Strength Goal 1, OT) goal ongoing  -       Row Name 03/10/25 1401          Therapy Assessment/Plan (OT)    Planned Therapy Interventions (OT) activity tolerance training;BADL retraining;patient/caregiver education/training;transfer/mobility retraining;strengthening exercise  -               User Key  (r) = Recorded By, (t) = Taken By, (c) = Cosigned By      Initials Name Provider Type     Maria Esther Rodríguez Occupational Therapist                   Clinical Impression       Row Name 03/10/25 4309          Pain Assessment    Pretreatment Pain Rating 2/10  -     Posttreatment Pain Rating 3/10  -     Pain Location other (see comments)  pelvis  -     Pain Side/Orientation left  -     Pain Management Interventions activity modification encouraged  -     Response to Pain Interventions activity participation with tolerable pain;activity participation with increased pain  -       Row Name 03/10/25 1836          Plan of Care Review    Plan of Care Reviewed With patient  -     Progress no change  -     Outcome Evaluation Pt seen for OT evaluation today.  Pt lives at home with his wife and is normally independent with self care tasks, he uses a cane for mobility tasks.  Pt received supine in bed and was min assist to sit eob, min assist to stand and min assist to walk 14' with RW.  Pt is expected to benefit from skilled OT to improve his independence with ADL tasks.  Pt wants to d/c to rehab upon d/c from the hospital.  -       Row Name 03/10/25 6665          Therapy Assessment/Plan (OT)    Patient/Family Therapy Goal Statement (OT) d/c rehab  -     Rehab Potential (OT) good  -     Criteria for Skilled Therapeutic Interventions Met  (OT) yes;skilled treatment is necessary  -     Therapy Frequency (OT) 3 times/wk  -       Row Name 03/10/25 1355          Therapy Plan Review/Discharge Plan (OT)    Anticipated Discharge Disposition (OT) sub acute care setting  -       Row Name 03/10/25 1355          Positioning and Restraints    Pre-Treatment Position in bed  -     Post Treatment Position chair  -     In Chair sitting;call light within reach;encouraged to call for assist;notified ns  -               User Key  (r) = Recorded By, (t) = Taken By, (c) = Cosigned By      Initials Name Provider Type    Maria Esther Chun Occupational Therapist                   Outcome Measures       Row Name 03/10/25 1402          How much help from another is currently needed...    Putting on and taking off regular lower body clothing? 2  -AH     Bathing (including washing, rinsing, and drying) 2  -AH     Toileting (which includes using toilet bed pan or urinal) 3  -AH     Putting on and taking off regular upper body clothing 4  -AH     Taking care of personal grooming (such as brushing teeth) 3  -AH     Eating meals 4  -AH     AM-PAC 6 Clicks Score (OT) 18  -       Row Name 03/10/25 1349 03/10/25 0500       How much help from another person do you currently need...    Turning from your back to your side while in flat bed without using bedrails? 3  -MS 3  -KP    Moving from lying on back to sitting on the side of a flat bed without bedrails? 3  -MS 2  -KP    Moving to and from a bed to a chair (including a wheelchair)? 3  -MS 3  -KP    Standing up from a chair using your arms (e.g., wheelchair, bedside chair)? 3  -MS 3  -KP    Climbing 3-5 steps with a railing? 2  -MS 1  -KP    To walk in hospital room? 3  -MS 2  -KP    AM-PAC 6 Clicks Score (PT) 17  -MS 14  -KP    Highest Level of Mobility Goal 5 --> Static standing  -MS 4 --> Transfer to chair/commode  -KP      Row Name 03/10/25 0300          How much help from another person do you currently need...     Turning from your back to your side while in flat bed without using bedrails? 3  -KP     Moving from lying on back to sitting on the side of a flat bed without bedrails? 2  -KP     Moving to and from a bed to a chair (including a wheelchair)? 3  -KP     Standing up from a chair using your arms (e.g., wheelchair, bedside chair)? 3  -KP     Climbing 3-5 steps with a railing? 1  -KP     To walk in hospital room? 2  -KP     AM-PAC 6 Clicks Score (PT) 14  -KP     Highest Level of Mobility Goal 4 --> Transfer to chair/commode  -       Row Name 03/10/25 1402          Functional Assessment    Outcome Measure Options AM-PAC 6 Clicks Daily Activity (OT)  -               User Key  (r) = Recorded By, (t) = Taken By, (c) = Cosigned By      Initials Name Provider Type     Maria Esther Rodríguez Occupational Therapist    Bandar Vera, PT Physical Therapist    Vanesa Zimmerman, RN Registered Nurse                    Occupational Therapy Education       Title: PT OT SLP Therapies (In Progress)       Topic: Occupational Therapy (In Progress)       Point: ADL training (Done)       Description:   Instruct learner(s) on proper safety adaptation and remediation techniques during self care or transfers.   Instruct in proper use of assistive devices.                  Learning Progress Summary            Patient Acceptance, E,TB, VU by  at 3/10/2025 1403    Comment: Role of OT/POC                      Point: Home exercise program (Not Started)       Description:   Instruct learner(s) on appropriate technique for monitoring, assisting and/or progressing therapeutic exercises/activities.                  Learner Progress:  Not documented in this visit.              Point: Precautions (Not Started)       Description:   Instruct learner(s) on prescribed precautions during self-care and functional transfers.                  Learner Progress:  Not documented in this visit.              Point: Body mechanics (Not Started)        Description:   Instruct learner(s) on proper positioning and spine alignment during self-care, functional mobility activities and/or exercises.                  Learner Progress:  Not documented in this visit.                              User Key       Initials Effective Dates Name Provider Type Discipline     06/16/21 -  Maria Esther Rodríguez Occupational Therapist OT                  OT Recommendation and Plan  Planned Therapy Interventions (OT): activity tolerance training, BADL retraining, patient/caregiver education/training, transfer/mobility retraining, strengthening exercise  Therapy Frequency (OT): 3 times/wk  Plan of Care Review  Plan of Care Reviewed With: patient  Progress: no change  Outcome Evaluation: Pt seen for OT evaluation today.  Pt lives at home with his wife and is normally independent with self care tasks, he uses a cane for mobility tasks.  Pt received supine in bed and was min assist to sit eob, min assist to stand and min assist to walk 14' with RW.  Pt is expected to benefit from skilled OT to improve his independence with ADL tasks.  Pt wants to d/c to rehab upon d/c from the hospital.     Time Calculation:   Evaluation Complexity (OT)  Review Occupational Profile/Medical/Therapy History Complexity: expanded/moderate complexity  Assessment, Occupational Performance/Identification of Deficit Complexity: 3-5 performance deficits  Clinical Decision Making Complexity (OT): detailed assessment/moderate complexity  Overall Complexity of Evaluation (OT): moderate complexity     Time Calculation- OT       Row Name 03/10/25 1404 03/10/25 1359          Time Calculation- OT    OT Start Time 1105  - --     OT Received On 03/10/25  - --     OT Goal Re-Cert Due Date 03/20/25  - --        Timed Charges    33920 - Gait Training Minutes  -- 14  -MS        Untimed Charges    OT Eval/Re-eval Minutes 45  - --        Total Minutes    Timed Charges Total Minutes -- 14  -MS     Untimed Charges Total Minutes  45  -AH --      Total Minutes 45  -AH 14  -MS               User Key  (r) = Recorded By, (t) = Taken By, (c) = Cosigned By      Initials Name Provider Type    Maria Esther Chun Occupational Therapist    Bandar Vera, PT Physical Therapist                  Therapy Charges for Today       Code Description Service Date Service Provider Modifiers Qty    52046935523 HC OT EVAL MOD COMPLEXITY 3 3/10/2025 Maria Esther Rodríguez GO 1                 Maria Esther Rodríguez  3/10/2025    Electronically signed by Maria Esther Rodríguez at 03/10/25 1404       Maria Esther Rodríguez at 03/10/25 1403          Goal Outcome Evaluation:  Plan of Care Reviewed With: patient        Progress: no change  Outcome Evaluation: Pt seen for OT evaluation today.  Pt lives at home with his wife and is normally independent with self care tasks, he uses a cane for mobility tasks.  Pt received supine in bed and was min assist to sit eob, min assist to stand and min assist to walk 14' with RW.  Pt is expected to benefit from skilled OT to improve his independence with ADL tasks.  Pt wants to d/c to rehab upon d/c from the hospital.    Anticipated Discharge Disposition (OT): sub acute care setting                          Electronically signed by Maria Esther Rodríguez at 03/10/25 1404

## 2025-03-10 NOTE — CASE MANAGEMENT/SOCIAL WORK
Case Management/Social Work    Patient Name:  Chris Mejia  YOB: 1960  MRN: [id number]  Admit Date:  3/7/2025        14:35 EDT  Presented patient with bed offer from [facility] which he accepts. SW notified.      Electronically signed by:  Kirill Colin RN  03/10/25 14:35 EDT

## 2025-03-10 NOTE — PLAN OF CARE
Goal Outcome Evaluation:  Plan of Care Reviewed With: patient        Progress: no change  Outcome Evaluation: Pt seen for OT evaluation today.  Pt lives at home with his wife and is normally independent with self care tasks, he uses a cane for mobility tasks.  Pt received supine in bed and was min assist to sit eob, min assist to stand and min assist to walk 14' with RW.  Pt is expected to benefit from skilled OT to improve his independence with ADL tasks.  Pt wants to d/c to rehab upon d/c from the hospital.    Anticipated Discharge Disposition (OT): sub acute care setting

## 2025-03-10 NOTE — THERAPY TREATMENT NOTE
Patient Name: Chris Mejia  : 1960    MRN: [id number]                              Today's Date: 3/10/2025       Admit Date: 3/7/2025    Visit Dx:     ICD-10-CM ICD-9-CM   1. Closed fracture of left inferior pubic ramus, initial encounter  S32.592A 808.2   2. Closed fracture of superior ramus of left pubis, initial encounter  S32.512A 808.2     Patient Active Problem List   Diagnosis    Tubular adenoma of colon    Trigeminal neuralgia    Spinocerebellar ataxia    RA (rheumatoid arthritis)    Insomnia    Essential hypertension    Hypercholesteremia    Acid reflux    Anxiety    Vitamin B12 deficiency    Vitamin D deficiency    DENZEL (obstructive sleep apnea)    Atopic rhinitis    Ataxic gait    Shoulder pain    Ophthalmoplegia    CAD in native artery    Seasonal allergies    Benign non-nodular prostatic hyperplasia with lower urinary tract symptoms    Weakness    Tension headache    Limited response to serotonin reuptake inhibitors 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    HTR2A GG genotype    HLA-B*1502 allele negative    CYP2B6 intermediate metabolizer    Vertigo    Cerebellar atrophy    Chest pain    SOB (shortness of breath)    Aortic root dilation    Dysphagia    Pubic ramus fracture     Past Medical History:   Diagnosis Date    Allergic rhinitis     Anxiety     Arthritis     Balance problem     Carpal tunnel syndrome     Cluster headache     Coronary artery disease     Developmental delay     Diabetes mellitus     Noted by PCP    Difficulty walking     Dizziness     Dysphagia     Enlarged prostate     GERD (gastroesophageal reflux disease)     Gout     Hip fracture, left     History of cardiovascular stress test 2018    positive for inferior and lateral ischemia     History of echocardiogram     Hyperlipidemia     Hypertension     Impaired mobility     Lymphadenitis     Memory loss     Migraine     Morbid obesity with BMI of 40.0-44.9, adult 08/10/2020    Associated with  hypertension, hyperlipidemia       Myocardial infarction 2000    Obesity     DENZEL (obstructive sleep apnea)     oxygen at night- 2LNC    Peptic ulcer     Peptic ulceration     Poor historian     Rheumatoid arthritis     Right shoulder pain     SOB (shortness of breath) on exertion     Spinocerebellar ataxia     Tension headache     Trigeminal neuralgia     Vitamin B 12 deficiency     Vitamin D deficiency      Past Surgical History:   Procedure Laterality Date    CARDIAC CATHETERIZATION      CARDIAC CATHETERIZATION Left 3/14/2018    Procedure: Cardiac Catheterization/Vascular Study;  Surgeon: Adam Perkins MD;  Location:  [facility];  Service: Cardiovascular    COLONOSCOPY      COLONOSCOPY N/A 9/13/2019    Procedure: COLONOSCOPY W/ COLD SNARE POLYPECTOMY;  Surgeon: Melba Lopez MD;  Location: [facility] ENDOSCOPY;  Service: Gastroenterology    COLONOSCOPY N/A 9/24/2024    Procedure: COLONOSCOPY;  Surgeon: Melba Lopez MD;  Location: [facility] ENDOSCOPY;  Service: Gastroenterology;  Laterality: N/A;    ELBOW ARTHROPLASTY Bilateral     ENDOSCOPY      ENDOSCOPY N/A 9/24/2024    Procedure: ESOPHAGOGASTRODUODENOSCOPY WITH BIOPSY;  Surgeon: Melba Lopez MD;  Location: [facility] ENDOSCOPY;  Service: Gastroenterology;  Laterality: N/A;    HERNIA REPAIR      x3    HIP HEMIARTHROPLASTY Left     NEUROPLASTY Bilateral     decompression median nerve at carpal tunnel    TOTAL KNEE ARTHROPLASTY Bilateral     2005, 2012      General Information       Row Name 03/10/25 1324          Physical Therapy Time and Intention    Document Type therapy note (daily note)  -MS     Mode of Treatment physical therapy  -MS       Row Name 03/10/25 6754          General Information    Patient Profile Reviewed yes  -MS               User Key  (r) = Recorded By, (t) = Taken By, (c) = Cosigned By      Initials Name Provider Type    Bandar Vera PT Physical Therapist                   Mobility       Row Name 03/10/25 0268           Bed Mobility    Supine-Sit Willis (Bed Mobility) minimum assist (75% patient effort);verbal cues  -MS       Row Name 03/10/25 1324          Sit-Stand Transfer    Sit-Stand Willis (Transfers) minimum assist (75% patient effort);moderate assist (50% patient effort);verbal cues  -MS     Assistive Device (Sit-Stand Transfers) walker, front-wheeled  -MS       Row Name 03/10/25 1324          Gait/Stairs (Locomotion)    Willis Level (Gait) minimum assist (75% patient effort);moderate assist (50% patient effort)  -MS     Assistive Device (Gait) walker, front-wheeled  -MS     Patient was able to Ambulate yes  -MS     Distance in Feet (Gait) 14  -MS     Deviations/Abnormal Patterns (Gait) festinating/shuffling;weight shifting decreased  -MS     Left Sided Gait Deviations foot drop/toe drag;weight shift ability decreased  -MS       Row Name 03/10/25 1324          Mobility    Extremity Weight-bearing Status left lower extremity  -MS     Left Lower Extremity (Weight-bearing Status) weight-bearing as tolerated (WBAT)  -MS               User Key  (r) = Recorded By, (t) = Taken By, (c) = Cosigned By      Initials Name Provider Type    MS Bandar Ramey, PT Physical Therapist                   Obj/Interventions       Row Name 03/10/25 1331          Range of Motion Comprehensive    General Range of Motion bilateral lower extremity ROM WFL  -MS       Row Name 03/10/25 1331          Strength Comprehensive (MMT)    General Manual Muscle Testing (MMT) Assessment lower extremity strength deficits identified  -MS     Comment, General Manual Muscle Testing (MMT) Assessment B LE 4-/5  -MS       Row Name 03/10/25 1331          Motor Skills    Therapeutic Exercise hip;knee;ankle  -MS       Row Name 03/10/25 1331          Hip (Therapeutic Exercise)    Hip (Therapeutic Exercise) strengthening exercise  -MS     Hip Strengthening (Therapeutic Exercise) marching while seated;10 repetitions;aBduction;aDduction;bilateral   -MS       Row Name 03/10/25 1331          Knee (Therapeutic Exercise)    Knee (Therapeutic Exercise) strengthening exercise  -MS     Knee Strengthening (Therapeutic Exercise) LAQ (long arc quad);10 repetitions;bilateral  -MS               User Key  (r) = Recorded By, (t) = Taken By, (c) = Cosigned By      Initials Name Provider Type    MS Bandar Ramey, PT Physical Therapist                   Goals/Plan    No documentation.                  Clinical Impression       Row Name 03/10/25 1332          Pain    Pretreatment Pain Rating 2/10  -MS     Posttreatment Pain Rating 3/10  -MS     Pain Location other (see comments)  pelvis  -MS     Pain Side/Orientation left  -MS     Pain Management Interventions activity modification encouraged  -MS     Response to Pain Interventions activity participation with tolerable pain  -MS       Row Name 03/10/25 4266          Plan of Care Review    Plan of Care Reviewed With patient  -MS     Progress improving  -MS     Outcome Evaluation Pt participated in PT tx this date. Pt reports he is feeling better today. Pt was able to t/f to EOB with Raul. Pt was Raul for STS transfer with cues for hand placement. Pt was able to ambulate 14ft with Rwx and Raul. Pt reports he was dx with cerebellar atrophy which attributes to his difficulties with ambulation, and reports that he feels these symptoms are worse since the fall. Pt would benefit from skilled PT tx during this inpatient stay.  -MS       Row Name 03/10/25 9931          Therapy Assessment/Plan (PT)    Patient/Family Therapy Goals Statement (PT) to get stronger  -MS     Rehab Potential (PT) good  -MS     Criteria for Skilled Interventions Met (PT) yes;meets criteria;skilled treatment is necessary  -MS     Therapy Frequency (PT) daily  -MS       Row Name 03/10/25 1334          Vital Signs    O2 Delivery Pre Treatment room air  -MS     O2 Delivery Intra Treatment room air  -MS     O2 Delivery Post Treatment room air  -MS     Pre Patient  Position Supine  -MS     Intra Patient Position Standing  -MS     Post Patient Position Sitting  -MS       Row Name 03/10/25 1332          Positioning and Restraints    Pre-Treatment Position in bed  -MS     Post Treatment Position chair  -MS     In Chair sitting;call light within reach;encouraged to call for assist;notified nsg  -MS               User Key  (r) = Recorded By, (t) = Taken By, (c) = Cosigned By      Initials Name Provider Type    Bandar Vera, PT Physical Therapist                   Outcome Measures       Row Name 03/10/25 1349 03/10/25 0500       How much help from another person do you currently need...    Turning from your back to your side while in flat bed without using bedrails? 3  -MS 3  -KP    Moving from lying on back to sitting on the side of a flat bed without bedrails? 3  -MS 2  -KP    Moving to and from a bed to a chair (including a wheelchair)? 3  -MS 3  -KP    Standing up from a chair using your arms (e.g., wheelchair, bedside chair)? 3  -MS 3  -KP    Climbing 3-5 steps with a railing? 2  -MS 1  -KP    To walk in hospital room? 3  -MS 2  -KP    AM-PAC 6 Clicks Score (PT) 17  -MS 14  -KP    Highest Level of Mobility Goal 5 --> Static standing  -MS 4 --> Transfer to chair/commode  -KP      Row Name 03/10/25 0300          How much help from another person do you currently need...    Turning from your back to your side while in flat bed without using bedrails? 3  -KP     Moving from lying on back to sitting on the side of a flat bed without bedrails? 2  -KP     Moving to and from a bed to a chair (including a wheelchair)? 3  -KP     Standing up from a chair using your arms (e.g., wheelchair, bedside chair)? 3  -KP     Climbing 3-5 steps with a railing? 1  -KP     To walk in hospital room? 2  -KP     AM-PAC 6 Clicks Score (PT) 14  -KP     Highest Level of Mobility Goal 4 --> Transfer to chair/commode  -KP               User Key  (r) = Recorded By, (t) = Taken By, (c) = Cosigned By       Initials Name Provider Type    MS Bandar Ramey, PT Physical Therapist    Vanesa Zimmerman, RN Registered Nurse                                 Physical Therapy Education       Title: PT OT SLP Therapies (In Progress)       Topic: Physical Therapy (In Progress)       Point: Mobility training (Done)       Learning Progress Summary            Patient Acceptance, E, VU by MS at 3/10/2025 1359    Comment: importance of mobility    Acceptance, E, VU by MS at 3/10/2025 1349    Comment: importance of mobility    Acceptance, E,D, VU,DU by TW at 3/9/2025 1221    Comment: Sequencing with FWW for ambulation discussed with pt prior to ambulation and safety cues for sequencing during gait.    Acceptance, E,TB, VU by  at 3/8/2025 1328    Comment: Role of PT and POC.  Proper use of RW and sequencing for stairs   Family Acceptance, E,TB, VU by  at 3/8/2025 1328    Comment: Role of PT and POC.  Proper use of RW and sequencing for stairs                      Point: Home exercise program (Done)       Learning Progress Summary            Patient Acceptance, E, VU by MS at 3/10/2025 1359    Comment: importance of mobility    Acceptance, E, VU by MS at 3/10/2025 1349    Comment: importance of mobility                      Point: Body mechanics (Not Started)       Learner Progress:  Not documented in this visit.              Point: Precautions (Not Started)       Learner Progress:  Not documented in this visit.                              User Key       Initials Effective Dates Name Provider Type Discipline     08/22/23 -  Mechelle Mcfarland, PT Physical Therapist PT     06/16/21 -  Janis Colindres, CODY Physical Therapist PT    MS 08/22/23 -  Bandar Ramey, CODY Physical Therapist PT                  PT Recommendation and Plan     Progress: improving  Outcome Evaluation: Pt participated in PT tx this date. Pt reports he is feeling better today. Pt was able to t/f to EOB with Raul. Pt was Raul for STS transfer with cues for  hand placement. Pt was able to ambulate 14ft with Rwx and Raul. Pt reports he was dx with cerebellar atrophy which attributes to his difficulties with ambulation, and reports that he feels these symptoms are worse since the fall. Pt would benefit from skilled PT tx during this inpatient stay.     Time Calculation:         PT Charges       Row Name 03/10/25 1359             Time Calculation    Start Time 1104  -MS      Stop Time 1128  -MS      Time Calculation (min) 24 min  -MS      PT Received On 03/10/25  -MS         Timed Charges    07130 - PT Therapeutic Exercise Minutes 10  -MS      68592 - Gait Training Minutes  14  -MS         Total Minutes    Timed Charges Total Minutes 24  -MS       Total Minutes 24  -MS                User Key  (r) = Recorded By, (t) = Taken By, (c) = Cosigned By      Initials Name Provider Type    Bandar Vera PT Physical Therapist                  Therapy Charges for Today       Code Description Service Date Service Provider Modifiers Qty    32512692013 HC PT THER PROC EA 15 MIN 3/10/2025 Bandar Ramey, PT GP 1    63469146880 HC GAIT TRAINING EA 15 MIN 3/10/2025 Bandar Ramey PT GP 1            PT G-Codes  Outcome Measure Options: AM-PAC 6 Clicks Basic Mobility (PT)  AM-PAC 6 Clicks Score (PT): 17  PT Discharge Summary  Anticipated Discharge Disposition (PT): inpatient rehabilitation facility    Bandar Ramey PT  3/10/2025

## 2025-03-11 ENCOUNTER — TELEPHONE (OUTPATIENT)
Dept: CARDIOLOGY | Facility: CLINIC | Age: 65
End: 2025-03-11
Payer: MEDICARE

## 2025-03-11 VITALS
RESPIRATION RATE: 16 BRPM | SYSTOLIC BLOOD PRESSURE: 124 MMHG | BODY MASS INDEX: 47.78 KG/M2 | HEIGHT: 60 IN | OXYGEN SATURATION: 92 % | DIASTOLIC BLOOD PRESSURE: 80 MMHG | HEART RATE: 72 BPM | WEIGHT: 243.39 LBS | TEMPERATURE: 98.4 F

## 2025-03-11 PROCEDURE — 96372 THER/PROPH/DIAG INJ SC/IM: CPT

## 2025-03-11 PROCEDURE — 99239 HOSP IP/OBS DSCHRG MGMT >30: CPT | Performed by: INTERNAL MEDICINE

## 2025-03-11 PROCEDURE — 25010000002 ENOXAPARIN PER 10 MG: Performed by: INTERNAL MEDICINE

## 2025-03-11 PROCEDURE — G0378 HOSPITAL OBSERVATION PER HR: HCPCS

## 2025-03-11 RX ORDER — ECHINACEA PURPUREA EXTRACT 125 MG
1 TABLET ORAL AS NEEDED
Qty: 88 ML | Refills: 12 | Status: SHIPPED | OUTPATIENT
Start: 2025-03-11 | End: 2025-04-28

## 2025-03-11 RX ORDER — POLYETHYLENE GLYCOL 3350 17 G/17G
17 POWDER, FOR SOLUTION ORAL DAILY
Qty: 30 PACKET | Refills: 0 | Status: SHIPPED | OUTPATIENT
Start: 2025-03-11 | End: 2025-04-10

## 2025-03-11 RX ORDER — HYDROCODONE BITARTRATE AND ACETAMINOPHEN 7.5; 325 MG/1; MG/1
1 TABLET ORAL EVERY 6 HOURS PRN
Qty: 5 TABLET | Refills: 0 | Status: SHIPPED | OUTPATIENT
Start: 2025-03-11 | End: 2025-03-12 | Stop reason: SDUPTHER

## 2025-03-11 RX ORDER — ENOXAPARIN SODIUM 100 MG/ML
60 INJECTION SUBCUTANEOUS EVERY 12 HOURS
Qty: 36 ML | Refills: 0 | Status: SHIPPED | OUTPATIENT
Start: 2025-03-11 | End: 2025-04-10

## 2025-03-11 RX ADMIN — ASPIRIN 81 MG: 81 TABLET, COATED ORAL at 09:28

## 2025-03-11 RX ADMIN — HYDROCHLOROTHIAZIDE 25 MG: 25 TABLET ORAL at 09:28

## 2025-03-11 RX ADMIN — ENOXAPARIN SODIUM 60 MG: 60 INJECTION SUBCUTANEOUS at 05:26

## 2025-03-11 RX ADMIN — PRAMIPEXOLE DIHYDROCHLORIDE 0.12 MG: 0.25 TABLET ORAL at 09:28

## 2025-03-11 RX ADMIN — TAMSULOSIN HYDROCHLORIDE 0.4 MG: 0.4 CAPSULE ORAL at 09:27

## 2025-03-11 RX ADMIN — ISOSORBIDE MONONITRATE 30 MG: 30 TABLET, EXTENDED RELEASE ORAL at 09:29

## 2025-03-11 RX ADMIN — HYDROCODONE BITARTRATE AND ACETAMINOPHEN 1 TABLET: 7.5; 325 TABLET ORAL at 11:17

## 2025-03-11 RX ADMIN — Medication 10 ML: at 09:29

## 2025-03-11 RX ADMIN — AMLODIPINE BESYLATE 10 MG: 5 TABLET ORAL at 09:29

## 2025-03-11 RX ADMIN — CARVEDILOL 25 MG: 25 TABLET, FILM COATED ORAL at 09:28

## 2025-03-11 RX ADMIN — PANTOPRAZOLE SODIUM 40 MG: 40 TABLET, DELAYED RELEASE ORAL at 09:28

## 2025-03-11 RX ADMIN — GUAIFENESIN 600 MG: 600 TABLET, EXTENDED RELEASE ORAL at 09:29

## 2025-03-11 NOTE — PLAN OF CARE
Goal Outcome Evaluation:   VSS during shift. No acute changes noted during shift. Pt with plans to discharge to [facility] 3/11/25. No s/s of acute distress noted at this time. Wife to remain at bedside. POC ongoing.

## 2025-03-11 NOTE — CASE MANAGEMENT/SOCIAL WORK
Case Management/Social Work    Patient Name:  Chris Mejia  YOB: 1960  MRN: [id number]  Admit Date:  3/7/2025        10:28 EDT   Discharge Plan       Row Name 03/11/25 1028       Plan    Plan Patient accepted at [facility] and can transfer for STR once medically ready.                        Electronically signed by:  Kirill Colin RN  03/11/25 10:28 EDT

## 2025-03-11 NOTE — PLAN OF CARE
Goal Outcome Evaluation: Patient being discharged to [facility] via EMS

## 2025-03-11 NOTE — DISCHARGE SUMMARY
[facility]   DISCHARGE SUMMARY      Name:  Chris Mejia   Age:  65 y.o.  Sex:  male  :  1960  MRN:  [id number]   Visit Number:  [id number]    Admission Date:  3/7/2025  Date of Discharge:  3/11/2025  Primary Care Physician:  Naty Lebron MD    Important issues to note:    Start: norco, lovenox, ocean spray  Stop: dexilant  Follow up: PCP and Ortho  Brief Summary: Presented with pubic fracture due to fall. Initially had PT/OT which recommended STR which was arranged at [place].     Discharge Diagnoses:     Status post fall at home.  Left inferior pubic ramus fracture, POA.  Essential hypertension.  Coronary artery disease.  Gastroesophageal reflux disease.  Benign prostatic hyperplasia.      Problem List:     Active Hospital Problems    Diagnosis  POA    **Pubic ramus fracture [S32.599A]  Yes      Resolved Hospital Problems   No resolved problems to display.     Presenting Problem:    Chief Complaint   Patient presents with    Fall    Hip Pain      Consults:     Consulting Physician(s)         Provider   Role Specialty     Shon Gaytan MD      Consulting Physician Orthopedic Surgery          Procedures Performed:            3/11/25: previous provider documentation reviewed.      History of presenting illness:       Chris Mejia is a 65-year-old male with a past medical history of hypertension, coronary artery disease, anxiety, GERD, hyperlipidemia and BPH presenting with left hip pain pain.  Patient states, he got up to go to the bathroom, turned around too quickly and lost his balance.  Unfortunately patient fell landing on his left hip.  At this time, he is complaining of severe left hip pain.  He describes the pain as sharp and nonradiating.  He does have prior history of left hip and bilateral knee surgery and Dr. Gaytan has been his previous orthopedic surgeon.     In ED, blood pressure 136/89, heart rate 78, respiratory rate 18, temperature 98.2 and O2  saturation 93% on room air.  Labs on arrival showed sodium 137, potassium 4.2, BUN 11, creatinine 1.03, WBC 11.1, hemoglobin 16.1 and platelet count 144.  CT of abdomen and pelvis showed Acute nondisplaced fracture of the inferior left pubic ramus. Acute nondisplaced intra-articular fracture of the anterior inferior left acetabulum/root of the superior pubic ramus. No acute hip fracture or dislocation.     Patient was admitted to the medical floor and was seen by Dr. Gaytan who recommended conservative management with weightbearing as tolerated, physical therapy and outpatient follow-up with orthopedic services.  Patient was seen by physical therapy and was able to walk using a walker.  Case management was consulted for short-term rehab placement.  Edited by: Minh Chew DO at 3/11/2025 1042  Pubic ramus fracture       Hospital Course:       Left inferior pubic ramus fracture.  - Seen by Dr. Gaytan who recommended conservative management with weightbearing as tolerated.  - Continue physical and occupational therapy.  - Was on morphine inpatient I will place him on norco for pain.  - Continue Zanaflex nightly from home medications.     Essential hypertension/CAD.  - Continue aspirin, amlodipine, carvedilol, Imdur, hydrochlorothiazide and pravastatin.     BPH.  - Continue tamsulosin.     Discussed with the patient's wife Diana who is at the bedside.  Discussed with nursing staff and multidisciplinary team.       DVT Prophylaxis: Enoxaparin for 30 days  Code Status: Full  Diet: Regular  Discharge Plan: [facility] and rehab facility today  Edited by: Minh Chew DO at 3/11/2025 1046      Vital Signs:    Temp:  [98.4 °F (36.9 °C)-99.3 °F (37.4 °C)] 98.4 °F (36.9 °C)  Heart Rate:  [72-86] 72  Resp:  [16-18] 16  BP: (111-124)/(70-80) 124/80    Physical Exam:    Constitutional: No acute distress, awake, alert  HENT: NCAT, mucous membranes moist  Respiratory: Clear to auscultation bilaterally, respiratory  effort normal   Cardiovascular: RRR, no murmurs, rubs, or gallops  Gastrointestinal: Positive bowel sounds, soft, nontender, nondistended  Musculoskeletal: No bilateral ankle edema, scars on knees and left hip from surgery  Psychiatric: Appropriate affect, cooperative  Neurologic: Oriented x 3, speech clear  Skin: No rashes  Edited by: Minh Chew DO at 3/11/2025 1042    Pertinent Lab Results:     Results from last 7 days   Lab Units 03/09/25  0608 03/08/25 0447 03/08/25  0018   SODIUM mmol/L 136 137 137   POTASSIUM mmol/L 3.7 4.3 4.2   CHLORIDE mmol/L 100 100 101   CO2 mmol/L 24.8 23.7 25.4   BUN mg/dL 9 13 11   CREATININE mg/dL 0.86 0.98 1.03   CALCIUM mg/dL 8.4* 8.9 8.8   BILIRUBIN mg/dL  --   --  0.5   ALK PHOS U/L  --   --  45   ALT (SGPT) U/L  --   --  42*   AST (SGOT) U/L  --   --  37   GLUCOSE mg/dL 127* 134* 114*     Results from last 7 days   Lab Units 03/09/25  0608 03/08/25 0447 03/08/25  0018   WBC 10*3/mm3 9.13 12.67* 11.12*   HEMOGLOBIN g/dL 15.3 16.4 16.1   HEMATOCRIT % 42.7 46.6 45.1   PLATELETS 10*3/mm3 112*  --  144                                   Pertinent Radiology Results:    Imaging Results (All)       Procedure Component Value Units Date/Time    CT Pelvis Without Contrast [[id number]] Collected: 03/07/25 2344     Updated: 03/08/25 0144    Addenda:        ADDENDUM REPORT    ADDENDUM:  Addendum:    Error in the report. Fractures are both left-sided.    Findings:    Acute nondisplaced fracture of the inferior left pubic ramus.    Acute nondisplaced intra-articular fracture of the anterior inferior left   acetabulum/root of the superior pubic ramus on axial series 2 image 55-58.    No acute right hip fracture or dislocation. Moderate to severe right hip   arthritis    No acute left hip fracture or dislocation. Moderate to severe left hip   arthritis    Prior ORIF for left femur fracture.    Degenerative disease at L4-5 with suspected severe canal and foraminal   stenosis.     Degenerative disease at L5-S1 with mild canal narrowing and severe   bilateral foraminal stenosis.    Impression:    1. Acute nondisplaced fracture of the inferior left pubic ramus.    2. Acute nondisplaced intra-articular fracture of the anterior inferior   left acetabulum/root of the superior pubic ramus.    3. No acute hip fracture or dislocation.    Authenticated and Electronically Signed by Joann Roberto MD  on 03/08/2025 01:36:49 AM  Signed: 03/08/25 0136 by Joann Roberto MD    Narrative:      FINAL REPORT    TECHNIQUE:  null    CLINICAL HISTORY:  s/p fall, left hip pain, unable to bear weight, possible pelvic  fracture on Xray    COMPARISON:  null    FINDINGS:  Exam: CT pelvis without contrast    Comparison: None    Clinical history: Status post fall, left hip pain unable to bear weight.    Findings:    Acute nondisplaced fracture of the inferior left pubic ramus.    Acute nondisplaced intra-articular fracture of the anterior inferior right acetabulum/root of the superior pubic ramus on axial series 2 image 55-58.    No acute right hip fracture or dislocation. Moderate to severe right hip arthritis    No acute left hip fracture or dislocation. Moderate to severe left hip arthritis    Prior ORIF for left femur fracture.    Degenerative disease at L4-5 with suspected severe canal and foraminal stenosis.    Degenerative disease at L5-S1 with mild canal narrowing and severe bilateral foraminal stenosis.      Impression:      Impression:    1. Acute nondisplaced fracture of the inferior left pubic ramus.    2. Acute nondisplaced intra-articular fracture of the anterior inferior right acetabulum/root of the superior pubic ramus.    3. No acute hip fracture or dislocation.    Authenticated and Electronically Signed by Joann Roberto MD  on 03/07/2025 11:44:21 PM    XR Hip With or Without Pelvis 2 - 3 View Left [[id number]] Collected: 03/07/25 2339     Updated: 03/07/25 2342    Narrative:      FINAL  REPORT    TECHNIQUE:  null    CLINICAL HISTORY:  left hip injury, fall    COMPARISON:  null    FINDINGS:  Exam: AP pelvis with two-view left hip    Comparison: None    Findings:    No acute pelvic fracture.    No acute right hip fracture or dislocation.    Prior ORIF for left femur fracture. No acute left hip fracture or dislocation.    Moderate bilateral hip arthritis.    Degenerative disease in the lower lumbar spine.    No acute fracture or dislocation.    No radiodense foreign bodies.      Impression:      Impression:    1. No acute pelvic or hip fracture.    If there is concern for occult fracture, then CT may be considered.    Authenticated and Electronically Signed by Joann Roberto MD  on 03/07/2025 11:39:53 PM            Echo:    Results for orders placed during the hospital encounter of 07/10/24    Adult Transthoracic Echo Complete W/ Cont if Necessary Per Protocol    Interpretation Summary    Left ventricular systolic function is normal. Calculated left ventricular EF = 57.7% Left ventricular ejection fraction appears to be 56 - 60%. Left ventricular diastolic function was normal.    Normal right ventricular size and function.    Mild aortic valve regurgitation is present.    Mild dilation of the aortic root is present. The aortic root measures 3.8 cm.    Condition on Discharge:      Stable.    Code status during the hospital stay:    Code Status and Medical Interventions: CPR (Attempt to Resuscitate); Full Support   Ordered at: 03/09/25 1517     Code Status (Patient has no pulse and is not breathing):    CPR (Attempt to Resuscitate)     Medical Interventions (Patient has pulse or is breathing):    Full Support     Discharge Disposition:    Skilled Nursing Facility (DC - External)    Discharge Medications:       Discharge Medications        New Medications        Instructions Start Date   enoxaparin sodium 60 MG/0.6ML solution prefilled syringe syringe  Commonly known as: LOVENOX   60 mg, Subcutaneous,  Every 12 Hours      HYDROcodone-acetaminophen 7.5-325 MG per tablet  Commonly known as: NORCO   1 tablet, Oral, Every 6 Hours PRN      polyethylene glycol 17 g packet  Commonly known as: MIRALAX   17 g, Oral, Daily, Hold for diarrhea      sodium chloride 0.65 % nasal spray  Commonly known as: Ocean Nasal Spray   1 spray, Nasal, As Needed             Continue These Medications        Instructions Start Date   amLODIPine 10 MG tablet  Commonly known as: NORVASC   10 mg, Oral, Daily      aspirin 81 MG tablet   81 mg, Daily      azelastine 0.1 % nasal spray  Commonly known as: ASTELIN   2 sprays, Nasal, 2 Times Daily      busPIRone 15 MG tablet  Commonly known as: BUSPAR   15 mg, Oral, 2 Times Daily PRN      carvedilol 25 MG tablet  Commonly known as: COREG   25 mg, Oral, 2 Times Daily With Meals      hydroCHLOROthiazide 25 MG tablet   25 mg, Daily      isosorbide mononitrate 30 MG 24 hr tablet  Commonly known as: IMDUR   30 mg, Oral, Daily      levocetirizine 5 MG tablet  Commonly known as: XYZAL   5 mg, Oral, Every Evening      meclizine 25 MG tablet  Commonly known as: ANTIVERT   25 mg, 3 Times Daily PRN      nitroglycerin 0.4 MG SL tablet  Commonly known as: Nitrostat   0.4 mg, Sublingual, Every 5 Minutes PRN, Take no more than 3 doses in 15 minutes.      pantoprazole 40 MG EC tablet  Commonly known as: PROTONIX   40 mg, Daily      pramipexole 0.125 MG tablet  Commonly known as: MIRAPEX   0.125 mg, 3 Times Daily      pravastatin 40 MG tablet  Commonly known as: PRAVACHOL   40 mg, Oral, Nightly      tamsulosin 0.4 MG capsule 24 hr capsule  Commonly known as: FLOMAX   TAKE 1 CAPSULE EVERY NIGHT (DOSE DECREASE DUE TO      VISION DISTURBANCE)      tiZANidine 4 MG tablet  Commonly known as: ZANAFLEX   4 mg, Oral, Nightly             Stop These Medications      dexlansoprazole 60 MG capsule  Commonly known as: Dexilant            Discharge Diet:     Diet Instructions       Advance Diet As Tolerated -Target Diet: regular       Target Diet: regular          Activity at Discharge:       Follow-up Appointments:    Additional Instructions for the Follow-ups that You Need to Schedule       Discharge Follow-up with PCP   As directed       Currently Documented PCP:    Naty Lebron MD    PCP Phone Number:    [phone]     Follow Up Details: 1 week               Follow-up Information       Shon Gaytan MD .    Specialty: Orthopedic Surgery  Contact information:  [address]  [address]  [facility] [address] [phone]               Naty Lebron MD .    Specialty: Family Medicine  Why: 1 week  Contact information:  [address]  [address]  [place] [address]  [phone]                           Future Appointments   Date Time Provider Department Center   5/7/2025 10:00 AM Naty Lebron MD [facility]   8/12/2025  9:15 AM Peter Dawkins MD [facility]     Test Results Pending at Discharge:           Minh Chew DO  03/11/25  10:51 EDT    Time: I spent 45 minutes on this discharge activity which included: face-to-face encounter with the patient, reviewing the data in the system, coordination of the care with the nursing staff as well as consultants, documentation, and entering orders.     Dictated utilizing Dragon dictation.

## 2025-03-11 NOTE — TELEPHONE ENCOUNTER
Fax was received stating that there has been a recall on Carvedilol with certain lot numbers and NDC. Called [facility] and first call was disconnected, called again and number was disconnected. After the third attempted I was able to Speak with [facility] and Pharmacy staff states that there was some lot numbers that was affected and was transferred to another Pharmacist named Piper and she states that there had not been but will double check the lot number dispensed to make sure. Per staff the lot number that was dispensed was not affected by the recall.     Provider line number is [phone]

## 2025-03-11 NOTE — CASE MANAGEMENT/SOCIAL WORK
Case Management Discharge Note                Selected Continued Care - Admitted Since 3/7/2025       Destination Coordination complete.      Service Provider Services Address Phone Fax Patient Preferred    [facility] [address] [address] [phone] --              Durable Medical Equipment    No services have been selected for the patient.                Dialysis/Infusion    No services have been selected for the patient.                Home Medical Care    No services have been selected for the patient.                Therapy    No services have been selected for the patient.                Community Resources    No services have been selected for the patient.                Community & DME    No services have been selected for the patient.                    Transportation Services  Ambulance: [facility]    Final Discharge Disposition Code: 03 - skilled nursing facility (SNF)

## 2025-03-12 DIAGNOSIS — S32.592A CLOSED FRACTURE OF RAMUS OF LEFT PUBIS, INITIAL ENCOUNTER: ICD-10-CM

## 2025-03-12 RX ORDER — HYDROCODONE BITARTRATE AND ACETAMINOPHEN 7.5; 325 MG/1; MG/1
1 TABLET ORAL EVERY 6 HOURS PRN
Qty: 120 TABLET | Refills: 0 | Status: SHIPPED | OUTPATIENT
Start: 2025-03-12

## 2025-03-12 NOTE — TELEPHONE ENCOUNTER
(NEW ADMIT)    RICHARD REQUESTING MED REFILL FOR NORCO 7.5-325 MG.    DIRECTIONS: NORCO 7.5-325 MG 1 TAB PO Q 6 HRS PRN.

## 2025-03-19 ENCOUNTER — NURSING HOME (OUTPATIENT)
Age: 65
End: 2025-03-19
Payer: MEDICARE

## 2025-03-19 VITALS
WEIGHT: 248.6 LBS | BODY MASS INDEX: 39.02 KG/M2 | HEIGHT: 67 IN | HEART RATE: 73 BPM | TEMPERATURE: 97.6 F | OXYGEN SATURATION: 94 % | DIASTOLIC BLOOD PRESSURE: 76 MMHG | RESPIRATION RATE: 16 BRPM | SYSTOLIC BLOOD PRESSURE: 123 MMHG
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
## 2025-03-19 DIAGNOSIS — Z78.9 IMPAIRED MOBILITY AND ADLS: ICD-10-CM

## 2025-03-19 DIAGNOSIS — I10 ESSENTIAL HYPERTENSION: Chronic | ICD-10-CM

## 2025-03-19 DIAGNOSIS — G50.0 TRIGEMINAL NEURALGIA: ICD-10-CM

## 2025-03-19 DIAGNOSIS — F41.1 GENERALIZED ANXIETY DISORDER: Chronic | ICD-10-CM

## 2025-03-19 DIAGNOSIS — Z74.09 IMPAIRED MOBILITY AND ADLS: ICD-10-CM

## 2025-03-19 DIAGNOSIS — S32.592A CLOSED FRACTURE OF RAMUS OF LEFT PUBIS, INITIAL ENCOUNTER: Chronic | ICD-10-CM

## 2025-03-19 DIAGNOSIS — E78.5 DYSLIPIDEMIA: Chronic | ICD-10-CM

## 2025-03-19 DIAGNOSIS — K21.9 GERD WITHOUT ESOPHAGITIS: Chronic | ICD-10-CM

## 2025-03-19 DIAGNOSIS — N40.1 BENIGN NON-NODULAR PROSTATIC HYPERPLASIA WITH LOWER URINARY TRACT SYMPTOMS: ICD-10-CM

## 2025-03-19 DIAGNOSIS — R53.81 PHYSICAL DECONDITIONING: ICD-10-CM

## 2025-03-19 PROBLEM — I25.10 ATHEROSCLEROSIS OF NATIVE CORONARY ARTERY OF NATIVE HEART WITHOUT ANGINA PECTORIS: Chronic | Status: ACTIVE | Noted: 2019-06-24

## 2025-03-19 NOTE — PROGRESS NOTES
Nursing Home History/Physical        Florentin Benson DO [x]   MARJORIE Dowling []  [address] [facility], [place] [address]  Phone: [phone]  Fax: [phone] Kraig Plascencia MD []  Sony Hardy DO []  [address] [place] [address]  Phone: [phone]  Fax: [phone]     PATIENT NAME: Chris Mejia                                                                          YOB: 1960           DATE OF SERVICE: 03/19/2025  FACILITY:  []  [facility]  []  [place]   [x]  [facility]   [] [facility]    [] Other ______________________________________________________________________     CHIEF COMPLAINT:  Fall with significant injury/closed left pubic rami fracture/GERD/hypertension/dyslipidemia/BPH with LUTS/trigeminal neuralgia/generalized anxiety disorder/impaired mobility and ADLs/physical deconditioning/coronary atherosclerotic disease      HISTORY OF PRESENT ILLNESS:      [x]  Initial visit for coordination of rehabilitative care issues and chronic medical management needs.    I have reviewed labs/imaging/records from this hospitalization, including ER staff and admitting/attending physicians H/P's and progress notes to establish a comprehensive understanding of this patient's clinical hospital course, as well as to establish a transition of care appropriately.    Admission Date:  3/7/2025  Date of Discharge:  3/11/2025  Primary Care Physician:  Naty Lebron MD     Important issues to note:     Start: norco, lovenox, ocean spray  Stop: dexilant  Follow up: PCP and Ortho  Brief Summary: Presented with pubic fracture due to fall. Initially had PT/OT which recommended STR which was arranged at [place].      Discharge Diagnoses:      Status post fall at home.  Left inferior pubic ramus fracture, POA.  Essential hypertension.  Coronary artery disease.  Gastroesophageal reflux disease.  Benign prostatic hyperplasia.        Problem List:            Active Hospital Problems      Diagnosis   POA    **Pubic ramus fracture [S32.599A]   Yes       Resolved Hospital Problems   No resolved problems to display.      Presenting Problem:         Chief Complaint   Patient presents with    Fall    Hip Pain                 Consults:      Consulting Physician(s)           Provider   Role Specialty      Shon Gaytan MD       Consulting Physician Orthopedic Surgery             Procedures Performed:                 3/11/25: previous provider documentation reviewed.        History of presenting illness:        Chris Mejia is a 65-year-old male with a past medical history of hypertension, coronary artery disease, anxiety, GERD, hyperlipidemia and BPH presenting with left hip pain pain.  Patient states, he got up to go to the bathroom, turned around too quickly and lost his balance.  Unfortunately patient fell landing on his left hip.  At this time, he is complaining of severe left hip pain.  He describes the pain as sharp and nonradiating.  He does have prior history of left hip and bilateral knee surgery and Dr. Gaytan has been his previous orthopedic surgeon.     In ED, blood pressure 136/89, heart rate 78, respiratory rate 18, temperature 98.2 and O2 saturation 93% on room air.  Labs on arrival showed sodium 137, potassium 4.2, BUN 11, creatinine 1.03, WBC 11.1, hemoglobin 16.1 and platelet count 144.  CT of abdomen and pelvis showed Acute nondisplaced fracture of the inferior left pubic ramus. Acute nondisplaced intra-articular fracture of the anterior inferior left acetabulum/root of the superior pubic ramus. No acute hip fracture or dislocation.     Patient was admitted to the medical floor and was seen by Dr. Gaytan who recommended conservative management with weightbearing as tolerated, physical therapy and outpatient follow-up with orthopedic services.  Patient was seen by physical therapy and was able to walk using a walker.  Case management was consulted for short-term rehab  placement.  Edited by: Minh Chew DO at 3/11/2025 1042  Pubic ramus fracture        Hospital Course:        Left inferior pubic ramus fracture.  - Seen by Dr. Gaytan who recommended conservative management with weightbearing as tolerated.  - Continue physical and occupational therapy.  - Was on morphine inpatient I will place him on norco for pain.  - Continue Zanaflex nightly from home medications.     Essential hypertension/CAD.  - Continue aspirin, amlodipine, carvedilol, Imdur, hydrochlorothiazide and pravastatin.     BPH.  - Continue tamsulosin.     Discussed with the patient's wife Diana who is at the bedside.  Discussed with nursing staff and multidisciplinary team.        DVT Prophylaxis: Enoxaparin for 30 days  Code Status: Full  Diet: Regular  Discharge Plan: [facility] and rehab facility today    PAST MEDICAL & SURGICAL HISTORY:   Past Medical History:   Diagnosis Date    Allergic rhinitis     Anxiety     Arthritis     Balance problem     Carpal tunnel syndrome     Cluster headache     Coronary artery disease     Developmental delay     Diabetes mellitus     Noted by PCP    Difficulty walking     Dizziness     Dysphagia     Enlarged prostate     GERD (gastroesophageal reflux disease)     Gout     Hip fracture, left     History of cardiovascular stress test 2018    positive for inferior and lateral ischemia     History of echocardiogram     Hyperlipidemia     Hypertension     Impaired mobility     Lymphadenitis     Memory loss     Migraine     Morbid obesity with BMI of 40.0-44.9, adult 08/10/2020    Associated with hypertension, hyperlipidemia       Myocardial infarction 2000    Obesity     DENZEL (obstructive sleep apnea)     oxygen at night- 2LNC    Peptic ulcer     Peptic ulceration     Poor historian     Rheumatoid arthritis     Right shoulder pain     SOB (shortness of breath) on exertion     Spinocerebellar ataxia     Tension headache     Trigeminal neuralgia     Vitamin B 12 deficiency      Vitamin D deficiency       Past Surgical History:   Procedure Laterality Date    CARDIAC CATHETERIZATION      CARDIAC CATHETERIZATION Left 3/14/2018    Procedure: Cardiac Catheterization/Vascular Study;  Surgeon: Adam Perkins MD;  [facility]: [facility] CATH [facility]VASIVE [facility];  Service: Cardiovascular    COLONOSCOPY      COLONOSCOPY N/A 9/13/2019    Procedure: COLONOSCOPY W/ COLD SNARE POLYPECTOMY;  Surgeon: Melba Lopez MD;  Location: [facility] ENDOSCOPY;  Service: Gastroenterology    COLONOSCOPY N/A 9/24/2024    Procedure: COLONOSCOPY;  Surgeon: Melba Lopez MD;  Location: [facility] ENDOSCOPY;  Service: Gastroenterology;  Laterality: N/A;    ELBOW ARTHROPLASTY Bilateral     ENDOSCOPY      ENDOSCOPY N/A 9/24/2024    Procedure: ESOPHAGOGASTRODUODENOSCOPY WITH BIOPSY;  Surgeon: Melba Lopez MD;  Location: [facility] ENDOSCOPY;  Service: Gastroenterology;  Laterality: N/A;    HERNIA REPAIR      x3    HIP HEMIARTHROPLASTY Left     NEUROPLASTY Bilateral     decompression median nerve at carpal tunnel    TOTAL KNEE ARTHROPLASTY Bilateral     2005, 2012         MEDICATIONS:  I have reviewed and reconciled the patients medication list in the patients chart at the skilled nursing facility today.      ALLERGIES:    Allergies   Allergen Reactions    Oxycodone Hallucinations    Topamax [Topiramate] Headache         SOCIAL HISTORY:    Social History     Socioeconomic History    Marital status:    Tobacco Use    Smoking status: Never     Passive exposure: Never    Smokeless tobacco: Never   Vaping Use    Vaping status: Never Used   Substance and Sexual Activity    Alcohol use: Yes     Comment: Rarely    Drug use: No    Sexual activity: Defer       FAMILY HISTORY:    Family History   Problem Relation Age of Onset    Deep vein thrombosis Mother     Diabetes Mother     Hyperlipidemia Mother     Hypertension Mother     Heart attack Mother     Cancer Father     Kidney disease Father     Deep vein thrombosis Daughter      Arthritis Sister     Diabetes Sister     Hyperlipidemia Sister     Hypertension Sister     Osteoporosis Sister     Thyroid disease Sister     Liver disease Sister     Arthritis Brother     Diabetes Brother     Hyperlipidemia Brother     Hypertension Brother     Stroke Brother     Cancer Brother     Heart attack Brother     Mental illness Brother        REVIEW OF SYSTEMS:    Review of Systems  Appetite: Fair []   Good [x]   Poor []   Weight Loss []  [x]  Weight Stable   Unavoidable Weight Loss []  Tolerating Tube Feeding []    Supplements Provided []   Constitutional: Negative for fever, chills, diaphoresis; improving malaise/fatigue.  HENT: No dysphagia; no changes to vision/hearing/smell/taste; no epistaxis  Eyes: Negative for redness and visual disturbance.   Respiratory: Negative for shortness of breath, chest pain, cough or chest tightness.   Cardiovascular: Negative for chest pain and palpitations.   Gastrointestinal: Negative for abdominal distention, abdominal pain and blood in stool.   Endocrine: Negative for cold intolerance and heat intolerance.   Genitourinary: Negative for difficulty urinating, dysuria and frequency.Negative for hematuria   Musculoskeletal: Chronic myalgias and arthralgias, most significant to left hip/pelvis.  Integumentary: No open wounds, rash or concerning skin lesions  Neurological: Negative for syncope; improving generalized weakness.  Hematological: Negative for adenopathy. Does not bruise/bleed easily.   Immunological: Negative for reported allergies or immunological disorders  Psychological: No acute behavioral changes    PHYSICAL EXAMINATION:   VITAL SIGNS:   Vitals:    03/19/25 0910   BP: 123/76   Pulse: 73   Resp: 16   Temp: 97.6 °F (36.4 °C)   SpO2: 94%         Physical Exam  General Appearance:  [x]  Alert   [x]  Oriented x person  [x]  No acute distress     []  Confused  []  Disoriented   []  Comatose   Head:  Atraumatic and normocephalic, without obvious abnormality.    Eyes:         PERRLA, conjunctivae and sclerae normal, no Icterus. No pallor. Extra-occular movements are within normal limits.   Ears:  Ears appear intact with no abnormalities noted.   Throat: No oral lesions, no thrush, oral mucosa moist.   Neck: Supple, trachea midline, no thyromegaly, no carotid bruit.   Back:   No kyphoscoliosis. No tenderness to palpation.   Lungs:   Chest shape is normal. Breath sounds heard bilaterally equally.  No wheezing.  Audible air exchange noted all lung fields.   Heart:  Normal S1 and S2, no murmur, no gallop, no rub. No JVD.   Abdomen:   Normal bowel sounds, no masses, no organomegaly. Soft, non-tender, non-distended, no guarding    Extremities: Moves all extremities; less so to the left lower extremity.  Without edema, cyanosis or clubbing.  Poor core strength/stability.    Pulses: Pulses palpable and equal bilaterally.   Skin: No bleeding or rash.  Generalized dry skin noted.  Age-related atrophy of skin.   Neurologic: [x] Normal speech []  Normal mental status    [x] Cranial nerves II through XII intact   [x]  No anosmia [x]  DTR 2+ [x]  Proprioception intact  [x]  No focal motor/sensory deficits      Psych/Mood:                    [x]  No acute changes []  Depressed      Urinary:      [x]  Continent  []  Incontinent  []  Retention  []  F/C     []  UTI w/treatment in progress      ASSESSMENT     Diagnoses and all orders for this visit:    1. Fall with significant injury, initial encounter (Primary)    2. Closed fracture of ramus of left pubis, initial encounter    3. GERD without esophagitis    4. Essential hypertension    5. Dyslipidemia    6. Benign non-nodular prostatic hyperplasia with lower urinary tract symptoms    7. Trigeminal neuralgia    8. Generalized anxiety disorder    9. Impaired mobility and ADLs    10. Physical deconditioning        PLAN  Planned utilization of skilled nursing facility services to aid in mobilization/transfers, fall precautions in place given  his impaired weightbearing status and generalized mobility.  Continue PT/OT.  Keep scheduled follow-up appoint with orthopedic surgery.  Anticoagulation due to limited weightbearing status for 30-day duration as per discharge planning from hospital.    Pain appears clinically well-controlled, currently utilizing Norco.  Continue as needed muscle relaxant.    Vital signs demonstrate hemodynamic stability, blood pressure is at goal.  Demonstrates no findings of unstable angina.    Discussed need for stress/anxiety reducing techniques such as prayer/meditation/breathing and counting exercises and avoidance of stress producing environments/situations; will follow clinically.  Continue as needed anxiolytic when needed.    Continue dietary//to modifications to aid in symptom management of chronic GERD.    Surveillance labs as per routine/need        [x]  Discussed Patient in detail with nursing/staff, addressed all needs today.     [x]  Plan of Care Reviewed   [x]  PT/OT Reviewed   []  Order Changes  [x]  Discharge Plans Reviewed  []  Code Status Change        I spent 60 minutes caring for Chris on this date of service. This time includes time spent by me in the following activities:preparing for the visit, reviewing tests, obtaining and/or reviewing a separately obtained history, performing a medically appropriate examination and/or evaluation , counseling and educating the patient/family/caregiver, ordering medications, tests, or procedures, documenting information in the medical record, and care coordination    Florentin Benson   03/19/2025

## 2025-03-21 ENCOUNTER — TELEPHONE (OUTPATIENT)
Dept: INTERNAL MEDICINE | Facility: CLINIC | Age: 65
End: 2025-03-21
Payer: MEDICARE

## 2025-03-21 NOTE — TELEPHONE ENCOUNTER
Caller: Chris Mejia    Relationship: Self    Best call back number: [phone]     What form or medical record are you requesting: J[employer] D[employer]Y EXCUSE    Who is requesting this form or medical record from you: DISTRICT COURT    How would you like to receive the form or medical records (pick-up, mail, fax):   If fax, what is the fax number:   If mail, what is the address:   If pick-up, provide patient with address and location details    Timeframe paperwork needed: AS SOON AS POSSIBLE    Additional notes: PATIENT GOT ANOTHER LETTER FROM DISTRICT COURT STATING THAT HIS REQUEST TO BE EXCUSE WAS DENIED.  CAN DR HANNA WRITE ANOTHER LETTER TO THE FACT THAT HE HAS BROKEN HIS HIP?

## 2025-04-01 ENCOUNTER — TELEPHONE (OUTPATIENT)
Dept: INTERNAL MEDICINE | Facility: CLINIC | Age: 65
End: 2025-04-01
Payer: MEDICARE

## 2025-04-01 NOTE — TELEPHONE ENCOUNTER
Pt would like to be worked in if possible this week with Dr Lebron or Tova for a persistent cough. He has had it for over a month. Please advise.

## 2025-04-02 ENCOUNTER — CLINICAL SUPPORT (OUTPATIENT)
Dept: INTERNAL MEDICINE | Facility: CLINIC | Age: 65
End: 2025-04-02
Payer: MEDICARE

## 2025-04-02 DIAGNOSIS — E53.8 VITAMIN B12 DEFICIENCY: Primary | ICD-10-CM

## 2025-04-02 RX ADMIN — CYANOCOBALAMIN 1000 MCG: 1000 INJECTION, SOLUTION INTRAMUSCULAR; SUBCUTANEOUS at 16:15

## 2025-04-02 NOTE — TELEPHONE ENCOUNTER
I had a cancellation this afternoon. Time is earlier than wife, will have patients to see between them but can get him in.

## 2025-04-03 NOTE — TELEPHONE ENCOUNTER
Had a cancellation today. 11:30. Do they want him to be seen? If not it's okay, just held spot to be cautious.

## 2025-04-18 DIAGNOSIS — N40.1 BENIGN NON-NODULAR PROSTATIC HYPERPLASIA WITH LOWER URINARY TRACT SYMPTOMS: ICD-10-CM

## 2025-04-18 DIAGNOSIS — F41.9 ANXIETY: ICD-10-CM

## 2025-04-18 DIAGNOSIS — R33.9 URINARY RETENTION: ICD-10-CM

## 2025-04-18 RX ORDER — TAMSULOSIN HYDROCHLORIDE 0.4 MG/1
CAPSULE ORAL
Qty: 90 CAPSULE | Refills: 3 | Status: SHIPPED | OUTPATIENT
Start: 2025-04-18

## 2025-04-18 RX ORDER — BUSPIRONE HYDROCHLORIDE 15 MG/1
15 TABLET ORAL 2 TIMES DAILY PRN
Qty: 180 TABLET | Refills: 1 | Status: SHIPPED | OUTPATIENT
Start: 2025-04-18

## 2025-04-22 ENCOUNTER — TRANSCRIBE ORDERS (OUTPATIENT)
Dept: GENERAL RADIOLOGY | Facility: HOSPITAL | Age: 65
End: 2025-04-22
Payer: MEDICARE

## 2025-04-22 ENCOUNTER — HOSPITAL ENCOUNTER (OUTPATIENT)
Dept: GENERAL RADIOLOGY | Facility: HOSPITAL | Age: 65
Discharge: HOME | End: 2025-04-22
Admitting: ORTHOPAEDIC SURGERY
Payer: MEDICARE

## 2025-04-22 DIAGNOSIS — S32.810A: ICD-10-CM

## 2025-04-22 DIAGNOSIS — S32.810A: Primary | ICD-10-CM

## 2025-04-22 PROCEDURE — 72170 X-RAY EXAM OF PELVIS: CPT

## 2025-04-25 ENCOUNTER — EXTERNAL PBMM DATA (OUTPATIENT)
Dept: PHARMACY | Facility: OTHER | Age: 65
End: 2025-04-25
Payer: MEDICARE

## 2025-05-06 ENCOUNTER — EXTERNAL PBMM DATA (OUTPATIENT)
Dept: PHARMACY | Facility: OTHER | Age: 65
End: 2025-05-06
Payer: MEDICARE

## 2025-05-07 ENCOUNTER — OFFICE VISIT (OUTPATIENT)
Dept: INTERNAL MEDICINE | Facility: CLINIC | Age: 65
End: 2025-05-07
Payer: MEDICARE

## 2025-05-07 VITALS
HEART RATE: 70 BPM | TEMPERATURE: 97.9 F | DIASTOLIC BLOOD PRESSURE: 76 MMHG | HEIGHT: 67 IN | SYSTOLIC BLOOD PRESSURE: 128 MMHG | WEIGHT: 249.4 LBS | OXYGEN SATURATION: 98 % | BODY MASS INDEX: 39.15 KG/M2

## 2025-05-07 DIAGNOSIS — I10 ESSENTIAL HYPERTENSION: Primary | ICD-10-CM

## 2025-05-07 DIAGNOSIS — E53.8 VITAMIN B12 DEFICIENCY: ICD-10-CM

## 2025-05-07 DIAGNOSIS — G89.4 CHRONIC PAIN SYNDROME: ICD-10-CM

## 2025-05-07 RX ORDER — POLYETHYLENE GLYCOL 3350 17 G/17G
17 POWDER, FOR SOLUTION ORAL EVERY 24 HOURS
COMMUNITY
Start: 2025-03-12

## 2025-05-07 RX ADMIN — CYANOCOBALAMIN 1000 MCG: 1000 INJECTION, SOLUTION INTRAMUSCULAR; SUBCUTANEOUS at 10:17

## 2025-05-07 NOTE — PROGRESS NOTES
"Chief Complaint  Hypertension    Subjective        Chris Mejia presents to [facility]  History of Present Illness  Chronic pain  Has tried gummies from child and have helped pain more than opiates did in past  Blood pressure stable        Objective   Vital Signs:  /76   Pulse 70   Temp 97.9 °F (36.6 °C)   Ht 170.2 cm (67\")   Wt 113 kg (249 lb 6.4 oz)   SpO2 98%   BMI 39.06 kg/m²   Estimated body mass index is 39.06 kg/m² as calculated from the following:    Height as of this encounter: 170.2 cm (67\").    Weight as of this encounter: 113 kg (249 lb 6.4 oz).          Physical Exam  Vitals and nursing note reviewed.   Constitutional:       General: He is not in acute distress.     Appearance: Normal appearance. He is well-developed and well-groomed. He is obese. He is not ill-appearing, toxic-appearing or diaphoretic.   HENT:      Head: Normocephalic and atraumatic.      Right Ear: Hearing normal.      Left Ear: Hearing normal.   Eyes:      General: Lids are normal. No scleral icterus.        Right eye: No discharge.         Left eye: No discharge.      Extraocular Movements: Extraocular movements intact.   Cardiovascular:      Rate and Rhythm: Normal rate and regular rhythm.   Pulmonary:      Effort: Pulmonary effort is normal.   Musculoskeletal:      Cervical back: Neck supple.   Skin:     Coloration: Skin is not jaundiced or pale.   Neurological:      General: No focal deficit present.      Mental Status: He is alert and oriented to person, place, and time.      Gait: Gait abnormal.   Psychiatric:         Attention and Perception: Attention and perception normal.         Mood and Affect: Mood and affect normal.         Speech: Speech normal.         Behavior: Behavior normal. Behavior is cooperative.         Thought Content: Thought content normal.         Cognition and Memory: Cognition and memory normal.         Judgment: Judgment normal.          Result Review " :  The following data was reviewed by: Naty Lebron MD on 04/16/2025:  XR Pelvis 1 or 2 View (04/22/2025 09:45)            Assessment and Plan   Diagnoses and all orders for this visit:    1. Essential hypertension (Primary)  Assessment & Plan:  Hypertension is stable and controlled  Continue current treatment regimen.  Blood pressure will be reassessed in 3 months.      2. Vitamin B12 deficiency  Assessment & Plan:  Vitamin B12 improved with injections.  Methylmalonic acid normal last check, continue current frequency  Failed oral replacement      3. Chronic pain syndrome  Assessment & Plan:  Patient wishes to discuss [place] medical cannabis further next visit. Aware no dispensaries open yet. Aware of need for urine drug screen and [employer] cannabis consent. Encouraged patient/wife to review [employer] web site.                I spent 35  minutes caring for Chris on this date of service. This time includes time spent by me in the following activities:preparing for the visit, reviewing tests, obtaining and/or reviewing a separately obtained history, performing a medically appropriate examination and/or evaluation , counseling and educating the patient/family/caregiver, and documenting information in the medical record    Follow Up   Return in about 3 months (around 8/10/2025) for Medicare Wellness, Blood Pressure follow up.  Patient was given instructions and counseling regarding his condition or for health maintenance advice. Please see specific information pulled into the AVS if appropriate.

## 2025-05-07 NOTE — ASSESSMENT & PLAN NOTE
Patient wishes to discuss [place] medical cannabis further next visit. Aware no dispensaries open yet. Aware of need for urine drug screen and [employer] cannabis consent. Encouraged patient/wife to review [place] cannabis web site.

## 2025-05-12 DIAGNOSIS — J30.2 SEASONAL ALLERGIES: ICD-10-CM

## 2025-05-12 RX ORDER — LEVOCETIRIZINE DIHYDROCHLORIDE 5 MG/1
5 TABLET, FILM COATED ORAL EVERY EVENING
Qty: 90 TABLET | Refills: 3 | Status: SHIPPED | OUTPATIENT
Start: 2025-05-12

## 2025-06-09 RX ORDER — CARVEDILOL 25 MG/1
25 TABLET ORAL 2 TIMES DAILY WITH MEALS
Qty: 180 TABLET | Refills: 3 | Status: SHIPPED | OUTPATIENT
Start: 2025-06-09

## 2025-06-14 DIAGNOSIS — J30.2 SEASONAL ALLERGIES: ICD-10-CM

## 2025-06-14 RX ORDER — AZELASTINE HYDROCHLORIDE 137 UG/1
SPRAY, METERED NASAL
Qty: 90 ML | Refills: 3 | Status: SHIPPED | OUTPATIENT
Start: 2025-06-14

## 2025-07-03 ENCOUNTER — CLINICAL SUPPORT (OUTPATIENT)
Dept: INTERNAL MEDICINE | Facility: CLINIC | Age: 65
End: 2025-07-03
Payer: MEDICARE

## 2025-07-03 DIAGNOSIS — E53.8 B12 DEFICIENCY: Primary | ICD-10-CM

## 2025-07-03 PROCEDURE — 96372 THER/PROPH/DIAG INJ SC/IM: CPT | Performed by: FAMILY MEDICINE

## 2025-07-03 RX ADMIN — CYANOCOBALAMIN 1000 MCG: 1000 INJECTION, SOLUTION INTRAMUSCULAR; SUBCUTANEOUS at 15:44

## 2025-07-23 DIAGNOSIS — I10 ESSENTIAL HYPERTENSION: Chronic | ICD-10-CM

## 2025-07-23 RX ORDER — AMLODIPINE BESYLATE 10 MG/1
TABLET ORAL
Qty: 90 TABLET | Refills: 3 | Status: SHIPPED | OUTPATIENT
Start: 2025-07-23

## 2025-07-28 RX ORDER — ISOSORBIDE MONONITRATE 30 MG/1
30 TABLET, EXTENDED RELEASE ORAL DAILY
Qty: 90 TABLET | Refills: 3 | Status: SHIPPED | OUTPATIENT
Start: 2025-07-28

## 2025-08-04 ENCOUNTER — TELEPHONE (OUTPATIENT)
Dept: CARDIOLOGY | Facility: CLINIC | Age: 65
End: 2025-08-04
Payer: MEDICARE

## 2025-08-15 ENCOUNTER — OFFICE VISIT (OUTPATIENT)
Dept: INTERNAL MEDICINE | Facility: CLINIC | Age: 65
End: 2025-08-15
Payer: MEDICARE

## 2025-08-15 VITALS
TEMPERATURE: 97.2 F | WEIGHT: 259.8 LBS | HEART RATE: 80 BPM | DIASTOLIC BLOOD PRESSURE: 72 MMHG | HEIGHT: 67 IN | BODY MASS INDEX: 40.78 KG/M2 | OXYGEN SATURATION: 95 % | SYSTOLIC BLOOD PRESSURE: 124 MMHG

## 2025-08-15 DIAGNOSIS — R60.0 BILATERAL LOWER EXTREMITY EDEMA: ICD-10-CM

## 2025-08-15 DIAGNOSIS — Z00.01 ENCOUNTER FOR MEDICARE ANNUAL EXAMINATION WITH ABNORMAL FINDINGS: ICD-10-CM

## 2025-08-15 DIAGNOSIS — Z12.5 PROSTATE CANCER SCREENING: ICD-10-CM

## 2025-08-15 DIAGNOSIS — E55.9 VITAMIN D DEFICIENCY: ICD-10-CM

## 2025-08-15 DIAGNOSIS — R73.9 HYPERGLYCEMIA: ICD-10-CM

## 2025-08-15 DIAGNOSIS — Z00.00 MEDICARE ANNUAL WELLNESS VISIT, SUBSEQUENT: Primary | ICD-10-CM

## 2025-08-15 DIAGNOSIS — E78.5 DYSLIPIDEMIA: Chronic | ICD-10-CM

## 2025-08-15 DIAGNOSIS — I10 ESSENTIAL HYPERTENSION: ICD-10-CM

## 2025-08-15 DIAGNOSIS — R06.02 SOB (SHORTNESS OF BREATH): ICD-10-CM

## 2025-08-15 DIAGNOSIS — R79.9 ABNORMAL BLOOD CHEMISTRY: ICD-10-CM

## 2025-08-15 DIAGNOSIS — G47.33 OSA (OBSTRUCTIVE SLEEP APNEA): ICD-10-CM

## 2025-08-15 DIAGNOSIS — Z51.81 MEDICATION MONITORING ENCOUNTER: ICD-10-CM

## 2025-08-15 DIAGNOSIS — Z91.81 AT HIGH RISK FOR FALLS: ICD-10-CM

## 2025-08-15 DIAGNOSIS — E53.8 VITAMIN B12 DEFICIENCY: ICD-10-CM

## 2025-08-15 DIAGNOSIS — G47.34 NOCTURNAL HYPOXIA: ICD-10-CM

## 2025-08-15 PROBLEM — I67.9 CEREBROVASCULAR DISEASE: Status: ACTIVE | Noted: 2025-03-11

## 2025-08-15 PROBLEM — G25.81 RESTLESS LEGS: Status: ACTIVE | Noted: 2025-03-11

## 2025-08-15 PROBLEM — M51.369 DEGENERATION OF LUMBAR INTERVERTEBRAL DISC: Status: ACTIVE | Noted: 2025-03-11

## 2025-08-15 PROBLEM — R26.81 UNSTEADINESS ON FEET: Status: ACTIVE | Noted: 2025-03-11

## 2025-08-15 PROBLEM — R41.841 COGNITIVE COMMUNICATION DISORDER: Status: ACTIVE | Noted: 2025-03-11

## 2025-08-15 PROBLEM — M48.061 SPINAL STENOSIS OF LUMBAR REGION: Status: ACTIVE | Noted: 2025-03-11

## 2025-08-15 PROBLEM — I69.319 RESIDUAL COGNITIVE DEFICIT AS LATE EFFECT OF CEREBROVASCULAR ACCIDENT: Status: ACTIVE | Noted: 2025-03-11

## 2025-08-15 RX ADMIN — CYANOCOBALAMIN 1000 MCG: 1000 INJECTION, SOLUTION INTRAMUSCULAR; SUBCUTANEOUS at 10:34

## 2025-08-16 LAB
25(OH)D3+25(OH)D2 SERPL-MCNC: 15 NG/ML (ref 30–100)
ALBUMIN SERPL-MCNC: 4.2 G/DL (ref 3.9–4.9)
ALP SERPL-CCNC: 53 IU/L (ref 44–121)
ALT SERPL-CCNC: 31 IU/L (ref 0–44)
AST SERPL-CCNC: 30 IU/L (ref 0–40)
BILIRUB SERPL-MCNC: 0.6 MG/DL (ref 0–1.2)
BNP SERPL-MCNC: 43.6 PG/ML (ref 0–100)
BUN SERPL-MCNC: 13 MG/DL (ref 8–27)
BUN/CREAT SERPL: 13 (ref 10–24)
CALCIUM SERPL-MCNC: 9.3 MG/DL (ref 8.6–10.2)
CHLORIDE SERPL-SCNC: 102 MMOL/L (ref 96–106)
CHOLEST SERPL-MCNC: 109 MG/DL (ref 100–199)
CO2 SERPL-SCNC: 22 MMOL/L (ref 20–29)
CREAT SERPL-MCNC: 1 MG/DL (ref 0.76–1.27)
EGFRCR SERPLBLD CKD-EPI 2021: 82 ML/MIN/1.73
ERYTHROCYTE [DISTWIDTH] IN BLOOD BY AUTOMATED COUNT: 13 % (ref 11.6–15.4)
FOLATE SERPL-MCNC: 7.9 NG/ML
GLOBULIN SER CALC-MCNC: 3.3 G/DL (ref 1.5–4.5)
GLUCOSE SERPL-MCNC: 89 MG/DL (ref 70–99)
HBA1C MFR BLD: 5.6 % (ref 4.8–5.6)
HCT VFR BLD AUTO: 48.3 % (ref 37.5–51)
HDLC SERPL-MCNC: 35 MG/DL
HGB BLD-MCNC: 16.4 G/DL (ref 13–17.7)
LDLC SERPL CALC-MCNC: 54 MG/DL (ref 0–99)
MCH RBC QN AUTO: 33.6 PG (ref 26.6–33)
MCHC RBC AUTO-ENTMCNC: 34 G/DL (ref 31.5–35.7)
MCV RBC AUTO: 99 FL (ref 79–97)
PLATELET # BLD AUTO: 182 X10E3/UL (ref 150–450)
POTASSIUM SERPL-SCNC: 4.1 MMOL/L (ref 3.5–5.2)
PROT SERPL-MCNC: 7.5 G/DL (ref 6–8.5)
PSA SERPL-MCNC: 3.3 NG/ML (ref 0–4)
RBC # BLD AUTO: 4.88 X10E6/UL (ref 4.14–5.8)
SODIUM SERPL-SCNC: 142 MMOL/L (ref 134–144)
TRIGL SERPL-MCNC: 108 MG/DL (ref 0–149)
TSH SERPL DL<=0.005 MIU/L-ACNC: 2.62 UIU/ML (ref 0.45–4.5)
URATE SERPL-MCNC: 7.2 MG/DL (ref 3.8–8.4)
VIT B12 SERPL-MCNC: >2000 PG/ML (ref 232–1245)
VLDLC SERPL CALC-MCNC: 20 MG/DL (ref 5–40)
WBC # BLD AUTO: 6.1 X10E3/UL (ref 3.4–10.8)

## 2025-08-17 DIAGNOSIS — K21.9 GASTROESOPHAGEAL REFLUX DISEASE WITHOUT ESOPHAGITIS: ICD-10-CM

## 2025-08-18 RX ORDER — DEXLANSOPRAZOLE 60 MG/1
1 CAPSULE, DELAYED RELEASE ORAL DAILY
Qty: 90 CAPSULE | Refills: 3 | OUTPATIENT
Start: 2025-08-18

## 2025-08-29 ENCOUNTER — OFFICE VISIT (OUTPATIENT)
Dept: CARDIOLOGY | Facility: CLINIC | Age: 65
End: 2025-08-29
Payer: MEDICARE

## 2025-08-29 VITALS
DIASTOLIC BLOOD PRESSURE: 70 MMHG | SYSTOLIC BLOOD PRESSURE: 110 MMHG | OXYGEN SATURATION: 98 % | HEIGHT: 67 IN | WEIGHT: 255 LBS | HEART RATE: 63 BPM | BODY MASS INDEX: 40.02 KG/M2

## 2025-08-29 DIAGNOSIS — I10 ESSENTIAL HYPERTENSION: Chronic | ICD-10-CM

## 2025-08-29 DIAGNOSIS — R60.0 BILATERAL LOWER EXTREMITY EDEMA: ICD-10-CM

## 2025-08-29 DIAGNOSIS — E66.9 OBESITY WITH BODY MASS INDEX (BMI) OF 30.0 TO 39.9: ICD-10-CM

## 2025-08-29 DIAGNOSIS — E78.5 DYSLIPIDEMIA: Chronic | ICD-10-CM

## 2025-08-29 DIAGNOSIS — I25.10 ATHEROSCLEROSIS OF NATIVE CORONARY ARTERY OF NATIVE HEART WITHOUT ANGINA PECTORIS: Primary | Chronic | ICD-10-CM

## 2025-08-29 PROCEDURE — 1159F MED LIST DOCD IN RCRD: CPT | Performed by: NURSE PRACTITIONER

## 2025-08-29 PROCEDURE — 3074F SYST BP LT 130 MM HG: CPT | Performed by: NURSE PRACTITIONER

## 2025-08-29 PROCEDURE — 99214 OFFICE O/P EST MOD 30 MIN: CPT | Performed by: NURSE PRACTITIONER

## 2025-08-29 PROCEDURE — 3078F DIAST BP <80 MM HG: CPT | Performed by: NURSE PRACTITIONER

## 2025-08-29 PROCEDURE — 1160F RVW MEDS BY RX/DR IN RCRD: CPT | Performed by: NURSE PRACTITIONER

## 2025-08-29 RX ORDER — DEXLANSOPRAZOLE 60 MG/1
CAPSULE, DELAYED RELEASE ORAL
COMMUNITY
Start: 2025-05-12

## (undated) DEVICE — GW PRESS VERRATA STR 185CM

## (undated) DEVICE — CATH DIAG EXPO .045 FL3  5F 100CM

## (undated) DEVICE — LUBE JELLY PK/2.75GM STRL BX/144

## (undated) DEVICE — YANKAUER,BULB TIP, NO VENT: Brand: ARGYLE

## (undated) DEVICE — GLV SURG SENSICARE W/ALOE PF LF 6.5 STRL

## (undated) DEVICE — SYR LUER SLPTP 50ML

## (undated) DEVICE — SNAR POLYP SENSATION STDOVL 27 240 BX40

## (undated) DEVICE — Device

## (undated) DEVICE — SUCTION CANISTER, 1500CC, RIGID: Brand: DEROYAL

## (undated) DEVICE — Device: Brand: DEFENDO AIR/WATER/SUCTION AND BIOPSY VALVE

## (undated) DEVICE — DEV COMP RAD PRELUDESYNC 24CM

## (undated) DEVICE — VLV SXN AIR/H2O ORCAPOD3 1P/U STRL

## (undated) DEVICE — QUICK CATCH IN-LINE SUCTION POLYP TRAP IS USED FOR SUCTION RETRIEVAL OF ENDOSCOPICALLY REMOVED POLYPS.

## (undated) DEVICE — CONMED SCOPE SAVER BITE BLOCK, 20X27 MM: Brand: SCOPE SAVER

## (undated) DEVICE — CATH DIAG EXPO M/ PK 5F FL4/FR4 PIG

## (undated) DEVICE — GW FC FLOP/TP .035 260CM 3MM

## (undated) DEVICE — GLOVE,SURG,SENSICARE,ALOE,LF,PF,6: Brand: MEDLINE

## (undated) DEVICE — GLIDESHEATH SLENDER STAINLESS STEEL KIT: Brand: GLIDESHEATH SLENDER

## (undated) DEVICE — GLV SURG SENSICARE W/ALOE PF LF SZ6 STRL

## (undated) DEVICE — MEDI-VAC NON-CONDUCTIVE SUCTION TUBING: Brand: CARDINAL HEALTH

## (undated) DEVICE — RADIFOCUS GLIDEWIRE: Brand: GLIDEWIRE